# Patient Record
Sex: FEMALE | Race: WHITE
[De-identification: names, ages, dates, MRNs, and addresses within clinical notes are randomized per-mention and may not be internally consistent; named-entity substitution may affect disease eponyms.]

---

## 2017-06-07 ENCOUNTER — HOSPITAL ENCOUNTER (EMERGENCY)
Dept: HOSPITAL 18 - NAV ERS | Age: 52
Discharge: ANOTHER HOSPITAL | End: 2017-06-07
Payer: COMMERCIAL

## 2017-06-07 DIAGNOSIS — I10: ICD-10-CM

## 2017-06-07 DIAGNOSIS — T37.0X5A: ICD-10-CM

## 2017-06-07 DIAGNOSIS — E78.2: ICD-10-CM

## 2017-06-07 DIAGNOSIS — F31.9: ICD-10-CM

## 2017-06-07 DIAGNOSIS — L29.9: Primary | ICD-10-CM

## 2017-06-07 DIAGNOSIS — F17.210: ICD-10-CM

## 2017-06-07 DIAGNOSIS — E11.9: ICD-10-CM

## 2017-06-07 DIAGNOSIS — I25.10: ICD-10-CM

## 2017-06-07 LAB
ALBUMIN SERPL BCG-MCNC: 3.7 G/DL (ref 3.5–5)
ALP SERPL-CCNC: 86 U/L (ref 40–150)
ALT SERPL W P-5'-P-CCNC: 46 U/L (ref 8–55)
ANION GAP SERPL CALC-SCNC: 17 MMOL/L (ref 10–20)
AST SERPL-CCNC: 40 U/L (ref 5–34)
BASOPHILS # BLD AUTO: 0.1 THOU/UL (ref 0–0.2)
BASOPHILS NFR BLD AUTO: 1.1 % (ref 0–1)
BILIRUB SERPL-MCNC: 0.4 MG/DL (ref 0.2–1.2)
BUN SERPL-MCNC: 10 MG/DL (ref 9.8–20.1)
CALCIUM SERPL-MCNC: 8.6 MG/DL (ref 7.8–10.44)
CHLORIDE SERPL-SCNC: 102 MMOL/L (ref 98–107)
CK MB SERPL-MCNC: 1.2 NG/ML (ref 0–6.6)
CO2 SERPL-SCNC: 21 MMOL/L (ref 22–29)
CREAT CL PREDICTED SERPL C-G-VRATE: 0 ML/MIN (ref 70–130)
EOSINOPHIL # BLD AUTO: 0.2 THOU/UL (ref 0–0.7)
EOSINOPHIL NFR BLD AUTO: 1.4 % (ref 0–10)
GLOBULIN SER CALC-MCNC: 2.8 G/DL (ref 2.4–3.5)
GLUCOSE SERPL-MCNC: 467 MG/DL (ref 70–105)
HGB BLD-MCNC: 14.4 G/DL (ref 12–16)
LYMPHOCYTES # BLD AUTO: 3.8 THOU/UL (ref 1.2–3.4)
LYMPHOCYTES NFR BLD AUTO: 32.9 % (ref 21–51)
MCH RBC QN AUTO: 29.3 PG (ref 27–31)
MCV RBC AUTO: 90.3 FL (ref 81–99)
MONOCYTES # BLD AUTO: 0.7 THOU/UL (ref 0.11–0.59)
MONOCYTES NFR BLD AUTO: 6 % (ref 0–10)
NEUTROPHILS # BLD AUTO: 6.8 THOU/UL (ref 1.4–6.5)
NEUTROPHILS NFR BLD AUTO: 58.6 % (ref 42–75)
PLATELET # BLD AUTO: 126 THOU/UL (ref 130–400)
POTASSIUM SERPL-SCNC: 3.4 MMOL/L (ref 3.5–5.1)
RBC # BLD AUTO: 4.92 MILL/UL (ref 4.2–5.4)
SODIUM SERPL-SCNC: 137 MMOL/L (ref 136–145)
TROPONIN I SERPL DL<=0.01 NG/ML-MCNC: (no result) NG/ML (ref ?–0.03)
WBC # BLD AUTO: 11.6 THOU/UL (ref 4.8–10.8)

## 2017-06-07 PROCEDURE — 82553 CREATINE MB FRACTION: CPT

## 2017-06-07 PROCEDURE — 84484 ASSAY OF TROPONIN QUANT: CPT

## 2017-06-07 PROCEDURE — 93005 ELECTROCARDIOGRAM TRACING: CPT

## 2017-06-07 PROCEDURE — 90471 IMMUNIZATION ADMIN: CPT

## 2017-06-07 PROCEDURE — 85025 COMPLETE CBC W/AUTO DIFF WBC: CPT

## 2017-06-07 PROCEDURE — 80053 COMPREHEN METABOLIC PANEL: CPT

## 2017-06-07 PROCEDURE — 96376 TX/PRO/DX INJ SAME DRUG ADON: CPT

## 2017-06-07 PROCEDURE — 96374 THER/PROPH/DIAG INJ IV PUSH: CPT

## 2017-06-07 PROCEDURE — 96372 THER/PROPH/DIAG INJ SC/IM: CPT

## 2017-06-07 PROCEDURE — 96375 TX/PRO/DX INJ NEW DRUG ADDON: CPT

## 2017-06-07 PROCEDURE — 96361 HYDRATE IV INFUSION ADD-ON: CPT

## 2017-06-07 PROCEDURE — 71010: CPT

## 2017-06-07 NOTE — RAD
PORTABLE CHEST:

6/7/17

 

HISTORY: 

Dyspnea.

 

COMPARISON:  

1/31/17 study.

 

The heart size is enlarged. Mediastinal structures are unremarkable. The lungs are clear of infiltra
jaison. There are postoperative changes of the cervical spine. 

 

IMPRESSION:  

Mild cardiomegaly. 

 

POS: [facility]

## 2017-06-09 ENCOUNTER — HOSPITAL ENCOUNTER (EMERGENCY)
Dept: HOSPITAL 18 - NAV ERS | Age: 52
LOS: 1 days | Discharge: HOME | End: 2017-06-10
Payer: SELF-PAY

## 2017-06-09 DIAGNOSIS — T78.40XA: Primary | ICD-10-CM

## 2017-06-09 DIAGNOSIS — E11.9: ICD-10-CM

## 2017-06-09 DIAGNOSIS — F17.210: ICD-10-CM

## 2017-06-09 DIAGNOSIS — F31.9: ICD-10-CM

## 2017-06-09 DIAGNOSIS — I10: ICD-10-CM

## 2017-06-09 DIAGNOSIS — E78.2: ICD-10-CM

## 2017-06-09 PROCEDURE — S0028 INJECTION, FAMOTIDINE, 20 MG: HCPCS

## 2017-06-09 PROCEDURE — 96375 TX/PRO/DX INJ NEW DRUG ADDON: CPT

## 2017-06-09 PROCEDURE — A4216 STERILE WATER/SALINE, 10 ML: HCPCS

## 2017-06-09 PROCEDURE — 96374 THER/PROPH/DIAG INJ IV PUSH: CPT

## 2017-07-25 ENCOUNTER — HOSPITAL ENCOUNTER (OUTPATIENT)
Dept: HOSPITAL 18 - NAV LAB | Age: 52
Discharge: HOME | End: 2017-07-25
Payer: SELF-PAY

## 2017-07-25 DIAGNOSIS — Z13.820: Primary | ICD-10-CM

## 2017-07-25 DIAGNOSIS — I25.10: ICD-10-CM

## 2017-07-25 DIAGNOSIS — E78.2: ICD-10-CM

## 2017-07-25 DIAGNOSIS — N18.9: ICD-10-CM

## 2017-07-25 DIAGNOSIS — I12.9: ICD-10-CM

## 2017-07-25 DIAGNOSIS — E11.22: ICD-10-CM

## 2017-07-25 LAB
25(OH)D3+25(OH)D2 SERPL-MCNC: 12 NG/ML (ref 30–?)
ALBUMIN SERPL BCG-MCNC: 4.2 G/DL (ref 3.5–5)
ALP SERPL-CCNC: 88 U/L (ref 40–150)
ALT SERPL W P-5'-P-CCNC: 53 U/L (ref 8–55)
ANION GAP SERPL CALC-SCNC: 17 MMOL/L (ref 10–20)
AST SERPL-CCNC: 53 U/L (ref 5–34)
BACTERIA UR QL AUTO: (no result) HPF
BASOPHILS # BLD AUTO: 0.1 THOU/UL (ref 0–0.2)
BASOPHILS NFR BLD AUTO: 1.1 % (ref 0–1)
BILIRUB SERPL-MCNC: 0.6 MG/DL (ref 0.2–1.2)
BUN SERPL-MCNC: 16 MG/DL (ref 9.8–20.1)
CALCIUM SERPL-MCNC: 9.2 MG/DL (ref 7.8–10.44)
CHD RISK SERPL-RTO: 9.4 (ref ?–4.5)
CHLORIDE SERPL-SCNC: 100 MMOL/L (ref 98–107)
CHOLEST SERPL-MCNC: 187 MG/DL
CO2 SERPL-SCNC: 26 MMOL/L (ref 22–29)
CREAT CL PREDICTED SERPL C-G-VRATE: 0 ML/MIN (ref 70–130)
EOSINOPHIL # BLD AUTO: 0.2 THOU/UL (ref 0–0.7)
EOSINOPHIL NFR BLD AUTO: 1.4 % (ref 0–10)
GLOBULIN SER CALC-MCNC: 2.5 G/DL (ref 2.4–3.5)
GLUCOSE SERPL-MCNC: 156 MG/DL (ref 70–105)
HDLC SERPL-MCNC: 20 MG/DL
HGB BLD-MCNC: 14.4 G/DL (ref 12–16)
LDLC SERPL CALC-MCNC: (no result) MG/DL
LYMPHOCYTES # BLD AUTO: 3.8 THOU/UL (ref 1.2–3.4)
LYMPHOCYTES NFR BLD AUTO: 32.9 % (ref 21–51)
MCH RBC QN AUTO: 28.5 PG (ref 27–31)
MCV RBC AUTO: 89 FL (ref 81–99)
MONOCYTES # BLD AUTO: 0.7 THOU/UL (ref 0.11–0.59)
MONOCYTES NFR BLD AUTO: 6 % (ref 0–10)
NEUTROPHILS # BLD AUTO: 6.8 THOU/UL (ref 1.4–6.5)
NEUTROPHILS NFR BLD AUTO: 58.6 % (ref 42–75)
PLATELET # BLD AUTO: 167 THOU/UL (ref 130–400)
POTASSIUM SERPL-SCNC: 3.7 MMOL/L (ref 3.5–5.1)
RBC # BLD AUTO: 5.04 MILL/UL (ref 4.2–5.4)
RBC UR QL AUTO: (no result) HPF (ref 0–3)
SODIUM SERPL-SCNC: 139 MMOL/L (ref 136–145)
SP GR UR STRIP: 1.01 (ref 1–1.03)
TRIGL SERPL-MCNC: 434 MG/DL (ref ?–150)
TSH SERPL DL<=0.005 MIU/L-ACNC: 0.95 UIU/ML (ref 0.35–4.94)
WBC # BLD AUTO: 11.2 THOU/UL (ref 4.8–10.8)

## 2017-07-25 PROCEDURE — 80053 COMPREHEN METABOLIC PANEL: CPT

## 2017-07-25 PROCEDURE — 84443 ASSAY THYROID STIM HORMONE: CPT

## 2017-07-25 PROCEDURE — 81001 URINALYSIS AUTO W/SCOPE: CPT

## 2017-07-25 PROCEDURE — 83036 HEMOGLOBIN GLYCOSYLATED A1C: CPT

## 2017-07-25 PROCEDURE — 82306 VITAMIN D 25 HYDROXY: CPT

## 2017-07-25 PROCEDURE — 80061 LIPID PANEL: CPT

## 2017-07-25 PROCEDURE — 85025 COMPLETE CBC W/AUTO DIFF WBC: CPT

## 2017-08-05 ENCOUNTER — HOSPITAL ENCOUNTER (EMERGENCY)
Dept: HOSPITAL 18 - NAV ERS | Age: 52
Discharge: HOME | End: 2017-08-05
Payer: SELF-PAY

## 2017-08-05 DIAGNOSIS — E11.65: Primary | ICD-10-CM

## 2017-08-05 DIAGNOSIS — I25.10: ICD-10-CM

## 2017-08-05 DIAGNOSIS — I10: ICD-10-CM

## 2017-08-05 DIAGNOSIS — F31.9: ICD-10-CM

## 2017-08-05 DIAGNOSIS — E66.9: ICD-10-CM

## 2017-08-05 DIAGNOSIS — E78.5: ICD-10-CM

## 2017-08-05 DIAGNOSIS — F17.210: ICD-10-CM

## 2017-08-05 DIAGNOSIS — Z79.899: ICD-10-CM

## 2017-08-05 LAB
ALBUMIN SERPL BCG-MCNC: 3.8 G/DL (ref 3.5–5)
ALP SERPL-CCNC: 85 U/L (ref 40–150)
ALT SERPL W P-5'-P-CCNC: 53 U/L (ref 8–55)
ANION GAP SERPL CALC-SCNC: 14 MMOL/L (ref 10–20)
AST SERPL-CCNC: 38 U/L (ref 5–34)
BASOPHILS # BLD AUTO: 0.1 THOU/UL (ref 0–0.2)
BASOPHILS NFR BLD AUTO: 1 % (ref 0–1)
BILIRUB SERPL-MCNC: 0.4 MG/DL (ref 0.2–1.2)
BUN SERPL-MCNC: 15 MG/DL (ref 9.8–20.1)
CALCIUM SERPL-MCNC: 8.6 MG/DL (ref 7.8–10.44)
CHLORIDE SERPL-SCNC: 103 MMOL/L (ref 98–107)
CO2 SERPL-SCNC: 21 MMOL/L (ref 22–29)
CREAT CL PREDICTED SERPL C-G-VRATE: 0 ML/MIN (ref 70–130)
EOSINOPHIL # BLD AUTO: 0.2 THOU/UL (ref 0–0.7)
EOSINOPHIL NFR BLD AUTO: 1.6 % (ref 0–10)
GLOBULIN SER CALC-MCNC: 2.4 G/DL (ref 2.4–3.5)
GLUCOSE SERPL-MCNC: 480 MG/DL (ref 70–105)
GLUCOSE UR STRIP-MCNC: 500 MG/DL
HGB BLD-MCNC: 13.9 G/DL (ref 12–16)
LIPASE SERPL-CCNC: 81 U/L (ref 8–78)
LYMPHOCYTES # BLD AUTO: 3.1 THOU/UL (ref 1.2–3.4)
LYMPHOCYTES NFR BLD AUTO: 31.1 % (ref 21–51)
MCH RBC QN AUTO: 29.8 PG (ref 27–31)
MCV RBC AUTO: 88 FL (ref 81–99)
MONOCYTES # BLD AUTO: 0.6 THOU/UL (ref 0.11–0.59)
MONOCYTES NFR BLD AUTO: 5.6 % (ref 0–10)
NEUTROPHILS # BLD AUTO: 6.1 THOU/UL (ref 1.4–6.5)
NEUTROPHILS NFR BLD AUTO: 60.8 % (ref 42–75)
PLATELET # BLD AUTO: 132 THOU/UL (ref 130–400)
POTASSIUM SERPL-SCNC: 4 MMOL/L (ref 3.5–5.1)
RBC # BLD AUTO: 4.65 MILL/UL (ref 4.2–5.4)
SODIUM SERPL-SCNC: 134 MMOL/L (ref 136–145)
SP GR UR STRIP: 1.02 (ref 1–1.03)
WBC # BLD AUTO: 10 THOU/UL (ref 4.8–10.8)

## 2017-08-05 PROCEDURE — 82010 KETONE BODYS QUAN: CPT

## 2017-08-05 PROCEDURE — 36416 COLLJ CAPILLARY BLOOD SPEC: CPT

## 2017-08-05 PROCEDURE — 81003 URINALYSIS AUTO W/O SCOPE: CPT

## 2017-08-05 PROCEDURE — 71010: CPT

## 2017-08-05 PROCEDURE — 93005 ELECTROCARDIOGRAM TRACING: CPT

## 2017-08-05 PROCEDURE — 80053 COMPREHEN METABOLIC PANEL: CPT

## 2017-08-05 PROCEDURE — 96360 HYDRATION IV INFUSION INIT: CPT

## 2017-08-05 PROCEDURE — 85025 COMPLETE CBC W/AUTO DIFF WBC: CPT

## 2017-08-05 PROCEDURE — 83690 ASSAY OF LIPASE: CPT

## 2017-08-05 NOTE — RAD
PORTABLE FRONTAL CHEST RADIOGRAPH

8/5/17

 

COMPARISON:  

6/7/17

 

HISTORY: 

Dizziness. 

 

Abnormal blood sugar levels. 

 

FINDINGS:  

No pneumothorax or pleural fluid. No focal consolidation or alveolar edema. Heart and mediastinal co
ntours are stable. Cervical spine hardware is present. 

 

IMPRESSION:  

No acute findings. 

 

POS: [facility]

## 2017-08-19 ENCOUNTER — HOSPITAL ENCOUNTER (EMERGENCY)
Dept: HOSPITAL 18 - NAV ERS | Age: 52
Discharge: HOME | End: 2017-08-19
Payer: COMMERCIAL

## 2017-08-19 DIAGNOSIS — N20.0: Primary | ICD-10-CM

## 2017-08-19 DIAGNOSIS — Z79.4: ICD-10-CM

## 2017-08-19 DIAGNOSIS — K86.1: ICD-10-CM

## 2017-08-19 DIAGNOSIS — R91.1: ICD-10-CM

## 2017-08-19 DIAGNOSIS — F17.210: ICD-10-CM

## 2017-08-19 DIAGNOSIS — E66.9: ICD-10-CM

## 2017-08-19 DIAGNOSIS — Z79.899: ICD-10-CM

## 2017-08-19 DIAGNOSIS — F31.9: ICD-10-CM

## 2017-08-19 DIAGNOSIS — E10.9: ICD-10-CM

## 2017-08-19 DIAGNOSIS — E78.5: ICD-10-CM

## 2017-08-19 DIAGNOSIS — I25.10: ICD-10-CM

## 2017-08-19 DIAGNOSIS — I10: ICD-10-CM

## 2017-08-19 LAB
ALBUMIN SERPL BCG-MCNC: 3.5 G/DL (ref 3.5–5)
ALP SERPL-CCNC: 72 U/L (ref 40–150)
ALT SERPL W P-5'-P-CCNC: 44 U/L (ref 8–55)
ANION GAP SERPL CALC-SCNC: 10 MMOL/L (ref 10–20)
AST SERPL-CCNC: 35 U/L (ref 5–34)
BASOPHILS # BLD AUTO: 0.1 THOU/UL (ref 0–0.2)
BASOPHILS NFR BLD AUTO: 1.1 % (ref 0–1)
BILIRUB SERPL-MCNC: 0.3 MG/DL (ref 0.2–1.2)
BUN SERPL-MCNC: 10 MG/DL (ref 9.8–20.1)
CALCIUM SERPL-MCNC: 8.7 MG/DL (ref 7.8–10.44)
CHLORIDE SERPL-SCNC: 106 MMOL/L (ref 98–107)
CO2 SERPL-SCNC: 26 MMOL/L (ref 22–29)
CREAT CL PREDICTED SERPL C-G-VRATE: 0 ML/MIN (ref 70–130)
EOSINOPHIL # BLD AUTO: 0.2 THOU/UL (ref 0–0.7)
EOSINOPHIL NFR BLD AUTO: 1.8 % (ref 0–10)
GLOBULIN SER CALC-MCNC: 2.5 G/DL (ref 2.4–3.5)
GLUCOSE SERPL-MCNC: 251 MG/DL (ref 70–105)
GLUCOSE UR STRIP-MCNC: 500 MG/DL
HGB BLD-MCNC: 14.2 G/DL (ref 12–16)
LIPASE SERPL-CCNC: 111 U/L (ref 8–78)
LYMPHOCYTES # BLD AUTO: 2.6 THOU/UL (ref 1.2–3.4)
LYMPHOCYTES NFR BLD AUTO: 30.9 % (ref 21–51)
MCH RBC QN AUTO: 29.3 PG (ref 27–31)
MCV RBC AUTO: 88.5 FL (ref 81–99)
MONOCYTES # BLD AUTO: 0.4 THOU/UL (ref 0.11–0.59)
MONOCYTES NFR BLD AUTO: 4.9 % (ref 0–10)
NEUTROPHILS # BLD AUTO: 5.1 THOU/UL (ref 1.4–6.5)
NEUTROPHILS NFR BLD AUTO: 61.2 % (ref 42–75)
PLATELET # BLD AUTO: 144 THOU/UL (ref 130–400)
POTASSIUM SERPL-SCNC: 4 MMOL/L (ref 3.5–5.1)
RBC # BLD AUTO: 4.85 MILL/UL (ref 4.2–5.4)
RBC UR QL AUTO: (no result) HPF (ref 0–3)
SODIUM SERPL-SCNC: 138 MMOL/L (ref 136–145)
SP GR UR STRIP: 1.03 (ref 1–1.04)
WBC # BLD AUTO: 8.3 THOU/UL (ref 4.8–10.8)
WBC UR QL AUTO: (no result) HPF (ref 0–3)

## 2017-08-19 PROCEDURE — 83690 ASSAY OF LIPASE: CPT

## 2017-08-19 PROCEDURE — 81015 MICROSCOPIC EXAM OF URINE: CPT

## 2017-08-19 PROCEDURE — 80053 COMPREHEN METABOLIC PANEL: CPT

## 2017-08-19 PROCEDURE — 74176 CT ABD & PELVIS W/O CONTRAST: CPT

## 2017-08-19 PROCEDURE — 81003 URINALYSIS AUTO W/O SCOPE: CPT

## 2017-08-19 PROCEDURE — 96361 HYDRATE IV INFUSION ADD-ON: CPT

## 2017-08-19 PROCEDURE — 96374 THER/PROPH/DIAG INJ IV PUSH: CPT

## 2017-08-19 PROCEDURE — 87086 URINE CULTURE/COLONY COUNT: CPT

## 2017-08-19 PROCEDURE — 85025 COMPLETE CBC W/AUTO DIFF WBC: CPT

## 2017-08-19 NOTE — CT
EXAM:

ABDOMEN CT WITHOUT CONTRAST

PELVIC CT WITHOUT CONTRAST

8/19/17

 

HISTORY: 

Hematuria. Lower abdominal pain.

 

COMPARISON:  

None.

 

TECHNIQUE:  

Abdomen and pelvic CT are performed without IV or oral contrast utilizing a renal stone protocol. Co
sherri reformatted images are submitted for interpretation. 

 

FINDINGS:  

 

ABDOMEN CT:

4 mm nodule in the left lower lobe. Limited evaluation of the solid organs due to lack of IV contras
t. There is evidence of hepatic steatosis with small areas of fatty sparring. Grossly, the solid org
ans are unremarkable. 

 

Gallbladder is surgically absent. 

 

No gastrohepatic, retrocrural or periportal lymphadenopathy. 

 

Limited evaluation of the alimentary canal due to lack of oral contrast. No evidence of bowel obstru
ction. Ileocecal junction is normal. Appendix is not appreciated. No inflammation of the cecal apex.
 Scattered fecal material in a nondistended, nondilated colon. There are diverticulum in the sigmoid
 colon. There is subtle fat stranding in the mid sigmoid colon. Correlate for possible diverticuliti
s. No evidence of abscess or bowel perforation. 

 

Bilaterally, no hydronephrosis or perinephric fat stranding. There is a punctate nonobstructing calc
ification in the right renal pelvis. Bilateral ureters have a normal caliber. No hydroureter, periur
eteral fat stranding or ureterolithiasis. 

 

PELVIC CT:

The uterus is surgically absent. No pelvic mass, lymphadenopathy, free air or free fluid. 

 

Questionable subtle hyperdensity in the left aspect of the bladder which may represent a small recen
tly passed calculus. This subtle hyperdensity measures approximately 2 mm. 

 

IMPRESSION:  

1.      Questionable recently passed calculus. There is no evidence of obstructive uropathy.

2.      Nonobstructing punctate calcification right renal pelvis. 

3.      Diverticulosis with questionable diverticulitis involving the sigmoid colon. Minimal pericol
onic fat stranding is noted. Correlate clinically. 

 

POS: [facility]

## 2017-09-01 ENCOUNTER — HOSPITAL ENCOUNTER (EMERGENCY)
Dept: HOSPITAL 18 - NAV ERS | Age: 52
Discharge: HOME | End: 2017-09-01
Payer: COMMERCIAL

## 2017-09-01 DIAGNOSIS — Z79.899: ICD-10-CM

## 2017-09-01 DIAGNOSIS — E11.9: ICD-10-CM

## 2017-09-01 DIAGNOSIS — F31.9: ICD-10-CM

## 2017-09-01 DIAGNOSIS — R10.13: Primary | ICD-10-CM

## 2017-09-01 DIAGNOSIS — R11.2: ICD-10-CM

## 2017-09-01 DIAGNOSIS — I10: ICD-10-CM

## 2017-09-01 DIAGNOSIS — F17.210: ICD-10-CM

## 2017-09-01 DIAGNOSIS — Z79.4: ICD-10-CM

## 2017-09-01 DIAGNOSIS — I25.10: ICD-10-CM

## 2017-09-01 DIAGNOSIS — E66.9: ICD-10-CM

## 2017-09-01 DIAGNOSIS — E78.5: ICD-10-CM

## 2017-09-01 PROCEDURE — 99283 EMERGENCY DEPT VISIT LOW MDM: CPT

## 2017-09-08 ENCOUNTER — HOSPITAL ENCOUNTER (OUTPATIENT)
Dept: HOSPITAL 18 - NAV LAB | Age: 52
Discharge: HOME | End: 2017-09-08
Payer: COMMERCIAL

## 2017-09-08 ENCOUNTER — HOSPITAL ENCOUNTER (EMERGENCY)
Dept: HOSPITAL 18 - NAV ERS | Age: 52
LOS: 1 days | Discharge: ANOTHER HOSPITAL | End: 2017-09-09
Payer: COMMERCIAL

## 2017-09-08 DIAGNOSIS — Z87.891: ICD-10-CM

## 2017-09-08 DIAGNOSIS — F17.210: ICD-10-CM

## 2017-09-08 DIAGNOSIS — Z79.899: ICD-10-CM

## 2017-09-08 DIAGNOSIS — Z79.4: ICD-10-CM

## 2017-09-08 DIAGNOSIS — F31.9: ICD-10-CM

## 2017-09-08 DIAGNOSIS — R53.83: ICD-10-CM

## 2017-09-08 DIAGNOSIS — R16.1: ICD-10-CM

## 2017-09-08 DIAGNOSIS — E11.9: ICD-10-CM

## 2017-09-08 DIAGNOSIS — E66.9: ICD-10-CM

## 2017-09-08 DIAGNOSIS — R10.12: Primary | ICD-10-CM

## 2017-09-08 DIAGNOSIS — E78.1: ICD-10-CM

## 2017-09-08 DIAGNOSIS — K86.1: Primary | ICD-10-CM

## 2017-09-08 DIAGNOSIS — I10: ICD-10-CM

## 2017-09-08 LAB
ALBUMIN SERPL BCG-MCNC: 3.8 G/DL (ref 3.5–5)
ALP SERPL-CCNC: 72 U/L (ref 40–150)
ALT SERPL W P-5'-P-CCNC: 38 U/L (ref 8–55)
AMYLASE SERPL-CCNC: 67 U/L (ref 25–125)
ANION GAP SERPL CALC-SCNC: 11 MMOL/L (ref 10–20)
AST SERPL-CCNC: 36 U/L (ref 5–34)
BASOPHILS # BLD AUTO: 0.1 THOU/UL (ref 0–0.2)
BASOPHILS NFR BLD AUTO: 0.9 % (ref 0–1)
BILIRUB SERPL-MCNC: 0.3 MG/DL (ref 0.2–1.2)
BUN SERPL-MCNC: 12 MG/DL (ref 9.8–20.1)
CALCIUM SERPL-MCNC: 9.2 MG/DL (ref 7.8–10.44)
CHLORIDE SERPL-SCNC: 106 MMOL/L (ref 98–107)
CO2 SERPL-SCNC: 26 MMOL/L (ref 22–29)
CREAT CL PREDICTED SERPL C-G-VRATE: 0 ML/MIN (ref 70–130)
EOSINOPHIL # BLD AUTO: 0.2 THOU/UL (ref 0–0.7)
EOSINOPHIL NFR BLD AUTO: 1.7 % (ref 0–10)
GLOBULIN SER CALC-MCNC: 2.8 G/DL (ref 2.4–3.5)
GLUCOSE SERPL-MCNC: 223 MG/DL (ref 70–105)
GLUCOSE UR STRIP-MCNC: 100 MG/DL
HGB BLD-MCNC: 13.6 G/DL (ref 12–16)
LIPASE SERPL-CCNC: 105 U/L (ref 8–78)
LYMPHOCYTES # BLD AUTO: 2.8 THOU/UL (ref 1.2–3.4)
LYMPHOCYTES NFR BLD AUTO: 31.7 % (ref 21–51)
Lab: 39 U/L (ref 9–36)
MCH RBC QN AUTO: 29.6 PG (ref 27–31)
MCV RBC AUTO: 90.4 FL (ref 81–99)
MONOCYTES # BLD AUTO: 0.5 THOU/UL (ref 0.11–0.59)
MONOCYTES NFR BLD AUTO: 5.7 % (ref 0–10)
NEUTROPHILS # BLD AUTO: 5.3 THOU/UL (ref 1.4–6.5)
NEUTROPHILS NFR BLD AUTO: 60 % (ref 42–75)
PLATELET # BLD AUTO: 159 THOU/UL (ref 130–400)
POTASSIUM SERPL-SCNC: 4.2 MMOL/L (ref 3.5–5.1)
RBC # BLD AUTO: 4.61 MILL/UL (ref 4.2–5.4)
SODIUM SERPL-SCNC: 139 MMOL/L (ref 136–145)
SP GR UR STRIP: 1.01 (ref 1–1.03)
WBC # BLD AUTO: 8.9 THOU/UL (ref 4.8–10.8)

## 2017-09-08 PROCEDURE — 82607 VITAMIN B-12: CPT

## 2017-09-08 PROCEDURE — 83690 ASSAY OF LIPASE: CPT

## 2017-09-08 PROCEDURE — 85025 COMPLETE CBC W/AUTO DIFF WBC: CPT

## 2017-09-08 PROCEDURE — 82150 ASSAY OF AMYLASE: CPT

## 2017-09-08 PROCEDURE — 96374 THER/PROPH/DIAG INJ IV PUSH: CPT

## 2017-09-08 PROCEDURE — 96361 HYDRATE IV INFUSION ADD-ON: CPT

## 2017-09-08 PROCEDURE — 82977 ASSAY OF GGT: CPT

## 2017-09-08 PROCEDURE — 80053 COMPREHEN METABOLIC PANEL: CPT

## 2017-09-08 PROCEDURE — 81003 URINALYSIS AUTO W/O SCOPE: CPT

## 2017-09-08 PROCEDURE — 96375 TX/PRO/DX INJ NEW DRUG ADDON: CPT

## 2017-09-08 PROCEDURE — 96376 TX/PRO/DX INJ SAME DRUG ADON: CPT

## 2017-09-08 PROCEDURE — 74177 CT ABD & PELVIS W/CONTRAST: CPT

## 2017-09-09 NOTE — CT
ABDOMEN AND PELVIC CT SCAN WITH IV CONTRAST:

9/8/17

 

HISTORY: 

52-year-old female with history of chronic pancreatitis, left upper quadrant pain.

 

COMPARISON:  

8/19/17.

 

FINDINGS:  

The visualized lung zones are unremarkable. Fatty changes in the liver. Postop cholecystectomy. Ther
e is some minimal pancreatic ductal dilatation of the proximal and mid pancreatic duct, otherwise th
e pancreas appears unremarkable. Spleen is borderline enlarged. Adrenal glands are unremarkable. No 
evidence for renal calculus or acute  obstruction. Minimal sigmoid colon diverticulosis without di
verticulitis. No bowel obstruction, abscess, adenopathy or abnormal fluid collection. 

 

IMPRESSION:  

Fatty changes of the liver. Borderline splenomegaly. No renal calculus or acute  obstruction. No C
T evidence for acute appendicitis. Diverticulosis without acute diverticulitis.

 

POS: [facility]

## 2017-10-13 ENCOUNTER — HOSPITAL ENCOUNTER (INPATIENT)
Dept: HOSPITAL 92 - ERS | Age: 52
LOS: 2 days | Discharge: HOME | DRG: 439 | End: 2017-10-15
Attending: FAMILY MEDICINE | Admitting: FAMILY MEDICINE
Payer: COMMERCIAL

## 2017-10-13 VITALS — BODY MASS INDEX: 43.4 KG/M2

## 2017-10-13 DIAGNOSIS — K44.9: ICD-10-CM

## 2017-10-13 DIAGNOSIS — Z85.3: ICD-10-CM

## 2017-10-13 DIAGNOSIS — Z91.048: ICD-10-CM

## 2017-10-13 DIAGNOSIS — Z79.4: ICD-10-CM

## 2017-10-13 DIAGNOSIS — Z88.8: ICD-10-CM

## 2017-10-13 DIAGNOSIS — F17.210: ICD-10-CM

## 2017-10-13 DIAGNOSIS — E11.22: ICD-10-CM

## 2017-10-13 DIAGNOSIS — Z88.5: ICD-10-CM

## 2017-10-13 DIAGNOSIS — Z88.2: ICD-10-CM

## 2017-10-13 DIAGNOSIS — K86.1: Primary | ICD-10-CM

## 2017-10-13 DIAGNOSIS — Z85.820: ICD-10-CM

## 2017-10-13 DIAGNOSIS — F31.9: ICD-10-CM

## 2017-10-13 DIAGNOSIS — K29.70: ICD-10-CM

## 2017-10-13 DIAGNOSIS — I12.9: ICD-10-CM

## 2017-10-13 DIAGNOSIS — I25.10: ICD-10-CM

## 2017-10-13 DIAGNOSIS — Z88.1: ICD-10-CM

## 2017-10-13 DIAGNOSIS — E78.5: ICD-10-CM

## 2017-10-13 DIAGNOSIS — E66.9: ICD-10-CM

## 2017-10-13 DIAGNOSIS — N18.3: ICD-10-CM

## 2017-10-13 LAB
ALP SERPL-CCNC: 66 U/L (ref 40–150)
ALT SERPL W P-5'-P-CCNC: 34 U/L (ref 8–55)
ANION GAP SERPL CALC-SCNC: 9 MMOL/L (ref 10–20)
AST SERPL-CCNC: 29 U/L (ref 5–34)
BASOPHILS # BLD AUTO: 0.1 THOU/UL (ref 0–0.2)
BASOPHILS NFR BLD AUTO: 0.8 % (ref 0–1)
BILIRUB SERPL-MCNC: 0.4 MG/DL (ref 0.2–1.2)
BUN SERPL-MCNC: 15 MG/DL (ref 9.8–20.1)
CALCIUM SERPL-MCNC: 9.6 MG/DL (ref 7.8–10.44)
CHLORIDE SERPL-SCNC: 103 MMOL/L (ref 98–107)
CO2 SERPL-SCNC: 30 MMOL/L (ref 22–29)
CREAT CL PREDICTED SERPL C-G-VRATE: 0 ML/MIN (ref 70–130)
EOSINOPHIL # BLD AUTO: 0.2 THOU/UL (ref 0–0.7)
EOSINOPHIL NFR BLD AUTO: 1.6 % (ref 0–10)
GLOBULIN SER CALC-MCNC: 3.1 G/DL (ref 2.4–3.5)
HCT VFR BLD CALC: 44.5 % (ref 36–47)
LIPASE SERPL-CCNC: 98 U/L (ref 8–78)
LYMPHOCYTES # BLD: 3.8 THOU/UL (ref 1.2–3.4)
LYMPHOCYTES NFR BLD AUTO: 35.7 % (ref 21–51)
MONOCYTES # BLD AUTO: 0.6 THOU/UL (ref 0.11–0.59)
MONOCYTES NFR BLD AUTO: 5.9 % (ref 0–10)
NEUTROPHILS # BLD AUTO: 5.9 THOU/UL (ref 1.4–6.5)
RBC # BLD AUTO: 4.68 MILL/UL (ref 4.2–5.4)
WBC # BLD AUTO: 10.6 THOU/UL (ref 4.8–10.8)

## 2017-10-13 PROCEDURE — 84484 ASSAY OF TROPONIN QUANT: CPT

## 2017-10-13 PROCEDURE — 80048 BASIC METABOLIC PNL TOTAL CA: CPT

## 2017-10-13 PROCEDURE — 83690 ASSAY OF LIPASE: CPT

## 2017-10-13 PROCEDURE — 93010 ELECTROCARDIOGRAM REPORT: CPT

## 2017-10-13 PROCEDURE — 96374 THER/PROPH/DIAG INJ IV PUSH: CPT

## 2017-10-13 PROCEDURE — 80053 COMPREHEN METABOLIC PANEL: CPT

## 2017-10-13 PROCEDURE — 85025 COMPLETE CBC W/AUTO DIFF WBC: CPT

## 2017-10-13 PROCEDURE — 96375 TX/PRO/DX INJ NEW DRUG ADDON: CPT

## 2017-10-13 PROCEDURE — A4216 STERILE WATER/SALINE, 10 ML: HCPCS

## 2017-10-13 PROCEDURE — 74177 CT ABD & PELVIS W/CONTRAST: CPT

## 2017-10-13 PROCEDURE — 36415 COLL VENOUS BLD VENIPUNCTURE: CPT

## 2017-10-13 PROCEDURE — 96361 HYDRATE IV INFUSION ADD-ON: CPT

## 2017-10-13 PROCEDURE — 96376 TX/PRO/DX INJ SAME DRUG ADON: CPT

## 2017-10-13 PROCEDURE — 36416 COLLJ CAPILLARY BLOOD SPEC: CPT

## 2017-10-13 PROCEDURE — 88312 SPECIAL STAINS GROUP 1: CPT

## 2017-10-13 PROCEDURE — 88305 TISSUE EXAM BY PATHOLOGIST: CPT

## 2017-10-13 PROCEDURE — 82553 CREATINE MB FRACTION: CPT

## 2017-10-13 PROCEDURE — 93005 ELECTROCARDIOGRAM TRACING: CPT

## 2017-10-13 NOTE — CT
CT ABDOMEN WITH CONTRAST

CT PELVIS WITH CONTRAST:

 

DATE:

10/13/17

 

TIME:

9:32 p.m.

 

HISTORY:

52-year-old female with acute pancreatitis and epigastric pain. Nausea and emesis. 

 

COMPARISON:

CT of 9/8/17.

 

TECHNIQUE:

IV injection of iodinated contrast media: Isovue

Oral contrast media: Not administered. 

 

FINDINGS:

Diffusely low hepatic attenuation represents fatty liver. Liver is enlarged. Spleen is 14 x 7.5 x 11
.5 cm. Extensive calcified and noncalcified plaque with irregularity, but without aneurysm, involvin
g abdominal aorta. Appendix not visualized. Clips in the gallbladder fossa. Again demonstrated is th
e dilation of the pancreatic duct, at the head and body of the pancreas, up to 7 mm in caliber. At t
he body of the pancreas, there is an approximately 1.5 x 1.5 x 1.5 cm low attenuation lesion, with l
obular margins, with fluid attenuation (axial image 26 of 90, series 2; coronal image 66 of 179, ser
ies 601). The appearance is unchanged compared to 9/8/17. No peripancreatic fat stranding, and no ex
trapancreatic pseudocyst.  No hydronephrosis bilaterally. Normal adrenals. Nonspecific diffuse mild 
mural thickening of the urinary bladder, similar to previous CT. No free fluid or free air within th
e abdominal cavity or pelvic cavity. Multiple diverticula in sigmoid colon without signs of acute di
verticulitis. No small bowel dilation. No major interval change. No fat stranding around the pancrea
s. Nonspecific mildly enlarged delores hepatis lymph nodes.

 

IMPRESSION:

1.      A small hypodense lesion in the body of the pancreas. This is nonspecific, but one possibili
ty is a cystic pancreatic neoplasm. 

2.      Pancreatic ductal dilation.

3.      Hepatic steatosis. 

4.      Hepatomegaly.

5.      Borderline splenomegaly.

6.      Atherosclerotic disease of the abdominal aorta. 

7.      Status post cholecystectomy and perhaps status post appendectomy. 

8.      No significant interval change compared to 9/18/17.

 

 

MIRYAM BRADLEY

 

POS: [facility]

## 2017-10-14 LAB
ANION GAP SERPL CALC-SCNC: 12 MMOL/L (ref 10–20)
BASOPHILS # BLD AUTO: 0.1 THOU/UL (ref 0–0.2)
BASOPHILS NFR BLD AUTO: 0.5 % (ref 0–1)
BUN SERPL-MCNC: 15 MG/DL (ref 9.8–20.1)
CALCIUM SERPL-MCNC: 9.2 MG/DL (ref 7.8–10.44)
CHLORIDE SERPL-SCNC: 106 MMOL/L (ref 98–107)
CO2 SERPL-SCNC: 24 MMOL/L (ref 22–29)
CREAT CL PREDICTED SERPL C-G-VRATE: 120 ML/MIN (ref 70–130)
EOSINOPHIL # BLD AUTO: 0.2 THOU/UL (ref 0–0.7)
EOSINOPHIL NFR BLD AUTO: 2.2 % (ref 0–10)
HCT VFR BLD CALC: 44.7 % (ref 36–47)
LYMPHOCYTES # BLD: 4.6 THOU/UL (ref 1.2–3.4)
LYMPHOCYTES NFR BLD AUTO: 43.7 % (ref 21–51)
MONOCYTES # BLD AUTO: 0.6 THOU/UL (ref 0.11–0.59)
MONOCYTES NFR BLD AUTO: 5.3 % (ref 0–10)
NEUTROPHILS # BLD AUTO: 5 THOU/UL (ref 1.4–6.5)
RBC # BLD AUTO: 4.7 MILL/UL (ref 4.2–5.4)
TROPONIN I SERPL DL<=0.01 NG/ML-MCNC: (no result) NG/ML (ref ?–0.03)
WBC # BLD AUTO: 10.4 THOU/UL (ref 4.8–10.8)

## 2017-10-14 RX ADMIN — Medication PRN ML: at 17:08

## 2017-10-14 NOTE — HP-2
CODE STATUS:  FULL.

 

PRIMARY CARE PHYSICIAN:  Dr. Khan.

 

ATTENDING:  Dr. Hurtado.

 

PGY-1:  Dr. León.

 

CHIEF COMPLAINT:  Nausea, vomiting, abdominal pain.

 

HISTORY OF PRESENT ILLNESS:  This is a 52-year-old female who presents with nausea, vomiting, abdomi
nal pain for 3 months.  She states that she has previously admitted for the same type of symptoms.  
She states she had a history of gallstones and chronic pancreatitis in the past as well.  She also d
escribes the pain as a 6/10 over epigastrium, worse on the left side, but does not radiate anywhere.
  She states she is nauseous all the time and has vomited multiple times as well.  She denies any ja
undice episodes.  She has no other complaints at this time.

 

PAST MEDICAL HISTORY:  Significant for breast cancer, melanoma, coronary artery disease, diabetes me
llitus, hyperlipidemia, hypertension, obesity, chronic pancreatitis, bipolar and depression.

 

PAST SURGICAL HISTORY:  Significant for hysterectomy, oophorectomy, bilateral lumpectomy and left kn
ee surgery.

 

ALLERGIES:  Include ADHESIVES, BACTRIM, CODEINE, HYDROCODONE, NSAIDs, SULFA DRUGS, and TRAMADOL.

 

MEDICATIONS:  Include gabapentin 900 mg b.i.d., furosemide 40 mg, glargine 80 units b.i.d., lisinopr
il 5 mg, isosorbide mononitrate 60 mg t.i.d., paroxetine 20 mg, Risperdal 0.5 mg, fenofibrate 160 mg
, clopidogrel 75 mg.  She was also given Zofran and meperidine, but she was unaware of the dosages o
f those.

 

FAMILY HISTORY:  She had extensive cancer history in her maternal side including lung cancer in her 
mother and colon cancer in her uncle.

 

SOCIAL HISTORY:  She has a 30-pack-year smoking history and still currently smoking 1 pack per day. 
 She denies alcohol or drug use.

 

REVIEW OF SYSTEMS:  General:  She denies fevers or chills.  She does admit to a 20 pound weight loss
 over the last 3 months.  Denies night sweats, or fatigue.  Eyes:  She denies vision changes or eye 
pain.  ENT:  She admits to nasal congestion from her seasonal allergies.  She denies rhinorrhea, or 
sore throat.  Respiratory:  She denies cough, congestion, shortness of breath, exercise intolerance.
  Cardiovascular:  She denies chest pain, palpitations, edema.

Gastrointestinal:  She admits to nausea and vomiting.  She denies diarrhea or constipation.  She plascencia
s admit to abdominal pain.  Genitourinary:  Denies any incontinence or dysuria.  Skin:  She denies r
ashes, lesions, jaundice, itching.  Musculoskeletal:  She admits to pain, tenderness, stiffness, and
 swelling to her knees.  Neurologic:  She denies any weakness.  She does note numbness in her hands 
from a previous back injury.  Psychiatric:  She denies any anxiety or depression.

 

PHYSICAL EXAMINATION:

VITAL SIGNS:  Blood pressure was 125/71, pulse is 52, respiration 16, temperature max 98.2, pulse ox
 99% on room air.  Current weight is 111 kilograms.

GENERAL:  She is alert and oriented x4.  She is obese, appropriately interactive.

EYES:  PERRLA.  Conjunctivae within normal limits.

ENT:  Nasal mucosa and oropharynx within normal limits.

NECK:  Supple, without lymphadenopathy, thyromegaly, or bruit.

CARDIOVASCULAR:  Regular rate and rhythm.  No murmurs, no gallops.  Radial pulses and pedal pulses a
re equal bilaterally.

RESPIRATORY:  Normal effort, no retractions.

LUNGS:  Clear to auscultation bilaterally.

SKIN:  Warm and dry.

ABDOMEN:  Soft.  She was tender to palpation especially over epigastrium, worse on her left side.  B
owel sounds are present x4 and her liver was palpable.

EXTREMITIES:  She did not have any clubbing, cyanosis or edema.

MUSCULOSKELETAL:  Structure, tone, muscle strength and range of motion were within normal limits.

NEUROLOGIC:  No focal neurologic deficits.  Cranial nerves II through XII are grossly intact.  GCS w
as 15.

PSYCHIATRIC:  She was appropriate.

 

LABORATORY DATA AND IMAGING:  White blood cell count 10.6, platelet count 168, hemoglobin 14.4, hema
tocrit 44.5, MCV 95, 55.9% neutrophils.  Sodium 138, potassium 3.9, chloride 103, bicarbonate 30, BU
N 15, creatinine 1.04, glucose was 111, calcium 9.6, total protein 7.0, albumin 3.9, total bilirubin
 0.4, AST 29, ALT 34, alkaline phosphatase is 66.  Lipase was 98.  CT of her abdomen that showed a s
mall hypodense lesion in the body of her pancreas, pancreatic ductal dilation, hepatic steatosis, he
patomegaly and splenomegaly.

 

ASSESSMENT AND PLAN:  This is a 52-year-old female with a past medical history of coronary artery di
sease, diabetes mellitus, hypertension, hyperlipidemia, obesity, chronic pancreatitis, bipolar depre
ssion, breast cancer and melanoma, presents with:

1.  Abdominal pain secondary to a pancreatic lesion versus chronic pancreatitis.  We are going to gi
ve her pain control with morphine.  We are going to get a consult for biopsy, the best way to biopsy
 this lesion.  We are going to trend cardiac enzymes and get an EKG just to rule out any atypical pr
esentations of cardiac etiology.

2.  Nausea and vomiting.  We will give Zofran and Phenergan.

3.  Weight loss.  We are going to monitor her diet and monitor that going forward.

4.  Coronary artery disease.  Continue her home medications.

5.  Diabetes.  We will continue her home medication and give her a sliding scale.

6.  Hyperlipidemia.  Continue her home medications.

7.  Hypertension.  Continue her home medications.

8.  Bipolar depression.  Continue her home medications.

9.  Tobacco abuse.  We did advise her to quit, she did not want to use the nicotine patch.

10.  Hepatomegaly was found.  We will monitor that going forward.

 

Disposition and length of hospital stay will be inpatient and greater than 2 midnights.

 

Symptomatic medications will be provided.

 

History and physical exam as well as management will be discussed with Dr. Hurtado.

## 2017-10-15 VITALS — DIASTOLIC BLOOD PRESSURE: 69 MMHG | SYSTOLIC BLOOD PRESSURE: 139 MMHG

## 2017-10-15 VITALS — TEMPERATURE: 98.8 F

## 2017-10-15 LAB
ANION GAP SERPL CALC-SCNC: 9 MMOL/L (ref 10–20)
BASOPHILS # BLD AUTO: 0 THOU/UL (ref 0–0.2)
BASOPHILS NFR BLD AUTO: 0.4 % (ref 0–1)
BUN SERPL-MCNC: 18 MG/DL (ref 9.8–20.1)
CALCIUM SERPL-MCNC: 9.1 MG/DL (ref 7.8–10.44)
CHLORIDE SERPL-SCNC: 107 MMOL/L (ref 98–107)
CO2 SERPL-SCNC: 30 MMOL/L (ref 22–29)
CREAT CL PREDICTED SERPL C-G-VRATE: 118 ML/MIN (ref 70–130)
EOSINOPHIL # BLD AUTO: 0.1 THOU/UL (ref 0–0.7)
EOSINOPHIL NFR BLD AUTO: 1 % (ref 0–10)
HCT VFR BLD CALC: 42.7 % (ref 36–47)
LYMPHOCYTES # BLD: 2.7 THOU/UL (ref 1.2–3.4)
LYMPHOCYTES NFR BLD AUTO: 25.3 % (ref 21–51)
MONOCYTES # BLD AUTO: 0.6 THOU/UL (ref 0.11–0.59)
MONOCYTES NFR BLD AUTO: 6 % (ref 0–10)
NEUTROPHILS # BLD AUTO: 7.2 THOU/UL (ref 1.4–6.5)
RBC # BLD AUTO: 4.49 MILL/UL (ref 4.2–5.4)
WBC # BLD AUTO: 10.7 THOU/UL (ref 4.8–10.8)

## 2017-10-15 PROCEDURE — 0DB68ZX EXCISION OF STOMACH, VIA NATURAL OR ARTIFICIAL OPENING ENDOSCOPIC, DIAGNOSTIC: ICD-10-PCS | Performed by: INTERNAL MEDICINE

## 2017-10-15 RX ADMIN — Medication PRN ML: at 06:19

## 2017-10-15 NOTE — PRG
DATE OF SERVICE:  10/15/2017

 

Ms. Nguyen just returned from her endoscopy.  She is in no distress.  The official report has not
 yet returned, but she states that there were findings of gastritis.  It has been recommended that s
xavi have an endoscopic ultrasound, which was not performed at [facility].  She will through her PCP lora Angela for this test needed and evaluation of her pancreatic mass.  In any event, merle miller is ready for discharge from our institution and we will follow up with her PCP.

## 2017-10-15 NOTE — PDOC.FM
- Subjective


Subjective: 





Pt doing fine this morning. Pain being well controlled. Denies any other 

concerns or complaints at this time. 





- Objective


MAR Reviewed: Yes


Vital Signs & Weight: 


 Vital Signs (12 hours)











  Temp Pulse Resp BP Pulse Ox


 


 10/14/17 19:53  98.2 F  66  18   93 L


 


 10/14/17 19:51  98.2 F  66  18  103/57 L  93 L








 Weight











Admit Weight                   111.13 kg


 


Weight                         111.13 kg














Result Diagrams: 


 10/15/17 03:24





 10/15/17 03:24





Phys Exam





- Physical Examination


HEENT: moist MMs, oral pharynx no lesions


Neck: no nodes


Respiratory: no wheezing, no rales, no rhonchi, clear to auscultation bilateral


Cardiovascular: RRR, no significant murmur, no rub


Gastrointestinal: soft, non-tender, no distention


Musculoskeletal: no edema


Neurological: non-focal, normal sensation


Lymphatic: no nodes


Psychiatric: normal affect, A&O x 3





Dx/Plan


(1) Chronic pancreatitis


Code(s): K86.1 - OTHER CHRONIC PANCREATITIS   Status: Acute   





(2) Bipolar disorder


Code(s): F31.9 - BIPOLAR DISORDER, UNSPECIFIED   Status: Chronic   


Qualifiers: 


   Active/Remission status: remission status unspecified   Qualified Code(s): 

F31.9 - Bipolar disorder, unspecified   





(3) CAD (coronary artery disease)


Code(s): I25.10 - ATHSCL HEART DISEASE OF NATIVE CORONARY ARTERY W/O ANG PCTRS 

  Status: Chronic   


Qualifiers: 


   Coronary Disease-Associated Artery/Lesion type: native artery   Native vs. 

transplanted heart: native heart   Associated angina: angina presence 

unspecified   Qualified Code(s): I25.10 - Atherosclerotic heart disease of 

native coronary artery without angina pectoris   





(4) Diabetes mellitus type 2 in obese


Code(s): E11.9 - TYPE 2 DIABETES MELLITUS WITHOUT COMPLICATIONS; E66.9 - OBESITY

, UNSPECIFIED   Status: Chronic   





(5) Hyperlipidemia


Code(s): E78.5 - HYPERLIPIDEMIA, UNSPECIFIED   Status: Chronic   


Qualifiers: 


   Hyperlipidemia type: unspecified   Qualified Code(s): E78.5 - Hyperlipidemia

, unspecified   





(6) Hypertension


Code(s): I10 - ESSENTIAL (PRIMARY) HYPERTENSION   Status: Chronic   


Qualifiers: 


   Hypertension type: essential hypertension   Qualified Code(s): I10 - 

Essential (primary) hypertension   





(7) Tobacco abuse


Code(s): Z72.0 - TOBACCO USE   Status: Chronic   





(8) Weight loss


Status: Acute   





(9) Nausea & vomiting


Code(s): R11.2 - NAUSEA WITH VOMITING, UNSPECIFIED   Status: Acute   





- Plan


Plan: 





Chronic Pancreatitis w/ pancreatic lesion


-GI consulted Dr. Figueroa, await recs


-pt states plan for today is EGD


-Continue current pain management





Nausea and Vomitting


-Zofran and phenegran, continue to monitor





Weight Loss


-Diabetic Diet, continue to monitor





CAD


-continue home meds





Diabetes


-Mild SSI, continue home meds


-Accuchecks AC/HS





Hyperlipidemia


-Continue home meds





HTN


-continue home meds





Bipolar Depression


-Continue home meds





Tobacco Abuse


-Counselled on cessation

## 2017-10-15 NOTE — CON
DATE OF CONSULTATION:  10/14/2017

 

REASON FOR CONSULTATION:  Chronic pancreatitis.

 

HISTORY OF PRESENT ILLNESS:  Ms. Nguyen is a 52-year-old female who was admitted to the hospital 
on 09/10/2017 when she had represented to the emergency room with some left upper abdominal pain.  S
he had been here with that recently as an admission from 09/01 to 09/09.  At that time, she was foun
d to have fatty liver and borderline splenomegaly, mildly elevated lipase.  A CAT scan, at that time
, showed a prominent proximal common bile duct.  She was treated with narcotics through hospital [facility], ultimately discharged to home on a diabetic, heart-healthy diet, gabapentin, furosemide, insulin,
 lisinopril, isosorbide, paroxetine, Risperdal, fenofibrate, and Plavix.  She returns now with lew sanchez the same complaints and has pain in the left upper quadrant and across the mid abdomen, band-
like fashion with chronic and achy.  She reports that this is similar to her chronic pancreatitis.  
She reports that her chronic pancreatitis is from an episode of severe pancreatitis from gallstones 
at which time her bile duct was damaged.  She has nausea quite a bit.  She denies any fever or chill
s.  She reports some weight loss and loose stools at times.

 

PAST MEDICAL HISTORY:  Breast cancer, _____, coronary artery disease, diabetes, hyperlipidemia, hype
rtension, obesity, chronic pancreatitis, bipolar depression.

 

PAST SURGICAL HISTORY:  Includes hysterectomy, nephrectomy, bilateral lumpectomy, and left knee surg
fely as well as the appendectomy.

 

ALLERGIES:  ADHESIVES, BACTRIM, CODEINE, HYDROCODONE, NSAIDs, SULFA DRUGS, and TRAMADOL.

 

HOME MEDICATIONS:  Gabapentin, furosemide, lisinopril, isosorbide mononitrate, paroxetine, fenofibra
te, Risperdal, Plavix.

 

SOCIAL HISTORY:  She smokes a pack per day.  She denies alcohol use.

 

PRESENT MEDICATIONS:  Include Lipitor, Zyrtec, Pepcid, TriCor, fenofibrate, Lasix 40, gabapentin, gl
ucagon, insulin, Humalog, magnesium oxide, Lopressor, morphine, Protonix, Paxil, Phenergan, Risperda
l.

 

PHYSICAL EXAMINATION:

GENERAL:  She is resting on the bed.  She is actually sleeping when I came in, had an empty food tra
y by her bed.

VITAL SIGNS:  Temperature is 98, pulse 57, blood pressure 166/75.  She is overweight.

LUNGS:  Clear.

HEART:  Regular rate and rhythm without clicks or murmurs.

ABDOMEN:  Soft, mildly tender with no rebound or guarding in the upper abdomen.  There is no palpabl
e hepatosplenomegaly.

EXTREMITIES:  No clubbing, cyanosis, or edema.

 

LABORATORY AND IMAGING STUDIES:  White count 10.4, hemoglobin 14, platelet count 147.  Chemistry:  S
odium is 138, potassium 4.1, BUN and creatinine are 12 and 0.9, glucose 76, hemoglobin A1c was 8.1 o
n 09/09/2017, AST and ALT are 29 and 34 with alkaline phosphatase 66, bilirubin 0.4 on 10/13/2017.  
Her lipase is 98 and it is typically in the last year between 81 and 105.  CT scan of the abdomen an
d pelvis were performed again this hospitalization and show dilated distal common bile duct in the h
ead of the pancreas region, a small hypodense lesion in the head of the pancreas region and hepatome
mike and hepatic steatosis, borderline splenomegaly.  Other labs, her triglycerides were 294 on 09/0
9/2017.

 

ASSESSMENT:

1.  Chronic pancreatitis reportedly from complications of severe biliary pancreatitis about 20 years
 ago.

2.  Left lower quadrant upper abdominal pain likely related to her chronic pancreatitis.  Differenti
al diagnosis would include functional pain related to gastroparesis with elevated hemoglobin A1c.  T
here are no changes of acute pancreatitis on CAT scan.

3.  Diabetes, poorly controlled with hemoglobin A1c greater than 9.

4.  Cystic lesion in the head of the pancreas region, it is unclear.  This could be a pseudocyst, al
though it is new, a neoplasia needs to be ruled out and that will require endoscopic ultrasound that
 is not available at this facility.

 

RECOMMENDATIONS:

1.  I will make her n.p.o.  If her symptoms persist, we can consider an upper endoscopy this admissi
on, as I see nothing really overtly on her CAT scan that would cause pain.  It will be reasonable to
 start her on some pancreatic enzymes.  Her diabetes needs to be well controlled.  The patient will 
ultimately need to have an EUS of the pancreaa, but that can be either obtained by transferring to [facility] facility where that is available as an inpatient or having her follow up there as an outpatient. 
 Following up in the outpatient would be difficult as she states she has difficulty with insurance i
n outpatient settings requiring her to cover 100% in terms of not kicking in.

2.  At this time, I did not see any usefulness to ERCP.  She may benefit from ERCP and pancreatic du
ct stenting, but again, this is not something that is offered as an advance endoscopic procedure pan
creatic work here at this facility by our gastroenterologist and would need to be seen at the [facility] for that.

3.  We would also go ahead and place her on a PPI and some H2 blocker, would recommend smoking cessa
tion as this can irritate and exacerbate chronic pancreatitis.

## 2017-10-15 NOTE — OP
PREOPERATIVE DIAGNOSIS:  Abdominal pain, epigastric left upper quadrant.

 

POSTOPERATIVE DIAGNOSIS:  Mild antral gastritis, biopsied doubt cause of pain.

 

PROCEDURE:  Esophagogastroduodenoscopy with biopsy.

 

ANESTHESIA:  TIVA.

 

RECOMMENDATIONS:

1.  Change PPI to p.o., low fat diet, and pancreatic enzymes for pain.

2.  With regard to pancreatic mass and chronic pancreatitis, the patient needs referral to an outsid
e facility where EUS is available.  It is not available here.  With her insurance difficulties, I pr
obably not going to be able to get her referred in the outpatient setting and it might be best for [facility] to be transferred directly as an inpatient possibly to one of her sister's facilities at [facility] in [facility] where EUS is available, we will defer this to primary service.

 

PROCEDURE IN DETAIL:  After the patient was informed of the risks, benefits, possible complications 
of endoscopy including perforation, bleeding, reactions to medication and aspiration, informed conse
nt was obtained.  The patient brought to endoscopy suite where she was sedated in gradual fashion.  
Once she was comfortable, a bite block was placed in incisural orifice.  The endoscope was advanced 
through the esophagus, stomach, and second and third portion of duodenum and slowly removed.  There 
was good visualization of mucosa.  The esophagus was normal.  There is small hiatal hernia with no e
vidence of erosions or inflammation.  The stomach was normal except for some very mild nodular gastr
itis in the antrum and biopsies were obtained and submitted to Pathology.  Retroflexed views in the 
stomach were normal.  The duodenum was normal to the third portion.  The scope was removed.  The pat
ient tolerated the procedure well with no complications.

## 2017-10-18 NOTE — DIS-2
DATE OF ADMISSION:  10/14/2017

 

DATE OF DISCHARGE:  10/15/2017

 

ADMISSION ATTENDING:  Dr. Hurtado.

 

DISCHARGE ATTENDING:  Dr. Hurtado.

 

RESIDENT:  Narayan Herndon M.D.

 

CONSULTATIONS:  GI, Dr. Figueroa.

 

PROCEDURES DONE:  EGD was done on 10/15/2017.  Pelvis CT showed:

1.  A small hypodense lesion in the body of pancreas.  This is nonspecific, but one possibility is a
 cystic pancreatic neoplasm.

2.  Pancreatic ductal dilatation.

3.  Hepatic steatosis.

4.  Hepatomegaly.

4.  Borderline splenomegaly.

5.  Arthrosclerotic disease of the abdominal aorta.

6.  Status post cholecystectomy.

7.  Status post appendectomy.

8.  No significant interval change compared to 09/18/2017.

 

DISCHARGE MEDICATIONS:  New ones were famotidine 20 mg, Ondansetron 4 mg and pancrelipase DR 12,000 
four caps p.o. t.i.d. and then she was sent home on other home medications, clopidogrel 75 mg, cetir
izine 10 mg, fenofibrate 160 mg, furosemide 40 mg, gabapentin 300 mg, insulin 80 units subcu daily, 
isosorbide mononitrate 20 mg, lisinopril 5 mg, lovastatin 40 mg, magnesium 250 mg tablet, metoprolol
 tartrate 25 mg, omega-3-Acid Ethyl Esters 1 gram, omeprazole 20 mg, and paroxetine 20 mg, potassium
 99 mg p.o., Risperdal 0.5 mg.

 

DISCONTINUED MEDICATIONS:  There were no discontinued medications at this visit.

 

DIAGNOSES:

1.  Chronic pancreatitis.

2.  Gastritis.

3.  Bipolar disorder.

4.  Coronary artery disease.

5.  Diabetes mellitus type 2.

6.  Hypertension.

7.  Hyperlipidemia.

8.  Tobacco abuse.

9.  Weight loss.

10.  Nausea and vomiting.

 

HISTORY OF PRESENT ILLNESS AND BRIEF HOSPITAL COURSE:  This is a 52-year-old female who presented wi
th nausea, vomiting, abdominal pain for 3 months.  She was admitted for the symptoms previously abou
t a month ago.  She is reported to have chronic pancreatitis, reports the pain in the epigastrium ar
ea and worse on the left side.  Denies any jaundice episodes, just having severe nausea and vomiting
 and reported having some weight loss.  At this time, on the CT abdomen, a pancreatic lesion was adriane
pected neoplasm which was new from the previous admission, so we decided to consult GI for possible 
exploration.  Dr. Figueroa at this time recommended getting EGD, he also recommended that she would ne
ed a special procedure to visualize the pancreas in which they do not do at [facility] and 
so Dr. Figueroa recommended that patient be referred to a different hospital where they can workup the
 lesion on the chronic pancreatitis further.  She needs an EUS and needs to go somewhere that is willem
ilable and she was found on the EGD to have some mild nodular gastritis in which a biopsy was taken 
and Dr. Figueroa recommended to change PPI and added pancreatic enzymes for pain.  At this time since 
she would need further workup outpatient, we decided to discharge her home as her pain was being wel
l controlled and I did switch her medication and told her she needed to follow up with her primary c
are doctor and get set up for the EUS as recommended by GI.

 

DISPOSITION:  Patient is stable.  She will be discharged to home.

 

ACTIVITY:  Her activity as tolerated.

 

DIET:  Her diet is a diabetic diet and heart healthy diet.

 

FOLLOWUP:  Recommend she follow up with Dr. Khan within a week to get set up for outpatient EUS.

## 2017-11-12 ENCOUNTER — HOSPITAL ENCOUNTER (EMERGENCY)
Dept: HOSPITAL 18 - NAV ERS | Age: 52
Discharge: HOME | End: 2017-11-12
Payer: COMMERCIAL

## 2017-11-12 DIAGNOSIS — E78.5: ICD-10-CM

## 2017-11-12 DIAGNOSIS — I10: ICD-10-CM

## 2017-11-12 DIAGNOSIS — E78.1: ICD-10-CM

## 2017-11-12 DIAGNOSIS — E10.9: ICD-10-CM

## 2017-11-12 DIAGNOSIS — F31.9: ICD-10-CM

## 2017-11-12 DIAGNOSIS — I25.10: ICD-10-CM

## 2017-11-12 DIAGNOSIS — F17.210: ICD-10-CM

## 2017-11-12 DIAGNOSIS — K86.1: Primary | ICD-10-CM

## 2017-11-12 DIAGNOSIS — Z79.899: ICD-10-CM

## 2017-11-12 LAB
ALBUMIN SERPL BCG-MCNC: 4.1 G/DL (ref 3.5–5)
ALP SERPL-CCNC: 72 U/L (ref 40–150)
ALT SERPL W P-5'-P-CCNC: 33 U/L (ref 8–55)
ANION GAP SERPL CALC-SCNC: 15 MMOL/L (ref 10–20)
AST SERPL-CCNC: 33 U/L (ref 5–34)
BASOPHILS # BLD AUTO: 0.2 THOU/UL (ref 0–0.2)
BASOPHILS NFR BLD AUTO: 1.5 % (ref 0–1)
BILIRUB SERPL-MCNC: 0.5 MG/DL (ref 0.2–1.2)
BUN SERPL-MCNC: 15 MG/DL (ref 9.8–20.1)
CALCIUM SERPL-MCNC: 8.9 MG/DL (ref 7.8–10.44)
CHLORIDE SERPL-SCNC: 107 MMOL/L (ref 98–107)
CK MB SERPL-MCNC: 1.2 NG/ML (ref 0–6.6)
CO2 SERPL-SCNC: 21 MMOL/L (ref 22–29)
CREAT CL PREDICTED SERPL C-G-VRATE: 0 ML/MIN (ref 70–130)
EOSINOPHIL # BLD AUTO: 0.2 THOU/UL (ref 0–0.7)
EOSINOPHIL NFR BLD AUTO: 1.9 % (ref 0–10)
GLOBULIN SER CALC-MCNC: 2.7 G/DL (ref 2.4–3.5)
GLUCOSE SERPL-MCNC: 102 MG/DL (ref 70–105)
HGB BLD-MCNC: 14.9 G/DL (ref 12–16)
LIPASE SERPL-CCNC: 77 U/L (ref 8–78)
LYMPHOCYTES # BLD AUTO: 3.5 THOU/UL (ref 1.2–3.4)
LYMPHOCYTES NFR BLD AUTO: 31.6 % (ref 21–51)
MCH RBC QN AUTO: 29.6 PG (ref 27–31)
MCV RBC AUTO: 94.5 FL (ref 81–99)
MONOCYTES # BLD AUTO: 0.6 THOU/UL (ref 0.11–0.59)
MONOCYTES NFR BLD AUTO: 5.6 % (ref 0–10)
NEUTROPHILS # BLD AUTO: 6.6 THOU/UL (ref 1.4–6.5)
NEUTROPHILS NFR BLD AUTO: 59.4 % (ref 42–75)
PLATELET # BLD AUTO: 164 THOU/UL (ref 130–400)
POTASSIUM SERPL-SCNC: 4.2 MMOL/L (ref 3.5–5.1)
RBC # BLD AUTO: 5.05 MILL/UL (ref 4.2–5.4)
SODIUM SERPL-SCNC: 139 MMOL/L (ref 136–145)
TROPONIN I SERPL DL<=0.01 NG/ML-MCNC: (no result) NG/ML (ref ?–0.03)
WBC # BLD AUTO: 11.1 THOU/UL (ref 4.8–10.8)

## 2017-11-12 PROCEDURE — 82553 CREATINE MB FRACTION: CPT

## 2017-11-12 PROCEDURE — 84484 ASSAY OF TROPONIN QUANT: CPT

## 2017-11-12 PROCEDURE — 93005 ELECTROCARDIOGRAM TRACING: CPT

## 2017-11-12 PROCEDURE — 96374 THER/PROPH/DIAG INJ IV PUSH: CPT

## 2017-11-12 PROCEDURE — 96375 TX/PRO/DX INJ NEW DRUG ADDON: CPT

## 2017-11-12 PROCEDURE — 80053 COMPREHEN METABOLIC PANEL: CPT

## 2017-11-12 PROCEDURE — 83690 ASSAY OF LIPASE: CPT

## 2017-11-12 PROCEDURE — 85025 COMPLETE CBC W/AUTO DIFF WBC: CPT

## 2017-11-12 PROCEDURE — 96361 HYDRATE IV INFUSION ADD-ON: CPT

## 2018-02-02 ENCOUNTER — HOSPITAL ENCOUNTER (EMERGENCY)
Dept: HOSPITAL 18 - NAV ERS | Age: 53
Discharge: HOME | End: 2018-02-02
Payer: COMMERCIAL

## 2018-02-02 DIAGNOSIS — E66.9: ICD-10-CM

## 2018-02-02 DIAGNOSIS — F17.210: ICD-10-CM

## 2018-02-02 DIAGNOSIS — E78.5: ICD-10-CM

## 2018-02-02 DIAGNOSIS — E11.9: ICD-10-CM

## 2018-02-02 DIAGNOSIS — N28.9: ICD-10-CM

## 2018-02-02 DIAGNOSIS — Z79.4: ICD-10-CM

## 2018-02-02 DIAGNOSIS — I10: ICD-10-CM

## 2018-02-02 DIAGNOSIS — R10.11: Primary | ICD-10-CM

## 2018-02-02 DIAGNOSIS — Z79.899: ICD-10-CM

## 2018-02-02 DIAGNOSIS — I25.10: ICD-10-CM

## 2018-02-02 DIAGNOSIS — F31.9: ICD-10-CM

## 2018-02-02 LAB
ALBUMIN SERPL BCG-MCNC: 3.9 G/DL (ref 3.5–5)
ALP SERPL-CCNC: 58 U/L (ref 40–150)
ALT SERPL W P-5'-P-CCNC: 30 U/L (ref 8–55)
ANION GAP SERPL CALC-SCNC: 17 MMOL/L (ref 10–20)
AST SERPL-CCNC: 22 U/L (ref 5–34)
BASOPHILS # BLD AUTO: 0.1 THOU/UL (ref 0–0.2)
BASOPHILS NFR BLD AUTO: 1.5 % (ref 0–1)
BILIRUB SERPL-MCNC: 0.3 MG/DL (ref 0.2–1.2)
BUN SERPL-MCNC: 15 MG/DL (ref 9.8–20.1)
CALCIUM SERPL-MCNC: 9.6 MG/DL (ref 7.8–10.44)
CHLORIDE SERPL-SCNC: 103 MMOL/L (ref 98–107)
CO2 SERPL-SCNC: 27 MMOL/L (ref 22–29)
CREAT CL PREDICTED SERPL C-G-VRATE: 0 ML/MIN (ref 70–130)
EOSINOPHIL # BLD AUTO: 0.2 THOU/UL (ref 0–0.7)
EOSINOPHIL NFR BLD AUTO: 2.3 % (ref 0–10)
GLOBULIN SER CALC-MCNC: 2.9 G/DL (ref 2.4–3.5)
GLUCOSE SERPL-MCNC: 124 MG/DL (ref 70–105)
HGB BLD-MCNC: 12.4 G/DL (ref 12–16)
LIPASE SERPL-CCNC: 78 U/L (ref 8–78)
LYMPHOCYTES # BLD AUTO: 3 THOU/UL (ref 1.2–3.4)
LYMPHOCYTES NFR BLD AUTO: 32.3 % (ref 21–51)
MCH RBC QN AUTO: 28 PG (ref 27–31)
MCV RBC AUTO: 88.2 FL (ref 81–99)
MONOCYTES # BLD AUTO: 0.5 THOU/UL (ref 0.11–0.59)
MONOCYTES NFR BLD AUTO: 5.7 % (ref 0–10)
NEUTROPHILS # BLD AUTO: 5.4 THOU/UL (ref 1.4–6.5)
NEUTROPHILS NFR BLD AUTO: 58.2 % (ref 42–75)
PLATELET # BLD AUTO: 190 THOU/UL (ref 130–400)
POTASSIUM SERPL-SCNC: 3.9 MMOL/L (ref 3.5–5.1)
RBC # BLD AUTO: 4.43 MILL/UL (ref 4.2–5.4)
SODIUM SERPL-SCNC: 143 MMOL/L (ref 136–145)
SP GR UR STRIP: 1.01 (ref 1–1.03)
WBC # BLD AUTO: 9.2 THOU/UL (ref 4.8–10.8)

## 2018-02-02 PROCEDURE — 80053 COMPREHEN METABOLIC PANEL: CPT

## 2018-02-02 PROCEDURE — 85025 COMPLETE CBC W/AUTO DIFF WBC: CPT

## 2018-02-02 PROCEDURE — 96374 THER/PROPH/DIAG INJ IV PUSH: CPT

## 2018-02-02 PROCEDURE — 81003 URINALYSIS AUTO W/O SCOPE: CPT

## 2018-02-02 PROCEDURE — 83690 ASSAY OF LIPASE: CPT

## 2018-02-06 ENCOUNTER — HOSPITAL ENCOUNTER (EMERGENCY)
Dept: HOSPITAL 18 - NAV ERS | Age: 53
Discharge: HOME | End: 2018-02-06
Payer: COMMERCIAL

## 2018-02-06 DIAGNOSIS — E78.5: ICD-10-CM

## 2018-02-06 DIAGNOSIS — F31.9: ICD-10-CM

## 2018-02-06 DIAGNOSIS — E66.9: ICD-10-CM

## 2018-02-06 DIAGNOSIS — F17.210: ICD-10-CM

## 2018-02-06 DIAGNOSIS — I25.10: ICD-10-CM

## 2018-02-06 DIAGNOSIS — N28.9: ICD-10-CM

## 2018-02-06 DIAGNOSIS — Z79.899: ICD-10-CM

## 2018-02-06 DIAGNOSIS — Z79.4: ICD-10-CM

## 2018-02-06 DIAGNOSIS — K86.1: Primary | ICD-10-CM

## 2018-02-06 DIAGNOSIS — E11.9: ICD-10-CM

## 2018-02-06 DIAGNOSIS — I10: ICD-10-CM

## 2018-02-06 LAB
ALBUMIN SERPL BCG-MCNC: 4 G/DL (ref 3.5–5)
ALP SERPL-CCNC: 52 U/L (ref 40–150)
ALT SERPL W P-5'-P-CCNC: 29 U/L (ref 8–55)
ANION GAP SERPL CALC-SCNC: 16 MMOL/L (ref 10–20)
AST SERPL-CCNC: 24 U/L (ref 5–34)
BASOPHILS # BLD AUTO: 0.2 THOU/UL (ref 0–0.2)
BASOPHILS NFR BLD AUTO: 1.6 % (ref 0–1)
BILIRUB SERPL-MCNC: 0.5 MG/DL (ref 0.2–1.2)
BUN SERPL-MCNC: 22 MG/DL (ref 9.8–20.1)
CALCIUM SERPL-MCNC: 9.7 MG/DL (ref 7.8–10.44)
CHLORIDE SERPL-SCNC: 101 MMOL/L (ref 98–107)
CO2 SERPL-SCNC: 28 MMOL/L (ref 22–29)
CREAT CL PREDICTED SERPL C-G-VRATE: 0 ML/MIN (ref 70–130)
EOSINOPHIL # BLD AUTO: 0.1 THOU/UL (ref 0–0.7)
EOSINOPHIL NFR BLD AUTO: 0.8 % (ref 0–10)
GLOBULIN SER CALC-MCNC: 2.9 G/DL (ref 2.4–3.5)
GLUCOSE SERPL-MCNC: 85 MG/DL (ref 70–105)
HGB BLD-MCNC: 12.2 G/DL (ref 12–16)
LYMPHOCYTES # BLD AUTO: 3.4 THOU/UL (ref 1.2–3.4)
LYMPHOCYTES NFR BLD AUTO: 32.4 % (ref 21–51)
MCH RBC QN AUTO: 28.9 PG (ref 27–31)
MCV RBC AUTO: 87.7 FL (ref 81–99)
MONOCYTES # BLD AUTO: 0.7 THOU/UL (ref 0.11–0.59)
MONOCYTES NFR BLD AUTO: 7.1 % (ref 0–10)
NEUTROPHILS # BLD AUTO: 6.1 THOU/UL (ref 1.4–6.5)
NEUTROPHILS NFR BLD AUTO: 58.2 % (ref 42–75)
PLATELET # BLD AUTO: 171 THOU/UL (ref 130–400)
POTASSIUM SERPL-SCNC: 3.6 MMOL/L (ref 3.5–5.1)
RBC # BLD AUTO: 4.22 MILL/UL (ref 4.2–5.4)
SODIUM SERPL-SCNC: 141 MMOL/L (ref 136–145)
WBC # BLD AUTO: 10.4 THOU/UL (ref 4.8–10.8)

## 2018-02-06 PROCEDURE — 83690 ASSAY OF LIPASE: CPT

## 2018-02-06 PROCEDURE — 96375 TX/PRO/DX INJ NEW DRUG ADDON: CPT

## 2018-02-06 PROCEDURE — 85025 COMPLETE CBC W/AUTO DIFF WBC: CPT

## 2018-02-06 PROCEDURE — 96361 HYDRATE IV INFUSION ADD-ON: CPT

## 2018-02-06 PROCEDURE — 80053 COMPREHEN METABOLIC PANEL: CPT

## 2018-02-06 PROCEDURE — 83605 ASSAY OF LACTIC ACID: CPT

## 2018-02-06 PROCEDURE — 96374 THER/PROPH/DIAG INJ IV PUSH: CPT

## 2018-02-10 ENCOUNTER — HOSPITAL ENCOUNTER (EMERGENCY)
Dept: HOSPITAL 18 - NAV ERS | Age: 53
Discharge: HOME | End: 2018-02-10
Payer: COMMERCIAL

## 2018-02-10 DIAGNOSIS — F17.210: ICD-10-CM

## 2018-02-10 DIAGNOSIS — R10.13: ICD-10-CM

## 2018-02-10 DIAGNOSIS — I10: ICD-10-CM

## 2018-02-10 DIAGNOSIS — E78.5: ICD-10-CM

## 2018-02-10 DIAGNOSIS — Z79.899: ICD-10-CM

## 2018-02-10 DIAGNOSIS — F31.9: ICD-10-CM

## 2018-02-10 DIAGNOSIS — I25.10: ICD-10-CM

## 2018-02-10 DIAGNOSIS — E78.2: ICD-10-CM

## 2018-02-10 DIAGNOSIS — E11.9: ICD-10-CM

## 2018-02-10 DIAGNOSIS — E66.9: ICD-10-CM

## 2018-02-10 DIAGNOSIS — R10.12: Primary | ICD-10-CM

## 2018-02-10 PROCEDURE — 96375 TX/PRO/DX INJ NEW DRUG ADDON: CPT

## 2018-02-10 PROCEDURE — 96374 THER/PROPH/DIAG INJ IV PUSH: CPT

## 2018-02-24 ENCOUNTER — HOSPITAL ENCOUNTER (EMERGENCY)
Dept: HOSPITAL 18 - NAV ERS | Age: 53
Discharge: HOME | End: 2018-02-24
Payer: COMMERCIAL

## 2018-02-24 DIAGNOSIS — E66.9: ICD-10-CM

## 2018-02-24 DIAGNOSIS — E78.5: ICD-10-CM

## 2018-02-24 DIAGNOSIS — F17.210: ICD-10-CM

## 2018-02-24 DIAGNOSIS — Z79.899: ICD-10-CM

## 2018-02-24 DIAGNOSIS — E10.9: ICD-10-CM

## 2018-02-24 DIAGNOSIS — R10.11: Primary | ICD-10-CM

## 2018-02-24 DIAGNOSIS — I10: ICD-10-CM

## 2018-02-24 LAB
ALBUMIN SERPL BCG-MCNC: 3.8 G/DL (ref 3.5–5)
ALP SERPL-CCNC: 57 U/L (ref 40–150)
ALT SERPL W P-5'-P-CCNC: 30 U/L (ref 8–55)
ANION GAP SERPL CALC-SCNC: 16 MMOL/L (ref 10–20)
APTT PPP: 28.7 SEC (ref 22.9–36.1)
AST SERPL-CCNC: 32 U/L (ref 5–34)
BASOPHILS # BLD AUTO: 0.1 THOU/UL (ref 0–0.2)
BASOPHILS NFR BLD AUTO: 1.4 % (ref 0–1)
BILIRUB SERPL-MCNC: 0.5 MG/DL (ref 0.2–1.2)
BUN SERPL-MCNC: 12 MG/DL (ref 9.8–20.1)
CALCIUM SERPL-MCNC: 9.3 MG/DL (ref 7.8–10.44)
CHLORIDE SERPL-SCNC: 105 MMOL/L (ref 98–107)
CO2 SERPL-SCNC: 24 MMOL/L (ref 22–29)
CREAT CL PREDICTED SERPL C-G-VRATE: 0 ML/MIN (ref 70–130)
EOSINOPHIL # BLD AUTO: 0.2 THOU/UL (ref 0–0.7)
EOSINOPHIL NFR BLD AUTO: 2.4 % (ref 0–10)
GLOBULIN SER CALC-MCNC: 2.6 G/DL (ref 2.4–3.5)
GLUCOSE SERPL-MCNC: 117 MG/DL (ref 70–105)
HGB BLD-MCNC: 12.4 G/DL (ref 12–16)
INR PPP: 1.1
LIPASE SERPL-CCNC: 48 U/L (ref 8–78)
LYMPHOCYTES # BLD AUTO: 4 THOU/UL (ref 1.2–3.4)
LYMPHOCYTES NFR BLD AUTO: 40.3 % (ref 21–51)
MCH RBC QN AUTO: 29.4 PG (ref 27–31)
MCV RBC AUTO: 89.3 FL (ref 81–99)
MONOCYTES # BLD AUTO: 0.3 THOU/UL (ref 0.11–0.59)
MONOCYTES NFR BLD AUTO: 2.8 % (ref 0–10)
NEUTROPHILS # BLD AUTO: 5.2 THOU/UL (ref 1.4–6.5)
NEUTROPHILS NFR BLD AUTO: 53.1 % (ref 42–75)
PLATELET # BLD AUTO: 163 THOU/UL (ref 130–400)
POTASSIUM SERPL-SCNC: 3.9 MMOL/L (ref 3.5–5.1)
PROTHROMBIN TIME: 13.8 SEC (ref 12–14.7)
RBC # BLD AUTO: 4.21 MILL/UL (ref 4.2–5.4)
SODIUM SERPL-SCNC: 141 MMOL/L (ref 136–145)
WBC # BLD AUTO: 9.8 THOU/UL (ref 4.8–10.8)

## 2018-02-24 PROCEDURE — 96374 THER/PROPH/DIAG INJ IV PUSH: CPT

## 2018-02-24 PROCEDURE — 83690 ASSAY OF LIPASE: CPT

## 2018-02-24 PROCEDURE — 96375 TX/PRO/DX INJ NEW DRUG ADDON: CPT

## 2018-02-24 PROCEDURE — 80053 COMPREHEN METABOLIC PANEL: CPT

## 2018-02-24 PROCEDURE — 85730 THROMBOPLASTIN TIME PARTIAL: CPT

## 2018-02-24 PROCEDURE — 85610 PROTHROMBIN TIME: CPT

## 2018-02-24 PROCEDURE — 36415 COLL VENOUS BLD VENIPUNCTURE: CPT

## 2018-02-24 PROCEDURE — 85025 COMPLETE CBC W/AUTO DIFF WBC: CPT

## 2018-03-06 ENCOUNTER — HOSPITAL ENCOUNTER (OUTPATIENT)
Dept: HOSPITAL 92 - ERS | Age: 53
Setting detail: OBSERVATION
LOS: 3 days | Discharge: HOME | End: 2018-03-09
Attending: FAMILY MEDICINE | Admitting: FAMILY MEDICINE
Payer: COMMERCIAL

## 2018-03-06 ENCOUNTER — HOSPITAL ENCOUNTER (EMERGENCY)
Dept: HOSPITAL 18 - NAV ERS | Age: 53
Discharge: ANOTHER HOSPITAL | End: 2018-03-06
Payer: COMMERCIAL

## 2018-03-06 VITALS — BODY MASS INDEX: 42.3 KG/M2

## 2018-03-06 DIAGNOSIS — Z88.1: ICD-10-CM

## 2018-03-06 DIAGNOSIS — E78.5: ICD-10-CM

## 2018-03-06 DIAGNOSIS — Z88.8: ICD-10-CM

## 2018-03-06 DIAGNOSIS — Z88.2: ICD-10-CM

## 2018-03-06 DIAGNOSIS — E11.9: ICD-10-CM

## 2018-03-06 DIAGNOSIS — I10: ICD-10-CM

## 2018-03-06 DIAGNOSIS — F17.210: ICD-10-CM

## 2018-03-06 DIAGNOSIS — I25.2: ICD-10-CM

## 2018-03-06 DIAGNOSIS — E78.1: ICD-10-CM

## 2018-03-06 DIAGNOSIS — Z79.899: ICD-10-CM

## 2018-03-06 DIAGNOSIS — R07.9: Primary | ICD-10-CM

## 2018-03-06 DIAGNOSIS — K86.1: ICD-10-CM

## 2018-03-06 DIAGNOSIS — E66.9: ICD-10-CM

## 2018-03-06 DIAGNOSIS — Z88.5: ICD-10-CM

## 2018-03-06 DIAGNOSIS — I25.10: ICD-10-CM

## 2018-03-06 DIAGNOSIS — I25.119: Primary | ICD-10-CM

## 2018-03-06 DIAGNOSIS — Z95.5: ICD-10-CM

## 2018-03-06 DIAGNOSIS — Z88.0: ICD-10-CM

## 2018-03-06 DIAGNOSIS — Z91.048: ICD-10-CM

## 2018-03-06 LAB
ALBUMIN SERPL BCG-MCNC: 3.8 G/DL (ref 3.5–5)
ALP SERPL-CCNC: 59 U/L (ref 40–150)
ALT SERPL W P-5'-P-CCNC: 27 U/L (ref 8–55)
ANION GAP SERPL CALC-SCNC: 14 MMOL/L (ref 10–20)
AST SERPL-CCNC: 23 U/L (ref 5–34)
BASOPHILS # BLD AUTO: 0.1 THOU/UL (ref 0–0.2)
BASOPHILS NFR BLD AUTO: 1 % (ref 0–1)
BILIRUB SERPL-MCNC: 0.4 MG/DL (ref 0.2–1.2)
BUN SERPL-MCNC: 15 MG/DL (ref 9.8–20.1)
CALCIUM SERPL-MCNC: 9.3 MG/DL (ref 7.8–10.44)
CHLORIDE SERPL-SCNC: 106 MMOL/L (ref 98–107)
CK MB SERPL-MCNC: 0.8 NG/ML (ref 0–6.6)
CK SERPL-CCNC: 52 U/L (ref 29–168)
CO2 SERPL-SCNC: 24 MMOL/L (ref 22–29)
CREAT CL PREDICTED SERPL C-G-VRATE: 0 ML/MIN (ref 70–130)
EOSINOPHIL # BLD AUTO: 0.2 THOU/UL (ref 0–0.7)
EOSINOPHIL NFR BLD AUTO: 2 % (ref 0–10)
GLOBULIN SER CALC-MCNC: 2.8 G/DL (ref 2.4–3.5)
GLUCOSE SERPL-MCNC: 151 MG/DL (ref 70–105)
HGB BLD-MCNC: 12.7 G/DL (ref 12–16)
LIPASE SERPL-CCNC: 60 U/L (ref 8–78)
LYMPHOCYTES # BLD AUTO: 3.3 THOU/UL (ref 1.2–3.4)
LYMPHOCYTES NFR BLD AUTO: 33.6 % (ref 21–51)
MCH RBC QN AUTO: 29.8 PG (ref 27–31)
MCV RBC AUTO: 89.3 FL (ref 81–99)
MONOCYTES # BLD AUTO: 0.5 THOU/UL (ref 0.11–0.59)
MONOCYTES NFR BLD AUTO: 5.3 % (ref 0–10)
NEUTROPHILS # BLD AUTO: 5.6 THOU/UL (ref 1.4–6.5)
NEUTROPHILS NFR BLD AUTO: 58.1 % (ref 42–75)
PLATELET # BLD AUTO: 162 THOU/UL (ref 130–400)
POTASSIUM SERPL-SCNC: 3.9 MMOL/L (ref 3.5–5.1)
RBC # BLD AUTO: 4.26 MILL/UL (ref 4.2–5.4)
SODIUM SERPL-SCNC: 140 MMOL/L (ref 136–145)
TROPONIN I SERPL DL<=0.01 NG/ML-MCNC: (no result) NG/ML (ref ?–0.03)
WBC # BLD AUTO: 9.7 THOU/UL (ref 4.8–10.8)

## 2018-03-06 PROCEDURE — 85025 COMPLETE CBC W/AUTO DIFF WBC: CPT

## 2018-03-06 PROCEDURE — 96372 THER/PROPH/DIAG INJ SC/IM: CPT

## 2018-03-06 PROCEDURE — 93017 CV STRESS TEST TRACING ONLY: CPT

## 2018-03-06 PROCEDURE — 83880 ASSAY OF NATRIURETIC PEPTIDE: CPT

## 2018-03-06 PROCEDURE — A9500 TC99M SESTAMIBI: HCPCS

## 2018-03-06 PROCEDURE — 36416 COLLJ CAPILLARY BLOOD SPEC: CPT

## 2018-03-06 PROCEDURE — 93798 PHYS/QHP OP CAR RHAB W/ECG: CPT

## 2018-03-06 PROCEDURE — 78452 HT MUSCLE IMAGE SPECT MULT: CPT

## 2018-03-06 PROCEDURE — 93010 ELECTROCARDIOGRAM REPORT: CPT

## 2018-03-06 PROCEDURE — 96375 TX/PRO/DX INJ NEW DRUG ADDON: CPT

## 2018-03-06 PROCEDURE — 71275 CT ANGIOGRAPHY CHEST: CPT

## 2018-03-06 PROCEDURE — 80061 LIPID PANEL: CPT

## 2018-03-06 PROCEDURE — 4A023N7 MEASUREMENT OF CARDIAC SAMPLING AND PRESSURE, LEFT HEART, PERCUTANEOUS APPROACH: ICD-10-PCS | Performed by: INTERNAL MEDICINE

## 2018-03-06 PROCEDURE — 99152 MOD SED SAME PHYS/QHP 5/>YRS: CPT

## 2018-03-06 PROCEDURE — C1769 GUIDE WIRE: HCPCS

## 2018-03-06 PROCEDURE — 99153 MOD SED SAME PHYS/QHP EA: CPT

## 2018-03-06 PROCEDURE — 82553 CREATINE MB FRACTION: CPT

## 2018-03-06 PROCEDURE — 96376 TX/PRO/DX INJ SAME DRUG ADON: CPT

## 2018-03-06 PROCEDURE — G0378 HOSPITAL OBSERVATION PER HR: HCPCS

## 2018-03-06 PROCEDURE — 93005 ELECTROCARDIOGRAM TRACING: CPT

## 2018-03-06 PROCEDURE — 96374 THER/PROPH/DIAG INJ IV PUSH: CPT

## 2018-03-06 PROCEDURE — 80053 COMPREHEN METABOLIC PANEL: CPT

## 2018-03-06 PROCEDURE — 71045 X-RAY EXAM CHEST 1 VIEW: CPT

## 2018-03-06 PROCEDURE — 85379 FIBRIN DEGRADATION QUANT: CPT

## 2018-03-06 PROCEDURE — 76942 ECHO GUIDE FOR BIOPSY: CPT

## 2018-03-06 PROCEDURE — 84484 ASSAY OF TROPONIN QUANT: CPT

## 2018-03-06 PROCEDURE — 83690 ASSAY OF LIPASE: CPT

## 2018-03-06 PROCEDURE — 96361 HYDRATE IV INFUSION ADD-ON: CPT

## 2018-03-06 PROCEDURE — 36415 COLL VENOUS BLD VENIPUNCTURE: CPT

## 2018-03-06 PROCEDURE — 80048 BASIC METABOLIC PNL TOTAL CA: CPT

## 2018-03-06 PROCEDURE — A4216 STERILE WATER/SALINE, 10 ML: HCPCS

## 2018-03-06 PROCEDURE — 93458 L HRT ARTERY/VENTRICLE ANGIO: CPT

## 2018-03-06 NOTE — RAD
CHEST ONE VIEW:

 

History: Chest pain. 

 

Comparison: 8-5-17

 

FINDINGS: 

Portable upright chest demonstrates a normal cardiac silhouette. The pulmonary vessels and hilum are 
normal. Costophrenic angles are clear. No masses or consolidation. No pneumothorax or osseous abnorma
lities. Cervical fusion hardware is noted. 

 

IMPRESSION: 

No acute cardiopulmonary process. 

 

POS: [facility]

## 2018-03-06 NOTE — CT
CT PULMONARY ANGIOGRAM WITH IV CONTRAST AND 3D MIP RECONSTRUCTIONS:

 

03/06/2018

 

PROVIDED CLINICAL HISTORY:

Chest pain.

 

FINDINGS:

There is no evidence for central or segmental pulmonary embolus.  The heart, pericardium, and great v
essels demonstrate an unremarkable CT appearance, with the exception of coronary calcium.  No evidenc
e for thoracic lymph node enlargement.  The airway appears patent and of normal caliber.  The lungs a
re free of significant opacity.  No pleural fluid or pneumothorax apparent.  The visualized portions 
of the upper abdomen appear unremarkable.  The osseous structures demonstrate no concerning osteoblas
tic or osteolytic lesions.

 

IMPRESSION:

No evidence for central or segmental pulmonary embolus.

 

POS: OFF

## 2018-03-06 NOTE — PDOC.FPRHP
- History of Present Illness


Chief Complaint: Chest pain


History of Present Illness: 





51 yo F w/ PMH including DM2, MI x 2, last stent placement 10 yr ago, chronic 

pancreatitis, HLD, and HTN comes into the ED for evaluation of chest pain. She 

states that the chest pain started about 9am this morning while she was at 

work. She describes it as a crushing left-sided chest pain which radiated to 

the L neck and L arm. The pain was worse with walking. It was improved with 

resting and nitroglycerin. Pain is currently 3/10. She states she was having 

shortness of breath but denies diaphoresis. She had a cath with Dr. Bishop 2/1/ 17, showed 50% occulsion of LAD, single vessel disease.


ED Course: 





After elevated D-dimer patient had CTA in outside ED which showed no PE





- Allergies/Adverse Reactions


 Allergies











Allergy/AdvReac Type Severity Reaction Status Date / Time


 


adhesive Allergy   Verified 01/31/17 20:39


 


codeine Allergy   Verified 01/31/17 20:39


 


hydrocodone Allergy   Verified 01/31/17 20:39


 


levofloxacin [From Levaquin] Allergy   Verified 03/06/18 16:08


 


NSAIDS (Non-Steroidal Allergy   Verified 01/31/17 20:39





Anti-Inflamma     


 


Sulfa (Sulfonamide Allergy   Verified 06/08/17 02:28





Antibiotics)     


 


trimethoprim [From Bactrim] Allergy   Verified 03/06/18 16:08


 


tramadol AdvReac Mild ITCHING Verified 09/09/17 08:03














- Home Medications


 











 Medication  Instructions  Recorded  Confirmed  Type


 


Cetirizine HCl [Zyrtec] 10 mg PO HS 01/31/17 10/14/17 History


 


Clopidogrel Bisulfate [Clopidogrel] 75 mg PO DAILY 01/31/17 10/14/17 History


 


Fenofibrate [Tricor] 160 mg PO DAILY 01/31/17 10/14/17 History


 


Furosemide 40 mg PO DAILY 01/31/17 10/14/17 History


 


Gabapentin 900 mg PO BID 01/31/17 10/14/17 History


 


Insulin Glargine,Hum.Rec.Anlog 80 units SC DAILY 01/31/17 10/14/17 History





[Tousimba Solostar]    


 


Isosorbide Mononitrate 60 mg PO TID 01/31/17 10/14/17 History


 


Lisinopril 5 mg PO DAILY 01/31/17 10/14/17 History


 


Magnesium 250 mg PO HS 01/31/17 10/14/17 History


 


Omega-3 Acid Ethyl Esters [Lovaza] 2 gm PO BID 01/31/17 10/14/17 History


 


Omeprazole 20 mg PO BID 01/31/17 10/14/17 History


 


PARoxetine HCl [Paroxetine HCl] 20 mg PO HS 01/31/17 10/14/17 History


 


Potassium 99 mg PO HS 01/31/17 10/14/17 History


 


risperiDONE [Risperidone] 0.5 mg PO HS 01/31/17 10/14/17 History


 


Lovastatin 40 mg PO QPM-WM #30 tablet 06/08/17 10/14/17 Rx


 


Metoprolol Tartrate [Lopressor] 12.5 mg PO BID 09/09/17 10/14/17 History


 


Famotidine 20 mg PO BID #60 tablet 10/15/17  Rx


 


Ondansetron HCl [Zofran] 4 mg PO Q4HR PRN #30 tab 10/15/17  Rx


 


[place]lip[place] [address] [Creon DR 4 cap PO TID-WM #30 cap 10/15/17  Rx





12,000 Units]    














- History


PMHx: MIx2, chronic pancreatitis, HTN, HLD, CKD,


 


PSHx: Hysterectomy w/ bilateral oopherectomy, L knee surgery





FHx: Mother with Cardiomyopathy and pulm HTN


 


Social:


 2ppd for 40 years, no alcohol, no drugs, works at [employer] as [employer]








- Review of Systems


General: reports: fatigue.  denies: fever/chills


Eyes: reports: vision changes (when her blood sugar is abnormal).  denies: eye 

pain


ENT: denies: nasal congestion, rhinorrhea


Respiratory: reports: cough, shortness of breath (2/2 to allergies per patient)


Cardiovascular: reports: chest pain.  denies: palpitation, edema


Gastrointestinal: reports: nausea, vomiting, abdominal pain (chronic).  denies: 

diarrhea, constipation


Genitourinary: denies: dysuria, polyuria


Skin: denies: rashes, lesions


Musculoskeletal: reports: arthritis/arthralgias.  denies: pain, tenderness


Neurological: denies: numbness, weakness





- Vital signs


BP: [118/75]  HR: [63] RR: [18] Tmax: [98.2] Pox: [96]% on [ra]  Wt: [106]   








- Physical Exam


Constitutional: NAD, awake, alert and oriented


HEENT: normocephalic and atraumatic, EOMI, other (appearence of smoking)


Neck: supple, FROM


Heart: RRR, normal S1/S2, no murmurs/rubs/gallops, pulses present, no edema


Lungs: CTAB, good air movement


Abdomen: soft, non-tender, bowel sounds present


Musculoskeletal: normal structure, normal tone


Neurological: no focal deficit, CN II-XII intact


Skin: no rash/lesions, capillary refill <2 seconds


Heme/Lymphatic: no unusual bruising or bleeding


Psychiatric: normal mood and affect





FMR H&P: Results





- EKG Interpretation


EKG: 





normal rate, sinus rhythm, no axis deviation, QRS WNL, no ST elevation/

depression





FMR H&P: A/P





- Problem List


(1) Chest pain, rule out acute myocardial infarction


Current Visit: No   Status: Acute   Code(s): R07.9 - CHEST PAIN, UNSPECIFIED   





(2) Chronic pancreatitis


Current Visit: No   Status: Acute   Code(s): K86.1 - OTHER CHRONIC PANCREATITIS

   





(3) Hyperlipidemia


Current Visit: No   Status: Chronic   Code(s): E78.5 - HYPERLIPIDEMIA, 

UNSPECIFIED   


Qualifiers: 


   Hyperlipidemia type: unspecified   Qualified Code(s): E78.5 - Hyperlipidemia

, unspecified   





(4) Hypertension


Current Visit: No   Status: Chronic   Code(s): I10 - ESSENTIAL (PRIMARY) 

HYPERTENSION   


Qualifiers: 


   Hypertension type: essential hypertension   Qualified Code(s): I10 - 

Essential (primary) hypertension   





(5) Tobacco abuse


Current Visit: No   Status: Chronic   Code(s): Z72.0 - TOBACCO USE   





- Plan





# Chest pain r/o


- Cath in 2/1/17 shows 50% occlusion LAD, 2 vessels with 35% occlusion


- typical chest pain, HEART score 5


- CTA negative


- trend troponins


- ASA, plavix, statin


- Cardiology consulted





# HTN 


- home meds





# HLD


- statin





# DM2


- Home toujeo 80U q AM





# Chronic Pancreatitis


- gabapentin 900 mg BID


- Lipase is 60





Dispo: home tomorrow pending stress test results





FMR H&P: Upper Level





- Pertinent history





51 yo CF w/ hx CAD s/p stent placement, HTN, HLD, DM2, and chronic pancreatitis 

presenting with chest pain that began acutely today at work. Pt describes 

exertional substernal sharp crushing pain that radiates to left shoulder and arm

, that is improved by rest and nitroglycerin. She was walking at work when pain 

occurred. No episodes of CP over last few weeks besides today.





- Pertinent findings





Gen: obese, NAD


CV: no reproducible TTP, RRR, good S1, S2 but mildly distant


Resp: CTAB, no audible wheezing


Abd: mild TTP RUQ and epigastrium, negative blake's, no rigidity, no rebound


Ext: negative Homans, no edema


Psych: A&Ox3, normal affect





EKG showed NSR rate of 72. mild ST depression in V5-V6





- Plan


Date/Time: 03/06/18 1448








I, Richard Warren MD, have evaluated this patient and agree with findings/plan 

as outlined by intern resident. Pertinent changes/additions are listed here.





1. Typical CP likely secondary to CAD


- pt has a hx CAD s/p stent placement with most recent cath in 10/2017 

revealing 40-50% blockage of LAD.


- pt describes this episode of her CP similar to previous cardiac related events


- pt has a HEART score of 5 with Nguyen rule placing her at pretest 

possibility of 73% for cardiac event


- trops neg x2, continue to trend. no significant EKG changes.


- with typical CP, will consult cardiology, recs greatly appreciated


- nitro and morphine PRN CP


- pt already on plavix, continue


- will obtain list of home medications from PCP office





2. Chronic Pancreatitis


- normal lipase and does not seem to be abdominal pain or acute pancreatitis


- pt had EUS at [facility] and has follow up already scheduled





3. HTN


- home meds and monitor





4. DM2


- home meds, accuchecks and SSI





5. HLD


- continue home statin





6. PPx


- lovenox and protonix





Disposition: Admit to observation and proceed with cardiology consult, recs 

greatly appreciated for possible catheterization.





Attending Addendum





- Attending Addendum


Date/Time: 03/06/18 5050





I personally evaluated the patient and discussed the management with Dr. Hernandez 

on 3/6/18.


I agree with the History, Examination, Assessment and Plan documented above 

with any addition or exceptions noted below.


Patient with 3/3 Nguyen score, typical CP, with hx of CAD.  Pt c/o 5/10 chest 

pain right now, will give morphine and nitro.  Cardiac enzymes neg, EKG 

unremarkable.  Cardiology consulted to consider cath.

## 2018-03-07 LAB
ANION GAP SERPL CALC-SCNC: 12 MMOL/L (ref 10–20)
BASOPHILS # BLD AUTO: 0.1 THOU/UL (ref 0–0.2)
BASOPHILS NFR BLD AUTO: 0.9 % (ref 0–1)
BUN SERPL-MCNC: 15 MG/DL (ref 9.8–20.1)
CALCIUM SERPL-MCNC: 9.7 MG/DL (ref 7.8–10.44)
CHD RISK SERPL-RTO: 11.6 (ref ?–4.5)
CHLORIDE SERPL-SCNC: 107 MMOL/L (ref 98–107)
CHOLEST SERPL-MCNC: 197 MG/DL
CK MB SERPL-MCNC: 0.5 NG/ML (ref 0–6.6)
CO2 SERPL-SCNC: 26 MMOL/L (ref 22–29)
CREAT CL PREDICTED SERPL C-G-VRATE: 120 ML/MIN (ref 70–130)
EOSINOPHIL # BLD AUTO: 0.2 THOU/UL (ref 0–0.7)
EOSINOPHIL NFR BLD AUTO: 2.1 % (ref 0–10)
GLUCOSE SERPL-MCNC: 103 MG/DL (ref 70–105)
HDLC SERPL-MCNC: 17 MG/DL
HGB BLD-MCNC: 14 G/DL (ref 12–16)
LDLC SERPL CALC-MCNC: (no result) MG/DL
LYMPHOCYTES # BLD: 3.1 THOU/UL (ref 1.2–3.4)
LYMPHOCYTES NFR BLD AUTO: 42.4 % (ref 21–51)
MCH RBC QN AUTO: 31 PG (ref 27–31)
MCV RBC AUTO: 93.6 FL (ref 81–99)
MONOCYTES # BLD AUTO: 0.5 THOU/UL (ref 0.11–0.59)
MONOCYTES NFR BLD AUTO: 6.6 % (ref 0–10)
NEUTROPHILS # BLD AUTO: 3.5 THOU/UL (ref 1.4–6.5)
NEUTROPHILS NFR BLD AUTO: 47.9 % (ref 42–75)
PLATELET # BLD AUTO: 167 THOU/UL (ref 130–400)
POTASSIUM SERPL-SCNC: 4 MMOL/L (ref 3.5–5.1)
RBC # BLD AUTO: 4.52 MILL/UL (ref 4.2–5.4)
SODIUM SERPL-SCNC: 141 MMOL/L (ref 136–145)
TRIGL SERPL-MCNC: 509 MG/DL (ref ?–150)
TROPONIN I SERPL DL<=0.01 NG/ML-MCNC: (no result) NG/ML (ref ?–0.03)
TROPONIN I SERPL DL<=0.01 NG/ML-MCNC: 0.01 NG/ML (ref ?–0.03)
WBC # BLD AUTO: 7.2 THOU/UL (ref 4.8–10.8)

## 2018-03-07 RX ADMIN — NITROGLYCERIN PRN MG: 0.4 TABLET SUBLINGUAL at 22:31

## 2018-03-07 RX ADMIN — ASPIRIN SCH: 81 TABLET ORAL at 10:57

## 2018-03-07 RX ADMIN — Medication SCH: at 23:36

## 2018-03-07 RX ADMIN — NITROGLYCERIN PRN MG: 0.4 TABLET SUBLINGUAL at 22:19

## 2018-03-07 NOTE — CON
DATE OF CONSULTATION:  03/06/2018

 

HISTORY OF PRESENT ILLNESS:  The patient is a 52-year-old woman who presents 
with recurrent chest discomfort.  The patient has a long history of coronary 
artery disease.  She states many years ago, she had placement of a coronary 
stent.  The patient was seen initially in 2017 with chest pain.  She underwent 
a cardiac catheterization and found to have a 50% lesion in the LAD.  The 
patient was placed on medical therapy.  The patient states she has been 
compliant with her medication, but was continued smoking.  The patient was 
doing well until the day of admission, she developed midsternal chest and that 
radiated down into her left arm.  This discomfort was relieved by 1 
nitroglycerin spray, the discomfort recurred a few minutes later and she came 
to the emergency room for further evaluation.  The patient denies having any 
present chest discomfort.

 

PAST MEDICAL HISTORY: 

1.  Coronary artery disease.

2.  Hypertension.

3.  Hypercholesterolemia.

4.  Chronic obstructive pulmonary disease.

5.  Asthma.

6.  Chronic pancreatitis.

7.  History of pancreatic mass.

 

PAST SURGICAL HISTORY:  Breast surgery, neck surgery,and  knee surgery.

 

MEDICATIONS ON ADMISSION:   Paxil 20 daily, lovastatin 40 at bedtime, Imdur 60 
t.i.d., risperidone 0.5 mg at bedtime, potassium 99 mg daily, Creon 4 capsules 
t.i.d., magnesium 250 at bedtime, gabapentin 900 b.i.d., Lasix 40 daily, TriCor 
160 at bedtime, Plavix 75 daily, lisinopril 5 daily, Prilosec 20 b.i.d., folic 
acid 1 mg daily, and Zyrtec 10 mg at bedtime.

 

SOCIAL HISTORY:  The patient smokes 2 packs per day.

 

FAMILY HISTORY:  Positive family history of coronary artery disease.

 

ALLERGIES:  ASPIRIN, ADHESIVE TAPE, CODEINE, HYDROCODONE, LEVOFLOXACIN, 
NONSTEROIDAL MEDICATIONS, SULFA DRUGS and TRAMADOL.

 

REVIEW OF SYSTEMS:  Ten point system is notable for chronic abdominal 
discomfort.  Ten-point system otherwise unremarkable.

 

PHYSICAL EXAMINATION:

GENERAL:  Obese woman in no acute distress.

VITAL SIGNS:  Blood pressure 117/58.

NECK:  Showed no jugular venous distension.

LUNGS:  Clear to auscultation.

HEART:  Regular rate and rhythm, normal S1, S2, 1/6 systolic murmur.

ABDOMEN:  Distended.

EXTREMITIES:  Showed trace edema.

SKIN:  Warm, dry.

NEUROLOGIC:  Nonfocal.

VASCULAR:  Radial pulses are 2+.

 

LABORATORY RESULTS:  Revealed her to have white blood count 9.7, hemoglobin 12.7
, hematocrit 38.0, platelets 162.  Sodium is 140, potassium 3.9, chloride 106, 
bicarbonate 24, BUN 15, creatinine is 0.92, troponin less than 0.01.  BNP is 
less than 10.  Lipase was 60.  The patient's EKG revealed normal sinus rhythm, 
normal ECG.

 

IMPRESSION:

1.  Chest pain suggestive of angina.

2.  History of coronary artery disease.

3.  Hypertension.

4.  Dyslipidemia.

5.  Diabetes mellitus.

6.  Chronic pancreatitis.

7.  Pancreatic mass.

8.  Tobacco abuse.

 

PLAN:  This patient presents with recurrent chest pain suggestive of angina.  
She also has a history of a pancreatic mass which is undergoing evaluation.  We 
will ask GI to reevaluate.  We will change the patient's Imdur to 120 mg tablet 
once daily.  With her aspirin allergy, we would try to treat the patient 
initially medically since it would be risky to place a stent.  We will proceed 
with stress testing to make sure there is no evidence of significant ischemia.  
We will follow this patient with you through her hospitalization.

 

LEVI

## 2018-03-07 NOTE — PDOC.FM
Addendum entered and electronically signed by Johny Hernandez MD  03/07/18 12:28: 





Patient's stress test showed questionable area, she will require resting stress 

test tomorrow





Original Note:








- Subjective


Subjective: 





This morning patient says she is feeling well overall. She states she is having 

3-4/10 chest pain similar to what she had yesterday. She states she was able to 

sleep well overnight. 





- Objective


Vital Signs & Weight: 


 Vital Signs (12 hours)











  Temp Pulse Resp BP BP Pulse Ox


 


 03/07/18 09:00  97.9 F  55 L  20   


 


 03/07/18 07:18  97.9 F  55 L  20   146/61 H  94 L


 


 03/07/18 04:10  98.1 F  62  20  132/60   97


 


 03/06/18 23:40   60  20  136/60   96








 Weight











Weight                         105.097 kg














I&O: 


 











 03/06/18 03/07/18 03/08/18





 06:59 06:59 06:59


 


Intake Total  840 


 


Balance  840 











Result Diagrams: 


 03/07/18 04:30





 03/07/18 04:30





<Johny Hernandez - Last Filed: 03/07/18 09:14>





- Objective


Vital Signs & Weight: 


 Vital Signs (12 hours)











  Temp Pulse Resp BP Pulse Ox


 


 03/07/18 15:04  98.6 F  80  18  126/63  92 L


 


 03/07/18 10:55  98.7 F  76  17  144/70 H  96


 


 03/07/18 09:00  97.9 F  55 L  20  


 


 03/07/18 07:18  97.9 F  55 L  20  146/61 H  94 L








 Weight











Weight                         105.097 kg














I&O: 


 











 03/06/18 03/07/18 03/08/18





 06:59 06:59 06:59


 


Intake Total  840 


 


Balance  840 











Result Diagrams: 


 03/07/18 04:30





 03/07/18 04:30





<Joe Carrillo - Last Filed: 03/07/18 17:13>





Phys Exam





- Physical Examination


Constitutional: NAD


HEENT: PERRLA, moist MMs


Neck: no nodes, full ROM


Respiratory: no wheezing, clear to auscultation bilateral


Cardiovascular: RRR, no significant murmur


Gastrointestinal: soft, non-tender, no distention, positive bowel sounds


Musculoskeletal: no edema, pulses present


Neurological: non-focal, moves all 4 limbs


Psychiatric: normal affect, A&O x 3


Skin: no rash, cap refill <2 seconds





<Johny Hernandez - Last Filed: 03/07/18 09:14>





Dx/Plan


(1) Chest pain, rule out acute myocardial infarction


Code(s): R07.9 - CHEST PAIN, UNSPECIFIED   Status: Acute   





(2) Chronic pancreatitis


Code(s): K86.1 - OTHER CHRONIC PANCREATITIS   Status: Acute   





(3) Hyperlipidemia


Code(s): E78.5 - HYPERLIPIDEMIA, UNSPECIFIED   Status: Chronic   


  QualifierTitle:    Hyperlipidemia type: unspecified   Qualified Code(s): 

E78.5 - Hyperlipidemia, unspecified   





(4) Hypertension


Code(s): I10 - ESSENTIAL (PRIMARY) HYPERTENSION   Status: Chronic   


  QualifierTitle:    Hypertension type: essential hypertension   Qualified Code(

s): I10 - Essential (primary) hypertension   





(5) Tobacco abuse


Code(s): Z72.0 - TOBACCO USE   Status: Chronic   





- Plan


Plan: 





# Chest pain r/o


- Cath in 2/1/17 shows 50% occlusion LAD, 2 vessels with 35% occlusion


- typical chest pain, HEART score 5


- CTA negative


- trend troponins


- ASA, plavix, statin


- Cardiology consulted


   - Dr. Alfonso has seen and evaluated the patient, recommends stress test 

today


   - consider cath if stress test is positive, patient has history of ASA 

allergy which complicates cath


# HTN 


- home meds





# HLD


- statin





# DM2


- Home toujeo 80U q AM





# Chronic Pancreatitis


- gabapentin 900 mg BID


- Lipase 60 on admission





Dispo: will f/u on stress test today, possible d/c pending stress test results





<Johny Hernandez - Last Filed: 03/07/18 09:14>





Attending Addendum





- Attending Addendum


Date/Time: 03/07/18 4179





I personally evaluated the patient and discussed the management with Dr. Hernandez.


I agree with the History, Examination, Assessment and Plan documented above 

with any addition or exceptions noted below.








<Joe Carrillo - Last Filed: 03/07/18 17:13>

## 2018-03-07 NOTE — CON
DATE OF CONSULTATION:  03/07/2018

 

I was asked to see Ms. Nguyen in regard to a pancreatic mass that was found when she was in the [facility] back in October.  She is here with chest pain for rule out MI and is getting a stress test.  I
n talking with her, she did follow up with Dr. Mccoy after that admission and was referred to [facility] and had endoscopic ultrasound.  There, she is being followed by [facility] and GI, Dr Castillo Mccoy, at this time.  It seems that all has been addressed and is being evaluated in the outpat
ient setting.  It does not need to be reevaluated here.  If she needs further GI attention, she can f
eel free to consult Dr. Mccoy, her primary gastrologist.  As far as her chest pain, she has had a
 previous EGD on last admission with no findings of reflux.  There is no indication to repeat this at
 this time and I did not think her chest pain is GI in origin.  We will sign off.  If there are any q
uestions, please do not hesitate to contact me at [phone].

## 2018-03-07 NOTE — PDOC.EVN
Event Note





- Event Note


Event Note: 


 Notified by Johny, patient's nurse, that she is reporting some chest pain rated 

5/10. She had not yet been given Tylenol. Advised to give Tylenol. Considered 

nitro but patient is already on Imdur. Advised to give Tylenol and call if that 

didn't help. Johny verbalized understanding.  Will consider morphine if Tylenol 

does not help.

## 2018-03-07 NOTE — PDOC.EVN
Event Note





- Event Note


Event Note: 


 Nursing reports patient is having chest pain again.  Will check EKG and 

cardiac enzymes. Morphine ordered.

## 2018-03-07 NOTE — PRG
CARDIOLOGY FOLLOWUP NOTE

 

DATE OF SERVICE:  03/07/2018

 

SUBJECTIVE:  Ms. Carmen Nguyen continued to have chest pain off and on in the middle of her chest an
d into the left arm.  She said she has also noted that it gets worse when she exerts herself.

 

The patient had a stress portion of her nuclear medicine imaging done today, it was abnormal, but the
 final report is not in.

 

The initial plan was to do the resting tomorrow, but she is still having chest pain.

 

OBJECTIVE:

VITAL SIGNS:  Her blood pressure is 126/63 and pulse 80, it is regular.

LUNGS:  Clear.

CARDIAC:  Normal S1 and normal S2.

ABDOMEN:  Soft and nontender.

EXTREMITIES:  There is no edema.

 

ASSESSMENT:

1.  Recurrent chest pain, typical of angina.

2.  Coronary artery disease.

3.  Obesity.

4.  Aspirin intolerance/allergies anaphylactic reaction, she says.

5.  Continued smoking.  She said she has gone from 2 packs of cigarettes per day, now down to one nelida
f pack of cigarettes per day.

6.  Mixed hyperlipidemia, not well controlled.

 

PLAN:

1.  In view of the continued chest pain, we will proceed the catheterization tomorrow.  I discussed t
he risks of stroke, heart attack, iodine allergy, interfering with the blood supply to the leg or kid
morteza, stent thrombosis, stent restenosis.  She understands and wishes to proceed.  She understands the
re may be some increased risk of stent thrombosis without aspirin, but she is absolutely allergic to 
ASPIRIN and does not wish to even consider aspirin desensitization.

2.  We will increase the statin therapy and consider adding _____ at the time of discharge.  She need
s a high dose statin along with _____.

3.  Again I have counseled tobacco cessation.

## 2018-03-08 RX ADMIN — Medication SCH ML: at 20:05

## 2018-03-08 RX ADMIN — Medication SCH: at 08:23

## 2018-03-08 RX ADMIN — ASPIRIN SCH: 81 TABLET ORAL at 06:18

## 2018-03-08 NOTE — EKG
Test Reason : 

Blood Pressure : ***/*** mmHG

Vent. Rate : 076 BPM     Atrial Rate : 076 BPM

   P-R Int : 138 ms          QRS Dur : 088 ms

    QT Int : 376 ms       P-R-T Axes : 042 017 056 degrees

   QTc Int : 423 ms

 

Normal sinus rhythm

Normal ECG

When compared with ECG of 07-MAR-2018 16:52, (Unconfirmed)

No significant change was found

Confirmed by DR. VIJAYA THOMAS ([id number]) on 3/8/2018 5:44:18 PM

 

Referred By:  OSKAR           Confirmed By:DR. VIJAYA THOMAS

## 2018-03-08 NOTE — EKG
Test Reason : 

Blood Pressure : ***/*** mmHG

Vent. Rate : 074 BPM     Atrial Rate : 074 BPM

   P-R Int : 142 ms          QRS Dur : 086 ms

    QT Int : 400 ms       P-R-T Axes : 034 020 059 degrees

   QTc Int : 444 ms

 

Normal sinus rhythm

Normal ECG

When compared with ECG of 06-MAR-2018 14:22, (Unconfirmed)

No significant change was found

Confirmed by DR. VIJAYA THOMAS ([id number]) on 3/8/2018 5:43:49 PM

 

Referred By:  CHLOE           Confirmed By:DR. VIJAYA THOMAS

## 2018-03-08 NOTE — NM
STRESS ONLY MYOCARDIAL PERFUSION SCAN:

 

INDICATION:

Chest pain. 

 

TECHNIQUE:   

The patient was given 27 mCi of technetium sestamibi. Patient was stress according to Lexiscan protoc
ol. Dr. Bishop took patient to the cath lab prior to acquisition of rest imaging. Therefore, this is 
a stress only exam. 

 

FINDINGS:

There is decreased activity in the apex on the stress only images. 

Wall motion appears normal. 

Ejection fraction recorded at 73%. 

 

IMPRESSION: 

Decreased activity in the apex on stress only exam. This is persistent on attenuation correction. 

 

 

POS: [facility]

## 2018-03-08 NOTE — PDOC.FM
- Subjective


Subjective: 





This morning the patient denies chest pain at rest. She states that when 

walking she gets the squeezing chest pain up to 4/10 on the left side of her 

chest about the same as yesterday. She is having mild 4/10 abdominal pain which 

is charecteristic of her chronic pancreatitis. She states she did not sleep 

well overnight as she is slightly anxious about the cath procedure. Answered 

all of the patient's question and offered reassurance.





- Objective


Vital Signs & Weight: 


 Vital Signs (12 hours)











  Temp Pulse Resp BP BP Pulse Ox


 


 03/08/18 06:58  97.7 F  64  16   99/51 L  94 L


 


 03/08/18 05:07  97.5 F L  68  18  139/65   95


 


 03/07/18 22:29   70   134/63   99


 


 03/07/18 22:19   70   124/64  


 


 03/07/18 21:49   82   134/79  


 


 03/07/18 20:27  97.0 F L  70  20   134/79  99








 Weight











Weight                         107.229 kg














I&O: 


 











 03/07/18 03/08/18 03/09/18





 06:59 06:59 06:59


 


Intake Total 840 562 


 


Balance 840 562 











Result Diagrams: 


 03/07/18 04:30





 03/07/18 04:30





<Johny Hernandez - Last Filed: 03/08/18 08:20>





- Objective


Vital Signs & Weight: 


 Vital Signs (12 hours)











  Temp Pulse Resp BP BP Pulse Ox


 


 03/08/18 10:30  97.6 F  52 L  16   116/56 L  97


 


 03/08/18 09:57  97.5 F L  48 L  16   94/51 L  93 L


 


 03/08/18 08:00  97.7 F  64  16   


 


 03/08/18 06:58  97.7 F  64  16   99/51 L  94 L


 


 03/08/18 05:07  97.5 F L  68  18  139/65   95








 Weight











Weight                         107.229 kg














I&O: 


 











 03/07/18 03/08/18 03/09/18





 06:59 06:59 06:59


 


Intake Total 840 562 


 


Balance 840 562 











Result Diagrams: 


 03/07/18 04:30





 03/07/18 04:30





<Joe Carrillo - Last Filed: 03/08/18 13:43>





Phys Exam





- Physical Examination


Constitutional: NAD


HEENT: PERRLA, moist MMs


Neck: no nodes, full ROM


Respiratory: no wheezing, clear to auscultation bilateral


chronic smoker


Cardiovascular: RRR, no significant murmur


Gastrointestinal: soft, non-tender, no distention, positive bowel sounds


Musculoskeletal: no edema, pulses present


Neurological: non-focal, moves all 4 limbs


Lymphatic: no nodes


Psychiatric: normal affect, A&O x 3


Skin: no rash, cap refill <2 seconds





<Johny Hernandez - Last Filed: 03/08/18 08:20>





Dx/Plan


(1) Chest pain, rule out acute myocardial infarction


Code(s): R07.9 - CHEST PAIN, UNSPECIFIED   Status: Acute   





(2) Chronic pancreatitis


Code(s): K86.1 - OTHER CHRONIC PANCREATITIS   Status: Acute   





(3) Hyperlipidemia


Code(s): E78.5 - HYPERLIPIDEMIA, UNSPECIFIED   Status: Chronic   


  QualifierTitle:    Hyperlipidemia type: unspecified   Qualified Code(s): 

E78.5 - Hyperlipidemia, unspecified   





(4) Hypertension


Code(s): I10 - ESSENTIAL (PRIMARY) HYPERTENSION   Status: Chronic   


  QualifierTitle:    Hypertension type: essential hypertension   Qualified Code(

s): I10 - Essential (primary) hypertension   





(5) Tobacco abuse


Code(s): Z72.0 - TOBACCO USE   Status: Chronic   





- Plan


Plan: 





# Chest pain r/o


- Cath in 2/1/17 shows 50% occlusion LAD, 2 vessels with 35% occlusion


- typical chest pain, HEART score 5


- CTA negative


- trend troponins, neg -> neg -> .012


- ASA, plavix, statin (dose increased to high dose)


- Cardiology consulted, recs appreciated


   - plan for Cath this AM





# HTN 


- home meds





# HLD


- statin (increased to high dose)





# DM2


- Home toujeo 80U q AM





# Chronic Pancreatitis


- gabapentin 900 mg BID


- Lipase 60 on admission


- Patient did receive morphine PRN yesterday PM





Dispo: pending cath results





<Johny Hernandez - Last Filed: 03/08/18 08:20>





Attending Addendum





- Attending Addendum


Date/Time: 03/08/18 1343





I personally evaluated the patient and discussed the management with Dr. Hernandez.


I agree with the History, Examination, Assessment and Plan documented above 

with any addition or exceptions noted below.








<Joe Carrillo - Last Filed: 03/08/18 13:43>

## 2018-03-09 VITALS — DIASTOLIC BLOOD PRESSURE: 58 MMHG | SYSTOLIC BLOOD PRESSURE: 129 MMHG

## 2018-03-09 VITALS — TEMPERATURE: 97.9 F

## 2018-03-09 RX ADMIN — Medication SCH ML: at 08:52

## 2018-03-09 RX ADMIN — ASPIRIN SCH: 81 TABLET ORAL at 08:51

## 2018-03-09 NOTE — PDOC.FM
Addendum entered and electronically signed by Johny Hernandez MD  03/09/18 08:54: 





Patient will return next week for catheterization and stent placement w/ Dr. Bishop





Original Note:








- Subjective


Subjective: 





This morning Mrs. Nguyen denies chest pain overnight. She denies nausea or 

vomiting. She has mild abdominal pain from her chronic pancreatitis. this is a 

dull ache in the RUQ. She states she is feeling better and had many of her 

questions answered by Dr. Bishop. She had questions regarding home regimen and 

cath resutls which were addressed. Completed a smoking cessation plan with the 

patient. 





- Objective


Vital Signs & Weight: 


 Vital Signs (12 hours)











  Temp Pulse Resp BP Pulse Ox


 


 03/09/18 07:48  97.9 F  53 L  18  


 


 03/09/18 02:03  97.9 F  53 L  18  131/63  97


 


 03/08/18 20:04   52 L   131/61 








 Weight











Weight                         107.229 kg














I&O: 


 











 03/08/18 03/09/18 03/10/18





 06:59 06:59 06:59


 


Intake Total 562 1921 


 


Balance 562 1921 











Result Diagrams: 


 03/07/18 04:30





 03/07/18 04:30





<Johny Hernandez - Last Filed: 03/09/18 08:03>





- Objective


Vital Signs & Weight: 


 Vital Signs (12 hours)











  Temp Pulse Resp BP BP Pulse Ox


 


 03/09/18 07:48  97.9 F  53 L  18   


 


 03/09/18 07:22  98.2 F  62  20   129/58 L  98


 


 03/09/18 02:03  97.9 F  53 L  18  131/63   97








 Weight











Weight                         107.229 kg














I&O: 


 











 03/08/18 03/09/18 03/10/18





 06:59 06:59 06:59


 


Intake Total 562 1921 


 


Balance 562 1921 











Result Diagrams: 


 03/07/18 04:30





 03/07/18 04:30





<Joe Carrillo - Last Filed: 03/09/18 11:16>





Phys Exam





- Physical Examination


Constitutional: NAD


HEENT: PERRLA, moist MMs


Neck: no nodes, full ROM


Respiratory: no wheezing, clear to auscultation bilateral


Cardiovascular: RRR, no significant murmur


Gastrointestinal: soft, non-tender, no distention, positive bowel sounds


Musculoskeletal: no edema, pulses present


Neurological: non-focal, normal sensation, moves all 4 limbs


Lymphatic: no nodes


Psychiatric: normal affect, A&O x 3


Skin: no rash, cap refill <2 seconds





<Johny Hernandez - Last Filed: 03/09/18 08:03>





Dx/Plan


(1) Chest pain, rule out acute myocardial infarction


Code(s): R07.9 - CHEST PAIN, UNSPECIFIED   Status: Acute   





(2) Chronic pancreatitis


Code(s): K86.1 - OTHER CHRONIC PANCREATITIS   Status: Acute   





(3) Hyperlipidemia


Code(s): E78.5 - HYPERLIPIDEMIA, UNSPECIFIED   Status: Chronic   


  QualifierTitle:    Hyperlipidemia type: unspecified   Qualified Code(s): 

E78.5 - Hyperlipidemia, unspecified   





(4) Hypertension


Code(s): I10 - ESSENTIAL (PRIMARY) HYPERTENSION   Status: Chronic   


  QualifierTitle:    Hypertension type: essential hypertension   Qualified Code(

s): I10 - Essential (primary) hypertension   





(5) Tobacco abuse


Code(s): Z72.0 - TOBACCO USE   Status: Chronic   





- Plan


Plan: 





# Chest pain r/o


- Cath in 2/1/17 shows 50% occlusion LAD, 2 vessels with 35% occlusion


- Cath 3/8/17 shows 60% LAD occlusion, 50% RCA, both larger than last year


   - difficult place to stent because of diagonals, 


- typical chest pain, HEART score 5


- CTA negative


- trend troponins, neg -> neg -> .012


- Cardiology recs maximizing medical regimen and smoking cessation because of 

risky stent placement


- start ranexa, brillinta, vascepa


- Cardiology consulted, recs appreciated


   - plan for Cath this AM





# HTN 


- home meds





# HLD


- statin





# DM2


- Home toujeo 80U q AM





# Chronic Pancreatitis


- gabapentin 900 mg BID


- Lipase 60 on admission





Dispo: d/c this AM





<Johny Hernandez - Last Filed: 03/09/18 08:03>





Attending Addendum





- Attending Addendum


Date/Time: 03/09/18 1115





I personally evaluated the patient and discussed the management with Dr. Hernandez.


I agree with the History, Examination, Assessment and Plan documented above 

with any addition or exceptions noted below.








<Joe Carrillo - Last Filed: 03/09/18 11:16>

## 2018-03-09 NOTE — DIS-2
DATE OF ADMISSION:  03/06/2018

 

DATE OF DISCHARGE:  03/09/2018

 

RESIDENT:  Johny Hernandez M.D.

 

ADMITTING ATTENDING:  Joe Carrillo M.D.

 

DISCHARGE ATTENDING:  Joe Carrillo M.D.

 

CONSULTATIONS:  Cardiology.

 

PROCEDURES:  Stress test, catheterization.

 

PRIMARY DIAGNOSIS:  Atypical angina.

 

SECONDARY DIAGNOSES:  Hypertension, hyperlipidemia, diabetes type 2, chronic pancreatitis.

 

DISCONTINUED MEDICATIONS:  Aspirin, Plavix, fenofibrate.

 

DISCHARGE MEDICATIONS:  Atorvastatin 40 mg nightly, Vascepa 1 gram p.o. b.i.d., Imdur 120 mg daily, Z
ofran 4 mg q.6 h. p.r.n., Ranexa 500 mg p.o. b.i.d., ticagrelor 90 mg p.o. b.i.d., folic acid 1 mg da
cody, Lasix 40 mg daily, gabapentin 900 mg p.o. b.i.d., lisinopril 5 mg, and risperidone 0.5 mg nightl
y.

 

HISTORY OF PRESENT ILLNESS AND HOSPITAL COURSE:  This 53-year-old female with past medical history in
cluding diabetes type 2, MI x2, last stent placement 10 years ago, chronic pancreatitis, hyperlipidem
ia, and hypertension, comes in the ED for evaluation of chest pain.  She stated the chest pain starte
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  She described as a crushing lef
t-sided chest pain, which radiated to the left neck and left arm and was worsened by walking.  It imp
roved with resting and nitroglycerin.  In the ED, her pain was currently 3/10.  She said she was havi
ng shortness of breath, but denied diaphoresis.  She had a catheterization with Dr. Bishop on 02/01/2
017 which showed a 50% occlusion of left anterior descending.

 

During the course of this hospitalization, the patient had a resting stress test which showed possibl
e akinesis at the apex.  She had a repeat catheterization with Dr. Bishop which showed 60% LAD occlus
ion, 50% RCA occlusion, both increased in size from last year.  Stent was not placed at the time seco
ndary to location of the occlusions in relation to diagonal vessels.  The patient was sent home with 
maximal medical therapy which included starting on Ranexa, Brilinta, and Vascepa.  She was also sent 
home on a high dose of atorvastatin.  She is going to follow up on Monday for repeat catheterization 
with stent placement with Dr. Bishop.

 

DISPOSITION:  Stable.

 

DISCHARGE INSTRUCTIONS:

1.  Location:  Home.

2.  Diet:  Regular.

3.  Activity:  As tolerated.

4.  Follow up Dr. Khan within 1 week.  Dr. Bishop on Monday for catheterization.  Tobacco cessatio
n plan was completed with the patient.  Please follow up on encouraging her to quit smoking as this i
s likely a large contributor to her heart disease.

## 2018-03-10 NOTE — EKG
Test Reason : 

Blood Pressure : ***/*** mmHG

Vent. Rate : 060 BPM     Atrial Rate : 060 BPM

   P-R Int : 140 ms          QRS Dur : 090 ms

    QT Int : 438 ms       P-R-T Axes : 051 050 062 degrees

   QTc Int : 438 ms

 

Normal sinus rhythm

Normal ECG

 

Confirmed by RADHA HAYWOOD ([id number]),  CARMELLA KENYON ([id number]) on 3/10/2018 3:02:39 PM

 

Referred By:             Confirmed By:RADHA HAYWOOD

## 2018-03-12 ENCOUNTER — HOSPITAL ENCOUNTER (OUTPATIENT)
Dept: HOSPITAL 92 - CCL | Age: 53
Setting detail: OBSERVATION
LOS: 1 days | Discharge: HOME | End: 2018-03-13
Attending: INTERNAL MEDICINE | Admitting: INTERNAL MEDICINE
Payer: COMMERCIAL

## 2018-03-12 VITALS — BODY MASS INDEX: 42.4 KG/M2

## 2018-03-12 DIAGNOSIS — E78.2: ICD-10-CM

## 2018-03-12 DIAGNOSIS — Z79.4: ICD-10-CM

## 2018-03-12 DIAGNOSIS — Z79.02: ICD-10-CM

## 2018-03-12 DIAGNOSIS — E11.9: ICD-10-CM

## 2018-03-12 DIAGNOSIS — Z88.6: ICD-10-CM

## 2018-03-12 DIAGNOSIS — Z88.5: ICD-10-CM

## 2018-03-12 DIAGNOSIS — Z88.2: ICD-10-CM

## 2018-03-12 DIAGNOSIS — Z79.899: ICD-10-CM

## 2018-03-12 DIAGNOSIS — Z88.8: ICD-10-CM

## 2018-03-12 DIAGNOSIS — F17.210: ICD-10-CM

## 2018-03-12 DIAGNOSIS — Z91.048: ICD-10-CM

## 2018-03-12 DIAGNOSIS — K86.81: ICD-10-CM

## 2018-03-12 DIAGNOSIS — Z91.018: ICD-10-CM

## 2018-03-12 DIAGNOSIS — Z88.1: ICD-10-CM

## 2018-03-12 DIAGNOSIS — I25.119: Primary | ICD-10-CM

## 2018-03-12 LAB
ANION GAP SERPL CALC-SCNC: 12 MMOL/L (ref 10–20)
BUN SERPL-MCNC: 20 MG/DL (ref 9.8–20.1)
CALCIUM SERPL-MCNC: 9.8 MG/DL (ref 7.8–10.44)
CHLORIDE SERPL-SCNC: 103 MMOL/L (ref 98–107)
CO2 SERPL-SCNC: 28 MMOL/L (ref 22–29)
CREAT CL PREDICTED SERPL C-G-VRATE: 73 ML/MIN (ref 70–130)
GLUCOSE SERPL-MCNC: 139 MG/DL (ref 70–105)
POTASSIUM SERPL-SCNC: 4 MMOL/L (ref 3.5–5.1)
SODIUM SERPL-SCNC: 139 MMOL/L (ref 136–145)

## 2018-03-12 PROCEDURE — 99152 MOD SED SAME PHYS/QHP 5/>YRS: CPT

## 2018-03-12 PROCEDURE — 80048 BASIC METABOLIC PNL TOTAL CA: CPT

## 2018-03-12 PROCEDURE — 93454 CORONARY ARTERY ANGIO S&I: CPT

## 2018-03-12 PROCEDURE — 36416 COLLJ CAPILLARY BLOOD SPEC: CPT

## 2018-03-12 PROCEDURE — 80053 COMPREHEN METABOLIC PANEL: CPT

## 2018-03-12 PROCEDURE — 92928 PRQ TCAT PLMT NTRAC ST 1 LES: CPT

## 2018-03-12 PROCEDURE — G0378 HOSPITAL OBSERVATION PER HR: HCPCS

## 2018-03-12 PROCEDURE — 96376 TX/PRO/DX INJ SAME DRUG ADON: CPT

## 2018-03-12 PROCEDURE — 85347 COAGULATION TIME ACTIVATED: CPT

## 2018-03-12 PROCEDURE — 85025 COMPLETE CBC W/AUTO DIFF WBC: CPT

## 2018-03-12 PROCEDURE — 96361 HYDRATE IV INFUSION ADD-ON: CPT

## 2018-03-12 PROCEDURE — C1887 CATHETER, GUIDING: HCPCS

## 2018-03-12 PROCEDURE — 93005 ELECTROCARDIOGRAM TRACING: CPT

## 2018-03-12 PROCEDURE — C1769 GUIDE WIRE: HCPCS

## 2018-03-12 PROCEDURE — 36415 COLL VENOUS BLD VENIPUNCTURE: CPT

## 2018-03-12 PROCEDURE — 99153 MOD SED SAME PHYS/QHP EA: CPT

## 2018-03-12 PROCEDURE — C1874 STENT, COATED/COV W/DEL SYS: HCPCS

## 2018-03-12 PROCEDURE — 96374 THER/PROPH/DIAG INJ IV PUSH: CPT

## 2018-03-12 PROCEDURE — C9600 PERC DRUG-EL COR STENT SING: HCPCS

## 2018-03-12 PROCEDURE — 93798 PHYS/QHP OP CAR RHAB W/ECG: CPT

## 2018-03-12 PROCEDURE — 93010 ELECTROCARDIOGRAM REPORT: CPT

## 2018-03-12 PROCEDURE — 76942 ECHO GUIDE FOR BIOPSY: CPT

## 2018-03-12 RX ADMIN — TICAGRELOR SCH MG: 90 TABLET ORAL at 20:23

## 2018-03-12 RX ADMIN — PANCRELIPASE SCH: 60000; 12000; 38000 CAPSULE, DELAYED RELEASE PELLETS ORAL at 17:24

## 2018-03-12 NOTE — HP
HISTORY OF PRESENT ILLNESS:   Ms. Nguyen is a very pleasant 53-year-old woman with diabetes, smoki
ng, coronary artery disease and continued chest pain and abnormal stress test.

 

Ms. Nguyen has a previous history of coronary disease.  She was admitted to the hospital last week
 with resting and anginal chest pain.  The patient continued to have chest pain at rest.  The patient
's stress portion of her stress test had been done, but not the resting portion.  She continued to ha
ve chest pain at rest.  Therefore, she was taken to Cardiac Catheterization Lab.  She was found to ha
ve what appeared to be borderline lesion in the LAD.  The resting portion of the stress test was then
 completed and it revealed that she actually did have ischemia at the apex.  In view of the continued
 chest pain and abnormal stress test she was brought back in for heart catheterization with stent imp
lantation.

 

MEDICATIONS:  The patient medicine included Brilinta.  She was just started on that.  She is on a sta
tin.  She was on Triglide, but was taken off that and put on Lovaza and Lipitor.

 

ALLERGIES:  Allergy to ASPIRIN, anaphylactic reaction.

 

SOCIAL HISTORY:  Continued smoking.  She has cut down from 2 packs of cigarettes per day, down to nelida
f pack a day.  She understands it is critical to quit smoking.

 

REVIEW OF SYSTEMS:

CONSTITUTIONAL:  No significant weight gain or loss.

VISION:  No changes.

HEARING:  No changes.

PULMONARY:  No cough or wheezing.

GASTROINTESTINAL:  No nausea, vomiting, diarrhea.

 

PHYSICAL EXAMINATION:

GENERAL:  A pleasant overweight 53-year-old female.

VITAL SIGNS:  Blood pressure 110/70, pulse 70.

HEENT:  Eyes; sclerae are nonicteric.  Mouth; mucous branch moist.

NECK:  Supple, no lymphadenopathy.

LUNGS:  Clear, no wheezing, rales or rhonchi.

CARDIAC:  Normal S1, normal S2, no murmur, rub or gallop.

ABDOMEN:  Soft, nontender.

EXTREMITIES:  No clubbing or cyanosis.  There is no edema.

 

ASSESSMENT:

1.  Coronary artery disease with continued chest pain.

2.  Continued smoking.

3.  Mixed hyperlipidemia.

 

PLAN:  In view of the abnormal stress test, I have decided to bring her back in for percutaneous inte
rvention.  She understands if she continues smoking she will continue to have problems.  I also suspe
ct she may have microvascular disease therefore we may consider a trial of Ranexa.  No other interven
tion will be indicated as she did have a successful stent implantation this morning.  Unfortunately, 
I think she may continue to have some chest pain, but in view of the abnormal stress test which corre
sponded with the LAD region, stress testing as mentioned was done.  Percutaneous intervention was ind
icated in view of the continued chest pain.

## 2018-03-12 NOTE — STRESS
Acquisition Time: 2018-03-07  09:21:33

Total Exercise Time: 00:01:00

Test Indications: CHEST PAIN

Medications: 

 

 

 

 

 

 

 

 

 

Protocol:  LEXISCAN

Max HR: 104 BPM  63% of  Pred: 165 BPM

Max BP: 136/058 mmHG

Max Work Load: 1.0 METS

 

RESTING ECG: NORMAL SINUS RYTHM AT 66 BPM 

SYMPTONS: DYSPNEA AND HEADACHE 

NORMAL BP RESPONSE 

ECTOPY: NONE 

ECG STRESS: NO SIGNIFICANT CHANGES 

INTERPRETATION: NEGATIVE ECG/AWAIT NUCEAR IMAGES FOR DEFINITIVE DIAGNOSIS 

 

Confirmed by DR. VIJAYA THOMAS ([id number]),  ERWIN TIDWELL ([id number]) on 3/12/2018 2:04:54 PM

 

Referred By: MD CALDERON MCCARTHY           Confirmed By:DR. VIJAYA THOMAS

## 2018-03-13 VITALS — TEMPERATURE: 98 F

## 2018-03-13 VITALS — DIASTOLIC BLOOD PRESSURE: 67 MMHG | SYSTOLIC BLOOD PRESSURE: 103 MMHG

## 2018-03-13 LAB
ALBUMIN SERPL BCG-MCNC: 3.6 G/DL (ref 3.5–5)
ALP SERPL-CCNC: 52 U/L (ref 40–150)
ALT SERPL W P-5'-P-CCNC: 21 U/L (ref 8–55)
ANION GAP SERPL CALC-SCNC: 11 MMOL/L (ref 10–20)
AST SERPL-CCNC: 17 U/L (ref 5–34)
BASOPHILS # BLD AUTO: 0 THOU/UL (ref 0–0.2)
BASOPHILS NFR BLD AUTO: 0.6 % (ref 0–1)
BILIRUB SERPL-MCNC: 0.5 MG/DL (ref 0.2–1.2)
BUN SERPL-MCNC: 17 MG/DL (ref 9.8–20.1)
CALCIUM SERPL-MCNC: 9 MG/DL (ref 7.8–10.44)
CHLORIDE SERPL-SCNC: 108 MMOL/L (ref 98–107)
CO2 SERPL-SCNC: 23 MMOL/L (ref 22–29)
CREAT CL PREDICTED SERPL C-G-VRATE: 115 ML/MIN (ref 70–130)
EOSINOPHIL # BLD AUTO: 0.2 THOU/UL (ref 0–0.7)
EOSINOPHIL NFR BLD AUTO: 2 % (ref 0–10)
GLOBULIN SER CALC-MCNC: 2.2 G/DL (ref 2.4–3.5)
GLUCOSE SERPL-MCNC: 112 MG/DL (ref 70–105)
HGB BLD-MCNC: 13.1 G/DL (ref 12–16)
LYMPHOCYTES # BLD: 2.1 THOU/UL (ref 1.2–3.4)
LYMPHOCYTES NFR BLD AUTO: 27.9 % (ref 21–51)
MCH RBC QN AUTO: 30.9 PG (ref 27–31)
MCV RBC AUTO: 93.2 FL (ref 81–99)
MONOCYTES # BLD AUTO: 0.5 THOU/UL (ref 0.11–0.59)
MONOCYTES NFR BLD AUTO: 6.9 % (ref 0–10)
NEUTROPHILS # BLD AUTO: 4.7 THOU/UL (ref 1.4–6.5)
NEUTROPHILS NFR BLD AUTO: 62.6 % (ref 42–75)
PLATELET # BLD AUTO: 126 THOU/UL (ref 130–400)
POTASSIUM SERPL-SCNC: 4 MMOL/L (ref 3.5–5.1)
RBC # BLD AUTO: 4.25 MILL/UL (ref 4.2–5.4)
SODIUM SERPL-SCNC: 138 MMOL/L (ref 136–145)
WBC # BLD AUTO: 7.5 THOU/UL (ref 4.8–10.8)

## 2018-03-13 PROCEDURE — 02713ZZ DILATION OF CORONARY ARTERY, TWO ARTERIES, PERCUTANEOUS APPROACH: ICD-10-PCS | Performed by: INTERNAL MEDICINE

## 2018-03-13 PROCEDURE — 027034Z DILATION OF CORONARY ARTERY, ONE ARTERY WITH DRUG-ELUTING INTRALUMINAL DEVICE, PERCUTANEOUS APPROACH: ICD-10-PCS | Performed by: INTERNAL MEDICINE

## 2018-03-13 RX ADMIN — TICAGRELOR SCH MG: 90 TABLET ORAL at 09:14

## 2018-03-13 RX ADMIN — PANCRELIPASE SCH CAP: 60000; 12000; 38000 CAPSULE, DELAYED RELEASE PELLETS ORAL at 14:26

## 2018-03-13 RX ADMIN — PANCRELIPASE SCH CAP: 60000; 12000; 38000 CAPSULE, DELAYED RELEASE PELLETS ORAL at 09:13

## 2018-03-13 RX ADMIN — PANCRELIPASE SCH: 60000; 12000; 38000 CAPSULE, DELAYED RELEASE PELLETS ORAL at 18:04

## 2018-03-13 RX ADMIN — PANCRELIPASE SCH: 60000; 12000; 38000 CAPSULE, DELAYED RELEASE PELLETS ORAL at 14:12

## 2018-03-13 NOTE — DIS
FINAL DIAGNOSES:

1.  Coronary artery disease.

2.  Diastolic heart failure.

3.  History of recurrent pancreatitis.

4.  ASPIRIN allergy.

 

MEDICATIONS AT THE TIME OF DISCHARGE:

1.  Brilinta 90 mg twice a day.

2.  Ranexa.

3.  Lisinopril 5 mg a day.

4.  Isosorbide 120 mg a day.

5.  Atorvastatin 40 mg a day.

6.  Torsemide 20 mg 2 tablets in the morning, 1 in the afternoon.

7.  Potassium 10 mEq a day.

 

HOSPITAL COURSE:  Please see admission note for full details.  Briefly, the patient was brought to [facility] for stent implantation in view of a positive stress test.  The stent was successfully plac
ed yesterday with 3.5 x 20 mm drug-coated stent.  The stent was postdilated to 3.5 mm balloon high pr
essure and then 4 mm balloon all, but the distal few millimeters of the stent were post-dilated to 
at size.  The patient had renal insufficiency and therefore received fluid yesterday, but developed s
ome congestive heart failure symptoms late last night.  She received intravenous diuretics.  On two o
ccasions, put out about 4 liters of urine today.  The renal function did improve with the fluid with 
a creatinine went from 1.45 to 0.94.  The patient is feeling fine today and wishes to be released [facility].  I discussed this spending the night here tonight, but said she wishes to go home and take her med
icines at home.  The nursing staff did indicate that the patient's bedside, presumably smoking a few 
times during the day today.  She is up walking the halls, feeling fine.  She said she can lay flat, b
ut when she lays totally flat, she starts to feel uncomfortable after a few minutes, but up at about 
15 degrees, she is fine.  Last night, she was extremely short of breath.

 

The patient is released home at this point.  Prognosis guarded.

## 2018-04-09 ENCOUNTER — HOSPITAL ENCOUNTER (EMERGENCY)
Dept: HOSPITAL 18 - NAV ERS | Age: 53
Discharge: HOME | End: 2018-04-09
Payer: COMMERCIAL

## 2018-04-09 DIAGNOSIS — E78.5: ICD-10-CM

## 2018-04-09 DIAGNOSIS — I25.10: ICD-10-CM

## 2018-04-09 DIAGNOSIS — F31.9: ICD-10-CM

## 2018-04-09 DIAGNOSIS — I10: ICD-10-CM

## 2018-04-09 DIAGNOSIS — F17.210: ICD-10-CM

## 2018-04-09 DIAGNOSIS — R07.2: Primary | ICD-10-CM

## 2018-04-09 DIAGNOSIS — E66.9: ICD-10-CM

## 2018-04-09 DIAGNOSIS — Z79.899: ICD-10-CM

## 2018-04-09 DIAGNOSIS — E11.9: ICD-10-CM

## 2018-04-09 DIAGNOSIS — K86.1: ICD-10-CM

## 2018-04-09 DIAGNOSIS — Z79.4: ICD-10-CM

## 2018-04-09 LAB
ALBUMIN SERPL BCG-MCNC: 4.2 G/DL (ref 3.5–5)
ALP SERPL-CCNC: 73 U/L (ref 40–150)
ALT SERPL W P-5'-P-CCNC: 30 U/L (ref 8–55)
ANION GAP SERPL CALC-SCNC: 18 MMOL/L (ref 10–20)
APTT PPP: 26.2 SEC (ref 22.9–36.1)
AST SERPL-CCNC: 19 U/L (ref 5–34)
BASOPHILS # BLD AUTO: 0.1 THOU/UL (ref 0–0.2)
BASOPHILS NFR BLD AUTO: 1.1 % (ref 0–1)
BILIRUB SERPL-MCNC: 0.4 MG/DL (ref 0.2–1.2)
BUN SERPL-MCNC: 19 MG/DL (ref 9.8–20.1)
CALCIUM SERPL-MCNC: 10.1 MG/DL (ref 7.8–10.44)
CHLORIDE SERPL-SCNC: 97 MMOL/L (ref 98–107)
CK MB SERPL-MCNC: 0.9 NG/ML (ref 0–6.6)
CO2 SERPL-SCNC: 25 MMOL/L (ref 22–29)
CREAT CL PREDICTED SERPL C-G-VRATE: 0 ML/MIN (ref 70–130)
EOSINOPHIL # BLD AUTO: 0.2 THOU/UL (ref 0–0.7)
EOSINOPHIL NFR BLD AUTO: 1.7 % (ref 0–10)
GLOBULIN SER CALC-MCNC: 3 G/DL (ref 2.4–3.5)
GLUCOSE SERPL-MCNC: 300 MG/DL (ref 70–105)
GLUCOSE UR STRIP-MCNC: 100 MG/DL
HGB BLD-MCNC: 14.2 G/DL (ref 12–16)
INR PPP: 0.9
LYMPHOCYTES # BLD AUTO: 3.6 THOU/UL (ref 1.2–3.4)
LYMPHOCYTES NFR BLD AUTO: 30.1 % (ref 21–51)
MCH RBC QN AUTO: 30.3 PG (ref 27–31)
MCV RBC AUTO: 87.3 FL (ref 81–99)
MONOCYTES # BLD AUTO: 0.7 THOU/UL (ref 0.11–0.59)
MONOCYTES NFR BLD AUTO: 5.9 % (ref 0–10)
NEUTROPHILS # BLD AUTO: 7.3 THOU/UL (ref 1.4–6.5)
NEUTROPHILS NFR BLD AUTO: 61.2 % (ref 42–75)
PLATELET # BLD AUTO: 159 THOU/UL (ref 130–400)
POTASSIUM SERPL-SCNC: 3.9 MMOL/L (ref 3.5–5.1)
PROTHROMBIN TIME: 12.7 SEC (ref 12–14.7)
RBC # BLD AUTO: 4.68 MILL/UL (ref 4.2–5.4)
SODIUM SERPL-SCNC: 136 MMOL/L (ref 136–145)
SP GR UR STRIP: 1.01 (ref 1–1.03)
TROPONIN I SERPL DL<=0.01 NG/ML-MCNC: (no result) NG/ML (ref ?–0.03)
WBC # BLD AUTO: 11.9 THOU/UL (ref 4.8–10.8)

## 2018-04-09 PROCEDURE — 71045 X-RAY EXAM CHEST 1 VIEW: CPT

## 2018-04-09 PROCEDURE — 93005 ELECTROCARDIOGRAM TRACING: CPT

## 2018-04-09 PROCEDURE — 85025 COMPLETE CBC W/AUTO DIFF WBC: CPT

## 2018-04-09 PROCEDURE — 81003 URINALYSIS AUTO W/O SCOPE: CPT

## 2018-04-09 PROCEDURE — 82553 CREATINE MB FRACTION: CPT

## 2018-04-09 PROCEDURE — 99407 BEHAV CHNG SMOKING > 10 MIN: CPT

## 2018-04-09 PROCEDURE — 85610 PROTHROMBIN TIME: CPT

## 2018-04-09 PROCEDURE — 96374 THER/PROPH/DIAG INJ IV PUSH: CPT

## 2018-04-09 PROCEDURE — 80053 COMPREHEN METABOLIC PANEL: CPT

## 2018-04-09 PROCEDURE — 84484 ASSAY OF TROPONIN QUANT: CPT

## 2018-04-09 PROCEDURE — 85730 THROMBOPLASTIN TIME PARTIAL: CPT

## 2018-04-09 PROCEDURE — 83880 ASSAY OF NATRIURETIC PEPTIDE: CPT

## 2018-04-09 NOTE — RAD
CHEST ONE VIEW:

 

HISTORY:

Chest pain.

 

COMPARISON: 

03/06/2018

 

FINDINGS:

Portable semiupright chest radiograph demonstrates a normal cardiac silhouette.  The pulmonary vessel
s and hilum are normal.  No consolidation or mass.  No pneumothorax or osseous abnormalities.

 

IMPRESSION:

No acute cardiopulmonary process.

 

POS: [facility]

## 2018-04-30 ENCOUNTER — HOSPITAL ENCOUNTER (OUTPATIENT)
Dept: HOSPITAL 92 - ERS | Age: 53
Setting detail: OBSERVATION
LOS: 2 days | Discharge: HOME | End: 2018-05-02
Attending: FAMILY MEDICINE | Admitting: FAMILY MEDICINE
Payer: COMMERCIAL

## 2018-04-30 VITALS — BODY MASS INDEX: 40.1 KG/M2

## 2018-04-30 DIAGNOSIS — Z88.1: ICD-10-CM

## 2018-04-30 DIAGNOSIS — Z91.048: ICD-10-CM

## 2018-04-30 DIAGNOSIS — R55: Primary | ICD-10-CM

## 2018-04-30 DIAGNOSIS — F31.9: ICD-10-CM

## 2018-04-30 DIAGNOSIS — N18.3: ICD-10-CM

## 2018-04-30 DIAGNOSIS — F17.200: ICD-10-CM

## 2018-04-30 DIAGNOSIS — Z88.6: ICD-10-CM

## 2018-04-30 DIAGNOSIS — I13.0: ICD-10-CM

## 2018-04-30 DIAGNOSIS — K86.1: ICD-10-CM

## 2018-04-30 DIAGNOSIS — Z88.2: ICD-10-CM

## 2018-04-30 DIAGNOSIS — Z79.899: ICD-10-CM

## 2018-04-30 DIAGNOSIS — I50.9: ICD-10-CM

## 2018-04-30 DIAGNOSIS — E11.22: ICD-10-CM

## 2018-04-30 DIAGNOSIS — Z88.8: ICD-10-CM

## 2018-04-30 DIAGNOSIS — I25.10: ICD-10-CM

## 2018-04-30 LAB
ALBUMIN SERPL BCG-MCNC: 3.9 G/DL (ref 3.5–5)
ALP SERPL-CCNC: 66 U/L (ref 40–150)
ALT SERPL W P-5'-P-CCNC: 43 U/L (ref 8–55)
ANION GAP SERPL CALC-SCNC: 14 MMOL/L (ref 10–20)
AST SERPL-CCNC: 38 U/L (ref 5–34)
BASOPHILS # BLD AUTO: 0 THOU/UL (ref 0–0.2)
BASOPHILS NFR BLD AUTO: 0 % (ref 0–1)
BILIRUB SERPL-MCNC: 0.5 MG/DL (ref 0.2–1.2)
BUN SERPL-MCNC: 13 MG/DL (ref 9.8–20.1)
CALCIUM SERPL-MCNC: 9 MG/DL (ref 7.8–10.44)
CHLORIDE SERPL-SCNC: 106 MMOL/L (ref 98–107)
CO2 SERPL-SCNC: 22 MMOL/L (ref 22–29)
CREAT CL PREDICTED SERPL C-G-VRATE: 0 ML/MIN (ref 70–130)
EOSINOPHIL # BLD AUTO: 0.1 THOU/UL (ref 0–0.7)
EOSINOPHIL NFR BLD AUTO: 1.2 % (ref 0–10)
GLOBULIN SER CALC-MCNC: 2.7 G/DL (ref 2.4–3.5)
GLUCOSE SERPL-MCNC: 101 MG/DL (ref 70–105)
HGB BLD-MCNC: 12.9 G/DL (ref 12–16)
LYMPHOCYTES # BLD: 2 THOU/UL (ref 1.2–3.4)
LYMPHOCYTES NFR BLD AUTO: 22.3 % (ref 21–51)
MAGNESIUM SERPL-MCNC: 2.4 MG/DL (ref 1.6–2.6)
MCH RBC QN AUTO: 31.2 PG (ref 27–31)
MCV RBC AUTO: 90.6 FL (ref 81–99)
MONOCYTES # BLD AUTO: 0.6 THOU/UL (ref 0.11–0.59)
MONOCYTES NFR BLD AUTO: 6.6 % (ref 0–10)
NEUTROPHILS # BLD AUTO: 6.3 THOU/UL (ref 1.4–6.5)
NEUTROPHILS NFR BLD AUTO: 69.9 % (ref 42–75)
PLATELET # BLD AUTO: 148 THOU/UL (ref 130–400)
POTASSIUM SERPL-SCNC: 3.5 MMOL/L (ref 3.5–5.1)
RBC # BLD AUTO: 4.13 MILL/UL (ref 4.2–5.4)
SODIUM SERPL-SCNC: 138 MMOL/L (ref 136–145)
SP GR UR STRIP: 1.01 (ref 1–1.04)
TROPONIN I SERPL DL<=0.01 NG/ML-MCNC: (no result) NG/ML (ref ?–0.03)
TROPONIN I SERPL DL<=0.01 NG/ML-MCNC: (no result) NG/ML (ref ?–0.03)
WBC # BLD AUTO: 9 THOU/UL (ref 4.8–10.8)

## 2018-04-30 PROCEDURE — 93880 EXTRACRANIAL BILAT STUDY: CPT

## 2018-04-30 PROCEDURE — 83036 HEMOGLOBIN GLYCOSYLATED A1C: CPT

## 2018-04-30 PROCEDURE — 82746 ASSAY OF FOLIC ACID SERUM: CPT

## 2018-04-30 PROCEDURE — 70450 CT HEAD/BRAIN W/O DYE: CPT

## 2018-04-30 PROCEDURE — 36415 COLL VENOUS BLD VENIPUNCTURE: CPT

## 2018-04-30 PROCEDURE — 83735 ASSAY OF MAGNESIUM: CPT

## 2018-04-30 PROCEDURE — 71045 X-RAY EXAM CHEST 1 VIEW: CPT

## 2018-04-30 PROCEDURE — 84484 ASSAY OF TROPONIN QUANT: CPT

## 2018-04-30 PROCEDURE — 93306 TTE W/DOPPLER COMPLETE: CPT

## 2018-04-30 PROCEDURE — 70551 MRI BRAIN STEM W/O DYE: CPT

## 2018-04-30 PROCEDURE — 85379 FIBRIN DEGRADATION QUANT: CPT

## 2018-04-30 PROCEDURE — 96372 THER/PROPH/DIAG INJ SC/IM: CPT

## 2018-04-30 PROCEDURE — 93005 ELECTROCARDIOGRAM TRACING: CPT

## 2018-04-30 PROCEDURE — 81003 URINALYSIS AUTO W/O SCOPE: CPT

## 2018-04-30 PROCEDURE — 84100 ASSAY OF PHOSPHORUS: CPT

## 2018-04-30 PROCEDURE — 80053 COMPREHEN METABOLIC PANEL: CPT

## 2018-04-30 PROCEDURE — 82607 VITAMIN B-12: CPT

## 2018-04-30 PROCEDURE — 83880 ASSAY OF NATRIURETIC PEPTIDE: CPT

## 2018-04-30 PROCEDURE — 85025 COMPLETE CBC W/AUTO DIFF WBC: CPT

## 2018-04-30 PROCEDURE — 80061 LIPID PANEL: CPT

## 2018-04-30 PROCEDURE — 80048 BASIC METABOLIC PNL TOTAL CA: CPT

## 2018-04-30 PROCEDURE — 36416 COLLJ CAPILLARY BLOOD SPEC: CPT

## 2018-04-30 PROCEDURE — 84443 ASSAY THYROID STIM HORMONE: CPT

## 2018-04-30 PROCEDURE — G0378 HOSPITAL OBSERVATION PER HR: HCPCS

## 2018-04-30 NOTE — RAD
PORTABLE UPRIGHT FRONTAL CHEST RADIOGRAPH:

 

04/30/2018

 

HISTORY:

Unresponsive.  Syncope.

 

COMPARISON:

04/09/2018

 

FINDINGS:

Stable cervical spine hardware noted, incompletely imaged.  Heart and mediastinal contours are unchan
ged.  Lungs appear clear with no pneumothorax, pleural fluid, focal consolidation, or alveolar edema.


 

IMPRESSION:

No acute findings.

 

POS: [facility]

## 2018-04-30 NOTE — CT
CT HEAD WITHOUT CONTRAST:

 

04/30/2018

 

HISTORY:

Lightheaded.  Loss consciousness.  Syncope.  Headache.

 

COMPARISON:

None.

 

TECHNIQUE:

Serial axial CT imaging obtained at 5 mm intervals, from the vertex through the skull base, without c
ontrast.

 

FINDINGS:

The imaged paranasal sinuses and mastoid air cells are unremarkable.  No acute osseous abnormality.  
There is no intracranial hemorrhage, midline shift, mass effect, or ventricular enlargement.

 

IMPRESSION:

No acute findings.

 

POS: [facility]

## 2018-04-30 NOTE — PDOC.FPRHP
- History of Present Illness


Chief Complaint: Syncope


History of Present Illness: 





54 yo female presents for evaluation of syncope that occurred earlier today 

while she was at work at [employer]. She states that she felt weak and needed a 

break so she went outside to take a break. She then was sitting in a chair, 

felt warm and lightheaded and lost consciousness. She does not know exactly how 

long she was out, but denies any convulsions or loss of continence. She states 

she had some chest tightness and SOB that lasted for 1 minute following the 

episode. She denies any confusion after the episode. She also complains of a 

headache off and on for the last three months. Her headache is not constant, 

but comes and goes and today is one of the more severe days. She denies any 

nausea, vomiting, diarrhea, recent illness, fever or chills. She does note that 

she is a well controlled diabetic. She was told her glucose was in the 60s 

during this event and that is out of the ordinary low for her. No other 

complaints today. 


ED Course: 





No medication intervention





- Allergies/Adverse Reactions


 Allergies











Allergy/AdvReac Type Severity Reaction Status Date / Time


 


adhesive Allergy   Verified 18 12:29


 


aspirin Allergy   Verified 18 12:29


 


codeine Allergy   Verified 18 12:29


 


gluten Allergy   Verified 18 12:29


 


hydrocodone Allergy   Verified 18 12:29


 


levofloxacin [From Levaquin] Allergy   Verified 18 12:29


 


NSAIDS (Non-Steroidal Allergy   Verified 18 12:29





Anti-Inflamma     


 


Sulfa (Sulfonamide Allergy   Verified 18 12:29





Antibiotics)     


 


trimethoprim [From Bactrim] Allergy   Verified 18 12:29


 


tramadol AdvReac Mild ITCHING Verified 18 12:29














- Home Medications


 











 Medication  Instructions  Recorded  Confirmed  Type


 


Cetirizine HCl [Zyrtec] 10 mg PO HS 17 History


 


Gabapentin 900 mg PO BID 17 History


 


Insulin Glargine,Hum.Rec.Anlog 80 units SC DAILY 17 History





[Toujeo Solostar]    


 


Lisinopril 5 mg PO QPM 17 History


 


Magnesium 250 mg PO HS 17 History


 


PARoxetine HCl [Paroxetine HCl] 20 mg PO HS 17 History


 


risperiDONE [Risperidone] 0.5 mg PO HS 17 History


 


Pancrelipase  24684 [Creon DR 4 cap PO TID-WM #30 cap 10/15/17 04/30/18 Rx





12,000 Units]    


 


Ondansetron [Zofran ODT] 4 mg PO Q6H PRN 4 Days #16 tab 18 Rx


 


Atorvastatin Calcium [Lipitor] 40 mg PO HS 30 Days #30 tab 18 Rx


 


Ranolazine [Ranexa] 500 mg PO BID 30 Days #60 tab 18 Rx


 


Ticagrelor [Brilinta] 90 mg PO BID 30 Days #60 tab 18 Rx


 


Potassium Chloride [Klor-Con 10] 10 meq PO DAILY #30 tab 18 Rx


 


Torsemide 20 mg PO ASDIR #90 tablet 18 Rx


 


Ergocalciferol (Vitamin D2) 50,000 unit PO ASDIR 18 History





[Vitamin D2]    


 


Fenofibrate [Tricor] 160 mg PO HS 18 History


 


Isosorbide Mononitrate [Imdur] 90 mg PO DAILY 18 History


 


Metoprolol Tartrate [Lopressor] 12.5 mg PO DAILY 18 History


 


Omega-3 Acid Ethyl Esters [Lovaza] 2 gm PO BID 18 History


 


Promethazine [Phenergan] 25 mg PO Q6HR PRN 18 History


 


Ranitidine HCl 150 mg PO BID 18 History














- History


PMHx: MIx2, chronic pancreatitis, HTN, HLD, CKD, DM2, Bipolar 


 


PSHx: Hysterectomy w/ bilateral oopherectomy, L knee surgery





FHx: Mother with Cardiomyopathy and pulm HTN


 


Social:


 2ppd for 40 years, no alcohol, no drugs, works at [employer] as 





- Review of Systems


General: denies: fever/chills, weight/appetite/sleep changes


Eyes: denies: eye pain, vision changes


ENT: denies: nasal congestion, rhinorrhea


Respiratory: reports: shortness of breath.  denies: cough, congestion


Cardiovascular: reports: chest pain, palpitation.  denies: edema


Gastrointestinal: denies: nausea, vomiting, diarrhea, constipation


Genitourinary: denies: incontinence, dysuria


Skin: denies: rashes, lesions


Musculoskeletal: denies: pain, tenderness, stiffness


Neurological: reports: syncope


Psychological: reports: other (Bipolar)





- Vital signs


BP: [124/75]  HR: [60] RR: [18] Tmax: [97.9] Pox: [99]% on [RmAir]  Wt: [105 kg

]   








- Physical Exam


Constitutional: NAD, awake, alert and oriented


HEENT: normocephalic and atraumatic, PERRLA, grossly normal vision, grossly 

normal hearing, normal nasal mucosa


Neck: supple


Chest: no-tender to palpation


Heart: RRR, normal S1/S2, no murmurs/rubs/gallops


Lungs: CTAB, no respiratory distress, good air movement, no wheezing


Abdomen: soft, non-tender, bowel sounds present, no masses/distention


Musculoskeletal: normal structure, normal tone, ROM grossly normal


Neurological: no focal deficit, CN II-XII intact, DTRs 2+


-Neurological: 





Altered sensation on Right side. 


Skin: no rash/lesions, good turgor


Heme/Lymphatic: no unusual bruising or bleeding


Psychiatric: normal mood and affect, good judgment and insight, intact recent 

and remote memory





FMR H&P: Results





- Labs


Result Diagrams: 


 18 18:40





 18 18:40


Lab results: 


 











WBC  9.0 thou/uL (4.8-10.8)   18  18:40    


 


Hgb  12.9 g/dL (12.0-16.0)   18  18:40    


 


Hct  37.5 % (36.0-47.0)   18  18:40    


 


MCV  90.6 fl (81.0-99.0)   18  18:40    


 


Plt Count  148 thou/uL (130-400)   18  18:40    


 


Neutrophils %  69.9 % (42.0-75.0)   18  18:40    


 


Sodium  138 mmol/L (136-145)   18  18:40    


 


Potassium  3.5 mmol/L (3.5-5.1)   18  18:40    


 


Chloride  106 mmol/L ()   18  18:40    


 


Carbon Dioxide  22 mmol/L (22-29)   18  18:40    


 


BUN  13 mg/dL (9.8-20.1)   18  18:40    


 


Creatinine  1.16 mg/dL (0.6-1.1)  H  18  18:40    


 


Glucose  101 mg/dL ()   18  18:40    


 


Calcium  9.0 mg/dL (7.8-10.44)   18  18:40    


 


Total Bilirubin  0.5 mg/dL (0.2-1.2)   18  18:40    


 


AST  38 U/L (5-34)  H  18  18:40    


 


ALT  43 U/L (8-55)   18  18:40    


 


Alkaline Phosphatase  66 U/L ()   18  18:40    


 


B-Natriuretic Peptide  38.6 pg/mL (0-100)   18  18:36    


 


Serum Total Protein  6.6 g/dL (6.0-8.3)   18  18:40    


 


Albumin  3.9 g/dL (3.5-5.0)   18  18:40    














- EKG Interpretation


EK lead EKG shows normal sinus rhythm, Rate (beats per minute): 61, with no 

ectopics, Interpretation: normal EKG, Conduction normal, ST segments normal, T 

waves normal, Axis normal, Other findings include:, Clinical impression: Normal 

EKG.





FMR H&P: A/P





- Problem List


(1) Syncope


Current Visit: Yes   Status: Acute   Code(s): R55 - SYNCOPE AND COLLAPSE   





(2) Chronic pancreatitis


Current Visit: No   Status: Acute   Code(s): K86.1 - OTHER CHRONIC PANCREATITIS

   





(3) Bipolar disorder


Current Visit: No   Status: Chronic   Code(s): F31.9 - BIPOLAR DISORDER, 

UNSPECIFIED   


Qualifiers: 


   Active/Remission status: remission status unspecified   Qualified Code(s): 

F31.9 - Bipolar disorder, unspecified   





(4) CAD (coronary artery disease)


Current Visit: No   Status: Chronic   Code(s): I25.10 - ATHSCL HEART DISEASE OF 

NATIVE CORONARY ARTERY W/O ANG PCTRS   


Qualifiers: 


   Coronary Disease-Associated Artery/Lesion type: native artery   Native vs. 

transplanted heart: native heart   Associated angina: angina presence 

unspecified   Qualified Code(s): I25.10 - Atherosclerotic heart disease of 

native coronary artery without angina pectoris   





(5) Chronic kidney disease, stage 3


Current Visit: No   Status: Chronic   





(6) Diabetes mellitus type 2 in obese


Current Visit: No   Status: Chronic   Code(s): E11.9 - TYPE 2 DIABETES MELLITUS 

WITHOUT COMPLICATIONS; E66.9 - OBESITY, UNSPECIFIED   





(7) Hyperlipidemia


Current Visit: No   Status: Chronic   Code(s): E78.5 - HYPERLIPIDEMIA, 

UNSPECIFIED   


Qualifiers: 


   Hyperlipidemia type: unspecified   Qualified Code(s): E78.5 - Hyperlipidemia

, unspecified   





(8) Hypertension


Current Visit: No   Status: Chronic   Code(s): I10 - ESSENTIAL (PRIMARY) 

HYPERTENSION   


Qualifiers: 


   Hypertension type: essential hypertension   Qualified Code(s): I10 - 

Essential (primary) hypertension   





(9) Peripheral neuropathy


Current Visit: No   Status: Chronic   Code(s): G62.9 - POLYNEUROPATHY, 

UNSPECIFIED   


Qualifiers: 


   Peripheral neuropathy type: polyneuropathy, unspecified   Qualified Code(s): 

G62.9 - Polyneuropathy, unspecified   





(10) Tobacco abuse


Current Visit: No   Status: Chronic   Code(s): Z72.0 - TOBACCO USE   





- Plan





(1) Syncope


-  Rule out TIA


- ECHO, MRI, Carotid dopplers ordered


- Orthostatic VS


- Tele monitoring


- Patient is allergic to aspirin





(2) Chronic pancreatitis


- No acute symptoms


- Continue home meds





(3) Bipolar disorder


- Currently well controlled


- Continue home meds 





(4) CAD (coronary artery disease)


- last stent placed in 2018


- Continue home meds





(5) Chronic kidney disease, stage 3


- Monitor BMP


- Avoid nephrotoxic drugs


- IVF if needed





(6) Diabetes mellitus type 2 in obese


- Accuchecks


- SSI


- Continue home meds


- Check A1C 





(7) Hyperlipidemia


- Continue home meds


- Check FLP 





(8) Hypertension


- BP at goal


- Hold home meds for now for permissive HTN x24 hours





(9) Peripheral neuropathy


- Continue home meds





(10) Tobacco abuse


- Recommend cessation


- Patient not willing to quit at this time.








CODE STATUS: FULL CODE





Disposition: Stable, will admit to Observation Telemetry.   





FMR H&P: Upper Level





- Pertinent history


54 yo CF pmhx significant for CAD, HTN, HLD, and T2DM p/w syncopal episode. 

Occurred while she was sitting in a chair while at work at [employer]. States that 

she suddenly felt warm and lightheaded before losing consciousness. Unclear if 

episode was witnessed but one of her coworkers did call EMS. They believe she 

was unconscious for about 15 min. Patient awoke after sternal rub by 

paramedics. Denies any reported convulsions or incontinence. States that she 

had some chest chest tightness and dyspnea for about 1 minute after the episode 

but denies any confusion, memory loss, or post-ictal type symptoms. She also 

endorses a constant, diffuse headache over the past three months that will wax 

and wane in severity. Headache was severe after syncopal episode. She also 

reports that she is a well-controlled insulin dependent T2 diabetic. Upon EMS 

arrival, patient was noted to have a glucose in the 60's, which is abnormally 

low for her, and so was given an amp of D50 en route. States that she had been 

eating and drinking normally prior to the syncopal episode. She was also 

apparently hypertensive during transport and given nitro spray, with subsequent 

improvement of her pressures after arrival here. Denies recent illness or other 

associated symptoms. 





- Pertinent findings





PE:


Gen: AAOx3, NAD


HEENT: PERRLA, EOMI, no carotid bruits


CV: RRR, no m/g/r


Lungs: CTAB


Neuro: CN's II-XII intact; strength 4/5 in all extremities but suspect poor 

patient effort; sensation subjectively decreased in left upper and lower 

extremities; reflexes 2+; positive Romberg, negative drift; HTS and FTN negative

; gait normal








- Plan


Date/Time: 18








Evelio SOUZA MD, have evaluated this patient and agree with findings/plan 

as outlined by intern resident. Pertinent changes/additions are listed here.


54 yo F with:


1) Syncopal episode, etiology unclear. Hypoglycemia v. vasovagal v. cardiogenic 

v. less likely TIA. Place in obs O/N. CT head negative. MRI, cardiac echo, and 

carotid dopplers ordered for AM. Cont. statin and Brillinta. Neurology consult 

in AM. Orthostatics ordered. EKG NSR but keping on telemetry monitoring O/N to 

look for any occult cardiac arrhythmia. Consider EEG if no other cause 

apparent. 


2) Chronic HA: patient was supposed to have CT scan tmrw and see neurologist to 

w/u further; will ask neurology to evaluate tmrw. MRI ordered as above.


3) CKDIII: appears near baseline, fluid status euvolemic. Continue to avoid 

nephrotoxic agents if possible. 


4) HTN: permissive HTN x 24 hrs


5) HLD: cont statin


6) 2v CAD: s/p drug eluting stent placement in LAD in 2018; continue 

Brillinta only as patient has ASA allergy; no new EKG changes


7) T2DM: check A1c, accuchecks AC/HS, cont. HMs


8) dCHF: echo, cont home Lasix


9) Chronic pancreatitis: asymptomatic at present


10) Chronic weakness w/ L sided paresthesias: likely related to longstanding 

diabetic neuropathy as neuro-imaging so far WNL. MRI tmrw.


11) Tobacco abuse: cessation counseling; patient states that she is currently 

trying to quit with transdermals





Attending Addendum





- Attending Addendum


Date/Time: 18 [id number]





I personally evaluated the patient and discussed the management with Chasidy Santana 

and Mau. 


I agree with the History, Examination, Assessment and Plan documented above 

with any addition or exceptions noted below.

## 2018-05-01 LAB
ANION GAP SERPL CALC-SCNC: 12 MMOL/L (ref 10–20)
BUN SERPL-MCNC: 15 MG/DL (ref 9.8–20.1)
CALCIUM SERPL-MCNC: 9.3 MG/DL (ref 7.8–10.44)
CHD RISK SERPL-RTO: 6.9 (ref ?–4.5)
CHLORIDE SERPL-SCNC: 105 MMOL/L (ref 98–107)
CHOLEST SERPL-MCNC: 144 MG/DL
CO2 SERPL-SCNC: 24 MMOL/L (ref 22–29)
CREAT CL PREDICTED SERPL C-G-VRATE: 75 ML/MIN (ref 70–130)
GLUCOSE SERPL-MCNC: 240 MG/DL (ref 70–105)
HDLC SERPL-MCNC: 21 MG/DL
LDLC SERPL CALC-MCNC: 56 MG/DL
POTASSIUM SERPL-SCNC: 3.6 MMOL/L (ref 3.5–5.1)
SODIUM SERPL-SCNC: 137 MMOL/L (ref 136–145)
TRIGL SERPL-MCNC: 335 MG/DL (ref ?–150)
TROPONIN I SERPL DL<=0.01 NG/ML-MCNC: 0.01 NG/ML (ref ?–0.03)
VIT B12 SERPL-MCNC: 392 PG/ML (ref 211–911)

## 2018-05-01 RX ADMIN — INSULIN LISPRO PRN UNIT: 100 INJECTION, SOLUTION INTRAVENOUS; SUBCUTANEOUS at 13:35

## 2018-05-01 RX ADMIN — PANCRELIPASE SCH CAP: 60000; 12000; 38000 CAPSULE, DELAYED RELEASE PELLETS ORAL at 17:45

## 2018-05-01 RX ADMIN — PANCRELIPASE SCH CAP: 60000; 12000; 38000 CAPSULE, DELAYED RELEASE PELLETS ORAL at 09:23

## 2018-05-01 RX ADMIN — PANCRELIPASE SCH CAP: 60000; 12000; 38000 CAPSULE, DELAYED RELEASE PELLETS ORAL at 13:34

## 2018-05-01 RX ADMIN — INSULIN LISPRO PRN UNIT: 100 INJECTION, SOLUTION INTRAVENOUS; SUBCUTANEOUS at 17:44

## 2018-05-01 RX ADMIN — TICAGRELOR SCH MG: 90 TABLET ORAL at 21:01

## 2018-05-01 RX ADMIN — TICAGRELOR SCH MG: 90 TABLET ORAL at 09:26

## 2018-05-01 RX ADMIN — INSULIN LISPRO PRN UNIT: 100 INJECTION, SOLUTION INTRAVENOUS; SUBCUTANEOUS at 21:06

## 2018-05-01 RX ADMIN — INSULIN LISPRO PRN UNIT: 100 INJECTION, SOLUTION INTRAVENOUS; SUBCUTANEOUS at 06:32

## 2018-05-01 NOTE — MRI
BRAIN MRI WITHOUT IV CONTRAST:

 

Date:  05/01/18 

 

HISTORY:  

53-year-old female with lightheadedness, loss of consciousness, syncope, and headache. TIA. 

 

COMPARISON:  

04/30/18 head CT. 

 

FINDINGS:

No focal mass or midline shift. No intra or extra-axial hemorrhage. No evidence for an acute infarct.
 Normal expected flow-voids are present. 

 

IMPRESSION: 

Unremarkable brain MRI. No evidence for acute infarct or other significant acute process. 

 

 

POS: [facility]

## 2018-05-01 NOTE — ULT
BILATERAL CAROTID DUPLEX ULTRASOUND:

 

DATE:  05/01/18 

 

HISTORY:  

TIA. 

 

TECHNIQUE:  

Gray scale ultrasound with color flow and spectral Doppler imaging of the extracranial carotid artery
 systems performed bilaterally. 

 

FINDINGS:

 

There is plaque formation on either side. 

 

The peak systolic velocity in the right ICA measures 57 cm/second with an end-diastolic velocity of 1
6 cm/second and a systolic ratio of 0.83. 

 

The peak systolic velocity in the left ICA measures 64 cm/second with an end-diastolic velocity of 21
 cm/second and a systolic ratio of 0.84. 

 

Flow in both vertebral arteries remains antegrade. 

 

IMPRESSION: 

No evidence of hemodynamically significant stenosis.  

 

POS: [facility]

## 2018-05-01 NOTE — CON
DATE OF CONSULTATION:  05/01/2018

 

CONSULTING PHYSICIAN:  [facility].

 

IMPRESSION:  Prolonged syncopal episode of uncertain etiology.

 

PLAN:  I will be happy to monitor as an outpatient for further events.

 

HISTORY OF PRESENT ILLNESS:  Ms. Nguyen is a 53-year-old white female with a history of diabetes, 
hypertension, chronic kidney disease, bipolar disorder, and myocardial infarction.  She was outside [facility], sitting on a chair when she began feeling lightheaded.  She apparently lost consciousness and
 reportedly it may have been as long as 10 minutes.  EMS was called when she was found unresponsive. 
 She was left sitting in the chair during this interval of time.  Has no reported seizure activity.  
I checked her blood sugar and it was 84.  It is unclear as to what her vital signs showed at that poi
nt in time.  She was transported here for further evaluation.  Her MRI of the brain was normal.  Her 
carotid ultrasound was also clear.  Her vital signs have been stable and she has been afebrile since 
admission.  She has not had any similar events like this in the past.

 

PAST MEDICAL HISTORY:  As listed above.

 

ALLERGIES:  SULFA, CODEINE, LEVAQUIN, NONSTEROIDALS, and TRAMADOL.

 

MEDICATIONS:  Metoprolol, lisinopril, Plavix, Neurontin, isosorbide, lovastatin, Risperdal, Paxil, La
six and Toujeo.

 

FAMILY HISTORY:  Noncontributory.

 

SOCIAL HISTORY:  No illicit drug use.

 

REVIEW OF SYSTEMS:  No complaints of any focal weakness or numbness.

 

PHYSICAL EXAMINATION:

GENERAL:  She is an overweight, middle-aged woman, sitting up in bed, in no distress.

VITAL SIGNS:  Have been stable.  She is afebrile.

HEENT:  Unremarkable.

NECK:  Supple.

EXTREMITIES:  No cyanosis.

NEUROLOGIC:  She is alert and appropriate.  Her speech is fluent and clear.  Exam is nonfocal.

 

IMAGING:  EKG shows normal sinus rhythm.

 

SUMMARY:  A middle-aged woman with a prolonged interval of unconsciousness.  It is possibly could hav
e been a nonconvulsive seizure.  I will be happy to follow up with her if there are any recurrent pro
blems.

## 2018-05-01 NOTE — PDOC.FM
- Subjective


Subjective: 





Pt states that she feels like during the episode yesterday she was having 

palpitations. She doesn't think her blood sugar was so low to where she passed 

out. She reports a hx of bradycardia. Denies complaints or sx this am.





- Objective


MAR Reviewed: Yes


Vital Signs & Weight: 


 Vital Signs (12 hours)











  Temp Pulse Resp BP BP BP Pulse Ox


 


 05/01/18 04:52  97.9 F  70  20  141/67 H    97


 


 04/30/18 22:48  97.5 F L  59 L  28 H  98/51 L  108/56 L  108/55 L 


 


 04/30/18 22:10  97.5 F L  59 L  28 H  134/62    96








 Weight











Weight                         106.005 kg














I&O: 


 











 04/30/18 05/01/18 05/02/18





 06:59 06:59 06:59


 


Intake Total  600 


 


Balance  600 











Result Diagrams: 


 04/30/18 18:40





 05/01/18 05:09





<Lyn Reyes - Last Filed: 05/01/18 12:21>





- Objective


Vital Signs & Weight: 


 Vital Signs (12 hours)











  Temp Pulse Resp BP BP Pulse Ox


 


 05/01/18 15:42  98.2 F  55 L  16  133/62   93 L


 


 05/01/18 12:00  97.7 F  60  12  119/62   96


 


 05/01/18 08:15  97.6 F  52 L  14   160/68 H  97








 Weight











Weight                         106.005 kg














I&O: 


 











 04/30/18 05/01/18 05/02/18





 06:59 06:59 06:59


 


Intake Total  600 1400


 


Balance  600 1400











Result Diagrams: 


 04/30/18 18:40





 05/01/18 05:09





<Chiqui Owens - Last Filed: 05/01/18 19:00>





Phys Exam





- Physical Examination


Constitutional: NAD


Respiratory: no wheezing, no rales, no rhonchi, clear to auscultation bilateral


Cardiovascular: RRR, no significant murmur


Gastrointestinal: soft, non-tender, no distention


Musculoskeletal: no edema


Neurological: non-focal


generalized weakness; left sided decreased sensation to light touch


Psychiatric: normal affect, A&O x 3


Skin: no rash, cap refill <2 seconds





<Lyn Reyes - Last Filed: 05/01/18 12:21>





Dx/Plan


(1) CHF (congestive heart failure)


Code(s): I50.9 - HEART FAILURE, UNSPECIFIED   Status: Acute   





(2) Syncope


Code(s): R55 - SYNCOPE AND COLLAPSE   Status: Acute   





(3) Chronic pancreatitis


Code(s): K86.1 - OTHER CHRONIC PANCREATITIS   Status: Acute   





(4) CAD (coronary artery disease)


Code(s): I25.10 - ATHSCL HEART DISEASE OF NATIVE CORONARY ARTERY W/O ANG PCTRS 

  Status: Chronic   


  QualifierTitle:    Coronary Disease-Associated Artery/Lesion type: native 

artery   Native vs. transplanted heart: native heart   Associated angina: 

angina presence unspecified   Qualified Code(s): I25.10 - Atherosclerotic heart 

disease of native coronary artery without angina pectoris   





(5) Diabetes mellitus type 2 in obese


Code(s): E11.9 - TYPE 2 DIABETES MELLITUS WITHOUT COMPLICATIONS; E66.9 - OBESITY

, UNSPECIFIED   Status: Chronic   





(6) Hyperlipidemia


Code(s): E78.5 - HYPERLIPIDEMIA, UNSPECIFIED   Status: Chronic   


  QualifierTitle:    Hyperlipidemia type: unspecified   Qualified Code(s): 

E78.5 - Hyperlipidemia, unspecified   





(7) Hypertension


Code(s): I10 - ESSENTIAL (PRIMARY) HYPERTENSION   Status: Chronic   


  QualifierTitle:    Hypertension type: essential hypertension   Qualified Code(

s): I10 - Essential (primary) hypertension   





(8) Bipolar disorder


Code(s): F31.9 - BIPOLAR DISORDER, UNSPECIFIED   Status: Chronic   


  QualifierTitle:    Active/Remission status: remission status unspecified   

Qualified Code(s): F31.9 - Bipolar disorder, unspecified   





(9) Chronic kidney disease, stage 3


Status: Chronic   





(10) Tobacco abuse


Code(s): Z72.0 - TOBACCO USE   Status: Chronic   





- Plan


Plan: 





(1) Syncope


- Rule out CVA


- MRI and carotid dopplers show no sign of cva or severe stenosis


- Orthostatic VS wnl


- Echo pending


- Continue Tele monitoring as patient is persistently bradycardic


- Patient is allergic to aspirin


- She is on atorvastin 40mg daily





(2) CAD (coronary artery disease)


- last stent placed in March 2018


-Continue plavix and ticagrelor (brilinta), pt allergic to aspirin


-Continue Ranexa





(3) Diabetes mellitus type 2 in obese


- Accuchecks


- SSI


- Continue 80U levemir daily


- hba1c 8.1





(4) CHF


metoprolol, torsemide, lisinopril, imdur





(5) Hyperlipidemia


-Continue atorvastatin 40mg daily and Tricor (fenofibrate)





(6) Hypertension


- BP at goal


- Okay to restart Imdur, lisinopril, metoprolol





(7) Chronic kidney disease, stage 3


- Monitor BMP


- Avoid nephrotoxic drugs


- IVF if needed





(8) Chronic pancreatitis


- No acute symptoms


-Continue Creon





(9) Bipolar disorder


- Currently well controlled


- Continue Risperdal and  





(10) Tobacco abuse


- Recommend cessation


- Patient not willing to quit at this time.








CODE STATUS: FULL CODE





Disposition: Stable, will admit to Observation Telemetry.   





<Lyn Reyes - Last Filed: 05/01/18 12:21>





Attending Addendum





- Attending Addendum


Date/Time: 05/01/18 1900





I personally evaluated the patient and discussed the management with Dr. Reyes.


I agree with the History, Examination, Assessment and Plan documented above 

with any addition or exceptions noted below.


Pt admitted with syncope.  MRI and carotid dopplers negative.  Waiting on echo 

report.  She is feeling better.  Monitor heart rate, as her beta blocker may 

need to be decreased if bradycardia persists.





<Chiqui Owens - Last Filed: 05/01/18 19:00>

## 2018-05-02 VITALS — DIASTOLIC BLOOD PRESSURE: 55 MMHG | TEMPERATURE: 98 F | SYSTOLIC BLOOD PRESSURE: 109 MMHG

## 2018-05-02 RX ADMIN — PANCRELIPASE SCH CAP: 60000; 12000; 38000 CAPSULE, DELAYED RELEASE PELLETS ORAL at 17:42

## 2018-05-02 RX ADMIN — TICAGRELOR SCH MG: 90 TABLET ORAL at 08:11

## 2018-05-02 RX ADMIN — INSULIN LISPRO PRN UNIT: 100 INJECTION, SOLUTION INTRAVENOUS; SUBCUTANEOUS at 03:59

## 2018-05-02 RX ADMIN — PANCRELIPASE SCH CAP: 60000; 12000; 38000 CAPSULE, DELAYED RELEASE PELLETS ORAL at 08:10

## 2018-05-02 RX ADMIN — PANCRELIPASE SCH CAP: 60000; 12000; 38000 CAPSULE, DELAYED RELEASE PELLETS ORAL at 12:25

## 2018-05-02 NOTE — PDOC.FM
- Subjective


Subjective: 





No acute events overnight. No complaints this morning. During the event, she 

described acute sx of sweating, rapid heart beat, and nausea before she had her 

syncopal spell. She also describes a hx of sinoatrial dysfunction and states 

that she usually has a fast heart beat.








- Objective


MAR Reviewed: Yes


Vital Signs & Weight: 


 Vital Signs (12 hours)











  Temp Pulse Resp BP BP Pulse Ox


 


 05/02/18 03:45  98.1 F  59 L  16   136/58 L  96


 


 05/01/18 23:40  98.3 F  62  18   143/64 H  96


 


 05/01/18 19:49  97.8 F  57 L  17  139/63   95








 Weight











Weight                         106.367 kg














I&O: 


 











 04/30/18 05/01/18 05/02/18





 06:59 06:59 06:59


 


Intake Total  600 1950


 


Balance  600 1950











Result Diagrams: 


 04/30/18 18:40





 05/01/18 05:09





<Lyn Reyes - Last Filed: 05/02/18 08:37>





- Objective


Vital Signs & Weight: 


 Vital Signs (12 hours)











  Temp Pulse Resp BP BP Pulse Ox


 


 05/02/18 11:09  98.0 F  62  16  109/55 L   96


 


 05/02/18 08:10  98.2 F  71  20   


 


 05/02/18 07:19  98.2 F  71  20  131/58 L   96


 


 05/02/18 03:45  98.1 F  59 L  16   136/58 L  96








 Weight











Weight                         106.367 kg














I&O: 


 











 05/01/18 05/02/18 05/03/18





 06:59 06:59 06:59


 


Intake Total 600 1950 


 


Balance 600 1950 











Result Diagrams: 


 04/30/18 18:40





 05/01/18 05:09





<Chiqui Owens - Last Filed: 05/02/18 12:57>





Phys Exam





- Physical Examination


Constitutional: NAD


HEENT: PERRLA, moist MMs


Respiratory: no wheezing, no rales, clear to auscultation bilateral


Cardiovascular: RRR, no significant murmur


Gastrointestinal: soft, non-tender, no distention


Musculoskeletal: no edema


Neurological: non-focal


Psychiatric: normal affect, A&O x 3


Skin: no rash, normal turgor, cap refill <2 seconds





<Lyn Reyes - Last Filed: 05/02/18 08:37>





Dx/Plan


(1) CHF (congestive heart failure)


Code(s): I50.9 - HEART FAILURE, UNSPECIFIED   Status: Acute   





(2) Syncope


Code(s): R55 - SYNCOPE AND COLLAPSE   Status: Acute   





(3) Chronic pancreatitis


Code(s): K86.1 - OTHER CHRONIC PANCREATITIS   Status: Acute   





(4) CAD (coronary artery disease)


Code(s): I25.10 - ATHSCL HEART DISEASE OF NATIVE CORONARY ARTERY W/O ANG PCTRS 

  Status: Chronic   


  QualifierTitle:    Coronary Disease-Associated Artery/Lesion type: native 

artery   Native vs. transplanted heart: native heart   Associated angina: 

angina presence unspecified   Qualified Code(s): I25.10 - Atherosclerotic heart 

disease of native coronary artery without angina pectoris   





(5) Diabetes mellitus type 2 in obese


Code(s): E11.9 - TYPE 2 DIABETES MELLITUS WITHOUT COMPLICATIONS; E66.9 - OBESITY

, UNSPECIFIED   Status: Chronic   





(6) Hyperlipidemia


Code(s): E78.5 - HYPERLIPIDEMIA, UNSPECIFIED   Status: Chronic   


  QualifierTitle:    Hyperlipidemia type: unspecified   Qualified Code(s): 

E78.5 - Hyperlipidemia, unspecified   





(7) Hypertension


Code(s): I10 - ESSENTIAL (PRIMARY) HYPERTENSION   Status: Chronic   


  QualifierTitle:    Hypertension type: essential hypertension   Qualified Code(

s): I10 - Essential (primary) hypertension   





(8) Bipolar disorder


Code(s): F31.9 - BIPOLAR DISORDER, UNSPECIFIED   Status: Chronic   


  QualifierTitle:    Active/Remission status: remission status unspecified   

Qualified Code(s): F31.9 - Bipolar disorder, unspecified   





(9) Chronic kidney disease, stage 3


Status: Chronic   





(10) Tobacco abuse


Code(s): Z72.0 - TOBACCO USE   Status: Chronic   





- Plan


Plan: 





(1) Syncope


- No evidence of CVA


-HR 58-94 overnight, sinus rhythm


-Pt describes acute sx of sweating, rapid heart beat and nausea before she had 

her syncopal spell.


-I suspect vasovagal syncope vs cardiac etiology; she also describes a hx of 

sinoatrial dysfunction and states that she usually has a fast heart beat.


-Echo read pending.


- MRI and carotid dopplers show no sign of cva or severe stenosis


- Orthostatic VS wnl


- Echo pending


- Continue Tele monitoring as patient is persistently bradycardic


- Patient is allergic to aspirin


- She is on atorvastin 40mg daily


- Continue to hold metoprolol





(2) CAD (coronary artery disease)


-last stent placed in March 2018


-Continue plavix and ticagrelor (brilinta), pt allergic to aspirin


-Continue Ranexa





(3) Diabetes mellitus type 2 in obese


- Accuchecks


- SSI: pt required 14 additional units of sliding scale yesterday


- Increase levemir to 87U daily


- hba1c 8.1





(4) dCHF


-Based off of last discharge summary from pt's recent hospitalization in March.


-echo pending, no s/s of volume overload today.


-Continue torsemide, imdur


-lisinopril, metoprolol were held yesterday for permissive hypertension


-restart lisinopril today; pt still is bradycardic. Hold metoprolol for now.





(5) Hyperlipidemia


-Continue atorvastatin 40mg daily and Tricor (fenofibrate)





(6) Hypertension


- BP at goal


- Okay to restart Imdur, lisinopril, metoprolol





(7) Chronic kidney disease, stage 3


- Monitor BMP


- Avoid nephrotoxic drugs


- IVF if needed





(8) Chronic pancreatitis


- No acute symptoms


-Continue Creon





(9) Bipolar disorder


- Currently well controlled


- Continue Risperdal and  





(10) Tobacco abuse


- Recommend cessation


- Patient not willing to quit at this time.





CODE STATUS: FULL CODE





Disposition: Stable, dc pending echo read, likely this evening.





<Lyn Reyes - Last Filed: 05/01/18 12:21>








<Lyn Reyes - Last Filed: 05/02/18 08:37>





Attending Addendum





- Attending Addendum


Date/Time: 05/02/18 1257





I personally evaluated the patient and discussed the management with Dr. Reyes.


I agree with the History, Examination, Assessment and Plan documented above 

with any addition or exceptions noted below.


No more syncopal or presyncopal symptoms.  Echo reviewed, EF 50-55%.  Will d/c 

home to f/u with PCP.





<Chiqui Owens - Last Filed: 05/02/18 12:57>

## 2018-05-25 ENCOUNTER — HOSPITAL ENCOUNTER (EMERGENCY)
Dept: HOSPITAL 18 - NAV ERS | Age: 53
Discharge: HOME | End: 2018-05-25
Payer: COMMERCIAL

## 2018-05-25 DIAGNOSIS — F17.210: ICD-10-CM

## 2018-05-25 DIAGNOSIS — Z79.899: ICD-10-CM

## 2018-05-25 DIAGNOSIS — E66.9: ICD-10-CM

## 2018-05-25 DIAGNOSIS — N28.9: ICD-10-CM

## 2018-05-25 DIAGNOSIS — I25.2: ICD-10-CM

## 2018-05-25 DIAGNOSIS — E78.5: ICD-10-CM

## 2018-05-25 DIAGNOSIS — E11.65: ICD-10-CM

## 2018-05-25 DIAGNOSIS — K86.1: Primary | ICD-10-CM

## 2018-05-25 LAB
ALBUMIN SERPL BCG-MCNC: 4.1 G/DL (ref 3.5–5)
ALP SERPL-CCNC: 64 U/L (ref 40–150)
ALT SERPL W P-5'-P-CCNC: 42 U/L (ref 8–55)
ANION GAP SERPL CALC-SCNC: 15 MMOL/L (ref 10–20)
AST SERPL-CCNC: 30 U/L (ref 5–34)
BASOPHILS # BLD AUTO: 0.1 THOU/UL (ref 0–0.2)
BASOPHILS NFR BLD AUTO: 0.9 % (ref 0–1)
BILIRUB SERPL-MCNC: 0.5 MG/DL (ref 0.2–1.2)
BUN SERPL-MCNC: 15 MG/DL (ref 9.8–20.1)
CALCIUM SERPL-MCNC: 9.1 MG/DL (ref 7.8–10.44)
CHLORIDE SERPL-SCNC: 102 MMOL/L (ref 98–107)
CK MB SERPL-MCNC: 0.6 NG/ML (ref 0–6.6)
CO2 SERPL-SCNC: 25 MMOL/L (ref 22–29)
CREAT CL PREDICTED SERPL C-G-VRATE: 0 ML/MIN (ref 70–130)
EOSINOPHIL # BLD AUTO: 0.2 THOU/UL (ref 0–0.7)
EOSINOPHIL NFR BLD AUTO: 1.9 % (ref 0–10)
GLOBULIN SER CALC-MCNC: 2.5 G/DL (ref 2.4–3.5)
GLUCOSE SERPL-MCNC: 289 MG/DL (ref 70–105)
HGB BLD-MCNC: 12.5 G/DL (ref 12–16)
LIPASE SERPL-CCNC: 101 U/L (ref 8–78)
LYMPHOCYTES # BLD AUTO: 2.6 THOU/UL (ref 1.2–3.4)
LYMPHOCYTES NFR BLD AUTO: 30.7 % (ref 21–51)
MCH RBC QN AUTO: 28.6 PG (ref 27–31)
MCV RBC AUTO: 86.5 FL (ref 81–99)
MONOCYTES # BLD AUTO: 0.4 THOU/UL (ref 0.11–0.59)
MONOCYTES NFR BLD AUTO: 5.2 % (ref 0–10)
NEUTROPHILS # BLD AUTO: 5.2 THOU/UL (ref 1.4–6.5)
NEUTROPHILS NFR BLD AUTO: 61.3 % (ref 42–75)
PLATELET # BLD AUTO: 158 THOU/UL (ref 130–400)
POTASSIUM SERPL-SCNC: 3.4 MMOL/L (ref 3.5–5.1)
RBC # BLD AUTO: 4.37 MILL/UL (ref 4.2–5.4)
SODIUM SERPL-SCNC: 139 MMOL/L (ref 136–145)
TROPONIN I SERPL DL<=0.01 NG/ML-MCNC: (no result) NG/ML (ref ?–0.03)
WBC # BLD AUTO: 8.5 THOU/UL (ref 4.8–10.8)

## 2018-05-25 PROCEDURE — 83690 ASSAY OF LIPASE: CPT

## 2018-05-25 PROCEDURE — 82553 CREATINE MB FRACTION: CPT

## 2018-05-25 PROCEDURE — 85025 COMPLETE CBC W/AUTO DIFF WBC: CPT

## 2018-05-25 PROCEDURE — 80053 COMPREHEN METABOLIC PANEL: CPT

## 2018-05-25 PROCEDURE — 99406 BEHAV CHNG SMOKING 3-10 MIN: CPT

## 2018-05-25 PROCEDURE — 96375 TX/PRO/DX INJ NEW DRUG ADDON: CPT

## 2018-05-25 PROCEDURE — 96361 HYDRATE IV INFUSION ADD-ON: CPT

## 2018-05-25 PROCEDURE — 93005 ELECTROCARDIOGRAM TRACING: CPT

## 2018-05-25 PROCEDURE — 84484 ASSAY OF TROPONIN QUANT: CPT

## 2018-05-25 PROCEDURE — 96374 THER/PROPH/DIAG INJ IV PUSH: CPT

## 2018-06-15 ENCOUNTER — HOSPITAL ENCOUNTER (EMERGENCY)
Dept: HOSPITAL 18 - NAV ERS | Age: 53
Discharge: HOME | End: 2018-06-15
Payer: COMMERCIAL

## 2018-06-15 DIAGNOSIS — R10.12: ICD-10-CM

## 2018-06-15 DIAGNOSIS — E78.2: ICD-10-CM

## 2018-06-15 DIAGNOSIS — F31.9: ICD-10-CM

## 2018-06-15 DIAGNOSIS — R10.11: ICD-10-CM

## 2018-06-15 DIAGNOSIS — I25.2: ICD-10-CM

## 2018-06-15 DIAGNOSIS — E66.9: ICD-10-CM

## 2018-06-15 DIAGNOSIS — I25.10: ICD-10-CM

## 2018-06-15 DIAGNOSIS — F17.210: ICD-10-CM

## 2018-06-15 DIAGNOSIS — E10.9: ICD-10-CM

## 2018-06-15 DIAGNOSIS — I10: ICD-10-CM

## 2018-06-15 DIAGNOSIS — R10.13: Primary | ICD-10-CM

## 2018-06-15 LAB
ALBUMIN SERPL BCG-MCNC: 4.1 G/DL (ref 3.5–5)
ALP SERPL-CCNC: 64 U/L (ref 40–150)
ALT SERPL W P-5'-P-CCNC: 37 U/L (ref 8–55)
ANION GAP SERPL CALC-SCNC: 17 MMOL/L (ref 10–20)
AST SERPL-CCNC: 30 U/L (ref 5–34)
BASOPHILS # BLD AUTO: 0.1 THOU/UL (ref 0–0.2)
BASOPHILS NFR BLD AUTO: 1 % (ref 0–1)
BILIRUB SERPL-MCNC: 0.4 MG/DL (ref 0.2–1.2)
BUN SERPL-MCNC: 17 MG/DL (ref 9.8–20.1)
CALCIUM SERPL-MCNC: 9.4 MG/DL (ref 7.8–10.44)
CHLORIDE SERPL-SCNC: 104 MMOL/L (ref 98–107)
CO2 SERPL-SCNC: 22 MMOL/L (ref 22–29)
CREAT CL PREDICTED SERPL C-G-VRATE: 0 ML/MIN (ref 70–130)
EOSINOPHIL # BLD AUTO: 0.2 THOU/UL (ref 0–0.7)
EOSINOPHIL NFR BLD AUTO: 1.7 % (ref 0–10)
GLOBULIN SER CALC-MCNC: 2.7 G/DL (ref 2.4–3.5)
GLUCOSE SERPL-MCNC: 150 MG/DL (ref 70–105)
HGB BLD-MCNC: 12.4 G/DL (ref 12–16)
LIPASE SERPL-CCNC: 78 U/L (ref 8–78)
LYMPHOCYTES # BLD AUTO: 3.4 THOU/UL (ref 1.2–3.4)
LYMPHOCYTES NFR BLD AUTO: 35.8 % (ref 21–51)
MCH RBC QN AUTO: 28.4 PG (ref 27–31)
MCV RBC AUTO: 87.6 FL (ref 81–99)
MONOCYTES # BLD AUTO: 0.5 THOU/UL (ref 0.11–0.59)
MONOCYTES NFR BLD AUTO: 5.7 % (ref 0–10)
NEUTROPHILS # BLD AUTO: 5.3 THOU/UL (ref 1.4–6.5)
NEUTROPHILS NFR BLD AUTO: 55.8 % (ref 42–75)
PLATELET # BLD AUTO: 166 THOU/UL (ref 130–400)
POTASSIUM SERPL-SCNC: 3.7 MMOL/L (ref 3.5–5.1)
RBC # BLD AUTO: 4.37 MILL/UL (ref 4.2–5.4)
SODIUM SERPL-SCNC: 139 MMOL/L (ref 136–145)
WBC # BLD AUTO: 9.5 THOU/UL (ref 4.8–10.8)

## 2018-06-15 PROCEDURE — 96374 THER/PROPH/DIAG INJ IV PUSH: CPT

## 2018-06-15 PROCEDURE — 96375 TX/PRO/DX INJ NEW DRUG ADDON: CPT

## 2018-06-15 PROCEDURE — 83690 ASSAY OF LIPASE: CPT

## 2018-06-15 PROCEDURE — 80053 COMPREHEN METABOLIC PANEL: CPT

## 2018-06-15 PROCEDURE — 96361 HYDRATE IV INFUSION ADD-ON: CPT

## 2018-06-15 PROCEDURE — 85025 COMPLETE CBC W/AUTO DIFF WBC: CPT

## 2018-06-26 ENCOUNTER — HOSPITAL ENCOUNTER (OUTPATIENT)
Dept: HOSPITAL 92 - SDC | Age: 53
Discharge: HOME | End: 2018-06-26
Attending: INTERNAL MEDICINE
Payer: COMMERCIAL

## 2018-06-26 VITALS — BODY MASS INDEX: 40.8 KG/M2

## 2018-06-26 DIAGNOSIS — F32.9: ICD-10-CM

## 2018-06-26 DIAGNOSIS — Z88.2: ICD-10-CM

## 2018-06-26 DIAGNOSIS — Z88.8: ICD-10-CM

## 2018-06-26 DIAGNOSIS — Z79.899: ICD-10-CM

## 2018-06-26 DIAGNOSIS — D12.3: ICD-10-CM

## 2018-06-26 DIAGNOSIS — K63.5: ICD-10-CM

## 2018-06-26 DIAGNOSIS — E11.9: ICD-10-CM

## 2018-06-26 DIAGNOSIS — I25.10: ICD-10-CM

## 2018-06-26 DIAGNOSIS — F17.200: ICD-10-CM

## 2018-06-26 DIAGNOSIS — K57.30: ICD-10-CM

## 2018-06-26 DIAGNOSIS — Z88.5: ICD-10-CM

## 2018-06-26 DIAGNOSIS — Z88.6: ICD-10-CM

## 2018-06-26 DIAGNOSIS — I10: ICD-10-CM

## 2018-06-26 DIAGNOSIS — Z88.1: ICD-10-CM

## 2018-06-26 DIAGNOSIS — M19.90: ICD-10-CM

## 2018-06-26 DIAGNOSIS — Z12.11: Primary | ICD-10-CM

## 2018-06-26 PROCEDURE — 0DBL8ZX EXCISION OF TRANSVERSE COLON, VIA NATURAL OR ARTIFICIAL OPENING ENDOSCOPIC, DIAGNOSTIC: ICD-10-PCS | Performed by: INTERNAL MEDICINE

## 2018-06-26 PROCEDURE — 0DBN8ZX EXCISION OF SIGMOID COLON, VIA NATURAL OR ARTIFICIAL OPENING ENDOSCOPIC, DIAGNOSTIC: ICD-10-PCS | Performed by: INTERNAL MEDICINE

## 2018-06-26 PROCEDURE — 88305 TISSUE EXAM BY PATHOLOGIST: CPT

## 2018-06-26 NOTE — HP
SHORT STAY HISTORY AND PHYSICAL

 

DATE OF ADMISSION:  06/26/2018

 

HISTORY OF PRESENT ILLNESS:  This is a 53-year-old female with abdominal pain, 
abnormal CT scan of the abdomen.  The patient had recent abdominal pain off and 
on.  She had an abdominal CAT scan few months ago and was told to have possible 
pancreatic mass.  However, she  had a negative EUS  in the past.  The patient 
has been doing better at the present time.  The patient comes for colonoscopy 
for colon cancer screening.

 

ALLERGIES:  Multiple include ASPIRIN, BACTRIM, CODEINE, HYDROCODONE, LEVAQUIN, 
METFORMIN, NSAIDS, and OXYCODONE.

 

SOCIAL HISTORY:  The patient is a smoker.  Does not drink alcohol.

 

MEDICAL ILLNESSES:

1.  Allergic rhinitis:

2.  Osteoarthritis.

3.  Colon polyp.

4.  Breast cancer.

5.  Coronary artery disease, stable.

6.  Depression.

7.  Diabetes.

8.  Hypertension.

 

PHYSICAL EXAMINATION:

VITAL SIGNS:  Pulse is 70, blood pressure 130/80.

HEENT:  Conjunctivae clear.

CARDIOVASCULAR SYSTEM:  First and second heart sounds normal.

LUNGS:  Clear to auscultation.

ABDOMEN:  Soft to palpate.  No organomegaly.  No tenderness.  No masses.

 

ADMITTING DIAGNOSIS:  Colon polyp.  The patient comes for colonoscopy for colon 
cancer screening.

 

LEVI

## 2018-06-26 NOTE — OP
DATE OF PROCEDURE:  06/26/2018

 

SURGEON:  Mandeep Mccoy M.D. 

 

OPERATIVE PROCEDURE:  Colonoscopy with polypectomy.

 

PREOPERATIVE DIAGNOSIS:  A 53-year-old  female with history of colon polyp, undergoing [facility] colonoscopy.

 

POSTOPERATIVE DIAGNOSES:

1.  Sigmoid diverticular disease.

2.  Sessile polyps x2 transverse colon.

3.  Sessile polyps x2, lower sigmoid colon.

 

PROCEDURE NOTE:  The patient was placed on her left lateral position and was given sedation by Anesth
esia Department.  A rectal exam was done before the scope was advanced into the rectum.  No lesion fe
lt on rectal exam.  A Pentax video colonoscope was introduced into the rectum and advanced all the wa
y into the cecum.  Prep was reasonably good.  The patient had some fecal residue that was washed out.
  The appendical opening, the ileocecal valve, cecum, no pathology seen.  The mucosa appears normal t
hroughout the colon with normal vascular pattern.  The ascending colon, hepatic flexure, no lesions s
een.  The transverse colon showed 2 sessile polyps.  Both removed with snare cautery with good hemost
asis.  The splenic flexure, descending colon, no pathology seen.  The sigmoid colon showed scattered 
diverticula.  There were 2 sessile polyps seen over the lower sigmoid colon area.  Both were removed 
with snare cautery with good hemostasis.  Retroflexion of the scope in the rectum showed no pathology
.

 

DISCHARGE PLANNING:  This is a 53-year-old  female who came in for colonoscopy.  She underwe
nt colonoscopy with polypectomy.

 

DISCHARGE RECOMMENDATIONS:

1.  The patient was advised to call me if she develops abdominal pain, hematochezia.

2.  High-fiber diet.

3.  Repeat colonoscopy in 5 years.

## 2018-07-04 ENCOUNTER — HOSPITAL ENCOUNTER (EMERGENCY)
Dept: HOSPITAL 18 - NAV ERS | Age: 53
Discharge: HOME | End: 2018-07-04
Payer: COMMERCIAL

## 2018-07-04 DIAGNOSIS — E78.1: ICD-10-CM

## 2018-07-04 DIAGNOSIS — I10: ICD-10-CM

## 2018-07-04 DIAGNOSIS — Z79.899: ICD-10-CM

## 2018-07-04 DIAGNOSIS — E10.9: ICD-10-CM

## 2018-07-04 DIAGNOSIS — I25.2: ICD-10-CM

## 2018-07-04 DIAGNOSIS — E66.9: ICD-10-CM

## 2018-07-04 DIAGNOSIS — I25.10: ICD-10-CM

## 2018-07-04 DIAGNOSIS — F17.210: ICD-10-CM

## 2018-07-04 DIAGNOSIS — R10.10: Primary | ICD-10-CM

## 2018-07-04 PROCEDURE — 96374 THER/PROPH/DIAG INJ IV PUSH: CPT

## 2018-07-04 PROCEDURE — 96375 TX/PRO/DX INJ NEW DRUG ADDON: CPT

## 2018-07-25 ENCOUNTER — HOSPITAL ENCOUNTER (EMERGENCY)
Dept: HOSPITAL 18 - NAV ERS | Age: 53
LOS: 1 days | Discharge: ANOTHER HOSPITAL | End: 2018-07-26
Payer: COMMERCIAL

## 2018-07-25 DIAGNOSIS — R11.0: ICD-10-CM

## 2018-07-25 DIAGNOSIS — Z79.899: ICD-10-CM

## 2018-07-25 DIAGNOSIS — E11.65: Primary | ICD-10-CM

## 2018-07-25 DIAGNOSIS — I25.10: ICD-10-CM

## 2018-07-25 DIAGNOSIS — E66.9: ICD-10-CM

## 2018-07-25 DIAGNOSIS — I25.2: ICD-10-CM

## 2018-07-25 DIAGNOSIS — E78.5: ICD-10-CM

## 2018-07-25 DIAGNOSIS — Z79.891: ICD-10-CM

## 2018-07-25 DIAGNOSIS — R61: ICD-10-CM

## 2018-07-25 DIAGNOSIS — F17.210: ICD-10-CM

## 2018-07-25 PROCEDURE — 82553 CREATINE MB FRACTION: CPT

## 2018-07-25 PROCEDURE — 36416 COLLJ CAPILLARY BLOOD SPEC: CPT

## 2018-07-25 PROCEDURE — 36415 COLL VENOUS BLD VENIPUNCTURE: CPT

## 2018-07-25 PROCEDURE — 94760 N-INVAS EAR/PLS OXIMETRY 1: CPT

## 2018-07-25 PROCEDURE — 71046 X-RAY EXAM CHEST 2 VIEWS: CPT

## 2018-07-25 PROCEDURE — 85025 COMPLETE CBC W/AUTO DIFF WBC: CPT

## 2018-07-25 PROCEDURE — 80053 COMPREHEN METABOLIC PANEL: CPT

## 2018-07-25 PROCEDURE — 93005 ELECTROCARDIOGRAM TRACING: CPT

## 2018-07-25 PROCEDURE — 84484 ASSAY OF TROPONIN QUANT: CPT

## 2018-07-25 PROCEDURE — 82010 KETONE BODYS QUAN: CPT

## 2018-07-25 PROCEDURE — 81003 URINALYSIS AUTO W/O SCOPE: CPT

## 2018-07-26 ENCOUNTER — HOSPITAL ENCOUNTER (EMERGENCY)
Dept: HOSPITAL 92 - ERS | Age: 53
Discharge: HOME | End: 2018-07-26
Payer: COMMERCIAL

## 2018-07-26 DIAGNOSIS — E11.65: Primary | ICD-10-CM

## 2018-07-26 DIAGNOSIS — I25.10: ICD-10-CM

## 2018-07-26 DIAGNOSIS — F17.210: ICD-10-CM

## 2018-07-26 DIAGNOSIS — I10: ICD-10-CM

## 2018-07-26 DIAGNOSIS — E78.5: ICD-10-CM

## 2018-07-26 DIAGNOSIS — F32.9: ICD-10-CM

## 2018-07-26 LAB
ALBUMIN SERPL BCG-MCNC: 4.1 G/DL (ref 3.5–5)
ALP SERPL-CCNC: 69 U/L (ref 40–150)
ALT SERPL W P-5'-P-CCNC: 39 U/L (ref 8–55)
ANION GAP SERPL CALC-SCNC: 16 MMOL/L (ref 10–20)
AST SERPL-CCNC: 29 U/L (ref 5–34)
BASOPHILS # BLD AUTO: 0.1 THOU/UL (ref 0–0.2)
BASOPHILS NFR BLD AUTO: 1 % (ref 0–1)
BILIRUB SERPL-MCNC: 0.3 MG/DL (ref 0.2–1.2)
BUN SERPL-MCNC: 19 MG/DL (ref 9.8–20.1)
CALCIUM SERPL-MCNC: 9.8 MG/DL (ref 7.8–10.44)
CHLORIDE SERPL-SCNC: 103 MMOL/L (ref 98–107)
CK MB SERPL-MCNC: 0.6 NG/ML (ref 0–6.6)
CK MB SERPL-MCNC: 1 NG/ML (ref 0–6.6)
CO2 SERPL-SCNC: 25 MMOL/L (ref 22–29)
CREAT CL PREDICTED SERPL C-G-VRATE: 0 ML/MIN (ref 70–130)
EOSINOPHIL # BLD AUTO: 0.2 THOU/UL (ref 0–0.7)
EOSINOPHIL NFR BLD AUTO: 1.7 % (ref 0–10)
GLOBULIN SER CALC-MCNC: 2.6 G/DL (ref 2.4–3.5)
GLUCOSE SERPL-MCNC: 171 MG/DL (ref 70–105)
HGB BLD-MCNC: 12.1 G/DL (ref 12–16)
LYMPHOCYTES # BLD AUTO: 3.7 THOU/UL (ref 1.2–3.4)
LYMPHOCYTES NFR BLD AUTO: 36.2 % (ref 21–51)
MCH RBC QN AUTO: 28.6 PG (ref 27–31)
MCV RBC AUTO: 86.7 FL (ref 78–98)
MONOCYTES # BLD AUTO: 0.6 THOU/UL (ref 0.11–0.59)
MONOCYTES NFR BLD AUTO: 6.1 % (ref 0–10)
NEUTROPHILS # BLD AUTO: 5.6 THOU/UL (ref 1.4–6.5)
NEUTROPHILS NFR BLD AUTO: 55.1 % (ref 42–75)
PLATELET # BLD AUTO: 167 THOU/UL (ref 130–400)
POTASSIUM SERPL-SCNC: 3.2 MMOL/L (ref 3.5–5.1)
RBC # BLD AUTO: 4.24 MILL/UL (ref 4.2–5.4)
SODIUM SERPL-SCNC: 141 MMOL/L (ref 136–145)
SP GR UR STRIP: 1.02 (ref 1–1.03)
TROPONIN I SERPL DL<=0.01 NG/ML-MCNC: (no result) NG/ML (ref ?–0.03)
TROPONIN I SERPL DL<=0.01 NG/ML-MCNC: (no result) NG/ML (ref ?–0.03)
WBC # BLD AUTO: 10.2 THOU/UL (ref 4.8–10.8)

## 2018-07-26 PROCEDURE — 93005 ELECTROCARDIOGRAM TRACING: CPT

## 2018-07-26 PROCEDURE — 82553 CREATINE MB FRACTION: CPT

## 2018-07-26 PROCEDURE — 36416 COLLJ CAPILLARY BLOOD SPEC: CPT

## 2018-07-26 PROCEDURE — 84484 ASSAY OF TROPONIN QUANT: CPT

## 2018-07-26 PROCEDURE — 36415 COLL VENOUS BLD VENIPUNCTURE: CPT

## 2018-07-26 NOTE — RAD
CHEST TWO VIEWS:

7/25/18

 

COMPARISON:  

4/30/18

 

HISTORY: 

Cough. Diaphoretic. Dizziness.

 

FINDINGS:  

There is cervical fusion hardware. Normal cardiac silhouette. Pulmonary vessels and hilum are normal.
 Costophrenic angles clear. No consolidation or mass. No pneumothorax or osseous abnormalities. 

 

IMPRESSION:  

No acute cardiopulmonary process.

 

POS: [facility]

## 2018-07-28 NOTE — EKG
Test Reason : 

Blood Pressure : ***/*** mmHG

Vent. Rate : 064 BPM     Atrial Rate : 064 BPM

   P-R Int : 158 ms          QRS Dur : 092 ms

    QT Int : 450 ms       P-R-T Axes : 042 008 069 degrees

   QTc Int : 464 ms

 

Normal sinus rhythm

Prolonged QT

Abnormal ECG

 

Confirmed by RITIKA INFANTE ([id number]),  SHELBIE FELIPE ([id number]) on 7/28/2018 12:09:32 PM

 

Referred By:             Confirmed By:RITIKA INFANTE

## 2018-09-10 ENCOUNTER — HOSPITAL ENCOUNTER (OUTPATIENT)
Dept: HOSPITAL 18 - NAV CT | Age: 53
Discharge: HOME | End: 2018-09-10
Payer: COMMERCIAL

## 2018-09-10 DIAGNOSIS — I25.10: ICD-10-CM

## 2018-09-10 DIAGNOSIS — J84.10: Primary | ICD-10-CM

## 2018-09-10 LAB — CREAT CL PREDICTED SERPL C-G-VRATE: 0 ML/MIN (ref 70–130)

## 2018-09-10 PROCEDURE — 82565 ASSAY OF CREATININE: CPT

## 2018-09-10 PROCEDURE — 71250 CT THORAX DX C-: CPT

## 2018-09-10 PROCEDURE — 36415 COLL VENOUS BLD VENIPUNCTURE: CPT

## 2018-09-10 NOTE — CT
CHEST CT SCAN WITHOUT IV CONTRAST:

 

HISTORY: 

A 53-year-old female with a history of single hyalinizing granuloma of lung.  IV contrast was not giv
en because of GFR of 37.

 

COMPARISON: 

CT angiogram chest 3/6/18.

 

FINDINGS: 

No evidence for pulmonary nodule or pleural effusion.  No pericardial effusion.  No mediastinal mass 
or adenopathy.  Three-vessel coronary artery calcific disease, evidence for coronary artery arteriova
scular disease.  Visualized liver, pancreas, spleen, adrenal glands, and kidneys are unremarkable as 
visualized.  Lower cervical spine anterior cervical fusion changes.  

 

IMPRESSION: 

No significant acute process in the chest.  No pulmonary nodules.  No mediastinal mass or adenopathy.
  No pleural effusion or pericardial effusion.  Three-vessel coronary artery calcific disease, eviden
ce for coronary artery atherosclerosis.

 

POS: [facility]

## 2018-09-19 ENCOUNTER — HOSPITAL ENCOUNTER (EMERGENCY)
Dept: HOSPITAL 18 - NAV ERS | Age: 53
Discharge: HOME | End: 2018-09-19
Payer: COMMERCIAL

## 2018-09-19 DIAGNOSIS — I25.10: ICD-10-CM

## 2018-09-19 DIAGNOSIS — E78.2: ICD-10-CM

## 2018-09-19 DIAGNOSIS — E11.9: ICD-10-CM

## 2018-09-19 DIAGNOSIS — F31.9: ICD-10-CM

## 2018-09-19 DIAGNOSIS — Z79.899: ICD-10-CM

## 2018-09-19 DIAGNOSIS — R07.9: Primary | ICD-10-CM

## 2018-09-19 DIAGNOSIS — I10: ICD-10-CM

## 2018-09-19 DIAGNOSIS — E66.9: ICD-10-CM

## 2018-09-19 DIAGNOSIS — I25.2: ICD-10-CM

## 2018-09-19 DIAGNOSIS — F17.210: ICD-10-CM

## 2018-09-19 LAB
ALBUMIN SERPL BCG-MCNC: 4.1 G/DL (ref 3.5–5)
ALP SERPL-CCNC: 63 U/L (ref 40–150)
ALT SERPL W P-5'-P-CCNC: 38 U/L (ref 8–55)
ANION GAP SERPL CALC-SCNC: 15 MMOL/L (ref 10–20)
AST SERPL-CCNC: 30 U/L (ref 5–34)
BASOPHILS # BLD AUTO: 0.1 THOU/UL (ref 0–0.2)
BASOPHILS NFR BLD AUTO: 0.9 % (ref 0–1)
BILIRUB SERPL-MCNC: 0.5 MG/DL (ref 0.2–1.2)
BUN SERPL-MCNC: 13 MG/DL (ref 9.8–20.1)
CALCIUM SERPL-MCNC: 9.5 MG/DL (ref 7.8–10.44)
CHLORIDE SERPL-SCNC: 102 MMOL/L (ref 98–107)
CK MB SERPL-MCNC: 0.6 NG/ML (ref 0–6.6)
CO2 SERPL-SCNC: 26 MMOL/L (ref 22–29)
CREAT CL PREDICTED SERPL C-G-VRATE: 0 ML/MIN (ref 70–130)
EOSINOPHIL # BLD AUTO: 0.1 THOU/UL (ref 0–0.7)
EOSINOPHIL NFR BLD AUTO: 1 % (ref 0–10)
GLOBULIN SER CALC-MCNC: 2.6 G/DL (ref 2.4–3.5)
GLUCOSE SERPL-MCNC: 146 MG/DL (ref 70–105)
HGB BLD-MCNC: 12.5 G/DL (ref 12–16)
LYMPHOCYTES # BLD AUTO: 2.9 THOU/UL (ref 1.2–3.4)
LYMPHOCYTES NFR BLD AUTO: 27 % (ref 21–51)
MCH RBC QN AUTO: 29.8 PG (ref 27–31)
MCV RBC AUTO: 89.7 FL (ref 78–98)
MONOCYTES # BLD AUTO: 0.5 THOU/UL (ref 0.11–0.59)
MONOCYTES NFR BLD AUTO: 4.5 % (ref 0–10)
NEUTROPHILS # BLD AUTO: 7.1 THOU/UL (ref 1.4–6.5)
NEUTROPHILS NFR BLD AUTO: 66.6 % (ref 42–75)
PLATELET # BLD AUTO: 166 THOU/UL (ref 130–400)
POTASSIUM SERPL-SCNC: 3.4 MMOL/L (ref 3.5–5.1)
RBC # BLD AUTO: 4.2 MILL/UL (ref 4.2–5.4)
SODIUM SERPL-SCNC: 140 MMOL/L (ref 136–145)
TROPONIN I SERPL DL<=0.01 NG/ML-MCNC: (no result) NG/ML (ref ?–0.03)
TROPONIN I SERPL DL<=0.01 NG/ML-MCNC: (no result) NG/ML (ref ?–0.03)
WBC # BLD AUTO: 10.6 THOU/UL (ref 4.8–10.8)

## 2018-09-19 PROCEDURE — 82553 CREATINE MB FRACTION: CPT

## 2018-09-19 PROCEDURE — 71045 X-RAY EXAM CHEST 1 VIEW: CPT

## 2018-09-19 PROCEDURE — 96360 HYDRATION IV INFUSION INIT: CPT

## 2018-09-19 PROCEDURE — 85025 COMPLETE CBC W/AUTO DIFF WBC: CPT

## 2018-09-19 PROCEDURE — 96361 HYDRATE IV INFUSION ADD-ON: CPT

## 2018-09-19 PROCEDURE — 84484 ASSAY OF TROPONIN QUANT: CPT

## 2018-09-19 PROCEDURE — 80053 COMPREHEN METABOLIC PANEL: CPT

## 2018-09-19 PROCEDURE — 93005 ELECTROCARDIOGRAM TRACING: CPT

## 2018-09-19 PROCEDURE — 83880 ASSAY OF NATRIURETIC PEPTIDE: CPT

## 2018-09-19 PROCEDURE — 94760 N-INVAS EAR/PLS OXIMETRY 1: CPT

## 2018-09-19 NOTE — RAD
AP VIEW CHEST:

9/19/18

 

HISTORY: 

Chest pain.

 

AP view chest is obtained on 9/19/18.

 

Comparison made to a previous exam from 4/30/18.

 

AP view chest demonstrates lower cervical ACDF plates and screws in place. 

 

The lungs are well aerated. No evidence of acute intrathoracic abnormality seen. No evidence of effus
ions, pneumonia, or pneumothorax seen.

 

IMPRESSION:  

Unremarkable AP view chest. 

 

POS: [facility]

## 2018-09-23 ENCOUNTER — HOSPITAL ENCOUNTER (EMERGENCY)
Dept: HOSPITAL 18 - NAV ERS | Age: 53
Discharge: ANOTHER HOSPITAL | End: 2018-09-23
Payer: COMMERCIAL

## 2018-09-23 ENCOUNTER — HOSPITAL ENCOUNTER (OUTPATIENT)
Dept: HOSPITAL 92 - ERS | Age: 53
Setting detail: OBSERVATION
LOS: 2 days | Discharge: HOME | End: 2018-09-25
Payer: COMMERCIAL

## 2018-09-23 VITALS — BODY MASS INDEX: 44.5 KG/M2

## 2018-09-23 DIAGNOSIS — Z88.4: ICD-10-CM

## 2018-09-23 DIAGNOSIS — I50.9: ICD-10-CM

## 2018-09-23 DIAGNOSIS — R20.0: Primary | ICD-10-CM

## 2018-09-23 DIAGNOSIS — Z79.899: ICD-10-CM

## 2018-09-23 DIAGNOSIS — I25.10: ICD-10-CM

## 2018-09-23 DIAGNOSIS — F17.210: ICD-10-CM

## 2018-09-23 DIAGNOSIS — E66.9: ICD-10-CM

## 2018-09-23 DIAGNOSIS — Z88.2: ICD-10-CM

## 2018-09-23 DIAGNOSIS — E11.22: ICD-10-CM

## 2018-09-23 DIAGNOSIS — J44.1: ICD-10-CM

## 2018-09-23 DIAGNOSIS — I10: ICD-10-CM

## 2018-09-23 DIAGNOSIS — E11.9: ICD-10-CM

## 2018-09-23 DIAGNOSIS — R20.2: Primary | ICD-10-CM

## 2018-09-23 DIAGNOSIS — I25.2: ICD-10-CM

## 2018-09-23 DIAGNOSIS — F31.9: ICD-10-CM

## 2018-09-23 DIAGNOSIS — E78.2: ICD-10-CM

## 2018-09-23 DIAGNOSIS — I13.0: ICD-10-CM

## 2018-09-23 LAB
ALBUMIN SERPL BCG-MCNC: 4.1 G/DL (ref 3.5–5)
ALP SERPL-CCNC: 62 U/L (ref 40–150)
ALT SERPL W P-5'-P-CCNC: 40 U/L (ref 8–55)
ANION GAP SERPL CALC-SCNC: 16 MMOL/L (ref 10–20)
APTT PPP: 27.3 SEC (ref 22.9–36.1)
AST SERPL-CCNC: 36 U/L (ref 5–34)
BASOPHILS # BLD AUTO: 0.1 THOU/UL (ref 0–0.2)
BASOPHILS NFR BLD AUTO: 1.4 % (ref 0–1)
BILIRUB SERPL-MCNC: 0.5 MG/DL (ref 0.2–1.2)
BUN SERPL-MCNC: 16 MG/DL (ref 9.8–20.1)
CALCIUM SERPL-MCNC: 9.5 MG/DL (ref 7.8–10.44)
CHLORIDE SERPL-SCNC: 105 MMOL/L (ref 98–107)
CK MB SERPL-MCNC: 1.2 NG/ML (ref 0–6.6)
CK SERPL-CCNC: 110 U/L (ref 29–168)
CO2 SERPL-SCNC: 25 MMOL/L (ref 22–29)
CREAT CL PREDICTED SERPL C-G-VRATE: 0 ML/MIN (ref 70–130)
DRUG SCREEN CUTOFF: (no result)
EOSINOPHIL # BLD AUTO: 0.2 THOU/UL (ref 0–0.7)
EOSINOPHIL NFR BLD AUTO: 1.9 % (ref 0–10)
GLOBULIN SER CALC-MCNC: 2.4 G/DL (ref 2.4–3.5)
GLUCOSE SERPL-MCNC: 115 MG/DL (ref 70–105)
HGB BLD-MCNC: 12.7 G/DL (ref 12–16)
INR PPP: 1
LIPASE SERPL-CCNC: 97 U/L (ref 8–78)
LYMPHOCYTES # BLD AUTO: 3.1 THOU/UL (ref 1.2–3.4)
LYMPHOCYTES NFR BLD AUTO: 32.2 % (ref 21–51)
MCH RBC QN AUTO: 29.3 PG (ref 27–31)
MCV RBC AUTO: 90.5 FL (ref 78–98)
MDIFF COMPLETE?: YES
MEDTOX CONTROL LINE VALID?: (no result)
MONOCYTES # BLD AUTO: 0.5 THOU/UL (ref 0.11–0.59)
MONOCYTES NFR BLD AUTO: 5.6 % (ref 0–10)
NEUTROPHILS # BLD AUTO: 5.6 THOU/UL (ref 1.4–6.5)
NEUTROPHILS NFR BLD AUTO: 58.8 % (ref 42–75)
PLATELET # BLD AUTO: 169 THOU/UL (ref 130–400)
PLATELET BLD QL SMEAR: (no result)
POTASSIUM SERPL-SCNC: 3.6 MMOL/L (ref 3.5–5.1)
PROTHROMBIN TIME: 12.9 SEC (ref 12–14.7)
RBC # BLD AUTO: 4.33 MILL/UL (ref 4.2–5.4)
SODIUM SERPL-SCNC: 142 MMOL/L (ref 136–145)
SP GR UR STRIP: 1.01 (ref 1–1.03)
TROPONIN I SERPL DL<=0.01 NG/ML-MCNC: (no result) NG/ML (ref ?–0.03)
WBC # BLD AUTO: 9.5 THOU/UL (ref 4.8–10.8)

## 2018-09-23 PROCEDURE — 70450 CT HEAD/BRAIN W/O DYE: CPT

## 2018-09-23 PROCEDURE — 80053 COMPREHEN METABOLIC PANEL: CPT

## 2018-09-23 PROCEDURE — 83690 ASSAY OF LIPASE: CPT

## 2018-09-23 PROCEDURE — 70551 MRI BRAIN STEM W/O DYE: CPT

## 2018-09-23 PROCEDURE — 36415 COLL VENOUS BLD VENIPUNCTURE: CPT

## 2018-09-23 PROCEDURE — 70498 CT ANGIOGRAPHY NECK: CPT

## 2018-09-23 PROCEDURE — 82553 CREATINE MB FRACTION: CPT

## 2018-09-23 PROCEDURE — 84443 ASSAY THYROID STIM HORMONE: CPT

## 2018-09-23 PROCEDURE — 85025 COMPLETE CBC W/AUTO DIFF WBC: CPT

## 2018-09-23 PROCEDURE — 93005 ELECTROCARDIOGRAM TRACING: CPT

## 2018-09-23 PROCEDURE — 71045 X-RAY EXAM CHEST 1 VIEW: CPT

## 2018-09-23 PROCEDURE — 80061 LIPID PANEL: CPT

## 2018-09-23 PROCEDURE — 80306 DRUG TEST PRSMV INSTRMNT: CPT

## 2018-09-23 PROCEDURE — 85610 PROTHROMBIN TIME: CPT

## 2018-09-23 PROCEDURE — G0378 HOSPITAL OBSERVATION PER HR: HCPCS

## 2018-09-23 PROCEDURE — 96372 THER/PROPH/DIAG INJ SC/IM: CPT

## 2018-09-23 PROCEDURE — 85730 THROMBOPLASTIN TIME PARTIAL: CPT

## 2018-09-23 PROCEDURE — 36416 COLLJ CAPILLARY BLOOD SPEC: CPT

## 2018-09-23 PROCEDURE — 70496 CT ANGIOGRAPHY HEAD: CPT

## 2018-09-23 PROCEDURE — 84484 ASSAY OF TROPONIN QUANT: CPT

## 2018-09-23 PROCEDURE — 81003 URINALYSIS AUTO W/O SCOPE: CPT

## 2018-09-23 NOTE — CT
CT BRAIN WITHOUT CONTRAST ENHANCEMENT:

 

HISTORY:

Left-sided paresthesias.

 

FINDINGS:

The ventricular and cisternal system are within normal limits.  No signs of intracerebral hemorrhage 
or extraaxial fluid collections.  The mastoid air cells and visualized sinuses are clear.

 

IMPRESSION:

No acute intracranial abnormalities.

 

Findings telephoned to Dr. Palm at 2015 hours.

 

CODE CR

 

POS: [facility]

## 2018-09-23 NOTE — PDOC.FPRHP
- History of Present Illness


Chief Complaint: Tremulousness


History of Present Illness: 


Ms. Nguyen presented to the [place] ER earlier this evening with a 4 day 

history of intermittent tremulousness and left sided paresthesias. She reports 

the symptoms worsen as she moves around and are relieved by rest. She reports L 

sided weakness since this morning, prescyncope feelings, nausea, and a 

headache. She denies changes in vision, falls, or chest pain. 


She has had similar symptoms in the past with negative or inconclusive 

diagnosis of multiple sclerosis, stroke, TIA. 





She also notes that recently she has felt that she has some bronchitis coming 

on and has associated productive cough, shortness of breath, and fatigue. 





ED Course: 


NIH 0 at [place], negative CT head, UDS, CBC


CMP with kidney function at baseline 





- Allergies/Adverse Reactions


 Allergies











Allergy/AdvReac Type Severity Reaction Status Date / Time


 


adhesive Allergy   Verified 06/25/18 14:52


 


aspirin Allergy   Verified 06/25/18 14:52


 


codeine Allergy   Verified 06/25/18 14:52


 


gluten Allergy   Verified 06/25/18 14:52


 


hydrocodone Allergy   Verified 06/25/18 14:52


 


levofloxacin [From Levaquin] Allergy   Verified 06/25/18 14:52


 


NSAIDS (Non-Steroidal Allergy   Verified 06/25/18 14:52





Anti-Inflamma     


 


Sulfa (Sulfonamide Allergy   Verified 06/25/18 14:52





Antibiotics)     


 


trimethoprim [From Bactrim] Allergy   Verified 06/25/18 14:52


 


tramadol AdvReac Mild ITCHING Verified 06/25/18 14:52














- Home Medications


 











 Medication  Instructions  Recorded  Confirmed  Type


 


Cetirizine HCl [Zyrtec] 10 mg PO HS 01/31/17 06/25/18 History


 


Gabapentin 900 mg PO BID 01/31/17 06/25/18 History


 


Lisinopril 5 mg PO QPM 01/31/17 06/25/18 History


 


Magnesium 250 mg PO HS 01/31/17 06/25/18 History


 


PARoxetine HCl [Paroxetine HCl] 20 mg PO HS 01/31/17 06/25/18 History


 


risperiDONE [Risperidone] 0.5 mg PO HS 01/31/17 06/25/18 History


 


Pancrelipase  50791 [Creon DR 4 cap PO TID-WM #30 cap 10/15/17 06/25/18 Rx





12,000 Units]    


 


Ondansetron [Zofran ODT] 4 mg PO Q6H PRN 4 Days #16 tab 03/07/18 06/25/18 Rx


 


Atorvastatin Calcium [Lipitor] 40 mg PO HS 30 Days #30 tab 03/08/18 06/25/18 Rx


 


Ranolazine [Ranexa] 500 mg PO BID 30 Days #60 tab 03/09/18 04/30/18 Rx


 


Ticagrelor [Brilinta] 90 mg PO BID 30 Days #60 tab 03/09/18 04/30/18 Rx


 


Potassium Chloride [Klor-Con 10] 10 meq PO DAILY #30 tab 03/13/18 06/25/18 Rx


 


Torsemide 20 mg PO ASDIR #90 tablet 03/13/18 06/25/18 Rx


 


Ergocalciferol (Vitamin D2) 50,000 unit PO ASDIR 04/30/18 06/25/18 History





[Vitamin D2]    


 


Fenofibrate [Tricor] 160 mg PO HS 04/30/18 06/25/18 History


 


Isosorbide Mononitrate [Imdur] 90 mg PO DAILY 04/30/18 06/25/18 History


 


Omega-3 Acid Ethyl Esters [Lovaza] 2 gm PO BID 04/30/18 06/25/18 History


 


Promethazine [Phenergan] 25 mg PO Q6HR PRN 04/30/18 06/25/18 History


 


Ranitidine HCl 150 mg PO BID 04/30/18 06/25/18 History


 


Insulin Glargine,Hum.Rec.Anlog 87 units SC DAILY #1 insuln.pen 05/02/18 06/25/ 18 Rx





[Toujeo Solostar]    


 


Morphine Sulfate 3 ml PO Q4H PRN 06/25/18 06/25/18 History














- History


PMHx: CAD, DMII, HTN, HLD, CKDII, chronic pancreatitis, NAFLD, Bipolar 


 


PSHx: hysterectomy with b/l oopherectomy, L knee scope





FHx: Aunt with lupus 


 


Social: current pack a day smoker, no alcohol or drugs


 








- Review of Systems


General: denies: fever/chills, weight/appetite/sleep changes


Eyes: reports: eye pain, vision changes


ENT: denies: nasal congestion, rhinorrhea


Respiratory: reports: cough, shortness of breath


Cardiovascular: denies: chest pain, palpitation, edema


Gastrointestinal: reports: nausea.  denies: vomiting, diarrhea


Genitourinary: denies: incontinence, dysuria


Skin: denies: rashes, lesions, jaundice


Musculoskeletal: denies: pain, tenderness


Neurological: reports: numbness, syncope, weakness





- Vital signs


BP: [117/80]  HR: [74] RR: [16] Tmax: [98] Pox: [96]% on [RA]  Wt: [112.6kg]   








- Physical Exam


Constitutional: NAD, awake, alert and oriented


HEENT: normocephalic and atraumatic, EOMI, grossly normal vision, grossly 

normal hearing


Neck: supple, trachea midline


Chest: no-tender to palpation


Heart: RRR, normal S1/S2


Lungs: CTAB, no respiratory distress, good air movement


Abdomen: soft, bowel sounds present, other (tenderness over epigastric area)


Musculoskeletal: normal structure, normal tone


Neurological: CN II-XII intact, other (reported L sided abnormal sensation, 

/flexion/extension strength reduced on left side)


Skin: no rash/lesions, good turgor


Heme/Lymphatic: no unusual bruising or bleeding


Psychiatric: normal mood and affect





FMR H&P: A/P





- Problem List


(1) Neurological symptoms


Current Visit: No   Status: Acute   Code(s): R29.90 - UNSPECIFIED SYMPTOMS AND 

SIGNS INVOLVING THE NERVOUS SYSTEM   





(2) Chronic kidney disease, stage 3


Current Visit: No   Status: Chronic   





(3) Chronic pancreatitis


Current Visit: No   Status: Acute   Code(s): K86.1 - OTHER CHRONIC PANCREATITIS

   





(4) CHF (congestive heart failure)


Current Visit: No   Status: Acute   Code(s): I50.9 - HEART FAILURE, UNSPECIFIED

   





(5) CAD (coronary artery disease)


Current Visit: No   Status: Chronic   Code(s): I25.10 - ATHSCL HEART DISEASE OF 

NATIVE CORONARY ARTERY W/O ANG PCTRS   


Qualifiers: 


   Coronary Disease-Associated Artery/Lesion type: native artery   Native vs. 

transplanted heart: native heart   Associated angina: angina presence 

unspecified   Qualified Code(s): I25.10 - Atherosclerotic heart disease of 

native coronary artery without angina pectoris   





(6) Diabetes mellitus type 2 in obese


Current Visit: No   Status: Chronic   Code(s): E11.9 - TYPE 2 DIABETES MELLITUS 

WITHOUT COMPLICATIONS; E66.9 - OBESITY, UNSPECIFIED   





(7) Bipolar disorder


Current Visit: No   Status: Chronic   Code(s): F31.9 - BIPOLAR DISORDER, 

UNSPECIFIED   


Qualifiers: 


   Active/Remission status: remission status unspecified   Qualified Code(s): 

F31.9 - Bipolar disorder, unspecified   





(8) Hyperlipidemia


Current Visit: No   Status: Chronic   Code(s): E78.5 - HYPERLIPIDEMIA, 

UNSPECIFIED   


Qualifiers: 


   Hyperlipidemia type: unspecified   Qualified Code(s): E78.5 - Hyperlipidemia

, unspecified   





(9) Hypertension


Current Visit: No   Status: Chronic   Code(s): I10 - ESSENTIAL (PRIMARY) 

HYPERTENSION   


Qualifiers: 


   Hypertension type: essential hypertension   Qualified Code(s): I10 - 

Essential (primary) hypertension   





- Plan





1. Neurological symptoms


- Ischemic stroke vs TIA vs MS flare


- allergy to NSAIDs, outside window for TPA, declines intervention 


- q4hr neuro checks, monitor on stroke 


- MRI to r/o ischemic event and evaluate for MS


- carotid artery US within 5 months 


- consult neurology for further recommendations 


2. CKDIII


- kidney function at baseline


- monitor labs


- encourage PO hydration 


3. Chronic pancreatitis


- baseline level of pain


- continue home meds 


4. CHF


- controlled, continue home meds


5. CAD


- hx of MI


- unlikely cause of current symptoms


- TTE within 5 months


- EKG WNL, monitor vitals 


6. DMII


- controlled, continue home meds 


7. Bipolar disorder


- controlled, continue home meds 


8. HLD


- controlled, continue home meds 


9. HTN


- controlled, hold home meds for permissive HTN


Disposition/LOS: 





neuro checks overnight, MRI and neuro consult in the AM, possible DC pending 

negative tests





FMR H&P: Upper [place]





- Pertinent history





53F arrives from outside ER with cc of left sided parasthesia and new complaint 

of weakness. Stated today she did not feel well and felt fatigued. At 

approximately 1700 hour, shelf felt both legs and arm felt like "rubber". She 

then presented to be evaluated at outside ER where NIHSS was zero. CT did not 

find hemorrhage or any abnormalities. TPA was not started as she had no 

neurological deficit besides parasthesia. She was transfered here for further 

work up. She now complain of new left sided weakness and continuation of her 

parasthesia.





- Pertinent findings





Gen: Alert, grossly oriented


HEENT: Moist mucosal membrane


CV: RRR with no apparent m/g/r. No brui


Resp: CTA bilaterally


GI: not tender to palpation, normoactive bowel


Neuro: CN II-XII grossly intact. 4/5 strength for left UE and LE. Sensation 

grossly intact though patient continue to complain of parasthesia. Normal tone.


Derm: No lesion observed. No bruising noted.





- Plan


Date/Time: 09/23/18 2304








I, [Deshaun Quintana], have evaluated this patient and agree with findings/plan as 

outlined by intern resident. Pertinent changes/additions are listed here.





1. TIA like symptoms


- CT negative for hemorrhagic stroke. NIHSS score of 0. Patient on exam had 

weakness of both upper and lower left limbs.


- 5 months ago, she has MRI, carotid doppler that was read as normal


- Will rule out stroke/TI, but consider conversion disorder, somatization. 

Unlikely MS as previous MRI neg.


- Plan for MRI. COnsider carotid/cardiac echo, but has had previously done this 

year. At this point, will hold off on echo. NIH scoring q 4hr





2. CKD stage 3


- In comparison to previous visit, CKD was at baseline


- WIll monitor with routine lab





3. Hx MI with CAD and stents


- Continue withstatin,  Brillinta and ranexa


- No cardiac symptom at this time


- Advise smoking cessation.





4.  Hx chronic pancreatitis


- No abdominal complaint


- Continue with pancreatic enzyme replacement





5. HTN


- Hold BP med. Permissive HTN in light of recent stroke light symptom





6. DM2


- SSI. Glucose in appropriate range at this time





7. Bipolar


- Not acutely psychotic. Continue home medication





8. Hx cHF with pEF


- Hold metoprolol at this time


- Monitor fluid status


9. HLD


- Continue statin and tricor

## 2018-09-23 NOTE — RAD
PORTABLE CHEST:

 

HISTORY:

Dyspnea.

 

COMPARISON:

09/19/2018

 

FINDINGS:

Heart size and mediastinum are within normal limits.  Lungs appear clear of infiltrates.  Postoperati
ve changes of the cervical spine are seen.

 

IMPRESSION:

No active intrathoracic disease.

 

POS: [facility]

## 2018-09-24 LAB
CHD RISK SERPL-RTO: 9 (ref ?–4.5)
CHOLEST SERPL-MCNC: 171 MG/DL
HDLC SERPL-MCNC: 19 MG/DL
LDLC SERPL CALC-MCNC: (no result) MG/DL
TRIGL SERPL-MCNC: 419 MG/DL (ref ?–150)

## 2018-09-24 RX ADMIN — PANCRELIPASE SCH CAP: 60000; 12000; 38000 CAPSULE, DELAYED RELEASE PELLETS ORAL at 09:39

## 2018-09-24 RX ADMIN — TICAGRELOR SCH MG: 90 TABLET ORAL at 23:34

## 2018-09-24 RX ADMIN — INSULIN GLARGINE SCH MLS: 100 INJECTION, SOLUTION SUBCUTANEOUS at 21:22

## 2018-09-24 RX ADMIN — PANCRELIPASE SCH CAP: 60000; 12000; 38000 CAPSULE, DELAYED RELEASE PELLETS ORAL at 12:27

## 2018-09-24 RX ADMIN — PANCRELIPASE SCH CAP: 60000; 12000; 38000 CAPSULE, DELAYED RELEASE PELLETS ORAL at 21:21

## 2018-09-24 RX ADMIN — PANCRELIPASE SCH CAP: 60000; 12000; 38000 CAPSULE, DELAYED RELEASE PELLETS ORAL at 17:16

## 2018-09-24 RX ADMIN — INSULIN GLARGINE SCH MLS: 100 INJECTION, SOLUTION SUBCUTANEOUS at 09:40

## 2018-09-24 RX ADMIN — TICAGRELOR SCH MG: 90 TABLET ORAL at 09:39

## 2018-09-24 NOTE — CON
DATE OF CONSULTATION:  09/24/2018

 

REFERRING PROVIDER:  Dr. Richard Francois.

 

REASON FOR CONSULTATION:  Left-sided numbness.

 

HISTORY OF PRESENT ILLNESS:  Ms. Nguyen is a pleasant 53-year-old  female who has been co
nsulted for evaluation of left-sided numbness.  The patient reports that she has a history of neuropa
thy which has resulted in numbness in both upper and lower extremities.  This has been ongoing for at
 least past 2 years.  She reports that since yesterday, she has been having numbness in the left side
 of the face, upper extremity, and lower extremity which is more than her normal symptoms that have b
een present in the past.  It is also unilateral and not consistent with her prior episodes of neuropa
thy.  She denied having any headache.  She does complain of having vision changes and that she feels 
like she is seeing halo around the periphery on the left side.  She denies changes in her speech, swa
llowing, difficulty with gait or balance.  She denies weakness in upper or lower extremities.

 

PAST MEDICAL HISTORY:  Significant for hypertension, diabetes, coronary artery disease, hyperlipidemi
a, chronic kidney disease stage 2, chronic pancreatitis, bipolar disorder.

 

PAST SURGICAL HISTORY:  Significant for hysterectomy, left knee scope surgery, and cardiac stent plac
ement.

 

SOCIAL HISTORY:  She denies alcohol use or illicit drug use.  She does smoke 1 pack per day.

 

FAMILY HISTORY:  Significant for lupus in her hand.

 

CURRENT MEDICATIONS:  Please review MAR.

 

ALLERGIES:  Include ASPIRIN, CODEINE, HYDROCODONE, LEVOFLOXACIN, NSAIDS, ADHESIVE TAPE, and GLUTEN.

 

REVIEW OF SYSTEMS:  As mentioned above in the HPI, otherwise negative.

 

PHYSICAL EXAMINATION:

VITAL SIGNS:  Blood pressure of 158/78, pulse of 78, temperature of 97.5, respirations of 18 and sats
 of 99% on room air.

GENERAL:  Well-developed, well-nourished  female in no apparent distress.

RESPIRATORY:  Clear to auscultation bilaterally.

CARDIOVASCULAR:  Regular rate and rhythm.

NEUROLOGIC:  Mental status:  The patient is awake, alert, oriented x3.  Speech and language:  Fluent 
speech.  Cranial nerves:  Pupils are 3 mm and reactive.  Visual fields are intact.  External muscles 
are intact.  No nystagmus noted.  Face is symmetric.  Tongue and uvula are midline.  Motor exam showe
d normal tone and bulk with 5/5 strength in both upper and lower extremities.  Sensory:  Sensation is
 intact and symmetric.  Deep tendon reflexes 2+ reflexes in both upper and lower extremities.  [place]:  Plantar responses flexion bilaterally.  Coordination intact to finger-nose-finger tapping bilate
rally.

 

LABORATORY DATA:  Reviewed, which included CBC, lipid profile, CMP, urine drug screen and urinalysis,
 which is significant for glucose of 190, total cholesterol 171, triglycerides of 419, HDL of 19, LDL
 not performed.

 

IMAGING STUDIES:  CT head without contrast and CT angio of the head and neck were reviewed, which ray
wed no acute intracranial abnormality.  There was no acute intracranial or extracranial vascular abno
rmality noted.

 

IMPRESSION:

1.  Left-sided numbness.

2.  Hypertension.

3.  Diabetes.

4.  Coronary artery disease.

 

ASSESSMENT AND PLAN:  Ms. Nguyen is a pleasant 53-year-old  female who presented with the
 left-sided paresthesia.  Her neurological exam is essentially normal.  At this time, I will recommen
d obtaining MRI brain without contrast.  If MRI brain without contrast is normal, then the patient is
 okay to be discharged to home.  If MRI brain without contrast does show acute ischemic event, then matias miller may need to discuss with Cardiology to see if we can add Plavix to her antiplatelet regimen.  She h
ad a stroke while on Brilinta and unfortunately, she is not a candidate for aspirin or Aggrenox that 
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

 

Thank you for your consultation.

## 2018-09-24 NOTE — MRI
MRI BRAIN WITHOUT CONTRAST:

 

HISTORY:

Left arm numbness and weakness.

 

CORRELATION:

The previous day's CT scan.

 

FINDINGS:

No restricted diffusion is seen.  No evidence of infarct, hemorrhage, midline shift, or abnormal extr
aaxial fluid collections is seen.  The ventricular size is normal, and the basilar cisterns are paten
t.  No significant abnormalities are noted on the highly sensitive FLAIR images.  No blood products a
re seen on the gradient echo sequences.  There is mild mucosal disease in the paranasal sinuses.

 

IMPRESSION:

No evidence of acute intracranial process.

 

POS: [facility]

## 2018-09-24 NOTE — PDOC.FM
- Subjective


Subjective: 





52 yo F here for left sided paresis. Pt states that her symptoms continue this 

am, however are stable. She complains of left sided numbness that involves her 

entire body. Denies weakness and can use limbs. No new symptoms. Denies headache

, n/v, changes in vision, or CP. No events over night





- Objective


MAR Reviewed: Yes


Vital Signs & Weight: 


 Vital Signs (12 hours)











  Temp Pulse Resp BP BP Pulse Ox


 


 09/24/18 07:39  97.7 F  51 L  16   128/52 L  93 L


 


 09/24/18 04:00  97.8 F  57 L  18  137/60   97


 


 09/23/18 23:40  98.6 F  62  16   171/77 H  99








 Weight











Weight                         110.54 kg














I&O: 


 











 09/23/18 09/24/18 09/25/18





 06:59 06:59 06:59


 


Intake Total  240 


 


Balance  240 














<Richard Francois - Last Filed: 09/24/18 08:29>





- Objective


Vital Signs & Weight: 


 Vital Signs (12 hours)











  Temp Pulse Pulse Pulse Resp BP BP


 


 09/24/18 16:00  97.8 F  63    16  


 


 09/24/18 13:57    71  80   124/60  141/74 H


 


 09/24/18 11:59  97.5 F L  78    18  


 


 09/24/18 07:39  97.7 F  51 L    16  














  BP Pulse Ox


 


 09/24/18 16:00  160/62 H  96


 


 09/24/18 13:57  


 


 09/24/18 11:59  158/78 H  99


 


 09/24/18 07:39  128/52 L  93 L








 Weight











Weight                         110.54 kg














I&O: 


 











 09/23/18 09/24/18 09/25/18





 06:59 06:59 06:59


 


Intake Total  240 240


 


Balance  240 240














<Joe Carrillo - Last Filed: 09/24/18 17:23>





Phys Exam





- Physical Examination


Constitutional: NAD


HEENT: PERRLA, moist MMs


Neck: no nodes, no JVD


Respiratory: clear to auscultation bilateral


Cardiovascular: RRR, no significant murmur


Gastrointestinal: soft, non-tender, no distention, positive bowel sounds


Musculoskeletal: no edema, pulses present


Neurological: non-focal, moves all 4 limbs


Diffuse L sided decreased sensation. Normal strength and proprioception


Psychiatric: A&O x 3


Skin: no rash, normal turgor





<Richard Francois - Last Filed: 09/24/18 08:29>





Dx/Plan


(1) Paresthesia


Code(s): R20.2 - PARESTHESIA OF SKIN   Status: Acute   





(2) CHF (congestive heart failure)


Code(s): I50.9 - HEART FAILURE, UNSPECIFIED   Status: Chronic   





(3) Bipolar disorder


Code(s): F31.9 - BIPOLAR DISORDER, UNSPECIFIED   Status: Chronic   


Qualifiers: 


   Active/Remission status: remission status unspecified   Qualified Code(s): 

F31.9 - Bipolar disorder, unspecified   





(4) CAD (coronary artery disease)


Code(s): I25.10 - ATHSCL HEART DISEASE OF NATIVE CORONARY ARTERY W/O ANG PCTRS 

  Status: Chronic   


Qualifiers: 


   Coronary Disease-Associated Artery/Lesion type: native artery   Native vs. 

transplanted heart: native heart   Associated angina: angina presence 

unspecified   Qualified Code(s): I25.10 - Atherosclerotic heart disease of 

native coronary artery without angina pectoris   





(5) Chronic kidney disease, stage 3


Status: Chronic   





(6) Diabetes mellitus type 2 in obese


Code(s): E11.9 - TYPE 2 DIABETES MELLITUS WITHOUT COMPLICATIONS; E66.9 - OBESITY

, UNSPECIFIED   Status: Chronic   





(7) Hyperlipidemia


Code(s): E78.5 - HYPERLIPIDEMIA, UNSPECIFIED   Status: Chronic   


Qualifiers: 


   Hyperlipidemia type: unspecified   Qualified Code(s): E78.5 - Hyperlipidemia

, unspecified   





(8) Hypertension


Code(s): I10 - ESSENTIAL (PRIMARY) HYPERTENSION   Status: Chronic   


Qualifiers: 


   Hypertension type: essential hypertension   Qualified Code(s): I10 - 

Essential (primary) hypertension   





- Plan


Plan: 





Paresthesia 


- unclear etiology. This is very likely vascular in nature, however psychogenic 

etiology cannot be ruled out given recent negative work up


- CT shows no acute event in outside facility. MRI pending. Pt has had normal 

carotid doppler withing the past 6mo


- Consult neurology 





BPD


- dx by history, however pt is not on meds to suggest the diagnosis. Pt is on 

SSRI at home which would point away from BPD





CKD3


- baseline. Monitor labs every 2 days 





CAD


- continue home meds





HTN


- controlled on home meds 





DM2


- Home meds. low carb diet. ACHS BG checks





CHF


- at baseline. No signs of overload at this time 





HLD


- home statin 





Dispo: continue to monitor on stroke. LOS pending neuro eval 





<Richard Francois - Last Filed: 09/24/18 08:29>





Attending Addendum





- Attending Addendum


Date/Time: 09/24/18 1722





I discussed the management with Dr. Francois.  Unfortunately on both occasions 

of my rounding this morning and this evening the patient has been unavailable 

for examination.


I agree with the History, Examination, Assessment and Plan documented above 

with any addition or exceptions noted below.








<Joe Carrillo - Last Filed: 09/24/18 17:23>

## 2018-09-24 NOTE — CT
NONCONTRAST HEAD CT

CT ANGIOGRAM OF THE HEAD

CT ANGIOGRAM OF THE NECK:

 

History: Evaluate for stroke. Numbness, weakness on the left side. Hypoglycemia. 

 

Technique: Noncontrast head CT was performed from skull base to skull vertex. CT angiogram of the hea
d was performed in the axial plane. 3D reformatted images are submitted for interpretation. 

 

FINDINGS: 

 

NONCONTRAST HEAD CT:

No parenchymal hemorrhage. No extraaxial hematoma. No midline shift. Basilar cisterns are patent. Bra
in volume is age appropriate. Cortical gray white matter differentiation is preserved. 

 

Ventricles and sulci are patent and symmetric. 

 

Adequate aeration of the sinuses and mastoid air cells. Calvarium is intact. 

 

On post contrast images, cortical gray white matter differentiation is preserved. Ventricles and sulc
i are patent and symmetric. 

 

Bilateral ocular lenses are located. Both globes are intact. Retrobulbar fat is preserved. Symmetric 
attenuation of the optic nerves and ocular muscles. 

 

Aerodigestive tract is patent. No mucosal abnormality. Symmetric attenuation of the carotid and subma
ndibular glands. Symmetric attenuation of the cleidomastoid muscles. 

 

No evidence of lymphadenopathy by size criteria. 

 

Cervical fusion changes from C4 through C7. No perihardware lucency. There is central canal stenosis 
and foraminal narrowing on the basis of degenerative change. 

 

Upper mediastinum and lung apices are unremarkable. 

 

CT ANGIOGRAM:

There is atherosclerosis of nonaneursymal aorta. 

 

Right carotid: Right carotid artery origin has appropriate enhancement and luminal diameter. The righ
t common carotid artery, carotid bifurcation, and internal carotid artery have appropriate enhancemen
t and luminal diameter. No significant stenosis based upon NASCET criteria. 

 

Left carotid: Left carotid artery has appropriate enhancement and luminal diameter. Left common carot
id, carotid bifurcation, and internal carotid artery have appropriate enhancement and luminal diamete
r. There is no significant stenosis based on NASCET criteria. 

 

Bilateral subclavian arteries are patent. 

 

Bilateral vertebral artery origins are patent. Both cervical vertebral arteries are patent throughout
 their course in the neck. Left vertebral artery is dominant. 

 

CT ANGIOGRAM OF THE HEAD:

There is symmetric enhancement and luminal diameter in both intracranial internal carotid arteries. T
here is some atherosclerosis in both cavernous segments and paraclinoid segments, no significant sten
osis. 

 

Anterior circulation: Symmetric enhancement and luminal diameter of A1 and M1 segments. Symmetric enh
ancement of the proximal MCA branches approximately 2 segments in length. 

 

Posterior circulation: Limited evaluation of the PICA artery origins. There is some atherosclerosis o
f a nonansysmal intracranial left vertebral artery. Both vertebral arteries supply normal caliber bas
ilar artery. Bilateral P1 segments have symmetric enhancement and luminal diameter. 

 

IMPRESSION: 

1. No significant stenosis in the cervical carotid or vertebral arteries based on NASCET criteria. 

2. Unremarkable CT angiogram of the [place]. 

 

 

 

POS: [facility]

## 2018-09-25 VITALS — SYSTOLIC BLOOD PRESSURE: 142 MMHG | TEMPERATURE: 98.6 F | DIASTOLIC BLOOD PRESSURE: 61 MMHG

## 2018-09-25 RX ADMIN — INSULIN GLARGINE SCH MLS: 100 INJECTION, SOLUTION SUBCUTANEOUS at 09:13

## 2018-09-25 RX ADMIN — TICAGRELOR SCH MG: 90 TABLET ORAL at 09:10

## 2018-09-25 NOTE — ADD-PRG
ADDENDUM:

 

DATE OF SERVICE:  09/25/2018

 

Please add this as an addendum to the note of Dr. Richard Francois.

 

Mrs. Nguyen is a pleasant, obese 53-year-old lady who was admitted with a left hemiparesis and lef
t hemianesthesia.  She has had a thorough workup with no demonstration of any central nervous system 
disease or stroke or other malady.  Her brain MRI which was read, there is no evidence of acute intra
cranial process.  She seems to be moving all extremities well.  In fact does not demonstrate any foca
l deficits.  I wonder if this could be some type of emotional conversion type disorder as she states 
does not seem to demonstrate any neurological issues other than her aforestated numbness on the left 
side.  She also is a heavy smoker and is counseled regarding quitting.  She has been seen by Dr. Magaña
 and is cleared for discharge later this afternoon.

## 2018-09-25 NOTE — PDOC.FM
- Subjective


Subjective: 


Pt found resting comfortably this morning. She complains of continued 

paresthesia on her left side. She also complains of SOB that she states has 

been present since her initial evaluation at [facility]. She complains of 

associated productive cough without fever or other symptoms for the past 3-4 

days. She states that she has been told in the past that she has COPD and she 

has an albuterol [facility] at home. She denies CP or LE pain/swelling





- Objective


Vital Signs & Weight: 


 Vital Signs (12 hours)











  Temp Pulse Resp BP BP Pulse Ox


 


 09/25/18 07:42  98.3 F  47 L  12   168/87 H  98


 


 09/25/18 05:28  97.8 F  54 L  16  117/56 L   96


 


 09/25/18 00:00  97.5 F L  60  20  120/55 L   94 L








 Weight











Weight                         110.677 kg














I&O: 


 











 09/24/18 09/25/18 09/26/18





 06:59 06:59 06:59


 


Intake Total 240 1340 


 


Balance 240 1340 














Phys Exam





- Physical Examination


Constitutional: NAD


HEENT: PERRLA, moist MMs


Neck: no nodes, no JVD


Respiratory: clear to auscultation bilateral


Cardiovascular: RRR, no significant murmur


Gastrointestinal: soft, non-tender, no distention


Musculoskeletal: no edema, pulses present


Neurological: non-focal, moves all 4 limbs


Complains of L sided paresthesia involving entire body


Lymphatic: no nodes


Psychiatric: normal affect, A&O x 3


Skin: no rash, normal turgor





Dx/Plan


(1) Paresthesia


Code(s): R20.2 - PARESTHESIA OF SKIN   Status: Acute   





(2) CHF (congestive heart failure)


Code(s): I50.9 - HEART FAILURE, UNSPECIFIED   Status: Chronic   





(3) Bipolar disorder


Code(s): F31.9 - BIPOLAR DISORDER, UNSPECIFIED   Status: Chronic   


Qualifiers: 


   Active/Remission status: remission status unspecified   Qualified Code(s): 

F31.9 - Bipolar disorder, unspecified   





(4) CAD (coronary artery disease)


Code(s): I25.10 - ATHSCL HEART DISEASE OF NATIVE CORONARY ARTERY W/O ANG PCTRS 

  Status: Chronic   


Qualifiers: 


   Coronary Disease-Associated Artery/Lesion type: native artery   Native vs. 

transplanted heart: native heart   Associated angina: angina presence 

unspecified   Qualified Code(s): I25.10 - Atherosclerotic heart disease of 

native coronary artery without angina pectoris   





(5) Chronic kidney disease, stage 3


Status: Chronic   





(6) Diabetes mellitus type 2 in obese


Code(s): E11.9 - TYPE 2 DIABETES MELLITUS WITHOUT COMPLICATIONS; E66.9 - OBESITY

, UNSPECIFIED   Status: Chronic   





(7) Hyperlipidemia


Code(s): E78.5 - HYPERLIPIDEMIA, UNSPECIFIED   Status: Chronic   


Qualifiers: 


   Hyperlipidemia type: unspecified   Qualified Code(s): E78.5 - Hyperlipidemia

, unspecified   





(8) Hypertension


Code(s): I10 - ESSENTIAL (PRIMARY) HYPERTENSION   Status: Chronic   


Qualifiers: 


   Hypertension type: essential hypertension   Qualified Code(s): I10 - 

Essential (primary) hypertension   





(9) COPD exacerbation


Code(s): J44.1 - CHRONIC OBSTRUCTIVE PULMONARY DISEASE W (ACUTE) EXACERBATION   

Status: Acute   





- Plan


Plan: 





Paresthesia 


- unclear etiology. CTA head/neck and MRI are negative. Neuro has been 

consulted.


- At this time pt may be most appropriate for continued work up in outpatient 

setting. 





COPD exacerbation 


- most likely dx for SOB and cough. Will give duoneb and reassess. Consider 

starting Levaquin and PO steroids





BPD


- continue home meds





CKD3


- baseline. Monitor labs every 2 days 





CAD


- continue home meds





HTN


- controlled on home meds 





DM2


- Home meds. low carb diet. ACHS BG checks





CHF


- at baseline. No signs of overload at this time 





HLD


- home statin 





Dispo: continue to monitor on stroke. ready for dc today

## 2018-09-25 NOTE — CT
NONCONTRAST HEAD CT

CT ANGIOGRAM OF THE HEAD

CT ANGIOGRAM OF THE NECK:

 

History: Evaluate for stroke. Numbness, weakness on the left side. Hypoglycemia. 

 

Technique: Noncontrast head CT was performed from skull base to skull vertex. CT angiogram of the hea
d was performed in the axial plane. 3D reformatted images are submitted for interpretation. 

 

FINDINGS: 

 

NONCONTRAST HEAD CT:

No parenchymal hemorrhage. No extraaxial hematoma. No midline shift. Basilar cisterns are patent. Bra
in volume is age appropriate. Cortical gray white matter differentiation is preserved. 

 

Ventricles and sulci are patent and symmetric. 

 

Adequate aeration of the sinuses and mastoid air cells. Calvarium is intact. 

 

On post contrast images, cortical gray white matter differentiation is preserved. Ventricles and sulc
i are patent and symmetric. 

 

Bilateral ocular lenses are located. Both globes are intact. Retrobulbar fat is preserved. Symmetric 
attenuation of the optic nerves and ocular muscles. 

 

Aerodigestive tract is patent. No mucosal abnormality. Symmetric attenuation of the carotid and subma
ndibular glands. Symmetric attenuation of the cleidomastoid muscles. 

 

No evidence of lymphadenopathy by size criteria. 

 

Cervical fusion changes from C4 through C7. No perihardware lucency. There is central canal stenosis 
and foraminal narrowing on the basis of degenerative change. 

 

Upper mediastinum and lung apices are unremarkable. 

 

CT ANGIOGRAM:

There is atherosclerosis of nonaneursymal aorta. 

 

Right carotid: Right carotid artery origin has appropriate enhancement and luminal diameter. The righ
t common carotid artery, carotid bifurcation, and internal carotid artery have appropriate enhancemen
t and luminal diameter. No significant stenosis based upon NASCET criteria. 

 

Left carotid: Left carotid artery has appropriate enhancement and luminal diameter. Left common carot
id, carotid bifurcation, and internal carotid artery have appropriate enhancement and luminal diamete
r. There is no significant stenosis based on NASCET criteria. 

 

Bilateral subclavian arteries are patent. 

 

Bilateral vertebral artery origins are patent. Both cervical vertebral arteries are patent throughout
 their course in the neck. Left vertebral artery is dominant. 

 

CT ANGIOGRAM OF THE HEAD:

There is symmetric enhancement and luminal diameter in both intracranial internal carotid arteries. T
here is some atherosclerosis in both cavernous segments and paraclinoid segments, no significant sten
osis. 

 

Anterior circulation: Symmetric enhancement and luminal diameter of A1 and M1 segments. Symmetric enh
ancement of the proximal MCA branches approximately 2 segments in length. 

 

Posterior circulation: Limited evaluation of the PICA artery origins. There is some atherosclerosis o
f a nonansysmal intracranial left vertebral artery. Both vertebral arteries supply normal caliber bas
ilar artery. Bilateral P1 segments have symmetric enhancement and luminal diameter. 

 

IMPRESSION: 

1. No significant stenosis in the cervical carotid or vertebral arteries based on NASCET criteria. 

2. Unremarkable CT angiogram of the [place].

## 2018-09-26 NOTE — DIS-2
DATE OF ADMISSION:  09/23/2018

 

DATE OF DISCHARGE:  09/25/2018

 

RESIDENT:  Richard Francois DO

 

ADMITTING ATTENDING:  Henok Cottrell MD

 

DISCHARGE ATTENDING:  Joe Carrillo MD

 

CONSULTATION:  Neurology, Annalee Magaña MD

 

PROCEDURES:  MRI of brain on 09/24/2018 with findings of no evidence of acute 
intracranial process.  CT angio of [place] and neck and no significant 
stenosis of the carotid or vertebral arteries and unremarkable CT angio of the 
[place].

 

ADMITTING DIAGNOSIS:  Left hemiparesis.

 

SECONDARY DIAGNOSES:  Chronic kidney disease, stage 3; chronic pancreatitis; 
congestive heart failure; coronary artery disease; diabetes type 2; bipolar 
disorder; hyperlipidemia; and hypertension.

 

DISCHARGE MEDICATIONS:  Gabapentin 800 mg p.o. b.i.d.; lisinopril 10 mg p.o. 
q.p.m.; paroxetine 20 mg p.o. at bedtime; risperidone 0.5 mg p.o. at bedtime; 
Zyrtec 10 mg p.o. at bedtime; magnesium 250 mg p.o. at bedtime; Pancrelipase DR 
12,000, 4 capsules p.o. t.i.d. with meals; Zofran 4 mg p.o. q.6 hours p.r.n. 
nausea, vomiting; Lipitor 40 mg p.o. at bedtime; Ranexa 500 mg p.o. b.i.d.; 
Brilinta 90 mg p.o. b.i.d.; potassium chloride 10 mEq p.o. daily; torsemide 20 
mg p.o. daily; Phenergan 25 mg p.o. q.6 hours p.r.n. nausea, vomiting; 
fenofibrate 160 mg p.o. at bedtime; vitamin D2, 50,000 units p.o. daily; Lovaza 
2 grams p.o. b.i.d.; ranitidine 150 mg p.o. b.i.d.; Imdur 30 mg p.o. b.i.d.; 
insulin glargine 80 units subcu t.i.d.; NicoDerm CQ 14 mg patch transdermal 
q.24 hours.

 

DISCONTINUED MEDICATIONS:  None.

 

HOSPITAL COURSE:  This is a 53-year-old female who was admitted to this 
facility after being seen in the [facility] with a 4-day history of intermittent 
tremulousness and left-sided paresthesia.  At the [facility], she had a negative 
CT with concern for a CVA versus TIA.  Given her history, she was transported 
to this facility for a workup. A this facility testing for vascular etiology to 
include MRI, CTA were negative.  The patient had continued symptoms of 
paresthesia, but no notable weakness.  Paresthesia included left side of her 
face, neck, shoulder, left arm, left side of her abdomen and trunk, left leg, 
and left foot.  Thorough evaluation by Neurology has determined this is likely 
not related to an acute vascular event.  The patient should be sent home on her 
current medical management.  It was recommended that the patient follow up with 
her PCP and Neurology upon discharge for further work up.

 

DISCHARGE INSTRUCTIONS:

1.  Location:  Home.

2.  Diet:  Heart healthy and low carb.

3.  Activity:  Ad german.

4.  Follow up with PCP, Dr. Khan in a week; and Neurology, Dr. Magaña.

 

[facility]

## 2018-10-14 ENCOUNTER — HOSPITAL ENCOUNTER (EMERGENCY)
Dept: HOSPITAL 18 - NAV ERS | Age: 53
Discharge: HOME | End: 2018-10-14
Payer: COMMERCIAL

## 2018-10-14 DIAGNOSIS — F31.9: ICD-10-CM

## 2018-10-14 DIAGNOSIS — F17.210: ICD-10-CM

## 2018-10-14 DIAGNOSIS — I25.2: ICD-10-CM

## 2018-10-14 DIAGNOSIS — L03.114: Primary | ICD-10-CM

## 2018-10-14 DIAGNOSIS — Z79.899: ICD-10-CM

## 2018-10-14 DIAGNOSIS — I25.10: ICD-10-CM

## 2018-10-14 DIAGNOSIS — E78.1: ICD-10-CM

## 2018-10-14 DIAGNOSIS — E66.9: ICD-10-CM

## 2018-10-14 DIAGNOSIS — E11.9: ICD-10-CM

## 2018-10-14 PROCEDURE — 99406 BEHAV CHNG SMOKING 3-10 MIN: CPT

## 2018-11-11 ENCOUNTER — HOSPITAL ENCOUNTER (EMERGENCY)
Dept: HOSPITAL 18 - NAV ERS | Age: 53
Discharge: HOME | End: 2018-11-11
Payer: COMMERCIAL

## 2018-11-11 DIAGNOSIS — E11.9: ICD-10-CM

## 2018-11-11 DIAGNOSIS — I25.10: ICD-10-CM

## 2018-11-11 DIAGNOSIS — R10.12: Primary | ICD-10-CM

## 2018-11-11 DIAGNOSIS — E66.9: ICD-10-CM

## 2018-11-11 DIAGNOSIS — I10: ICD-10-CM

## 2018-11-11 DIAGNOSIS — F17.210: ICD-10-CM

## 2018-11-11 DIAGNOSIS — E78.1: ICD-10-CM

## 2018-11-11 DIAGNOSIS — Z79.899: ICD-10-CM

## 2018-11-11 DIAGNOSIS — F31.9: ICD-10-CM

## 2018-11-11 LAB
ALBUMIN SERPL BCG-MCNC: 4.4 G/DL (ref 3.5–5)
ALP SERPL-CCNC: 62 U/L (ref 40–150)
ALT SERPL W P-5'-P-CCNC: 46 U/L (ref 8–55)
ANION GAP SERPL CALC-SCNC: 15 MMOL/L (ref 10–20)
AST SERPL-CCNC: 32 U/L (ref 5–34)
BASOPHILS # BLD AUTO: 0.1 THOU/UL (ref 0–0.2)
BASOPHILS NFR BLD AUTO: 1 % (ref 0–1)
BILIRUB SERPL-MCNC: 0.4 MG/DL (ref 0.2–1.2)
BUN SERPL-MCNC: 20 MG/DL (ref 9.8–20.1)
CALCIUM SERPL-MCNC: 10.1 MG/DL (ref 7.8–10.44)
CHLORIDE SERPL-SCNC: 102 MMOL/L (ref 98–107)
CO2 SERPL-SCNC: 26 MMOL/L (ref 22–29)
CREAT CL PREDICTED SERPL C-G-VRATE: 0 ML/MIN (ref 70–130)
EOSINOPHIL # BLD AUTO: 0.2 THOU/UL (ref 0–0.7)
EOSINOPHIL NFR BLD AUTO: 1.7 % (ref 0–10)
GLOBULIN SER CALC-MCNC: 2.9 G/DL (ref 2.4–3.5)
GLUCOSE SERPL-MCNC: 150 MG/DL (ref 70–105)
HGB BLD-MCNC: 12.7 G/DL (ref 12–16)
LIPASE SERPL-CCNC: 81 U/L (ref 8–78)
LYMPHOCYTES # BLD AUTO: 3.7 THOU/UL (ref 1.2–3.4)
LYMPHOCYTES NFR BLD AUTO: 38.6 % (ref 21–51)
MCH RBC QN AUTO: 29.4 PG (ref 27–31)
MCV RBC AUTO: 89.4 FL (ref 78–98)
MONOCYTES # BLD AUTO: 0.5 THOU/UL (ref 0.11–0.59)
MONOCYTES NFR BLD AUTO: 4.8 % (ref 0–10)
NEUTROPHILS # BLD AUTO: 5.2 THOU/UL (ref 1.4–6.5)
NEUTROPHILS NFR BLD AUTO: 53.9 % (ref 42–75)
PLATELET # BLD AUTO: 158 THOU/UL (ref 130–400)
POTASSIUM SERPL-SCNC: 3.4 MMOL/L (ref 3.5–5.1)
RBC # BLD AUTO: 4.32 MILL/UL (ref 4.2–5.4)
SODIUM SERPL-SCNC: 140 MMOL/L (ref 136–145)
WBC # BLD AUTO: 9.7 THOU/UL (ref 4.8–10.8)

## 2018-11-11 PROCEDURE — 96375 TX/PRO/DX INJ NEW DRUG ADDON: CPT

## 2018-11-11 PROCEDURE — 83690 ASSAY OF LIPASE: CPT

## 2018-11-11 PROCEDURE — 96366 THER/PROPH/DIAG IV INF ADDON: CPT

## 2018-11-11 PROCEDURE — 96365 THER/PROPH/DIAG IV INF INIT: CPT

## 2018-11-11 PROCEDURE — 80053 COMPREHEN METABOLIC PANEL: CPT

## 2018-11-11 PROCEDURE — 74177 CT ABD & PELVIS W/CONTRAST: CPT

## 2018-11-11 PROCEDURE — 85025 COMPLETE CBC W/AUTO DIFF WBC: CPT

## 2018-11-11 PROCEDURE — 36415 COLL VENOUS BLD VENIPUNCTURE: CPT

## 2018-11-11 NOTE — CT
ABDOMEN AND PELVIC CT SCAN WITH IV CONTRAST:

 

History: 

53-year-old female with mid epigastric left sided pain. History of chronic pancreatitis. 

 

FINDINGS: 

Hepatomegaly with fatty changes in the liver. Borderline splenomegaly. Status post cholecystectomy wi
thout ductal dilatation. Stable appearing pancreatic duct dilatation as well as a stable small cystic
 mass in the body of the pancreas, unchanged from 10-13-17. Stable small left adrenal nodule. No lazaro
l calculi or acute  obstruction. No abscess, adenopathy, or abnormal fluid collection. Minimal colo
to diverticulosis without acute diverticulitis. 

 

IMPRESSION: 

Stable pancreatic ductal dilatation and cystic focus in the body of the pancreas, unchanged from 10-1
3-17. Hepatomegaly with fatty changes. Borderline splenomegaly. No other significant acute process. 

 

POS: [facility]

## 2018-12-31 ENCOUNTER — HOSPITAL ENCOUNTER (EMERGENCY)
Dept: HOSPITAL 18 - NAV ERS | Age: 53
Discharge: HOME | End: 2018-12-31
Payer: COMMERCIAL

## 2018-12-31 DIAGNOSIS — I25.10: ICD-10-CM

## 2018-12-31 DIAGNOSIS — E66.9: ICD-10-CM

## 2018-12-31 DIAGNOSIS — F31.9: ICD-10-CM

## 2018-12-31 DIAGNOSIS — Z79.899: ICD-10-CM

## 2018-12-31 DIAGNOSIS — I10: ICD-10-CM

## 2018-12-31 DIAGNOSIS — E78.1: ICD-10-CM

## 2018-12-31 DIAGNOSIS — E11.9: ICD-10-CM

## 2018-12-31 DIAGNOSIS — F17.210: ICD-10-CM

## 2018-12-31 DIAGNOSIS — J44.0: Primary | ICD-10-CM

## 2018-12-31 PROCEDURE — 71046 X-RAY EXAM CHEST 2 VIEWS: CPT

## 2019-01-08 ENCOUNTER — HOSPITAL ENCOUNTER (OUTPATIENT)
Dept: HOSPITAL 92 - ERS | Age: 54
Setting detail: OBSERVATION
LOS: 2 days | Discharge: HOME | End: 2019-01-10
Attending: FAMILY MEDICINE | Admitting: FAMILY MEDICINE
Payer: COMMERCIAL

## 2019-01-08 VITALS — BODY MASS INDEX: 43.3 KG/M2

## 2019-01-08 DIAGNOSIS — Z79.02: ICD-10-CM

## 2019-01-08 DIAGNOSIS — E78.00: ICD-10-CM

## 2019-01-08 DIAGNOSIS — I25.110: Primary | ICD-10-CM

## 2019-01-08 DIAGNOSIS — E66.9: ICD-10-CM

## 2019-01-08 DIAGNOSIS — K86.1: ICD-10-CM

## 2019-01-08 DIAGNOSIS — E78.5: ICD-10-CM

## 2019-01-08 DIAGNOSIS — Z91.018: ICD-10-CM

## 2019-01-08 DIAGNOSIS — I12.9: ICD-10-CM

## 2019-01-08 DIAGNOSIS — Z88.5: ICD-10-CM

## 2019-01-08 DIAGNOSIS — N18.3: ICD-10-CM

## 2019-01-08 DIAGNOSIS — Z95.5: ICD-10-CM

## 2019-01-08 DIAGNOSIS — Z88.6: ICD-10-CM

## 2019-01-08 DIAGNOSIS — Z79.4: ICD-10-CM

## 2019-01-08 DIAGNOSIS — K21.9: ICD-10-CM

## 2019-01-08 DIAGNOSIS — E11.42: ICD-10-CM

## 2019-01-08 DIAGNOSIS — F17.210: ICD-10-CM

## 2019-01-08 DIAGNOSIS — F31.9: ICD-10-CM

## 2019-01-08 DIAGNOSIS — E11.22: ICD-10-CM

## 2019-01-08 DIAGNOSIS — Z91.048: ICD-10-CM

## 2019-01-08 DIAGNOSIS — Z88.2: ICD-10-CM

## 2019-01-08 DIAGNOSIS — K76.0: ICD-10-CM

## 2019-01-08 DIAGNOSIS — I25.2: ICD-10-CM

## 2019-01-08 DIAGNOSIS — Z88.8: ICD-10-CM

## 2019-01-08 DIAGNOSIS — Z79.899: ICD-10-CM

## 2019-01-08 LAB
ALBUMIN SERPL BCG-MCNC: 3.9 G/DL (ref 3.5–5)
ALP SERPL-CCNC: 64 U/L (ref 40–150)
ALT SERPL W P-5'-P-CCNC: 39 U/L (ref 8–55)
ANION GAP SERPL CALC-SCNC: 14 MMOL/L (ref 10–20)
AST SERPL-CCNC: 21 U/L (ref 5–34)
BASOPHILS # BLD AUTO: 0 THOU/UL (ref 0–0.2)
BASOPHILS NFR BLD AUTO: 0.2 % (ref 0–1)
BILIRUB SERPL-MCNC: 0.3 MG/DL (ref 0.2–1.2)
BUN SERPL-MCNC: 20 MG/DL (ref 9.8–20.1)
CALCIUM SERPL-MCNC: 9.5 MG/DL (ref 7.8–10.44)
CHLORIDE SERPL-SCNC: 104 MMOL/L (ref 98–107)
CK SERPL-CCNC: 42 U/L (ref 29–168)
CO2 SERPL-SCNC: 22 MMOL/L (ref 22–29)
CREAT CL PREDICTED SERPL C-G-VRATE: 0 ML/MIN (ref 70–130)
EOSINOPHIL # BLD AUTO: 0.1 THOU/UL (ref 0–0.7)
EOSINOPHIL NFR BLD AUTO: 0.4 % (ref 0–10)
GLOBULIN SER CALC-MCNC: 2.8 G/DL (ref 2.4–3.5)
GLUCOSE SERPL-MCNC: 231 MG/DL (ref 70–105)
HGB BLD-MCNC: 13.7 G/DL (ref 12–16)
LIPASE SERPL-CCNC: 61 U/L (ref 8–78)
LYMPHOCYTES # BLD: 1.5 THOU/UL (ref 1.2–3.4)
LYMPHOCYTES NFR BLD AUTO: 10.2 % (ref 21–51)
MCH RBC QN AUTO: 31.4 PG (ref 27–31)
MCV RBC AUTO: 90.8 FL (ref 78–98)
MONOCYTES # BLD AUTO: 0.9 THOU/UL (ref 0.11–0.59)
MONOCYTES NFR BLD AUTO: 6 % (ref 0–10)
NEUTROPHILS # BLD AUTO: 12.3 THOU/UL (ref 1.4–6.5)
NEUTROPHILS NFR BLD AUTO: 83.2 % (ref 42–75)
PLATELET # BLD AUTO: 196 THOU/UL (ref 130–400)
POTASSIUM SERPL-SCNC: 3.8 MMOL/L (ref 3.5–5.1)
RBC # BLD AUTO: 4.36 MILL/UL (ref 4.2–5.4)
SODIUM SERPL-SCNC: 136 MMOL/L (ref 136–145)
WBC # BLD AUTO: 14.8 THOU/UL (ref 4.8–10.8)

## 2019-01-08 PROCEDURE — 96374 THER/PROPH/DIAG INJ IV PUSH: CPT

## 2019-01-08 PROCEDURE — 82550 ASSAY OF CK (CPK): CPT

## 2019-01-08 PROCEDURE — 82947 ASSAY GLUCOSE BLOOD QUANT: CPT

## 2019-01-08 PROCEDURE — 83690 ASSAY OF LIPASE: CPT

## 2019-01-08 PROCEDURE — G0378 HOSPITAL OBSERVATION PER HR: HCPCS

## 2019-01-08 PROCEDURE — 36416 COLLJ CAPILLARY BLOOD SPEC: CPT

## 2019-01-08 PROCEDURE — 71045 X-RAY EXAM CHEST 1 VIEW: CPT

## 2019-01-08 PROCEDURE — 93458 L HRT ARTERY/VENTRICLE ANGIO: CPT

## 2019-01-08 PROCEDURE — C1769 GUIDE WIRE: HCPCS

## 2019-01-08 PROCEDURE — 84484 ASSAY OF TROPONIN QUANT: CPT

## 2019-01-08 PROCEDURE — 96361 HYDRATE IV INFUSION ADD-ON: CPT

## 2019-01-08 PROCEDURE — 85025 COMPLETE CBC W/AUTO DIFF WBC: CPT

## 2019-01-08 PROCEDURE — 93005 ELECTROCARDIOGRAM TRACING: CPT

## 2019-01-08 PROCEDURE — 99153 MOD SED SAME PHYS/QHP EA: CPT

## 2019-01-08 PROCEDURE — 99152 MOD SED SAME PHYS/QHP 5/>YRS: CPT

## 2019-01-08 PROCEDURE — 36415 COLL VENOUS BLD VENIPUNCTURE: CPT

## 2019-01-08 PROCEDURE — 80053 COMPREHEN METABOLIC PANEL: CPT

## 2019-01-08 PROCEDURE — 96375 TX/PRO/DX INJ NEW DRUG ADDON: CPT

## 2019-01-08 PROCEDURE — 94760 N-INVAS EAR/PLS OXIMETRY 1: CPT

## 2019-01-08 PROCEDURE — 96372 THER/PROPH/DIAG INJ SC/IM: CPT

## 2019-01-08 NOTE — RAD
PORTABLE CHEST:

1/8/19

 

PROVIDED CLINICAL HISTORY:  

Chest pain.

 

FINDINGS:  

Comparison 12/31/18.

 

The cardiac and mediastinal silhouette is within normal limits. Lungs appear clear. No  pleural fluid
 or pneumothorax apparent.

 

IMPRESSION:  

No evidence for an acute cardiopulmonary process.

 

POS: [facility]

## 2019-01-09 LAB
TROPONIN I SERPL DL<=0.01 NG/ML-MCNC: 0.01 NG/ML (ref ?–0.03)
TROPONIN I SERPL DL<=0.01 NG/ML-MCNC: 0.01 NG/ML (ref ?–0.03)

## 2019-01-09 RX ADMIN — TICAGRELOR SCH MG: 90 TABLET ORAL at 12:11

## 2019-01-09 RX ADMIN — TICAGRELOR SCH MG: 90 TABLET ORAL at 21:36

## 2019-01-09 NOTE — CON
DATE OF CONSULTATION:  01/09/2019



REASON FOR CONSULTATION:  Chest pain.



PRIMARY CARDIOLOGIST:  Wally Bishop MD



HISTORY OF PRESENT ILLNESS:  Ms. Nguyen is a pleasant 53-year-old white female,

who comes to the hospital for chest pain and shortness of breath. She had similar

symptoms in March of this last year. She underwent stress testing and showed an

apical defect. She had a heart catheterization and was found to have a significant

lesion on her LAD and this was stented with a 3.5 stent, post dilated approximately

to 4.0. She states that her symptoms actually improved for the next 8 months, but a

month ago, she started noticing increased shortness of breath with any exertion,

that gotto the point where it was at rest yesterday, so she decided to come in for

evaluation. She is ruled out with negative enzymes and negative EKG. 



PAST MEDICAL HISTORY:  

1. Coronary artery disease.

2. Type 2 diabetes.

3. Hyperlipidemia.

4. Bipolar disorder.

5. Depression.

6. Asthma.

7. Pancreatic mass with chronic pancreatitis.

8. AGUILAR.



SURGICAL HISTORY:  

1. Neck surgery.

2. Hysterectomy.

3. Bilateral oophorectomy.

4. Left knee surgery.

5. Skin cancer removal.

6. Bilateral lumpectomy.

7. Partial left breast removal.



FAMILY HISTORY:  Mother with cardiac issues.



SOCIAL HISTORY:  Continues to smoke a pack a day since she was 11, 42 year pack

history. No alcohol or drug use. 



OUTPATIENT MEDICATIONS:  Include,

1. Cetirizine.

2. Gabapentin.

3. Lisinopril 10 mg q.p.m.

4. Magnesium.

5. Paroxetine.

6. Risperidone.

7. Pancrelipase 

8. Zofran.

9. Lipitor 40 at bedtime.

10. Ranexa 500 b.i.d.

11. Ticagrelor 90 b.i.d.

12. Potassium chloride 10 mEq a day.

13. Torsemide 20 mg a day.

14. Vitamin D2.

15. Tricor.

16. Imdur 30 mg b.i.d.

17. Omega-3.

18. Promethazine.

19. Ranitidine.

20. Insulin.

21. Nicotine CQ transdermal patch.



ALLERGIES:  

1. ASPIRIN.

2. CODEINE.

3. GLUTEN.

4. HYDROCODONE.

5. LEVOFLOXACIN.

6. NSAIDS.

7. SULFA DRUGS.

8. TRAMADOL.



REVIEW OF SYSTEMS:  A 12-point review of systems was done and was all negative

unless stated in the history of present illness. 



PHYSICAL EXAMINATION:

VITAL SIGNS: Temperature 98.4, pulse 93, respiratory rate 16, saturation 97% on room

air, blood pressure 119/67. 

GENERAL: Awake, alert, and oriented x3. No distress. 

HEENT: Normocephalic, atraumatic. 

NECK: Supple. 

LUNGS: Clear. 

CARDIOVASCULAR: S1, S2. No S3 or S4. No murmurs. 

ABDOMEN: Soft. Positive bowel sounds. 

EXTREMITIES: 1+ edema. 

SKIN: Warm and dry.



LABORATORY WORK:  White count 14, hemoglobin 13, hematocrit 39, platelet count of

196. Chemistries; creatinine 1.3, GFR 41, and glucose of 231. Troponin was negative

x3. Albumin of 3.9. Normal sodium and potassium. 



EKG was reviewed. No ischemic changes. 



Chest x-ray unremarkable.



ASSESSMENT AND PLAN:  

1. Chest pain concerning for angina.

2. Ongoing tobacco abuse.

3. Hypertension.

4. Hyperlipidemia.

5. Coronary artery disease.



PLAN:  

1. We will further risk stratify her with heart catheterization, as she has exact

same symptoms that she had back when she needed a heart stenting to her LAD. She has

been compliant with her medications and her troponins are negative. We will see if

her symptoms are really related to coronary artery disease, as they might also be

related to her lung issues with her significant tobacco use history. 

2. We spoke at length about the risks and benefits of the procedure. Risks included,

but not limited to stroke, MI, death, bleeding, need for blood transfusion, limb

loss, organ loss, need for emergent bypass surgery, need for emergent vascular

repair. The patient understands and verbalized understanding of this. She agrees to

proceed. Drug-eluting stents, if needed. Radial approach, if possible. 

3. Further recommendations per results of coronary angiogram.







Job ID:  [id number]

## 2019-01-09 NOTE — PDOC.FPRHP
- History of Present Illness


Chief Complaint: chest pain


History of Present Illness: 





The patient is a 53YOF with a PMH significant for CAD s/p MI w/ stent placement 

x2, HTN, HLD, and DMII who presented to the ED with a CC of chest pain that she 

states began around 15:30 on the day of presentation. The patient reports 

starting to feel a little lightheaded around that time and thought it was due 

to her blood sugar. She says she checked her BG and it was over 400 so she gave 

herself about 270 units of insulin and said it came down to about 300 something 

which did help relieve her lightheadedness. However, over the course of those 3 

hours she began to develop gradually worsening chest pain that she reported to 

be substernal with radiation into her back. She described the pain as a tight, 

pressure-like sensation that was somewhat relieved with nitroglycerin spray at 

home. She reported it to be a going from a 6 to a 4-5/10 with that medicine. 

She did endorse exacerbation with movement or any exertion. She denied any 

associated N/V, or diaphoresis but did endorse some SOB with exertion as well. 

The patient stated that the pain eventually became so severe that she was not 

even able to walk from her bedroom into her bathroom without experiencing 

severe pain so she decided to go to the ER for evaluation. 





Of note, the patient has a significant cardiac history w/ a h/o 2 stent 

placements, the most recent being in March of 2018. The patient denies having a 

Cardiologist but says her last stress test was done before her last stent was 

placed.   


ED Course: 





0.4 of SL nitro x1





- Allergies/Adverse Reactions


 Allergies











Allergy/AdvReac Type Severity Reaction Status Date / Time


 


adhesive Allergy   Verified 06/25/18 14:52


 


aspirin Allergy   Verified 06/25/18 14:52


 


codeine Allergy   Verified 06/25/18 14:52


 


gluten Allergy   Verified 06/25/18 14:52


 


hydrocodone Allergy   Verified 06/25/18 14:52


 


levofloxacin [From Levaquin] Allergy   Verified 06/25/18 14:52


 


NSAIDS (Non-Steroidal Allergy   Verified 06/25/18 14:52





Anti-Inflamma     


 


Sulfa (Sulfonamide Allergy   Verified 06/25/18 14:52





Antibiotics)     


 


trimethoprim [From Bactrim] Allergy   Verified 06/25/18 14:52


 


tramadol AdvReac Mild ITCHING Verified 06/25/18 14:52














- Home Medications


 











 Medication  Instructions  Recorded  Confirmed  Type


 


Cetirizine HCl [Zyrtec] 10 mg PO HS 01/31/17 09/24/18 History


 


Gabapentin 800 mg PO BID 01/31/17 09/24/18 History


 


Lisinopril 10 mg PO QPM 01/31/17 09/24/18 History


 


Magnesium 250 mg PO HS 01/31/17 09/24/18 History


 


PARoxetine HCl [Paroxetine HCl] 20 mg PO HS 01/31/17 09/24/18 History


 


risperiDONE [Risperidone] 0.5 mg PO HS 01/31/17 09/24/18 History


 


Pancrelipase  54831 [Creon DR 4 cap PO TID-WM #30 cap 10/15/17 09/24/18 Rx





12,000 Units]    


 


Ondansetron [Zofran ODT] 4 mg PO Q6H PRN 4 Days #16 tab 03/07/18 09/24/18 Rx


 


Atorvastatin Calcium [Lipitor] 40 mg PO HS 30 Days #30 tab 03/08/18 09/24/18 Rx


 


Ranolazine [Ranexa] 500 mg PO BID 30 Days #60 tab 03/09/18 09/24/18 Rx


 


Ticagrelor [Brilinta] 90 mg PO BID 30 Days #60 tab 03/09/18 09/24/18 Rx


 


Potassium Chloride [Klor-Con 10] 10 meq PO DAILY #30 tab 03/13/18 09/24/18 Rx


 


Torsemide 20 mg PO ASDIR #90 tablet 03/13/18 09/24/18 Rx


 


Ergocalciferol (Vitamin D2) 50,000 unit PO ASDIR 04/30/18 09/24/18 History





[Vitamin D2]    


 


Fenofibrate [Tricor] 160 mg PO HS 04/30/18 09/24/18 History


 


Isosorbide Mononitrate [Imdur] 30 mg PO BID 04/30/18 09/24/18 History


 


Omega-3 Acid Ethyl Esters [Lovaza] 2 gm PO BID 04/30/18 09/24/18 History


 


Promethazine [Phenergan] 25 mg PO Q6HR PRN 04/30/18 09/24/18 History


 


Ranitidine HCl 150 mg PO BID 04/30/18 09/24/18 History


 


Morphine Sulfate 3 ml PO Q4H PRN 06/25/18 09/24/18 History


 


Insulin Glargine,Hum.Rec.Anlog 80 unit SQ TID 09/24/18 09/24/18 History





[Toujeo Solostar]    


 


Nicotine [Nicoderm CQ] 14 mg TD Q24HR #14 patch 09/25/18  Rx














- History


PMHx: CAD s/p stent placement x2, DMII, HLD, Bipolar, Depression, reported 

asthma, benign pancreatic mass w/ chronic pancreatitis, AGUILAR


 


PSHx: neck sx, hysterectomy, B/L oophrectomy, left knee, skin CA removal, B/L 

lumpectomy, partial L breast removal





FHx: mother with cardiac problems


 


Social: 42 pack year smoking history, No EtOH or drug use. 


 








- Review of Systems


General: reports: fatigue.  denies: fever/chills


Eyes: denies: eye pain, vision changes


ENT: denies: nasal congestion


Respiratory: reports: shortness of breath, exercise intolerance.  denies: cough


Cardiovascular: reports: chest pain, palpitation


Gastrointestinal: denies: nausea, vomiting


Genitourinary: reports: other (no hematuria).  denies: dysuria


Skin: denies: rashes, itching


Musculoskeletal: reports: pain (leg cramps).  denies: swelling


Neurological: denies: syncope, seizure


Psychological: denies: anxiety, depression





- Vital signs


BP: 137/79  HR: 67 RR: 16 Tmax: 98.F Pulse Ox: 99% on RA  Wt: 108kg   








- Physical Exam


Constitutional: awake, alert and oriented, well developed, other (mild distress 

intermittently 2/2 pain)


HEENT: normocephalic and atraumatic, conjunctiva clear, grossly normal vision, 

grossly normal hearing, MMM, oropharynx clear


Chest: no-tender to palpation


Heart: RRR, normal S1/S2, pulses present, no edema


Lungs: CTAB, no respiratory distress, good air movement, no wheezing


Abdomen: soft, bowel sounds present, other (TTP in LUQ)


Musculoskeletal: normal structure, ROM grossly normal


Neurological: no focal deficit, CN II-XII intact (grossly intact), normal 

sensation


Skin: no rash/lesions, capillary refill <2 seconds, no jaundice


Heme/Lymphatic: no unusual bruising or bleeding


Psychiatric: normal mood and affect, good judgment and insight, intact recent 

and remote memory





FMR H&P: Results





- Labs


Result Diagrams: 


 01/08/19 20:46





 01/08/19 20:46


Lab results: 


 











WBC  14.8 thou/uL (4.8-10.8)  H  01/08/19  20:46    


 


Hgb  13.7 g/dL (12.0-16.0)   01/08/19  20:46    


 


Hct  39.6 % (36.0-47.0)   01/08/19  20:46    


 


MCV  90.8 fL (78.0-98.0)   01/08/19  20:46    


 


Plt Count  196 thou/uL (130-400)   01/08/19  20:46    


 


Neutrophils %  83.2 % (42.0-75.0)  H  01/08/19  20:46    


 


Sodium  136 mmol/L (136-145)   01/08/19  20:46    


 


Potassium  3.8 mmol/L (3.5-5.1)   01/08/19  20:46    


 


Chloride  104 mmol/L ()   01/08/19  20:46    


 


Carbon Dioxide  22 mmol/L (22-29)   01/08/19  20:46    


 


BUN  20 mg/dL (9.8-20.1)   01/08/19  20:46    


 


Creatinine  1.34 mg/dL (0.6-1.1)  H  01/08/19  20:46    


 


Glucose  231 mg/dL ()  H  01/08/19  20:46    


 


Calcium  9.5 mg/dL (7.8-10.44)   01/08/19  20:46    


 


Total Bilirubin  0.3 mg/dL (0.2-1.2)   01/08/19  20:46    


 


AST  21 U/L (5-34)   01/08/19  20:46    


 


ALT  39 U/L (8-55)   01/08/19  20:46    


 


Alkaline Phosphatase  64 U/L ()   01/08/19  20:46    


 


Creatine Kinase  42 U/L ()   01/08/19  20:46    


 


Serum Total Protein  6.7 g/dL (6.0-8.3)   01/08/19  20:46    


 


Albumin  3.9 g/dL (3.5-5.0)   01/08/19  20:46    


 


Lipase  61 U/L (8-78)   01/08/19  20:46    











Additional comment: 





troponin I: <0.010





- EKG Interpretation


EKG: 





Initial: NSR, no acute ST changes


Repeat: NSR, no acute ST changes





- Radiology Interpretation


  ** Chest x-ray


Status: report reviewed by me (no acute cardiopulmonary process)





FMR H&P: A/P





- Problem List


(1) Chest pain, rule out acute myocardial infarction


Current Visit: No   Status: Acute   Code(s): R07.9 - CHEST PAIN, UNSPECIFIED   





(2) Chronic pancreatitis


Current Visit: No   Status: Acute   Code(s): K86.1 - OTHER CHRONIC PANCREATITIS

   





(3) Bipolar disorder


Current Visit: No   Status: Chronic   Code(s): F31.9 - BIPOLAR DISORDER, 

UNSPECIFIED   


Qualifiers: 


   Active/Remission status: remission status unspecified   Qualified Code(s): 

F31.9 - Bipolar disorder, unspecified   





(4) CAD (coronary artery disease)


Current Visit: No   Status: Chronic   Code(s): I25.10 - ATHSCL HEART DISEASE OF 

NATIVE CORONARY ARTERY W/O ANG PCTRS   


Qualifiers: 


   Coronary Disease-Associated Artery/Lesion type: native artery   Native vs. 

transplanted heart: native heart   Associated angina: angina presence 

unspecified   Qualified Code(s): I25.10 - Atherosclerotic heart disease of 

native coronary artery without angina pectoris   





(5) Chronic kidney disease, stage 3


Current Visit: No   Status: Chronic   





(6) Diabetes mellitus type 2 in obese


Current Visit: No   Status: Chronic   Code(s): E11.9 - TYPE 2 DIABETES MELLITUS 

WITHOUT COMPLICATIONS; E66.9 - OBESITY, UNSPECIFIED   





(7) Hyperlipidemia


Current Visit: No   Status: Chronic   Code(s): E78.5 - HYPERLIPIDEMIA, 

UNSPECIFIED   


Qualifiers: 


   Hyperlipidemia type: unspecified   Qualified Code(s): E78.5 - Hyperlipidemia

, unspecified   





(8) Hypertension


Current Visit: No   Status: Chronic   Code(s): I10 - ESSENTIAL (PRIMARY) 

HYPERTENSION   


Qualifiers: 


   Hypertension type: essential hypertension   Qualified Code(s): I10 - 

Essential (primary) hypertension   





(9) Peripheral neuropathy


Current Visit: No   Status: Chronic   Code(s): G62.9 - POLYNEUROPATHY, 

UNSPECIFIED   


Qualifiers: 


   Peripheral neuropathy type: polyneuropathy, unspecified   Qualified Code(s): 

G62.9 - Polyneuropathy, unspecified   





(10) Tobacco abuse


Current Visit: No   Status: Chronic   Code(s): Z72.0 - TOBACCO USE   





- Plan





53YOF w/ a PMH significant for CAD s/p stent placement x2, HTN, HLD, and DMII 

who presented to the ED with a CC of progressively worsening typical chest pain 

that began around 15:30 on the day of presentation.





Typical chest pain suspected to be 2/2 CAD:


- Patient is s/p 0.4mg of SL nitro in the ED and at home. Did not receive ASA 

as she has an anaphylactic reaction to ASA and NSAIDS.


- Vitals WNLs and patient is HD stable not in any acute distress other than 

discomfort from pain on exam.


- Initial & repeat ECG reassuring with no acute ST changes. Initial troponin 

was also negative x1. Will continue to trend and treat CP PRN with nitro and IV 

morphine.


- Patient is NPO pending a cardiology consult in the AM.





CAD s/p stent placement x2:


- Aware, will resume home meds.





Suspected h/o HTN:


- Patient denies a h/o HTN. States her antihypertensive meds are for her heart 

but she has never had high BP. Will resume home meds. 





HLD:


- Aware, will resume home meds but consider increasing atorvastatin to 80mg QHS 

to optimize treatment as tolerated by the patient. 





DMII w/ neuropathy:


- Aware, will resume home meds but hold full dose of home insulin while NPO. 

Will order aggressive SSI for now as she normally requires >80 units/day and 

ACHS accuchecks. 





Bipolar Disorder:


- Aware, will resume home meds.





CKD stage III:


- Aware, Cr on admission appears to be around patient's baseline w/ an eGFR of 

41. Will continue to follow and renally dose meds PRN.





h/o benign pancreatic mass:


- Aware, patient states she was instructed to get a follow-up CT and has not. 

Will encourage her to follow-up on this upon discharge.





Chronic pancreatitis:


- Aware, will give IV morphine for chest and abdominal pain PRN.


- Will also resume home nausea meds.





GERD:


- Aware, will resume home meds. 





Tobacco abuse:


- Will encourage cessation.


- No nicotine patch ordered due to chest pain. 


  





FMR H&P: Upper Level





- Pertinent history





53 yr old female with PMH of CAD s/p 2 stents, chronic pancreatitis, bipolar, 

NAFLD who presents with complaint of chest pain. States chest pain had onset 

around 3:30 PM earlier today at rest. Got really bad when she tried to walk to 

the bathroom. Pain is central, substernal and radiates towards left should and 

feels like pressure and tightness. No nausea or diaphoresis associated with 

pain. She stated nitro took pain from 6 to a 4-5. She had not received morphine 

yet for pain.  Has not taken ASA due to allergy. 


Reports intermittent nausea and abdominal pain 2/2 chronic pancreatitis





- Pertinent findings





EKG: normal sinus rhythm, no ST elevation, no T wave inversion 


Gen: Patient intermittently grabs her chest and makes a grimace on her face. 


Heart: RRR, no Murmurs, rubs, gallops 


Lungs: CTAB, no wheezes, rhales, rhonchi 


Chest: pain not reproducible 


Abd: nontender to palpation, BS +





- Plan


Date/Time: 01/09/19 0036








I, [Ana Warren], have evaluated this patient and agree with findings/plan as 

outlined by intern resident. Pertinent changes/additions are listed here.


53 yr old female with hx of CAD and new onset chest pain 





Typical chest pain


-hx of CAD s/p 2 stents 


-EKG x 3 neg 


-Trop x 2 neg, 3rd trop pending 


-nitro only minimally abates pain, morphine has helped a little


-unable to give ASA


-intermittently bradycardia so will hold beta blocker for now. 


-PRN morphine 


-currently on imdur


-last echo on 5/2018 without evidence of HF and normal EF at 50-55% 





CAD 


-see above 





HLD


-Last LDL in 6/2018 is 60 and pt is on statin 


-cont home atorvastatin


-consider increasing from 40 mg to 80 mg 





Insulin dependent diabetes


-check BG q ACHS


-patient on toujeo- interesting regimen, will do sliding scale here. Likely 

will need home toujeo or start lantus in interim. 





HTN


-cont home Lisinopril 





Bipolar


-cont home meds 


-currently controlled and stable 





NAFLD





Peripheral neuropathy 


-cont home gabapentin 





PCP: [facility]


Code status: FULL 


DVT ppx: lovenox 


GI ppx: ranitidine 


Diet: NPO after midnight 





Addendum - Attending





- Attending Attestation


Date/Time: 01/09/19 1145





I personally evaluated the patient and discussed the management with Dr. Hickman.


I agree with the History, Examination, Assessment and Plan documented above 

with any addition or exceptions noted below.


The patient presented with chest pain.  She states it is better than yesterday 

but still occurs occasionally.  Troponins are negative and repeat ekg's are 

negative.  Will consult cardiology with her hx of CAD and stents.

## 2019-01-10 VITALS — SYSTOLIC BLOOD PRESSURE: 147 MMHG | TEMPERATURE: 97.7 F | DIASTOLIC BLOOD PRESSURE: 73 MMHG

## 2019-01-10 PROCEDURE — 4A023N7 MEASUREMENT OF CARDIAC SAMPLING AND PRESSURE, LEFT HEART, PERCUTANEOUS APPROACH: ICD-10-PCS | Performed by: INTERNAL MEDICINE

## 2019-01-10 PROCEDURE — B2111ZZ FLUOROSCOPY OF MULTIPLE CORONARY ARTERIES USING LOW OSMOLAR CONTRAST: ICD-10-PCS | Performed by: INTERNAL MEDICINE

## 2019-01-10 RX ADMIN — TICAGRELOR SCH MG: 90 TABLET ORAL at 05:50

## 2019-01-10 RX ADMIN — PANCRELIPASE SCH: 60000; 12000; 38000 CAPSULE, DELAYED RELEASE PELLETS ORAL at 05:49

## 2019-01-10 RX ADMIN — PANCRELIPASE SCH CAP: 60000; 12000; 38000 CAPSULE, DELAYED RELEASE PELLETS ORAL at 10:58

## 2019-01-10 NOTE — PDOC.FM
- Subjective


Subjective: 





Ms. Nguyen is resting comfortably in bed. she denies SOB or CP. 





- Objective


Vital Signs & Weight: 


 Vital Signs (12 hours)











  Temp Pulse Resp BP BP BP Pulse Ox


 


 01/10/19 03:42  98.4 F  70  13    125/61  96


 


 01/09/19 23:55  98.8 F  81  16   140/63   95


 


 01/09/19 21:34   76   131/64   


 


 01/09/19 19:03  97.5 F L  76  16   131/64   98








 Weight











Weight                         118.206 kg














I&O: 


 











 01/08/19 01/09/19 01/10/19





 06:59 06:59 06:59


 


Intake Total   985


 


Balance   985











Result Diagrams: 


 01/08/19 20:46





 01/08/19 20:46





Phys Exam





- Physical Examination


Constitutional: NAD


HEENT: moist MMs


Neck: no JVD


Respiratory: clear to auscultation bilateral


Cardiovascular: RRR, no significant murmur


Gastrointestinal: soft, non-tender


Musculoskeletal: pulses present, edema present


Neurological: moves all 4 limbs


Psychiatric: normal affect


Skin: no rash





Dx/Plan


(1) Chest pain, rule out acute myocardial infarction


Code(s): R07.9 - CHEST PAIN, UNSPECIFIED   Status: Acute   





(2) Chronic pancreatitis


Code(s): K86.1 - OTHER CHRONIC PANCREATITIS   Status: Acute   





(3) Bipolar disorder


Code(s): F31.9 - BIPOLAR DISORDER, UNSPECIFIED   Status: Chronic   


Qualifiers: 


   Active/Remission status: remission status unspecified   Qualified Code(s): 

F31.9 - Bipolar disorder, unspecified   





(4) Chronic kidney disease, stage 3


Status: Chronic   





(5) Diabetes mellitus type 2 in obese


Code(s): E11.9 - TYPE 2 DIABETES MELLITUS WITHOUT COMPLICATIONS; E66.9 - OBESITY

, UNSPECIFIED   Status: Chronic   





(6) Hyperlipidemia


Code(s): E78.5 - HYPERLIPIDEMIA, UNSPECIFIED   Status: Chronic   


Qualifiers: 


   Hyperlipidemia type: unspecified   Qualified Code(s): E78.5 - Hyperlipidemia

, unspecified   





(7) Hypertension


Code(s): I10 - ESSENTIAL (PRIMARY) HYPERTENSION   Status: Chronic   


Qualifiers: 


   Hypertension type: essential hypertension   Qualified Code(s): I10 - 

Essential (primary) hypertension   





(8) Tobacco abuse


Code(s): Z72.0 - TOBACCO USE   Status: Chronic   





- Plan


Plan: 





Typical chest pain suspected to be 2/2 CAD:


- Patient is s/p 0.4mg of SL nitro in the ED and at home. Did not receive ASA 

as she has an anaphylactic reaction to ASA and NSAIDS.


- Vitals WNLs and patient is HD stable.


- Initial & repeat ECG reassuring with no acute ST changes. Initial troponins 

was also negativex4. 


- nitro, ekg, stat trop for chest pain


- Cardiology consulted, recommend cath





CAD s/p stent placement x2:


- Aware, will resume home meds.





Suspected h/o HTN:


- Patient denies a h/o HTN. States her antihypertensive meds are for her heart 

but she has never had high BP. 


- Will resume home meds. 





HLD:


- Aware, will resume home meds 





DMII w/ neuropathy:


- Aware, will resume home meds but hold full dose of home insulin while NPO. 


- Will order aggressive SSI for now as she normally requires >80 units/day 


- ACHS accuchecks. 





Bipolar Disorder:


- Aware, will resume home meds.





CKD stage III:


- Aware, Cr on admission appears to be around patient's baseline w/ an eGFR of 

41. 


- renally dose meds PRN


- monitor daily CMP 





h/o benign pancreatic mass:


- Aware, patient states she was instructed to get a follow-up CT and has not. 


- Will encourage her to follow-up on this upon discharge.





Chronic pancreatitis


- Will also resume home nausea meds.


- morphine prn





GERD:


- Aware, will resume home meds. 





Tobacco abuse:


- Will encourage cessation.


- No nicotine patch ordered due to chest pain. 





code: full 


ppx: hold lovenox until after cath


Dispo: [facility] possibly today, continue to monitor for chest pain





Addendum - Attending





- Attending Attestation


Date/Time: 01/10/19 8005





I personally evaluated the patient and discussed the management with Dr. Ferro.


I agree with the History, Examination, Assessment and Plan documented above 

with any addition or exceptions noted below.


The patient had a cath today.  Will f/u with cardiology recs.

## 2019-01-12 NOTE — EKG
Test Reason : 

Blood Pressure : ***/*** mmHG

Vent. Rate : 098 BPM     Atrial Rate : 098 BPM

   P-R Int : 136 ms          QRS Dur : 090 ms

    QT Int : 356 ms       P-R-T Axes : 060 015 077 degrees

   QTc Int : 454 ms

 

Normal sinus rhythm

Cannot rule out Anterior infarct , age undetermined

Abnormal ECG

 

Confirmed by TY CASTRO ([id number]),  CARMELLA KENYON ([id number]) on 1/12/2019 9:25:57 PM

 

Referred By:             Confirmed By:TY CASTRO

## 2019-01-12 NOTE — EKG
Test Reason : CHEST PAIN

Blood Pressure : ***/*** mmHG

Vent. Rate : 061 BPM     Atrial Rate : 061 BPM

   P-R Int : 132 ms          QRS Dur : 090 ms

    QT Int : 428 ms       P-R-T Axes : 051 039 066 degrees

   QTc Int : 430 ms

 

Normal sinus rhythm

Normal ECG

 

Confirmed by FANNY MEZA DO ([id number]),  CARMELLA KENYON ([id number]) on 1/12/2019 6:47:08 PM

 

Referred By:  RESIDENTS           Confirmed By:FANNY MEZA DO

## 2019-01-14 NOTE — DIS
DATE OF ADMISSION:  01/08/2019



DATE OF DISCHARGE:  01/10/2019



RESIDENT:  Raffaele Ferro DO



ADMITTING ATTENDING:  Chiqui Owens MD



DISCHARGE ATTENDING:  Chiqui Owens MD.



CONSULTS:  Cardiology, Chris Shah MD



PROCEDURES:  Left heart catheterization revealed no blockages, suggestive of

ischemia. 



PRIMARY DIAGNOSES:  Typical chest pain with history of coronary artery disease.



SECONDARY DIAGNOSES:  

1. Hypertension.

2. Hyperlipidemia.

3. Diabetes mellitus type 2.

4. Bipolar disorder.

5. Chronic kidney disease stage 3.

6. History of benign pancreatic mass.

7. Chronic pancreatitis.

8. Gastroesophageal reflux disease.

9. Tobacco abuse.



DISCHARGE MEDICATIONS:  

1. Gabapentin 800 mg p.o. b.i.d.

2. Lisinopril 10 mg p.o. q.p.m.

3. Paroxetine 20 mg p.o. at bedtime.

4. Risperidone 0.5 mg p.o. at bedtime.

5. Zyrtec 10 mg p.o. at bedtime.

6. Magnesium 500 mg p.o. at bedtime.

7. Pancrelipase 4 capsules p.o. t.i.d. with meals.

8. __________.

9. Lipitor 40 mg p.o. at bedtime.

10. Ranexa 500 mg p.o. b.i.d.

11. Brilinta 90 mg p.o. b.i.d.

12. Klor-Con 10 mEq daily.

13. TriCor 160 mg p.o. at bedtime.

14. Vitamin D2 of 50,000 units p.o. as directed.

15. Lovaza 2 g p.o. b.i.d.

16. Ranitidine 150 mg p.o. b.i.d.

17. Morphine sulfate 5 mL p.o. q.4 hour p.r.n.

18. Toujeo 8 units subcutaneously t.i.d.

19. Folvite 1 mg p.o. at bedtime.

20. Flonase 1 spray each nose daily.

21. Demadex 40 mg p.o. daily.

22. Imdur 60 mg p.o. daily.

23. Ranexa 1000 mg p.o. b.i.d.



DISCONTINUED MEDICATIONS:  

1. Imdur 60 mg p.o. b.i.d.

2. Ranexa 500 mg p.o. b.i.d.



HISTORY OF PRESENT ILLNESS/HOSPITAL COURSE:  Ms. Nguyen is a 53-year-old female,

who presents to the ER with chief complaint of chest pain similar to pain that she

has had in the past, where she was noted to have a myocardial infarction with stent

placement. The pain at this time did not resolve with nitroglycerine, but did

resolve with morphine. She denied at that time any nausea, vomiting, diaphoresis, or

palpitations. She report shortness of breath and had severe pain in her chest.

Cardiac risks were stratified and it was decided that with her significant history

and similar type pain, that this was not musculoskeletal in nature, even though her

troponins were negative x4 and EKG with sinus rhythm without ST elevation and

Cardiology was consulted and recommended left heart catheterization. The patient

underwent left heart catheterization without complication, tolerated the procedure

well and it was deemed by Cardiology to be stable for discharge. 



DISCHARGE INSTRUCTIONS:  



LOCATION:  Home.



DIET:  Heart healthy/diabetic.



ACTIVITY:  As tolerated.



FOLLOWUP:  Followup with cardiologist in 3 to 4 weeks and PCP within 7 days.







Job ID:  [id number]

## 2019-01-28 ENCOUNTER — HOSPITAL ENCOUNTER (EMERGENCY)
Dept: HOSPITAL 18 - NAV ERS | Age: 54
Discharge: HOME | End: 2019-01-28
Payer: COMMERCIAL

## 2019-01-28 DIAGNOSIS — E78.2: ICD-10-CM

## 2019-01-28 DIAGNOSIS — E11.9: ICD-10-CM

## 2019-01-28 DIAGNOSIS — Z79.899: ICD-10-CM

## 2019-01-28 DIAGNOSIS — E66.9: ICD-10-CM

## 2019-01-28 DIAGNOSIS — F31.9: ICD-10-CM

## 2019-01-28 DIAGNOSIS — I25.10: ICD-10-CM

## 2019-01-28 DIAGNOSIS — I25.2: ICD-10-CM

## 2019-01-28 DIAGNOSIS — J18.9: Primary | ICD-10-CM

## 2019-01-28 DIAGNOSIS — F17.210: ICD-10-CM

## 2019-01-28 DIAGNOSIS — E78.5: ICD-10-CM

## 2019-01-28 LAB
ALBUMIN SERPL BCG-MCNC: 4 G/DL (ref 3.5–5)
ALP SERPL-CCNC: 57 U/L (ref 40–150)
ALT SERPL W P-5'-P-CCNC: 41 U/L (ref 8–55)
ANION GAP SERPL CALC-SCNC: 15 MMOL/L (ref 10–20)
AST SERPL-CCNC: 39 U/L (ref 5–34)
BASOPHILS # BLD AUTO: 0.1 THOU/UL (ref 0–0.2)
BASOPHILS NFR BLD AUTO: 0.7 % (ref 0–1)
BILIRUB SERPL-MCNC: 0.5 MG/DL (ref 0.2–1.2)
BUN SERPL-MCNC: 15 MG/DL (ref 9.8–20.1)
CALCIUM SERPL-MCNC: 9.8 MG/DL (ref 7.8–10.44)
CHLORIDE SERPL-SCNC: 106 MMOL/L (ref 98–107)
CO2 SERPL-SCNC: 24 MMOL/L (ref 22–29)
CREAT CL PREDICTED SERPL C-G-VRATE: 0 ML/MIN (ref 70–130)
EOSINOPHIL # BLD AUTO: 0.2 THOU/UL (ref 0–0.7)
EOSINOPHIL NFR BLD AUTO: 1.9 % (ref 0–10)
GLOBULIN SER CALC-MCNC: 2.5 G/DL (ref 2.4–3.5)
GLUCOSE SERPL-MCNC: 95 MG/DL (ref 70–105)
HGB BLD-MCNC: 12 G/DL (ref 12–16)
LYMPHOCYTES # BLD AUTO: 3 THOU/UL (ref 1.2–3.4)
LYMPHOCYTES NFR BLD AUTO: 34.2 % (ref 21–51)
MCH RBC QN AUTO: 29.7 PG (ref 27–31)
MCV RBC AUTO: 88.1 FL (ref 78–98)
MONOCYTES # BLD AUTO: 0.6 THOU/UL (ref 0.11–0.59)
MONOCYTES NFR BLD AUTO: 6.6 % (ref 0–10)
NEUTROPHILS # BLD AUTO: 5 THOU/UL (ref 1.4–6.5)
NEUTROPHILS NFR BLD AUTO: 56.6 % (ref 42–75)
PLATELET # BLD AUTO: 159 THOU/UL (ref 130–400)
POTASSIUM SERPL-SCNC: 3.7 MMOL/L (ref 3.5–5.1)
RBC # BLD AUTO: 4.05 MILL/UL (ref 4.2–5.4)
SODIUM SERPL-SCNC: 141 MMOL/L (ref 136–145)
WBC # BLD AUTO: 8.9 THOU/UL (ref 4.8–10.8)

## 2019-01-28 PROCEDURE — 93005 ELECTROCARDIOGRAM TRACING: CPT

## 2019-01-28 PROCEDURE — 80053 COMPREHEN METABOLIC PANEL: CPT

## 2019-01-28 PROCEDURE — 71046 X-RAY EXAM CHEST 2 VIEWS: CPT

## 2019-01-28 PROCEDURE — 94640 AIRWAY INHALATION TREATMENT: CPT

## 2019-01-28 PROCEDURE — 83880 ASSAY OF NATRIURETIC PEPTIDE: CPT

## 2019-01-28 PROCEDURE — 85025 COMPLETE CBC W/AUTO DIFF WBC: CPT

## 2019-01-28 PROCEDURE — 94760 N-INVAS EAR/PLS OXIMETRY 1: CPT

## 2019-01-28 NOTE — RAD
CHEST TWO VIEWS:

 

INDICATIONS:

Dyspnea with history of shortness of breath, congestion, and wheezing.

 

COMPARISON:

01/08/2019

 

FINDINGS:

There is interstitial prominence of the lungs bilaterally.  The cardiac silhouette is accentuated by 
patient rotation.  There is osseous degenerative change.

 

IMPRESSION:

Interstitial prominence of the lungs bilaterally may relate to edema from fluid overload.  Alternativ
ely, this could represent pneumonitis.  Correlate clinically and, as necessary, imaging followup may 
be obtained.

 

POS: [facility]

## 2019-02-05 ENCOUNTER — HOSPITAL ENCOUNTER (EMERGENCY)
Dept: HOSPITAL 18 - NAV ERS | Age: 54
LOS: 1 days | Discharge: HOME | End: 2019-02-06
Payer: COMMERCIAL

## 2019-02-05 DIAGNOSIS — Z79.51: ICD-10-CM

## 2019-02-05 DIAGNOSIS — E78.1: ICD-10-CM

## 2019-02-05 DIAGNOSIS — F31.9: ICD-10-CM

## 2019-02-05 DIAGNOSIS — F17.210: ICD-10-CM

## 2019-02-05 DIAGNOSIS — R25.2: Primary | ICD-10-CM

## 2019-02-05 DIAGNOSIS — I25.10: ICD-10-CM

## 2019-02-05 DIAGNOSIS — E66.9: ICD-10-CM

## 2019-02-05 DIAGNOSIS — Z79.899: ICD-10-CM

## 2019-02-05 LAB
BASOPHILS # BLD AUTO: 0.1 THOU/UL (ref 0–0.2)
BASOPHILS NFR BLD AUTO: 0.6 % (ref 0–1)
EOSINOPHIL # BLD AUTO: 0 THOU/UL (ref 0–0.7)
EOSINOPHIL NFR BLD AUTO: 0.1 % (ref 0–10)
HGB BLD-MCNC: 12.8 G/DL (ref 12–16)
LYMPHOCYTES # BLD AUTO: 3.6 THOU/UL (ref 1.2–3.4)
LYMPHOCYTES NFR BLD AUTO: 19.7 % (ref 21–51)
MCH RBC QN AUTO: 29.9 PG (ref 27–31)
MCV RBC AUTO: 87.5 FL (ref 78–98)
MONOCYTES # BLD AUTO: 0.9 THOU/UL (ref 0.11–0.59)
MONOCYTES NFR BLD AUTO: 4.9 % (ref 0–10)
NEUTROPHILS # BLD AUTO: 13.7 THOU/UL (ref 1.4–6.5)
NEUTROPHILS NFR BLD AUTO: 74.7 % (ref 42–75)
PLATELET # BLD AUTO: 198 THOU/UL (ref 130–400)
RBC # BLD AUTO: 4.3 MILL/UL (ref 4.2–5.4)
WBC # BLD AUTO: 18.3 THOU/UL (ref 4.8–10.8)

## 2019-02-05 PROCEDURE — 85025 COMPLETE CBC W/AUTO DIFF WBC: CPT

## 2019-02-05 PROCEDURE — 99283 EMERGENCY DEPT VISIT LOW MDM: CPT

## 2019-02-05 PROCEDURE — 36415 COLL VENOUS BLD VENIPUNCTURE: CPT

## 2019-02-05 PROCEDURE — 82550 ASSAY OF CK (CPK): CPT

## 2019-02-05 PROCEDURE — 80053 COMPREHEN METABOLIC PANEL: CPT

## 2019-02-06 LAB
ALBUMIN SERPL BCG-MCNC: 4.2 G/DL (ref 3.5–5)
ALP SERPL-CCNC: 56 U/L (ref 40–150)
ALT SERPL W P-5'-P-CCNC: 52 U/L (ref 8–55)
ANION GAP SERPL CALC-SCNC: 15 MMOL/L (ref 10–20)
AST SERPL-CCNC: 43 U/L (ref 5–34)
BILIRUB SERPL-MCNC: 0.4 MG/DL (ref 0.2–1.2)
BUN SERPL-MCNC: 32 MG/DL (ref 9.8–20.1)
CALCIUM SERPL-MCNC: 9.9 MG/DL (ref 7.8–10.44)
CHLORIDE SERPL-SCNC: 104 MMOL/L (ref 98–107)
CO2 SERPL-SCNC: 23 MMOL/L (ref 22–29)
CREAT CL PREDICTED SERPL C-G-VRATE: 0 ML/MIN (ref 70–130)
GLOBULIN SER CALC-MCNC: 2.4 G/DL (ref 2.4–3.5)
GLUCOSE SERPL-MCNC: 100 MG/DL (ref 70–105)
POTASSIUM SERPL-SCNC: 3.6 MMOL/L (ref 3.5–5.1)
SODIUM SERPL-SCNC: 138 MMOL/L (ref 136–145)

## 2019-02-18 ENCOUNTER — HOSPITAL ENCOUNTER (EMERGENCY)
Dept: HOSPITAL 18 - NAV ERS | Age: 54
Discharge: HOME | End: 2019-02-18
Payer: COMMERCIAL

## 2019-02-18 DIAGNOSIS — I25.2: ICD-10-CM

## 2019-02-18 DIAGNOSIS — Z79.51: ICD-10-CM

## 2019-02-18 DIAGNOSIS — F31.9: ICD-10-CM

## 2019-02-18 DIAGNOSIS — E78.1: ICD-10-CM

## 2019-02-18 DIAGNOSIS — Z79.891: ICD-10-CM

## 2019-02-18 DIAGNOSIS — Z79.4: ICD-10-CM

## 2019-02-18 DIAGNOSIS — F17.210: ICD-10-CM

## 2019-02-18 DIAGNOSIS — E66.9: ICD-10-CM

## 2019-02-18 DIAGNOSIS — I25.10: ICD-10-CM

## 2019-02-18 DIAGNOSIS — Z79.899: ICD-10-CM

## 2019-02-18 DIAGNOSIS — E11.9: ICD-10-CM

## 2019-02-18 DIAGNOSIS — R53.81: Primary | ICD-10-CM

## 2019-02-18 LAB
ALBUMIN SERPL BCG-MCNC: 4.2 G/DL (ref 3.5–5)
ALP SERPL-CCNC: 58 U/L (ref 40–150)
ALT SERPL W P-5'-P-CCNC: 49 U/L (ref 8–55)
ANION GAP SERPL CALC-SCNC: 18 MMOL/L (ref 10–20)
AST SERPL-CCNC: 37 U/L (ref 5–34)
BASOPHILS # BLD AUTO: 0.1 THOU/UL (ref 0–0.2)
BASOPHILS NFR BLD AUTO: 1 % (ref 0–1)
BILIRUB SERPL-MCNC: 0.6 MG/DL (ref 0.2–1.2)
BUN SERPL-MCNC: 23 MG/DL (ref 9.8–20.1)
CALCIUM SERPL-MCNC: 9.1 MG/DL (ref 7.8–10.44)
CHLORIDE SERPL-SCNC: 100 MMOL/L (ref 98–107)
CK SERPL-CCNC: 70 U/L (ref 29–168)
CO2 SERPL-SCNC: 22 MMOL/L (ref 22–29)
CREAT CL PREDICTED SERPL C-G-VRATE: 0 ML/MIN (ref 70–130)
EOSINOPHIL # BLD AUTO: 0.1 THOU/UL (ref 0–0.7)
EOSINOPHIL NFR BLD AUTO: 0.8 % (ref 0–10)
GLOBULIN SER CALC-MCNC: 2.8 G/DL (ref 2.4–3.5)
GLUCOSE SERPL-MCNC: 66 MG/DL (ref 70–105)
HGB BLD-MCNC: 13.9 G/DL (ref 12–16)
LIPASE SERPL-CCNC: 94 U/L (ref 8–78)
LYMPHOCYTES # BLD AUTO: 3.4 THOU/UL (ref 1.2–3.4)
LYMPHOCYTES NFR BLD AUTO: 27.3 % (ref 21–51)
MCH RBC QN AUTO: 29.6 PG (ref 27–31)
MCV RBC AUTO: 89.3 FL (ref 78–98)
MONOCYTES # BLD AUTO: 0.6 THOU/UL (ref 0.11–0.59)
MONOCYTES NFR BLD AUTO: 5 % (ref 0–10)
NEUTROPHILS # BLD AUTO: 8.2 THOU/UL (ref 1.4–6.5)
NEUTROPHILS NFR BLD AUTO: 66 % (ref 42–75)
PLATELET # BLD AUTO: 151 THOU/UL (ref 130–400)
POTASSIUM SERPL-SCNC: 4 MMOL/L (ref 3.5–5.1)
RBC # BLD AUTO: 4.68 MILL/UL (ref 4.2–5.4)
SODIUM SERPL-SCNC: 136 MMOL/L (ref 136–145)
WBC # BLD AUTO: 12.4 THOU/UL (ref 4.8–10.8)

## 2019-02-18 PROCEDURE — 71045 X-RAY EXAM CHEST 1 VIEW: CPT

## 2019-02-18 PROCEDURE — 80053 COMPREHEN METABOLIC PANEL: CPT

## 2019-02-18 PROCEDURE — 93005 ELECTROCARDIOGRAM TRACING: CPT

## 2019-02-18 PROCEDURE — 82550 ASSAY OF CK (CPK): CPT

## 2019-02-18 PROCEDURE — 36416 COLLJ CAPILLARY BLOOD SPEC: CPT

## 2019-02-18 PROCEDURE — 84484 ASSAY OF TROPONIN QUANT: CPT

## 2019-02-18 PROCEDURE — 85025 COMPLETE CBC W/AUTO DIFF WBC: CPT

## 2019-02-18 PROCEDURE — 83690 ASSAY OF LIPASE: CPT

## 2019-02-18 PROCEDURE — 87804 INFLUENZA ASSAY W/OPTIC: CPT

## 2019-02-18 NOTE — RAD
CHEST ONE VIEW:

 

INDICATIONS:

History of syncope.

 

COMPARISON:

01/28/2019

 

FINDINGS:

The lungs are clear.  The heart size is normal.  No definite pleural effusion or pneumothorax is evid
ent.

 

IMPRESSION:

No acute cardiopulmonary abnormality.

 

POS: [facility]

## 2019-03-16 ENCOUNTER — HOSPITAL ENCOUNTER (EMERGENCY)
Dept: HOSPITAL 18 - NAV ERS | Age: 54
Discharge: HOME | End: 2019-03-16
Payer: COMMERCIAL

## 2019-03-16 DIAGNOSIS — I25.2: ICD-10-CM

## 2019-03-16 DIAGNOSIS — Z79.4: ICD-10-CM

## 2019-03-16 DIAGNOSIS — F31.9: ICD-10-CM

## 2019-03-16 DIAGNOSIS — J20.9: Primary | ICD-10-CM

## 2019-03-16 DIAGNOSIS — E66.9: ICD-10-CM

## 2019-03-16 DIAGNOSIS — I25.10: ICD-10-CM

## 2019-03-16 DIAGNOSIS — Z79.899: ICD-10-CM

## 2019-03-16 DIAGNOSIS — Z79.51: ICD-10-CM

## 2019-03-16 DIAGNOSIS — F17.210: ICD-10-CM

## 2019-03-16 DIAGNOSIS — E78.2: ICD-10-CM

## 2019-03-16 DIAGNOSIS — E11.9: ICD-10-CM

## 2019-03-16 DIAGNOSIS — K29.70: ICD-10-CM

## 2019-03-16 PROCEDURE — 36416 COLLJ CAPILLARY BLOOD SPEC: CPT

## 2019-03-16 PROCEDURE — 71046 X-RAY EXAM CHEST 2 VIEWS: CPT

## 2019-03-16 NOTE — RAD
TWO VIEWS CHEST:

3/16/19

 

HISTORY: 

Cough and wheezing for one month. 

 

PA and lateral views of the chest is obtained on 3/16/19.

 

Comparison made to previous exam from 1/28/19.

 

Two views chest demonstrate the lungs to be well aerated. No evidence of active intrathoracic disease
 seen. No evidence of effusions, pneumonia or pneumothorax seen. 

 

IMPRESSION:  

Normal two views chest. 

 

POS: [facility]

## 2019-04-02 ENCOUNTER — HOSPITAL ENCOUNTER (OUTPATIENT)
Dept: HOSPITAL 92 - ERS | Age: 54
Setting detail: OBSERVATION
LOS: 1 days | Discharge: HOME | End: 2019-04-03
Attending: FAMILY MEDICINE | Admitting: FAMILY MEDICINE
Payer: COMMERCIAL

## 2019-04-02 VITALS — BODY MASS INDEX: 40.8 KG/M2

## 2019-04-02 DIAGNOSIS — E78.5: ICD-10-CM

## 2019-04-02 DIAGNOSIS — Z88.6: ICD-10-CM

## 2019-04-02 DIAGNOSIS — Z98.890: ICD-10-CM

## 2019-04-02 DIAGNOSIS — I25.10: ICD-10-CM

## 2019-04-02 DIAGNOSIS — K86.1: ICD-10-CM

## 2019-04-02 DIAGNOSIS — F17.210: ICD-10-CM

## 2019-04-02 DIAGNOSIS — I13.0: ICD-10-CM

## 2019-04-02 DIAGNOSIS — Z88.5: ICD-10-CM

## 2019-04-02 DIAGNOSIS — Z79.51: ICD-10-CM

## 2019-04-02 DIAGNOSIS — Z79.899: ICD-10-CM

## 2019-04-02 DIAGNOSIS — Z95.5: ICD-10-CM

## 2019-04-02 DIAGNOSIS — I50.9: ICD-10-CM

## 2019-04-02 DIAGNOSIS — N18.3: ICD-10-CM

## 2019-04-02 DIAGNOSIS — E11.22: ICD-10-CM

## 2019-04-02 DIAGNOSIS — Z90.12: ICD-10-CM

## 2019-04-02 DIAGNOSIS — Z79.4: ICD-10-CM

## 2019-04-02 DIAGNOSIS — Z79.02: ICD-10-CM

## 2019-04-02 DIAGNOSIS — Z90.710: ICD-10-CM

## 2019-04-02 DIAGNOSIS — K75.81: ICD-10-CM

## 2019-04-02 DIAGNOSIS — F31.9: ICD-10-CM

## 2019-04-02 DIAGNOSIS — Z88.2: ICD-10-CM

## 2019-04-02 DIAGNOSIS — J44.1: Primary | ICD-10-CM

## 2019-04-02 DIAGNOSIS — Z88.1: ICD-10-CM

## 2019-04-02 DIAGNOSIS — Z90.722: ICD-10-CM

## 2019-04-02 DIAGNOSIS — Z91.09: ICD-10-CM

## 2019-04-02 LAB
ALBUMIN SERPL BCG-MCNC: 3.9 G/DL (ref 3.5–5)
ALP SERPL-CCNC: 106 U/L (ref 40–150)
ALT SERPL W P-5'-P-CCNC: 40 U/L (ref 8–55)
ANION GAP SERPL CALC-SCNC: 12 MMOL/L (ref 10–20)
AST SERPL-CCNC: 29 U/L (ref 5–34)
BASOPHILS # BLD AUTO: 0.1 THOU/UL (ref 0–0.2)
BASOPHILS NFR BLD AUTO: 0.7 % (ref 0–1)
BILIRUB SERPL-MCNC: 0.6 MG/DL (ref 0.2–1.2)
BUN SERPL-MCNC: 13 MG/DL (ref 9.8–20.1)
CALCIUM SERPL-MCNC: 9.1 MG/DL (ref 7.8–10.44)
CHLORIDE SERPL-SCNC: 100 MMOL/L (ref 98–107)
CO2 SERPL-SCNC: 29 MMOL/L (ref 22–29)
CREAT CL PREDICTED SERPL C-G-VRATE: 0 ML/MIN (ref 70–130)
EOSINOPHIL # BLD AUTO: 0.1 THOU/UL (ref 0–0.7)
EOSINOPHIL NFR BLD AUTO: 1.6 % (ref 0–10)
GLOBULIN SER CALC-MCNC: 2.2 G/DL (ref 2.4–3.5)
GLUCOSE SERPL-MCNC: 398 MG/DL (ref 70–105)
HGB BLD-MCNC: 12.9 G/DL (ref 12–16)
LYMPHOCYTES # BLD: 1.9 THOU/UL (ref 1.2–3.4)
LYMPHOCYTES NFR BLD AUTO: 23.1 % (ref 21–51)
MCH RBC QN AUTO: 30 PG (ref 27–31)
MCV RBC AUTO: 88 FL (ref 78–98)
MONOCYTES # BLD AUTO: 0.5 THOU/UL (ref 0.11–0.59)
MONOCYTES NFR BLD AUTO: 5.9 % (ref 0–10)
NEUTROPHILS # BLD AUTO: 5.6 THOU/UL (ref 1.4–6.5)
NEUTROPHILS NFR BLD AUTO: 68.7 % (ref 42–75)
PLATELET # BLD AUTO: 140 THOU/UL (ref 130–400)
POTASSIUM SERPL-SCNC: 3.8 MMOL/L (ref 3.5–5.1)
RBC # BLD AUTO: 4.31 MILL/UL (ref 4.2–5.4)
SODIUM SERPL-SCNC: 137 MMOL/L (ref 136–145)
WBC # BLD AUTO: 8.1 THOU/UL (ref 4.8–10.8)

## 2019-04-02 PROCEDURE — 85025 COMPLETE CBC W/AUTO DIFF WBC: CPT

## 2019-04-02 PROCEDURE — 83036 HEMOGLOBIN GLYCOSYLATED A1C: CPT

## 2019-04-02 PROCEDURE — 82805 BLOOD GASES W/O2 SATURATION: CPT

## 2019-04-02 PROCEDURE — 93005 ELECTROCARDIOGRAM TRACING: CPT

## 2019-04-02 PROCEDURE — 94760 N-INVAS EAR/PLS OXIMETRY 1: CPT

## 2019-04-02 PROCEDURE — 71045 X-RAY EXAM CHEST 1 VIEW: CPT

## 2019-04-02 PROCEDURE — 80048 BASIC METABOLIC PNL TOTAL CA: CPT

## 2019-04-02 PROCEDURE — 80053 COMPREHEN METABOLIC PANEL: CPT

## 2019-04-02 PROCEDURE — 96372 THER/PROPH/DIAG INJ SC/IM: CPT

## 2019-04-02 PROCEDURE — 84484 ASSAY OF TROPONIN QUANT: CPT

## 2019-04-02 PROCEDURE — 72040 X-RAY EXAM NECK SPINE 2-3 VW: CPT

## 2019-04-02 PROCEDURE — 36415 COLL VENOUS BLD VENIPUNCTURE: CPT

## 2019-04-02 PROCEDURE — 85379 FIBRIN DEGRADATION QUANT: CPT

## 2019-04-02 PROCEDURE — 84145 PROCALCITONIN (PCT): CPT

## 2019-04-02 PROCEDURE — 94640 AIRWAY INHALATION TREATMENT: CPT

## 2019-04-02 PROCEDURE — 36416 COLLJ CAPILLARY BLOOD SPEC: CPT

## 2019-04-02 PROCEDURE — G0378 HOSPITAL OBSERVATION PER HR: HCPCS

## 2019-04-02 RX ADMIN — INSULIN LISPRO PRN UNIT: 100 INJECTION, SOLUTION INTRAVENOUS; SUBCUTANEOUS at 23:48

## 2019-04-02 NOTE — PDOC.FPRHP
- History of Present Illness


Chief Complaint: SOB


History of Present Illness: 





55yo F with pmh of copd comes in with 2 months hx of nasal congestion and SOB, 

with greater SOB x1 day. Pt was Dx with COPD back in January 2018 and COPD 

exacerbation then. Since then she has been controlled with pro-air alone only 

using it 4 times over the last year. She does report having a cough but has had 

no change in clear sputum. Pt also reports intermittent subjective chills and 

increased fatigue, intermittent diarrhea (2/day, non bloody), and nausea.  





Of note pt reports that she was given azithromycin and a steroid 2 months ago 

for some sort of suspected respiratory infection (presumably COPD exacerbation)

. she completed antibiotics and had stable symptoms until 1-2 days ago. 


ED Course: 





solumedrol and duonebs





- Allergies/Adverse Reactions


 Allergies











Allergy/AdvReac Type Severity Reaction Status Date / Time


 


adhesive Allergy Severe  Verified 04/03/19 01:59


 


aspirin Allergy   Verified 01/09/19 18:42


 


codeine Allergy   Verified 01/09/19 18:42


 


hydrocodone Allergy   Verified 01/09/19 18:42


 


levofloxacin [From Levaquin] Allergy   Verified 01/09/19 18:42


 


NSAIDS (Non-Steroidal Allergy   Verified 01/09/19 18:42





Anti-Inflamma     


 


Sulfa (Sulfonamide Allergy   Verified 01/09/19 18:42





Antibiotics)     


 


trimethoprim [From Bactrim] Allergy   Verified 01/09/19 18:42


 


tramadol AdvReac Mild ITCHING Verified 01/09/19 18:42














- Home Medications


 











 Medication  Instructions  Recorded  Confirmed  Type


 


Cetirizine HCl [Zyrtec] 10 mg PO HS 01/31/17 04/02/19 History


 


Gabapentin 800 mg PO BID 01/31/17 04/02/19 History


 


Lisinopril 10 mg PO QPM 01/31/17 04/02/19 History


 


Magnesium 500 mg PO BID 01/31/17 04/02/19 History


 


PARoxetine HCl [Paroxetine HCl] 20 mg PO HS 01/31/17 04/02/19 History


 


risperiDONE [Risperidone] 0.5 mg PO HS 01/31/17 04/02/19 History


 


Pancrelipase  53163 [Creon DR 4 cap PO TID-WM #30 cap 10/15/17 04/02/19 Rx





12,000 Units]    


 


Ondansetron [Zofran ODT] 4 mg PO Q6H PRN 4 Days #16 tab 03/07/18 04/02/19 Rx


 


Atorvastatin Calcium [Lipitor] 40 mg PO HS 30 Days #30 tab 03/08/18 04/02/19 Rx


 


Potassium Chloride [Klor-Con 10] 10 meq PO DAILY #30 tab 03/13/18 04/02/19 Rx


 


Ergocalciferol (Vitamin D2) 50,000 unit PO ASDIR 04/30/18 04/02/19 History





[Vitamin D2]    


 


Fenofibrate [Tricor] 160 mg PO HS 04/30/18 04/02/19 History


 


Omega-3 Acid Ethyl Esters [Lovaza] 2 gm PO BID 04/30/18 04/02/19 History


 


Promethazine [Phenergan] 25 mg PO Q6HR PRN 04/30/18 04/02/19 History


 


Ranitidine HCl 300 mg PO BID 04/30/18 04/02/19 History


 


Insulin Glargine,Hum.Rec.Anlog 87 unit SQ BID 09/24/18 04/02/19 History





[Toujeo Solostar]    


 


Folic Acid [Folvite] 1 mg PO HS 01/09/19 04/02/19 History


 


Torsemide [Demadex] 40 mg PO ASDIR 01/09/19 04/02/19 History


 


Isosorbide Mononitrate [Imdur] 60 mg PO DAILY #30 tab 01/10/19 04/02/19 Rx


 


Morphine IR [Morphine Oral 6 ml PO Q4HR PRN 04/02/19 04/02/19 History





Solution]    


 


Clopidogrel Bisulfate [Plavix] 75 mg PO DAILY 04/03/19 04/03/19 History














- History


PMHx: CAD s/p stent placement x2, DMII, HLD, Bipolar, Depression, COPD, asthma, 

benign pancreatic mass w/ chronic pancreatitis, AGUILAR


 


PSHx: neck sx, hysterectomy, B/L oophrectomy, left knee, skin CA removal, B/L 

lumpectomy, partial L breast removal





FHx: mother with cardiac problems


 


Social: 42 pack year smoking history, No EtOH or drug use. 





Allergy: aspirin, Bactrim, codeine sulfate, Levaquin, NSAIDS (Non-Steroidal Anti

-Inflammatory Drug), traMADol, Vicodin





CODE: FULL





- Review of Systems


General: reports: fatigue, other (no fevers, intermittent subjective chills)


Eyes: denies: eye pain, vision changes


ENT: reports: nasal congestion


Respiratory: reports: cough, shortness of breath


Cardiovascular: denies: chest pain, palpitation


Gastrointestinal: reports: nausea, diarrhea.  denies: vomiting, constipation


Genitourinary: denies: incontinence, dysuria


Skin: denies: rashes, lesions


Musculoskeletal: denies: pain, tenderness


Neurological: denies: numbness, syncope, seizure


Psychological: denies: anxiety, depression





- Vital signs


BP: 119/56, Pulse: 90, Resp: 17 (Non-Labored), Pain: 4, O2 sat: 97 on Room Air, 

TMAX 97.9 degrees F,  Time: 4/2/2019 19:23.


weight: 108kg








- Physical Exam


Constitutional: NAD, awake, alert and oriented


HEENT: EOMI, grossly normal vision, grossly normal hearing


Neck: supple, trachea midline


Chest: no-tender to palpation


Heart: RRR, normal S1/S2


-Lungs: 


decreased breath sounds bilaterally, mild end expiratory wheezign on bilateral 

lung bases


Abdomen: soft, non-tender


Musculoskeletal: normal structure, normal tone


Neurological: no focal deficit, normal sensation


Skin: no rash/lesions, good turgor


Heme/Lymphatic: no purpura, no petechia


Psychiatric: normal mood and affect, good judgment and insight





FMR H&P: Results





- Labs


Result Diagrams: 


 04/03/19 05:59





 04/03/19 05:59


Lab results: 


 











WBC  8.1 thou/uL (4.8-10.8)   04/02/19  17:38    


 


Hgb  12.9 g/dL (12.0-16.0)   04/02/19  17:38    


 


Hct  37.9 % (36.0-47.0)   04/02/19  17:38    


 


MCV  88.0 fL (78.0-98.0)   04/02/19  17:38    


 


Plt Count  140 thou/uL (130-400)   04/02/19  17:38    


 


Neutrophils %  68.7 % (42.0-75.0)   04/02/19  17:38    


 


Sodium  137 mmol/L (136-145)   04/02/19  17:38    


 


Potassium  3.8 mmol/L (3.5-5.1)   04/02/19  17:38    


 


Chloride  100 mmol/L ()   04/02/19  17:38    


 


Carbon Dioxide  29 mmol/L (22-29)   04/02/19  17:38    


 


BUN  13 mg/dL (9.8-20.1)   04/02/19  17:38    


 


Creatinine  1.18 mg/dL (0.6-1.1)  H  04/02/19  17:38    


 


Glucose  398 mg/dL ()  H  04/02/19  17:38    


 


Calcium  9.1 mg/dL (7.8-10.44)   04/02/19  17:38    


 


Total Bilirubin  0.6 mg/dL (0.2-1.2)   04/02/19  17:38    


 


AST  29 U/L (5-34)   04/02/19  17:38    


 


ALT  40 U/L (8-55)   04/02/19  17:38    


 


Alkaline Phosphatase  106 U/L ()   04/02/19  17:38    


 


Serum Total Protein  6.1 g/dL (6.0-8.3)   04/02/19  17:38    


 


Albumin  3.9 g/dL (3.5-5.0)   04/02/19  17:38    














FMR H&P: A/P





- Problem List


(1) COPD exacerbation


Current Visit: No   Status: Acute   Code(s): J44.1 - CHRONIC OBSTRUCTIVE 

PULMONARY DISEASE W (ACUTE) EXACERBATION   





(2) Chronic pancreatitis


Current Visit: No   Status: Acute   Code(s): K86.1 - OTHER CHRONIC PANCREATITIS

   





(3) Bipolar disorder


Current Visit: No   Status: Chronic   Code(s): F31.9 - BIPOLAR DISORDER, 

UNSPECIFIED   


Qualifiers: 


   Active/Remission status: remission status unspecified   Qualified Code(s): 

F31.9 - Bipolar disorder, unspecified   





(4) CAD (coronary artery disease)


Current Visit: No   Status: Chronic   Code(s): I25.10 - ATHSCL HEART DISEASE OF 

NATIVE CORONARY ARTERY W/O ANG PCTRS   


Qualifiers: 


   Coronary Disease-Associated Artery/Lesion type: native artery   Native vs. 

transplanted heart: native heart   Associated angina: angina presence 

unspecified   Qualified Code(s): I25.10 - Atherosclerotic heart disease of 

native coronary artery without angina pectoris   





(5) Chronic kidney disease, stage 3


Current Visit: No   Status: Chronic   





(6) Diabetes mellitus type 2 in obese


Current Visit: No   Status: Chronic   Code(s): E11.9 - TYPE 2 DIABETES MELLITUS 

WITHOUT COMPLICATIONS; E66.9 - OBESITY, UNSPECIFIED   





(7) Hyperlipidemia


Current Visit: No   Status: Chronic   Code(s): E78.5 - HYPERLIPIDEMIA, 

UNSPECIFIED   


Qualifiers: 


   Hyperlipidemia type: unspecified   Qualified Code(s): E78.5 - Hyperlipidemia

, unspecified   





(8) Hypertension


Current Visit: No   Status: Chronic   Code(s): I10 - ESSENTIAL (PRIMARY) 

HYPERTENSION   


Qualifiers: 


   Hypertension type: essential hypertension   Qualified Code(s): I10 - 

Essential (primary) hypertension   





- Plan


COPD exacerbation


A- mild in severity, though pt has a somewhat impressive exam she is satting 

well on RA with minimal work of breathing. She does desat while talking without 

taking a breath down to 80s. Pt reports to be previously controlled on 

albuterol alone for over 1 year.


P- admit to medical obs


- ABG to establish baseline for CO2 retention


- prednisone daily


- duonebs q2hr schedule, will plan to space out as tolerated


- O2 prn, keep sats 89-92





Diarrhea


A- gastroenteritis vs. other infectious etiology


P- if pt continues to have loose stools will collect to c. diff and consider 

other stool studies





CKD3


A- Cr. 1.18, lowest its been in some time


P- monitor with BMPs





DMII


A- Pt on toujeo and agressive SSI. Toujeo not carried by hospital


P- Lantus 80u daily, Agressive SSI, accuchecks





HLD


-home statin





CAD s/p stent placement x2


- home medications





Bipolar


home meds





Depression


-home meds





benign pancreatic mass w/ chronic pancreatitis


-MD aware





AGUILAR


- continue home statin





Tobacco use


- patch daily,  cessation





CODE: FULL





FMR H&P: Upper Level





- Pertinent history


Carmen Nguyen is a 54 year old female who presents to the ED with worsening 

dyspnea and cough over the past day. She has had daily symptoms for the past 2 

months. She was treated with antibiotics for dyspnea by her PCP 2 months ago. 

She was given Solumedrol and Duonebs in the ED. 





- Pertinent findings





Vitals as described above. Hypoxia noted with minimal exertion.





Exam:


General: alert and oriented x 3 in no distress.


Heart: regular rate and rhythm, no murmurs, rubs, or gallops


Lungs: clear to auscultation bilaterally, no crackles, wheezes, or rhonchi.








CXR: no acute cardiopulmonary process.





- Plan


Date/Time: 04/02/19 1949








I, Rebekah Jeffers, have evaluated this patient and agree with findings/plan as 

outlined by intern resident. Pertinent changes/additions are listed here.





Acute hypoxic respiratory failure


- 2/2 to COPD exacerbation


- supplemental O2 as needed.








COPD exacerbation


- Duonebs IVAN and PRN.


- will recommend Spiriva after discharge.


- prednisone


- smoking cessation strongly encouraged.





Neck pain


- pt has a history of cervical fusion and notes increased neck pain triggered 

by coughing. Pt expressed concern for status of plates, will check plain film 

of c-spine.





Poorly controlled diabetes


- will check A1C


- restart home insulin


- AC/HS accuchecks


- pt not on PO meds; will discuss further with patient and consider starting 

additional agent.





Addendum - Attending





- Attending Attestation


Date/Time: 04/03/19 0705





I personally evaluated the patient and discussed the management with Dr. Xiong last night.


I agree with the History, Examination, Assessment and Plan documented above 

with any addition or exceptions noted below.

## 2019-04-03 VITALS — SYSTOLIC BLOOD PRESSURE: 146 MMHG | TEMPERATURE: 97.7 F | DIASTOLIC BLOOD PRESSURE: 78 MMHG

## 2019-04-03 LAB
ANALYZER IN CARDIO: (no result)
ANION GAP SERPL CALC-SCNC: 16 MMOL/L (ref 10–20)
BASE EXCESS STD BLDA CALC-SCNC: -1 MEQ/L
BASOPHILS # BLD AUTO: 0 THOU/UL (ref 0–0.2)
BASOPHILS NFR BLD AUTO: 0.1 % (ref 0–1)
BUN SERPL-MCNC: 19 MG/DL (ref 9.8–20.1)
CA-I BLDA-SCNC: 1.17 MMOL/L (ref 1.12–1.3)
CALCIUM SERPL-MCNC: 9.6 MG/DL (ref 7.8–10.44)
CHLORIDE SERPL-SCNC: 98 MMOL/L (ref 98–107)
CO2 SERPL-SCNC: 26 MMOL/L (ref 22–29)
CREAT CL PREDICTED SERPL C-G-VRATE: 88 ML/MIN (ref 70–130)
EOSINOPHIL # BLD AUTO: 0 THOU/UL (ref 0–0.7)
EOSINOPHIL NFR BLD AUTO: 0.1 % (ref 0–10)
GLUCOSE SERPL-MCNC: 567 MG/DL (ref 70–105)
HCO3 BLDA-SCNC: 23.6 MEQ/L (ref 22–28)
HCT VFR BLDA CALC: 38 % (ref 36–47)
HGB BLD-MCNC: 12.2 G/DL (ref 12–16)
HGB BLDA-MCNC: 12.9 G/DL (ref 12–16)
LYMPHOCYTES # BLD: 1 THOU/UL (ref 1.2–3.4)
LYMPHOCYTES NFR BLD AUTO: 9.9 % (ref 21–51)
MCH RBC QN AUTO: 30 PG (ref 27–31)
MCV RBC AUTO: 89.1 FL (ref 78–98)
MONOCYTES # BLD AUTO: 0.3 THOU/UL (ref 0.11–0.59)
MONOCYTES NFR BLD AUTO: 3 % (ref 0–10)
NEUTROPHILS # BLD AUTO: 9.1 THOU/UL (ref 1.4–6.5)
NEUTROPHILS NFR BLD AUTO: 86.9 % (ref 42–75)
PCO2 BLDA: 39 MMHG (ref 35–45)
PH BLDA: 7.4 [PH] (ref 7.35–7.45)
PLATELET # BLD AUTO: 138 THOU/UL (ref 130–400)
PO2 BLDA: 75.9 MMHG (ref 80–100)
POTASSIUM BLD-SCNC: 4.1 MMOL/L (ref 3.7–5.3)
POTASSIUM SERPL-SCNC: 4.5 MMOL/L (ref 3.5–5.1)
RBC # BLD AUTO: 4.07 MILL/UL (ref 4.2–5.4)
SODIUM SERPL-SCNC: 135 MMOL/L (ref 136–145)
SPECIMEN DRAWN FROM PATIENT: (no result)
WBC # BLD AUTO: 10.4 THOU/UL (ref 4.8–10.8)

## 2019-04-03 RX ADMIN — INSULIN LISPRO PRN UNIT: 100 INJECTION, SOLUTION INTRAVENOUS; SUBCUTANEOUS at 06:17

## 2019-04-03 NOTE — RAD
CERVICAL SPINE 4 VIEWS:

 

Date:  04/03/19 

 

HISTORY:  

Neck pain. History of prior fusion. 

 

FINDINGS:

Evidence of prior fusion procedure. Anterior plate and screws transfix C4, C5, C6, and C7. Interbody 
implants and interbody fusion is noted. Posterior cerclage wires are also seen throughout these level
s. 

 

Posterior alignment appears maintained. The C2-3 and C3-4 disc spaces are preserved. C7-T1 level not 
adequately evaluated. 

 

There are mild degenerative changes, mild spurring, and mild facet hypertrophy noted. 

 

Small osteophyte seen anteriorly at C2-3. 

 

IMPRESSION: 

Degenerative and postoperative changes of cervical spine as described. 

 

 

POS: [facility]

## 2019-04-03 NOTE — PDOC.FM
- Subjective


Subjective: 





VLADIMIR overnight. Pt reports imprvement of respiratory status with current 

medications. Pt is ID diabetic and had draastically [id number] blood sugars 

after the administration of steroids. Otherwise, no complaints. Pt is 

ambulating halls and will have walk test. Given extensive smoking history and 

frequent hospitalizations for cardiac vs COPD recommend increased OP coverage 

with LAMA LABA and establishing with OP pulmonology. Pt agreeable. Will try 

walk test to test for home O2 requirement. 





- Objective


Vital Signs & Weight: 


 Vital Signs (12 hours)











  Temp Pulse Resp BP BP Pulse Ox


 


 04/03/19 10:33   95  16    98


 


 04/03/19 07:32  98.1 F  95  16  144/68 H   95


 


 04/03/19 06:27       97


 


 04/03/19 06:24   83  16    97


 


 04/03/19 05:06  97.1 F L  93  18   128/67  99


 


 04/03/19 04:59   92  16   


 


 04/03/19 02:07       96


 


 04/03/19 00:53    16   


 


 04/02/19 23:29  98.3 F  90  20   147/72 H  94 L


 


 04/02/19 22:50   90  16    96








 Weight











Weight                         107.955 kg














I&O: 


 











 04/02/19 04/03/19 04/04/19





 06:59 06:59 06:59


 


Intake Total  480 


 


Balance  480 











Result Diagrams: 


 04/03/19 05:59





 04/03/19 05:59





Phys Exam





- Physical Examination


Constitutional: NAD


HEENT: PERRLA, sclera anicteric


Neck: no nodes, no JVD


Respiratory: no wheezing, no rales, no rhonchi, clear to auscultation bilateral


Cardiovascular: RRR, no significant murmur, no rub


Gastrointestinal: soft, non-tender, no distention, positive bowel sounds


Musculoskeletal: no edema, pulses present


Neurological: non-focal, moves all 4 limbs


Psychiatric: normal affect


Skin: no rash, cap refill <2 seconds





Dx/Plan


(1) COPD exacerbation


Code(s): J44.1 - CHRONIC OBSTRUCTIVE PULMONARY DISEASE W (ACUTE) EXACERBATION   

Status: Acute   





(2) CHF (congestive heart failure)


Code(s): I50.9 - HEART FAILURE, UNSPECIFIED   Status: Chronic   





(3) Chronic kidney disease, stage 3


Status: Chronic   





(4) Diabetes mellitus type 2 in obese


Code(s): E11.9 - TYPE 2 DIABETES MELLITUS WITHOUT COMPLICATIONS; E66.9 - OBESITY

, UNSPECIFIED   Status: Chronic   





- Plan


Plan: 





1) COPD exacerbation


- appears improved and pt satting well on room air with benign lung exam 


- will plan to DC home wiht oral steroid regimen and LAMA/LABA combined inhaler 

in addition to proair prn 


- OP pulm information given 


- pt has 80+ pack year history 





2) CHF: appears stable, cont home medications





3) DMII: cont insulin


- may need to increase SA insulin at home 2/2 steroid use





4) CKD:


- Cr at baseline





Dispo: stable, will give SA insulin and plan for LAMA LABA addition OP. Plan 

for dc to home today. 








Addendum - Attending





- Attending Attestation


Date/Time: 04/03/19 2394





I personally evaluated the patient and discussed the management with Dr. Hernández.


I agree with the History, Examination, Assessment and Plan documented above 

with any addition or exceptions noted below.


Pt is hyperglycemic this morning.  Giving increased humalog dose.  Likely 2/2 

steroids.  Will adjust home insulin regimen.  If sugar improves, pt can 

discharge home as she is ambulating in the hallways and maintaining sats.

## 2019-05-18 ENCOUNTER — HOSPITAL ENCOUNTER (EMERGENCY)
Dept: HOSPITAL 18 - NAV ERS | Age: 54
Discharge: HOME | End: 2019-05-18
Payer: COMMERCIAL

## 2019-05-18 DIAGNOSIS — E11.9: ICD-10-CM

## 2019-05-18 DIAGNOSIS — F17.210: ICD-10-CM

## 2019-05-18 DIAGNOSIS — E66.9: ICD-10-CM

## 2019-05-18 DIAGNOSIS — F31.9: ICD-10-CM

## 2019-05-18 DIAGNOSIS — Z79.51: ICD-10-CM

## 2019-05-18 DIAGNOSIS — Z79.4: ICD-10-CM

## 2019-05-18 DIAGNOSIS — E78.1: ICD-10-CM

## 2019-05-18 DIAGNOSIS — I25.2: ICD-10-CM

## 2019-05-18 DIAGNOSIS — I25.10: ICD-10-CM

## 2019-05-18 DIAGNOSIS — Z79.899: ICD-10-CM

## 2019-05-18 DIAGNOSIS — K86.1: Primary | ICD-10-CM

## 2019-05-18 LAB
ALBUMIN SERPL BCG-MCNC: 4 G/DL (ref 3.5–5)
ALP SERPL-CCNC: 100 U/L (ref 40–150)
ALT SERPL W P-5'-P-CCNC: 42 U/L (ref 8–55)
ANION GAP SERPL CALC-SCNC: 18 MMOL/L (ref 10–20)
AST SERPL-CCNC: 32 U/L (ref 5–34)
BASOPHILS # BLD AUTO: 0.1 THOU/UL (ref 0–0.2)
BASOPHILS NFR BLD AUTO: 0.9 % (ref 0–1)
BILIRUB SERPL-MCNC: 0.4 MG/DL (ref 0.2–1.2)
BUN SERPL-MCNC: 14 MG/DL (ref 9.8–20.1)
CALCIUM SERPL-MCNC: 9.7 MG/DL (ref 7.8–10.44)
CHLORIDE SERPL-SCNC: 97 MMOL/L (ref 98–107)
CO2 SERPL-SCNC: 27 MMOL/L (ref 22–29)
CREAT CL PREDICTED SERPL C-G-VRATE: 0 ML/MIN (ref 70–130)
EOSINOPHIL # BLD AUTO: 0.1 THOU/UL (ref 0–0.7)
EOSINOPHIL NFR BLD AUTO: 0.7 % (ref 0–10)
GLOBULIN SER CALC-MCNC: 2.5 G/DL (ref 2.4–3.5)
GLUCOSE SERPL-MCNC: 171 MG/DL (ref 70–105)
HGB BLD-MCNC: 13.4 G/DL (ref 12–16)
LIPASE SERPL-CCNC: 69 U/L (ref 8–78)
LYMPHOCYTES # BLD AUTO: 2.9 THOU/UL (ref 1.2–3.4)
LYMPHOCYTES NFR BLD AUTO: 24.4 % (ref 21–51)
MCH RBC QN AUTO: 27.8 PG (ref 27–31)
MCV RBC AUTO: 87.9 FL (ref 78–98)
MONOCYTES # BLD AUTO: 0.8 THOU/UL (ref 0.11–0.59)
MONOCYTES NFR BLD AUTO: 6.9 % (ref 0–10)
NEUTROPHILS # BLD AUTO: 7.9 THOU/UL (ref 1.4–6.5)
NEUTROPHILS NFR BLD AUTO: 67.1 % (ref 42–75)
PLATELET # BLD AUTO: 159 THOU/UL (ref 130–400)
POTASSIUM SERPL-SCNC: 3.4 MMOL/L (ref 3.5–5.1)
RBC # BLD AUTO: 4.81 MILL/UL (ref 4.2–5.4)
SODIUM SERPL-SCNC: 139 MMOL/L (ref 136–145)
WBC # BLD AUTO: 11.8 THOU/UL (ref 4.8–10.8)

## 2019-05-18 PROCEDURE — 80053 COMPREHEN METABOLIC PANEL: CPT

## 2019-05-18 PROCEDURE — 96374 THER/PROPH/DIAG INJ IV PUSH: CPT

## 2019-05-18 PROCEDURE — 83690 ASSAY OF LIPASE: CPT

## 2019-05-18 PROCEDURE — 85025 COMPLETE CBC W/AUTO DIFF WBC: CPT

## 2019-05-18 PROCEDURE — 36415 COLL VENOUS BLD VENIPUNCTURE: CPT

## 2019-05-18 PROCEDURE — 96361 HYDRATE IV INFUSION ADD-ON: CPT

## 2019-05-18 PROCEDURE — 96375 TX/PRO/DX INJ NEW DRUG ADDON: CPT

## 2019-05-23 ENCOUNTER — HOSPITAL ENCOUNTER (EMERGENCY)
Dept: HOSPITAL 18 - NAV ERS | Age: 54
Discharge: HOME | End: 2019-05-23
Payer: COMMERCIAL

## 2019-05-23 DIAGNOSIS — I25.10: ICD-10-CM

## 2019-05-23 DIAGNOSIS — E66.9: ICD-10-CM

## 2019-05-23 DIAGNOSIS — F17.210: ICD-10-CM

## 2019-05-23 DIAGNOSIS — I25.2: ICD-10-CM

## 2019-05-23 DIAGNOSIS — E78.2: ICD-10-CM

## 2019-05-23 DIAGNOSIS — E11.9: ICD-10-CM

## 2019-05-23 DIAGNOSIS — Z79.899: ICD-10-CM

## 2019-05-23 DIAGNOSIS — K86.1: Primary | ICD-10-CM

## 2019-05-23 LAB
ALBUMIN SERPL BCG-MCNC: 4.4 G/DL (ref 3.5–5)
ALP SERPL-CCNC: 115 U/L (ref 40–150)
ALT SERPL W P-5'-P-CCNC: 55 U/L (ref 8–55)
ANION GAP SERPL CALC-SCNC: 19 MMOL/L (ref 10–20)
AST SERPL-CCNC: 50 U/L (ref 5–34)
BASOPHILS # BLD AUTO: 0.1 THOU/UL (ref 0–0.2)
BASOPHILS NFR BLD AUTO: 0.9 % (ref 0–1)
BILIRUB SERPL-MCNC: 0.5 MG/DL (ref 0.2–1.2)
BUN SERPL-MCNC: 18 MG/DL (ref 9.8–20.1)
CALCIUM SERPL-MCNC: 9.9 MG/DL (ref 7.8–10.44)
CHLORIDE SERPL-SCNC: 101 MMOL/L (ref 98–107)
CO2 SERPL-SCNC: 24 MMOL/L (ref 22–29)
CREAT CL PREDICTED SERPL C-G-VRATE: 0 ML/MIN (ref 70–130)
EOSINOPHIL # BLD AUTO: 0 THOU/UL (ref 0–0.7)
EOSINOPHIL NFR BLD AUTO: 0.2 % (ref 0–10)
GLOBULIN SER CALC-MCNC: 2.8 G/DL (ref 2.4–3.5)
GLUCOSE SERPL-MCNC: 102 MG/DL (ref 70–105)
HGB BLD-MCNC: 14.7 G/DL (ref 12–16)
LIPASE SERPL-CCNC: 42 U/L (ref 8–78)
LYMPHOCYTES # BLD AUTO: 3.3 THOU/UL (ref 1.2–3.4)
LYMPHOCYTES NFR BLD AUTO: 24.4 % (ref 21–51)
MCH RBC QN AUTO: 27.8 PG (ref 27–31)
MCV RBC AUTO: 86.5 FL (ref 78–98)
MONOCYTES # BLD AUTO: 0.8 THOU/UL (ref 0.11–0.59)
MONOCYTES NFR BLD AUTO: 6.2 % (ref 0–10)
NEUTROPHILS # BLD AUTO: 9.1 THOU/UL (ref 1.4–6.5)
NEUTROPHILS NFR BLD AUTO: 68.4 % (ref 42–75)
PLATELET # BLD AUTO: 184 THOU/UL (ref 130–400)
POTASSIUM SERPL-SCNC: 3.9 MMOL/L (ref 3.5–5.1)
RBC # BLD AUTO: 5.28 MILL/UL (ref 4.2–5.4)
SODIUM SERPL-SCNC: 140 MMOL/L (ref 136–145)
WBC # BLD AUTO: 13.3 THOU/UL (ref 4.8–10.8)

## 2019-05-23 PROCEDURE — 96365 THER/PROPH/DIAG IV INF INIT: CPT

## 2019-05-23 PROCEDURE — 80053 COMPREHEN METABOLIC PANEL: CPT

## 2019-05-23 PROCEDURE — 85025 COMPLETE CBC W/AUTO DIFF WBC: CPT

## 2019-05-23 PROCEDURE — 74177 CT ABD & PELVIS W/CONTRAST: CPT

## 2019-05-23 PROCEDURE — 83690 ASSAY OF LIPASE: CPT

## 2019-05-23 PROCEDURE — 96375 TX/PRO/DX INJ NEW DRUG ADDON: CPT

## 2019-05-23 PROCEDURE — 96361 HYDRATE IV INFUSION ADD-ON: CPT

## 2019-05-23 NOTE — CT
Contrast-enhanced images abdomen pelvis.



HISTORY: Bilateral abdominal pain



Contrast-enhanced images of the abdomen pelvis obtained.



The lung bases are unremarkable.



Hepatic steatosis seen. No other hepatic lesions seen. Surgical clips seen in the gallbladder fossa.



The spleen is unremarkable except for some small calcifications.



Again pancreatic ductal dilatation seen.



Inferior mid body pancreatic hypodense lesion seen on numerous previous CTs again remains visible and
 is stable. Malignancy cannot be completely excluded.



Calcification of the aorta seen. No evidence of periaortic lymphadenopathy seen.



No dilated loops of small bowel seen.



Extensive sigmoid colonic diverticulosis is present.



IMPRESSION:

1.: Hypodense area seen persistent in the body of the pancreas.



2: Sigmoid colonic diverticulosis.



Reported By: Abdi Hoover 

Electronically Signed:  5/23/2019 8:52 PM

## 2019-06-13 ENCOUNTER — HOSPITAL ENCOUNTER (EMERGENCY)
Dept: HOSPITAL 18 - NAV ERS | Age: 54
Discharge: HOME | End: 2019-06-13
Payer: COMMERCIAL

## 2019-06-13 DIAGNOSIS — J44.1: Primary | ICD-10-CM

## 2019-06-13 DIAGNOSIS — Z79.899: ICD-10-CM

## 2019-06-13 DIAGNOSIS — F17.210: ICD-10-CM

## 2019-06-13 DIAGNOSIS — I25.10: ICD-10-CM

## 2019-06-13 DIAGNOSIS — I25.2: ICD-10-CM

## 2019-06-13 DIAGNOSIS — E66.9: ICD-10-CM

## 2019-06-13 DIAGNOSIS — E11.9: ICD-10-CM

## 2019-06-13 DIAGNOSIS — E78.1: ICD-10-CM

## 2019-06-13 DIAGNOSIS — Z79.4: ICD-10-CM

## 2019-06-13 PROCEDURE — 96372 THER/PROPH/DIAG INJ SC/IM: CPT

## 2019-07-24 ENCOUNTER — HOSPITAL ENCOUNTER (EMERGENCY)
Dept: HOSPITAL 18 - NAV ERS | Age: 54
Discharge: HOME | End: 2019-07-24
Payer: COMMERCIAL

## 2019-07-24 DIAGNOSIS — J44.9: ICD-10-CM

## 2019-07-24 DIAGNOSIS — I25.2: ICD-10-CM

## 2019-07-24 DIAGNOSIS — E66.9: ICD-10-CM

## 2019-07-24 DIAGNOSIS — Z79.1: ICD-10-CM

## 2019-07-24 DIAGNOSIS — R11.0: ICD-10-CM

## 2019-07-24 DIAGNOSIS — I11.0: ICD-10-CM

## 2019-07-24 DIAGNOSIS — I50.9: ICD-10-CM

## 2019-07-24 DIAGNOSIS — F31.9: ICD-10-CM

## 2019-07-24 DIAGNOSIS — E78.2: ICD-10-CM

## 2019-07-24 DIAGNOSIS — Z79.899: ICD-10-CM

## 2019-07-24 DIAGNOSIS — E86.0: Primary | ICD-10-CM

## 2019-07-24 DIAGNOSIS — R42: ICD-10-CM

## 2019-07-24 DIAGNOSIS — E11.9: ICD-10-CM

## 2019-07-24 DIAGNOSIS — I25.10: ICD-10-CM

## 2019-07-24 DIAGNOSIS — Z79.51: ICD-10-CM

## 2019-07-24 DIAGNOSIS — F17.210: ICD-10-CM

## 2019-07-24 LAB
ALBUMIN SERPL BCG-MCNC: 3.8 G/DL (ref 3.5–5)
ALP SERPL-CCNC: 88 U/L (ref 40–150)
ALT SERPL W P-5'-P-CCNC: 28 U/L (ref 8–55)
ANION GAP SERPL CALC-SCNC: 17 MMOL/L (ref 10–20)
AST SERPL-CCNC: 17 U/L (ref 5–34)
BASOPHILS # BLD AUTO: 0.2 THOU/UL (ref 0–0.2)
BASOPHILS NFR BLD AUTO: 1.2 % (ref 0–1)
BILIRUB SERPL-MCNC: 0.4 MG/DL (ref 0.2–1.2)
BUN SERPL-MCNC: 16 MG/DL (ref 9.8–20.1)
CALCIUM SERPL-MCNC: 9 MG/DL (ref 7.8–10.44)
CHLORIDE SERPL-SCNC: 103 MMOL/L (ref 98–107)
CO2 SERPL-SCNC: 21 MMOL/L (ref 22–29)
CREAT CL PREDICTED SERPL C-G-VRATE: 0 ML/MIN (ref 70–130)
EOSINOPHIL # BLD AUTO: 0.2 THOU/UL (ref 0–0.7)
EOSINOPHIL NFR BLD AUTO: 1 % (ref 0–10)
GLOBULIN SER CALC-MCNC: 2.4 G/DL (ref 2.4–3.5)
GLUCOSE SERPL-MCNC: 117 MG/DL (ref 70–105)
HGB BLD-MCNC: 13 G/DL (ref 12–16)
LIPASE SERPL-CCNC: 94 U/L (ref 8–78)
LYMPHOCYTES # BLD AUTO: 3.2 THOU/UL (ref 1.2–3.4)
LYMPHOCYTES NFR BLD AUTO: 20.4 % (ref 21–51)
MCH RBC QN AUTO: 29.2 PG (ref 27–31)
MCV RBC AUTO: 86.1 FL (ref 78–98)
MONOCYTES # BLD AUTO: 0.9 THOU/UL (ref 0.11–0.59)
MONOCYTES NFR BLD AUTO: 5.6 % (ref 0–10)
NEUTROPHILS # BLD AUTO: 11.4 THOU/UL (ref 1.4–6.5)
NEUTROPHILS NFR BLD AUTO: 71.9 % (ref 42–75)
PLATELET # BLD AUTO: 172 THOU/UL (ref 130–400)
POTASSIUM SERPL-SCNC: 4.1 MMOL/L (ref 3.5–5.1)
RBC # BLD AUTO: 4.46 MILL/UL (ref 4.2–5.4)
SODIUM SERPL-SCNC: 137 MMOL/L (ref 136–145)
WBC # BLD AUTO: 15.8 THOU/UL (ref 4.8–10.8)

## 2019-07-24 PROCEDURE — 96361 HYDRATE IV INFUSION ADD-ON: CPT

## 2019-07-24 PROCEDURE — 93005 ELECTROCARDIOGRAM TRACING: CPT

## 2019-07-24 PROCEDURE — 81003 URINALYSIS AUTO W/O SCOPE: CPT

## 2019-07-24 PROCEDURE — 96374 THER/PROPH/DIAG INJ IV PUSH: CPT

## 2019-07-24 PROCEDURE — 84484 ASSAY OF TROPONIN QUANT: CPT

## 2019-07-24 PROCEDURE — 80053 COMPREHEN METABOLIC PANEL: CPT

## 2019-07-24 PROCEDURE — 83690 ASSAY OF LIPASE: CPT

## 2019-07-24 PROCEDURE — 85025 COMPLETE CBC W/AUTO DIFF WBC: CPT

## 2019-08-12 ENCOUNTER — HOSPITAL ENCOUNTER (OUTPATIENT)
Dept: HOSPITAL 18 - NAV ULT | Age: 54
Discharge: HOME | End: 2019-08-12
Payer: COMMERCIAL

## 2019-08-12 DIAGNOSIS — N18.9: Primary | ICD-10-CM

## 2019-08-12 PROCEDURE — 76770 US EXAM ABDO BACK WALL COMP: CPT

## 2019-08-12 NOTE — ULT
Exam: Bilateral renal ultrasound



HISTORY: Renal failure



COMPARISON: None





FINDINGS: 

Right kidney: Normal cortical echotexture. No hydronephrosis.

Right kidney measurements: 10.8 x 4.3 x 5.4 cm. 

Left kidney: Normal cortical echotexture. No hydronephrosis

Left kidney measurements 12.0 x 4.9 x 4.6 cm. 



Urinary bladder: Normal mucosal appearance. 146 mL bladder prevoid volume; 11 mL postvoid volume





IMPRESSION: No hydronephrosis.



Reported By: Ryan Roberson 

Electronically Signed:  8/12/2019 11:23 AM

## 2019-08-15 ENCOUNTER — HOSPITAL ENCOUNTER (OUTPATIENT)
Dept: HOSPITAL 18 - NAV CT | Age: 54
Discharge: HOME | End: 2019-08-15
Payer: COMMERCIAL

## 2019-08-15 DIAGNOSIS — Z98.1: ICD-10-CM

## 2019-08-15 DIAGNOSIS — M48.02: ICD-10-CM

## 2019-08-15 DIAGNOSIS — M50.21: ICD-10-CM

## 2019-08-15 DIAGNOSIS — M54.2: Primary | ICD-10-CM

## 2019-08-15 PROCEDURE — 72125 CT NECK SPINE W/O DYE: CPT

## 2019-08-15 NOTE — CT
Exam: CT cervical spine without contrast



HISTORY: Trauma. Pain.



COMPARISON: None



FINDINGS: 

No craniocervical dissociation. Appropriate alignment of the lateral masses of C1 and C2. Intact odon
toid process

Appropriate alignment of the facets.

Anterior fusion plate with transvertebral body screw at C4, C5, C6 and C7. Disc prosthesis at C3-4-C5
, C5-6 and C6-C7. Posterior cerclage wires C4 through C7. No perihardware lucency.





Soft tissue neck structures: No mass, lymphadenopathy or hematoma. No prevertebral soft tissue swelli
ng.

Upper mediastinum and lung apices: Unremarkable

Central spinal canal: Significant multilevel neural foraminal narrowing. There appears to be severe c
entral canal stenosis at C3-C4 due to a central disc herniation. If there is concern, MRI of the

cervical line is recommended. There are varying degrees of central canal stenosis and foraminal narro
wing at the level of cervical fusion, likely due to a chronic process.

Vertebral bodies: Cervical spine vertebral body height is maintained. No fracture.





IMPRESSION:



1. No cervical spine fracture

2. Uncomplicated cervical fusion hardware.

3. Severe central canal stenosis at C3-C4 secondary to a central disc herniation. MRI should be perfo
rmed if there is clinical concern for cord injury.



Reported By: Ryan Roberson 

Electronically Signed:  8/15/2019 4:17 PM

## 2019-08-31 ENCOUNTER — HOSPITAL ENCOUNTER (EMERGENCY)
Dept: HOSPITAL 18 - NAV ERS | Age: 54
Discharge: HOME | End: 2019-08-31
Payer: COMMERCIAL

## 2019-08-31 DIAGNOSIS — I95.9: Primary | ICD-10-CM

## 2019-08-31 DIAGNOSIS — E11.22: ICD-10-CM

## 2019-08-31 DIAGNOSIS — E66.9: ICD-10-CM

## 2019-08-31 DIAGNOSIS — Z79.899: ICD-10-CM

## 2019-08-31 DIAGNOSIS — E78.5: ICD-10-CM

## 2019-08-31 DIAGNOSIS — Z79.4: ICD-10-CM

## 2019-08-31 DIAGNOSIS — Z95.5: ICD-10-CM

## 2019-08-31 DIAGNOSIS — F17.210: ICD-10-CM

## 2019-08-31 DIAGNOSIS — J44.9: ICD-10-CM

## 2019-08-31 DIAGNOSIS — Z79.01: ICD-10-CM

## 2019-08-31 DIAGNOSIS — R40.0: ICD-10-CM

## 2019-08-31 DIAGNOSIS — I25.10: ICD-10-CM

## 2019-08-31 DIAGNOSIS — I25.2: ICD-10-CM

## 2019-08-31 DIAGNOSIS — F31.9: ICD-10-CM

## 2019-08-31 DIAGNOSIS — I50.9: ICD-10-CM

## 2019-08-31 DIAGNOSIS — E78.1: ICD-10-CM

## 2019-08-31 DIAGNOSIS — N18.2: ICD-10-CM

## 2019-08-31 LAB
ALBUMIN SERPL BCG-MCNC: 3.7 G/DL (ref 3.5–5)
ALP SERPL-CCNC: 93 U/L (ref 40–150)
ALT SERPL W P-5'-P-CCNC: 26 U/L (ref 8–55)
ANION GAP SERPL CALC-SCNC: 15 MMOL/L (ref 10–20)
AST SERPL-CCNC: 19 U/L (ref 5–34)
BACTERIA UR QL AUTO: (no result) HPF
BASOPHILS # BLD AUTO: 0.1 THOU/UL (ref 0–0.2)
BASOPHILS NFR BLD AUTO: 1 % (ref 0–1)
BILIRUB SERPL-MCNC: 0.5 MG/DL (ref 0.2–1.2)
BUN SERPL-MCNC: 14 MG/DL (ref 9.8–20.1)
CALCIUM SERPL-MCNC: 9 MG/DL (ref 7.8–10.44)
CHLORIDE SERPL-SCNC: 101 MMOL/L (ref 98–107)
CO2 SERPL-SCNC: 25 MMOL/L (ref 22–29)
CREAT CL PREDICTED SERPL C-G-VRATE: 0 ML/MIN (ref 70–130)
DRUG SCREEN CUTOFF: (no result)
EOSINOPHIL # BLD AUTO: 0.1 THOU/UL (ref 0–0.7)
EOSINOPHIL NFR BLD AUTO: 1.2 % (ref 0–10)
GLOBULIN SER CALC-MCNC: 2.3 G/DL (ref 2.4–3.5)
GLUCOSE SERPL-MCNC: 181 MG/DL (ref 70–105)
HGB BLD-MCNC: 12.2 G/DL (ref 12–16)
LYMPHOCYTES # BLD AUTO: 2.8 THOU/UL (ref 1.2–3.4)
LYMPHOCYTES NFR BLD AUTO: 25.5 % (ref 21–51)
MCH RBC QN AUTO: 28.3 PG (ref 27–31)
MCV RBC AUTO: 87.6 FL (ref 78–98)
MEDTOX CONTROL LINE VALID?: (no result)
MONOCYTES # BLD AUTO: 0.7 THOU/UL (ref 0.11–0.59)
MONOCYTES NFR BLD AUTO: 6.4 % (ref 0–10)
NEUTROPHILS # BLD AUTO: 7.3 THOU/UL (ref 1.4–6.5)
NEUTROPHILS NFR BLD AUTO: 65.9 % (ref 42–75)
PLATELET # BLD AUTO: 140 THOU/UL (ref 130–400)
POTASSIUM SERPL-SCNC: 4.3 MMOL/L (ref 3.5–5.1)
RBC # BLD AUTO: 4.32 MILL/UL (ref 4.2–5.4)
RBC UR QL AUTO: (no result) HPF (ref 0–3)
SODIUM SERPL-SCNC: 137 MMOL/L (ref 136–145)
WBC # BLD AUTO: 11 THOU/UL (ref 4.8–10.8)
WBC UR QL AUTO: (no result) HPF (ref 0–3)

## 2019-08-31 PROCEDURE — 87086 URINE CULTURE/COLONY COUNT: CPT

## 2019-08-31 PROCEDURE — 96360 HYDRATION IV INFUSION INIT: CPT

## 2019-08-31 PROCEDURE — 80306 DRUG TEST PRSMV INSTRMNT: CPT

## 2019-08-31 PROCEDURE — 80053 COMPREHEN METABOLIC PANEL: CPT

## 2019-08-31 PROCEDURE — 83605 ASSAY OF LACTIC ACID: CPT

## 2019-08-31 PROCEDURE — 81003 URINALYSIS AUTO W/O SCOPE: CPT

## 2019-08-31 PROCEDURE — 94760 N-INVAS EAR/PLS OXIMETRY 1: CPT

## 2019-08-31 PROCEDURE — 84484 ASSAY OF TROPONIN QUANT: CPT

## 2019-08-31 PROCEDURE — 93005 ELECTROCARDIOGRAM TRACING: CPT

## 2019-08-31 PROCEDURE — 85025 COMPLETE CBC W/AUTO DIFF WBC: CPT

## 2019-08-31 PROCEDURE — 81015 MICROSCOPIC EXAM OF URINE: CPT

## 2019-10-16 ENCOUNTER — HOSPITAL ENCOUNTER (EMERGENCY)
Dept: HOSPITAL 18 - NAV ERS | Age: 54
Discharge: HOME | End: 2019-10-16
Payer: COMMERCIAL

## 2019-10-16 DIAGNOSIS — E78.5: ICD-10-CM

## 2019-10-16 DIAGNOSIS — I25.2: ICD-10-CM

## 2019-10-16 DIAGNOSIS — Z79.4: ICD-10-CM

## 2019-10-16 DIAGNOSIS — G47.10: ICD-10-CM

## 2019-10-16 DIAGNOSIS — J44.9: ICD-10-CM

## 2019-10-16 DIAGNOSIS — F17.210: ICD-10-CM

## 2019-10-16 DIAGNOSIS — N18.2: ICD-10-CM

## 2019-10-16 DIAGNOSIS — I50.9: ICD-10-CM

## 2019-10-16 DIAGNOSIS — Z79.899: ICD-10-CM

## 2019-10-16 DIAGNOSIS — G24.9: Primary | ICD-10-CM

## 2019-10-16 DIAGNOSIS — F31.9: ICD-10-CM

## 2019-10-16 DIAGNOSIS — E11.22: ICD-10-CM

## 2019-10-16 LAB
ALBUMIN SERPL BCG-MCNC: 3.7 G/DL (ref 3.5–5)
ALP SERPL-CCNC: 94 U/L (ref 40–110)
ALT SERPL W P-5'-P-CCNC: 35 U/L (ref 8–55)
ANION GAP SERPL CALC-SCNC: 15 MMOL/L (ref 10–20)
AST SERPL-CCNC: 17 U/L (ref 5–34)
BASOPHILS # BLD AUTO: 0.1 THOU/UL (ref 0–0.2)
BASOPHILS NFR BLD AUTO: 0.6 % (ref 0–1)
BILIRUB SERPL-MCNC: 0.4 MG/DL (ref 0.2–1.2)
BUN SERPL-MCNC: 11 MG/DL (ref 9.8–20.1)
CALCIUM SERPL-MCNC: 8.9 MG/DL (ref 7.8–10.44)
CHLORIDE SERPL-SCNC: 99 MMOL/L (ref 98–107)
CO2 SERPL-SCNC: 29 MMOL/L (ref 22–29)
CREAT CL PREDICTED SERPL C-G-VRATE: 0 ML/MIN (ref 70–130)
DRUG SCREEN CUTOFF: (no result)
EOSINOPHIL # BLD AUTO: 0.2 THOU/UL (ref 0–0.7)
EOSINOPHIL NFR BLD AUTO: 1.4 % (ref 0–10)
GLOBULIN SER CALC-MCNC: 2.4 G/DL (ref 2.4–3.5)
GLUCOSE SERPL-MCNC: 126 MG/DL (ref 70–105)
HGB BLD-MCNC: 12.8 G/DL (ref 12–16)
LYMPHOCYTES # BLD AUTO: 2.5 THOU/UL (ref 1.2–3.4)
LYMPHOCYTES NFR BLD AUTO: 20.6 % (ref 21–51)
MCH RBC QN AUTO: 27.7 PG (ref 27–31)
MCV RBC AUTO: 87.3 FL (ref 78–98)
MEDTOX CONTROL LINE VALID?: (no result)
MONOCYTES # BLD AUTO: 0.7 THOU/UL (ref 0.11–0.59)
MONOCYTES NFR BLD AUTO: 5.8 % (ref 0–10)
NEUTROPHILS # BLD AUTO: 8.9 THOU/UL (ref 1.4–6.5)
NEUTROPHILS NFR BLD AUTO: 71.7 % (ref 42–75)
PLATELET # BLD AUTO: 143 THOU/UL (ref 130–400)
POTASSIUM SERPL-SCNC: 3.9 MMOL/L (ref 3.5–5.1)
RBC # BLD AUTO: 4.63 MILL/UL (ref 4.2–5.4)
SODIUM SERPL-SCNC: 139 MMOL/L (ref 136–145)
WBC # BLD AUTO: 12.3 THOU/UL (ref 4.8–10.8)

## 2019-10-16 PROCEDURE — 80306 DRUG TEST PRSMV INSTRMNT: CPT

## 2019-10-16 PROCEDURE — 81003 URINALYSIS AUTO W/O SCOPE: CPT

## 2019-10-16 PROCEDURE — 70450 CT HEAD/BRAIN W/O DYE: CPT

## 2019-10-16 PROCEDURE — 83690 ASSAY OF LIPASE: CPT

## 2019-10-16 PROCEDURE — 80053 COMPREHEN METABOLIC PANEL: CPT

## 2019-10-16 PROCEDURE — 84443 ASSAY THYROID STIM HORMONE: CPT

## 2019-10-16 PROCEDURE — 93005 ELECTROCARDIOGRAM TRACING: CPT

## 2019-10-16 PROCEDURE — 85025 COMPLETE CBC W/AUTO DIFF WBC: CPT

## 2019-10-16 NOTE — CT
CT BRAIN:

 

10/16/2019

 

PROVIDED CLINICAL HISTORY:

Dizziness.

 

COMPARISON:

09/23/2018

 

FINDINGS:

The ventricular system appears normal in size and morphology. There is no evidence for intracranial h
emorrhage or mass effect. The extracranial soft tissues and osseous structures demonstrate an unremar
kable CT appearance.

 

IMPRESSION:

No evidence for intracranial hemorrhage or mass effect.

 

POS: [facility]

## 2019-10-19 ENCOUNTER — HOSPITAL ENCOUNTER (OUTPATIENT)
Dept: HOSPITAL 92 - ERS | Age: 54
Setting detail: OBSERVATION
LOS: 1 days | Discharge: HOME | End: 2019-10-20
Attending: FAMILY MEDICINE | Admitting: FAMILY MEDICINE
Payer: COMMERCIAL

## 2019-10-19 VITALS — BODY MASS INDEX: 44.7 KG/M2

## 2019-10-19 DIAGNOSIS — R53.1: Primary | ICD-10-CM

## 2019-10-19 DIAGNOSIS — Z95.5: ICD-10-CM

## 2019-10-19 DIAGNOSIS — Z79.899: ICD-10-CM

## 2019-10-19 DIAGNOSIS — J44.9: ICD-10-CM

## 2019-10-19 DIAGNOSIS — K86.1: ICD-10-CM

## 2019-10-19 DIAGNOSIS — I13.0: ICD-10-CM

## 2019-10-19 DIAGNOSIS — I70.8: ICD-10-CM

## 2019-10-19 DIAGNOSIS — Z88.1: ICD-10-CM

## 2019-10-19 DIAGNOSIS — F17.210: ICD-10-CM

## 2019-10-19 DIAGNOSIS — Z91.048: ICD-10-CM

## 2019-10-19 DIAGNOSIS — I65.21: ICD-10-CM

## 2019-10-19 DIAGNOSIS — I25.2: ICD-10-CM

## 2019-10-19 DIAGNOSIS — Z79.4: ICD-10-CM

## 2019-10-19 DIAGNOSIS — E11.22: ICD-10-CM

## 2019-10-19 DIAGNOSIS — Z88.2: ICD-10-CM

## 2019-10-19 DIAGNOSIS — E66.9: ICD-10-CM

## 2019-10-19 DIAGNOSIS — Z88.6: ICD-10-CM

## 2019-10-19 DIAGNOSIS — R51: ICD-10-CM

## 2019-10-19 DIAGNOSIS — I50.9: ICD-10-CM

## 2019-10-19 DIAGNOSIS — E78.5: ICD-10-CM

## 2019-10-19 DIAGNOSIS — N18.3: ICD-10-CM

## 2019-10-19 DIAGNOSIS — Z85.3: ICD-10-CM

## 2019-10-19 DIAGNOSIS — I25.10: ICD-10-CM

## 2019-10-19 DIAGNOSIS — Z88.5: ICD-10-CM

## 2019-10-19 DIAGNOSIS — R47.81: ICD-10-CM

## 2019-10-19 DIAGNOSIS — Z88.8: ICD-10-CM

## 2019-10-19 LAB
ALBUMIN SERPL BCG-MCNC: 3.7 G/DL (ref 3.5–5)
ALP SERPL-CCNC: 88 U/L (ref 40–110)
ALT SERPL W P-5'-P-CCNC: 23 U/L (ref 8–55)
ANION GAP SERPL CALC-SCNC: 14 MMOL/L (ref 10–20)
APTT PPP: 25.7 SEC (ref 22.9–36.1)
AST SERPL-CCNC: 16 U/L (ref 5–34)
BASOPHILS # BLD AUTO: 0.1 THOU/UL (ref 0–0.2)
BASOPHILS NFR BLD AUTO: 0.6 % (ref 0–1)
BILIRUB SERPL-MCNC: 0.7 MG/DL (ref 0.2–1.2)
BUN SERPL-MCNC: 13 MG/DL (ref 9.8–20.1)
CALCIUM SERPL-MCNC: 8.5 MG/DL (ref 7.8–10.44)
CHLORIDE SERPL-SCNC: 100 MMOL/L (ref 98–107)
CO2 SERPL-SCNC: 25 MMOL/L (ref 22–29)
CREAT CL PREDICTED SERPL C-G-VRATE: 0 ML/MIN (ref 70–130)
EOSINOPHIL # BLD AUTO: 0.3 THOU/UL (ref 0–0.7)
EOSINOPHIL NFR BLD AUTO: 2.2 % (ref 0–10)
GLOBULIN SER CALC-MCNC: 2 G/DL (ref 2.4–3.5)
GLUCOSE SERPL-MCNC: 101 MG/DL (ref 70–105)
HGB BLD-MCNC: 12.9 G/DL (ref 12–16)
INR PPP: 1.1
LYMPHOCYTES # BLD: 3.2 THOU/UL (ref 1.2–3.4)
LYMPHOCYTES NFR BLD AUTO: 24.1 % (ref 21–51)
MCH RBC QN AUTO: 29.9 PG (ref 27–31)
MCV RBC AUTO: 89.4 FL (ref 78–98)
MONOCYTES # BLD AUTO: 1 THOU/UL (ref 0.11–0.59)
MONOCYTES NFR BLD AUTO: 7.3 % (ref 0–10)
NEUTROPHILS # BLD AUTO: 8.6 THOU/UL (ref 1.4–6.5)
NEUTROPHILS NFR BLD AUTO: 66 % (ref 42–75)
PLATELET # BLD AUTO: 131 THOU/UL (ref 130–400)
POTASSIUM SERPL-SCNC: 4.4 MMOL/L (ref 3.5–5.1)
PROTHROMBIN TIME: 13.7 SEC (ref 12–14.7)
RBC # BLD AUTO: 4.31 MILL/UL (ref 4.2–5.4)
SODIUM SERPL-SCNC: 135 MMOL/L (ref 136–145)
WBC # BLD AUTO: 13.1 THOU/UL (ref 4.8–10.8)

## 2019-10-19 PROCEDURE — 96372 THER/PROPH/DIAG INJ SC/IM: CPT

## 2019-10-19 PROCEDURE — 85025 COMPLETE CBC W/AUTO DIFF WBC: CPT

## 2019-10-19 PROCEDURE — G0378 HOSPITAL OBSERVATION PER HR: HCPCS

## 2019-10-19 PROCEDURE — 36416 COLLJ CAPILLARY BLOOD SPEC: CPT

## 2019-10-19 PROCEDURE — 36415 COLL VENOUS BLD VENIPUNCTURE: CPT

## 2019-10-19 PROCEDURE — 93005 ELECTROCARDIOGRAM TRACING: CPT

## 2019-10-19 PROCEDURE — 70496 CT ANGIOGRAPHY HEAD: CPT

## 2019-10-19 PROCEDURE — 94640 AIRWAY INHALATION TREATMENT: CPT

## 2019-10-19 PROCEDURE — 70551 MRI BRAIN STEM W/O DYE: CPT

## 2019-10-19 PROCEDURE — 71045 X-RAY EXAM CHEST 1 VIEW: CPT

## 2019-10-19 PROCEDURE — 84484 ASSAY OF TROPONIN QUANT: CPT

## 2019-10-19 PROCEDURE — 80048 BASIC METABOLIC PNL TOTAL CA: CPT

## 2019-10-19 PROCEDURE — 80053 COMPREHEN METABOLIC PANEL: CPT

## 2019-10-19 PROCEDURE — 80061 LIPID PANEL: CPT

## 2019-10-19 PROCEDURE — 83036 HEMOGLOBIN GLYCOSYLATED A1C: CPT

## 2019-10-19 PROCEDURE — 70450 CT HEAD/BRAIN W/O DYE: CPT

## 2019-10-19 PROCEDURE — 85610 PROTHROMBIN TIME: CPT

## 2019-10-19 PROCEDURE — 85730 THROMBOPLASTIN TIME PARTIAL: CPT

## 2019-10-19 PROCEDURE — 70498 CT ANGIOGRAPHY NECK: CPT

## 2019-10-19 RX ADMIN — ALBUTEROL SULFATE SCH: 108 AEROSOL, METERED RESPIRATORY (INHALATION) at 18:44

## 2019-10-19 RX ADMIN — ALBUTEROL SULFATE SCH PUFF: 108 AEROSOL, METERED RESPIRATORY (INHALATION) at 22:09

## 2019-10-19 RX ADMIN — PANCRELIPASE SCH CAP: 60000; 12000; 38000 CAPSULE, DELAYED RELEASE PELLETS ORAL at 18:01

## 2019-10-19 NOTE — CT
CT BRAIN NONCONTRAST:



DATE:

10/19/2019



HISTORY:

54-year-old female with acute stroke symptoms: Left-sided neurologic deficits, ataxia, and blurred vi
keyanna.



Dr. Yuong gave this acute stroke alert protocol report by telephone to ER physician Dr. Antonio Huizar at 10
:11 AM on power scribe today.



FINDINGS:

There is no evidence of acute intra-axial or extra-axial hemorrhage. There is no midline shift or any
 other mass effect. There is no extra-axial fluid collection. There is no evidence of obstructive

hydrocephalus. Calvarium is intact.



IMPRESSION:

No acute intracranial findings.



Reported By: Og Young 

Electronically Signed:  10/19/2019 11:12 AM

## 2019-10-19 NOTE — PDOC.FPRHP
- History of Present Illness


Chief Complaint: Left Sided Weakness, Slurred Speech


History of Present Illness: 





Pt is a 55 yo female with PMH significant for COPD, DMII, HLD, CKD III, CAD w/ 

2 stents, and tobacco abuse who presents for left sided weakness, slurred 

speech beginning this morning.  Pt states symptoms began when she woke up this 

morning with a headache and weakness.  She went to bed w/o symptoms.  HA 

located in L occipital region, 8/10 pain.  She also complains of the room 

spinning.  Pt denies any falls.  She is able to walk but feels unsteady.  





Of note pt states she has been confused over the previous week or two.  During 

this time she has hallucinations but knows they are not real. She also has 

instances of "spacing out".  Her sister agrees.  She also has COPD w/ chronic 

cough but no significant changes in cough, sputum production/color.





In the ED pt's CXR, CT Brain showed no acute abnormalities.  Her CTA head/neck 

revealed mild R ICA stenosis.  She was also found to have a mild elevation in 

white count, 13.1





- Allergies/Adverse Reactions


 Allergies











Allergy/AdvReac Type Severity Reaction Status Date / Time


 


adhesive Allergy Severe  Verified 04/03/19 01:59


 


aspirin Allergy   Verified 01/09/19 18:42


 


codeine Allergy   Verified 01/09/19 18:42


 


hydrocodone Allergy   Verified 01/09/19 18:42


 


levofloxacin [From Levaquin] Allergy   Verified 01/09/19 18:42


 


NSAIDS (Non-Steroidal Allergy   Verified 01/09/19 18:42





Anti-Inflamma     


 


Sulfa (Sulfonamide Allergy   Verified 01/09/19 18:42





Antibiotics)     


 


trimethoprim [From Bactrim] Allergy   Verified 01/09/19 18:42


 


tramadol AdvReac Mild ITCHING Verified 01/09/19 18:42














- Home Medications


 











 Medication  Instructions  Recorded  Confirmed  Type


 


Cetirizine HCl [Zyrtec] 10 mg PO HS 01/31/17 10/19/19 History


 


Gabapentin 800 mg PO BID 01/31/17 10/19/19 History


 


Lisinopril 10 mg PO QPM 01/31/17 10/19/19 History


 


Magnesium 500 mg PO BID 01/31/17 10/19/19 History


 


PARoxetine HCl [Paroxetine HCl] 10 mg PO HS 01/31/17 10/19/19 History


 


risperiDONE [Risperidone] 0.5 mg PO HS 01/31/17 10/19/19 History


 


Pancrelipase  47132 [Creon DR 4 cap PO TID-WM #30 cap 10/15/17 10/19/19 Rx





12,000 Units]    


 


Ondansetron [Zofran ODT] 4 mg PO Q6H PRN 4 Days #16 tab 03/07/18 10/19/19 Rx


 


Ergocalciferol (Vitamin D2) 50,000 unit PO ASDIR 04/30/18 10/19/19 History





[Vitamin D2]    


 


Fenofibrate [Tricor] 160 mg PO HS 04/30/18 10/19/19 History


 


Omega-3 Acid Ethyl Esters [Lovaza] 2 gm PO BID 04/30/18 10/19/19 History


 


Folic Acid [Folvite] 1 mg PO HS 01/09/19 10/19/19 History


 


Torsemide [Demadex] 40 mg PO ASDIR 01/09/19 10/19/19 History


 


Isosorbide Mononitrate [Imdur] 60 mg PO DAILY #30 tab 01/10/19 10/19/19 Rx


 


Albuterol Sulfate [Proair 90 mcg IH Q4HR #1 aer.pow.ba 04/03/19 10/19/19 Rx





Respiclick]    


 


Clopidogrel Bisulfate [Plavix] 75 mg PO DAILY  tab 04/03/19 10/19/19 Rx


 


Fluticasone Propionate [Flonase 0 gm NASAL DAILY  bot 04/03/19 10/19/19 Rx





Nasal Spray]    


 


ALButerol Sulfate [Ventolin] 3 ml NEB Q8HR PRN 10/19/19 10/19/19 History


 


Famotidine [Pepcid AC] 20 mg PO DAILY 10/19/19 10/19/19 History


 


Folic Acid 1 tab PO DAILY 10/19/19 10/19/19 History


 


HumaLOG 0 unit SC TID-WM PRN 10/19/19 10/19/19 History


 


Insulin Glargine,Hum.Rec.Anlog 87 units SC TID 10/19/19 10/19/19 History





[Toujeo Solostar]    


 


Metoprolol Tartrate [Lopressor] 1 tab PO BID 10/19/19 10/19/19 History


 


Morphine Sulfate [Morphine Sulfate 15 mg PO DAILY PRN 10/19/19 10/19/19 History





ER]    


 


Ranolazine [Ranexa] 500 mg PO BID 10/19/19 10/19/19 History


 


sitaGLIPtin Phosphate [Januvia] 50 mg PO DAILY 10/19/19 10/19/19 History














- History


PMHx: COPD, DMII, HLD, CKD 3, CAD 2 stents, Breast cancer


 


PSHx: hysterectomy w/ oophorectomy, L Knee replacement, Cholecystectomy





FHx: Father - brain aneurysm 


 


Social: denies alcohol, drugs, endorsed tobacco, lives with sister, works at 

[employer]


 








- Review of Systems


General: denies: fever/chills, weight/appetite/sleep changes


Eyes: reports: vision changes (blurry).  denies: eye pain


ENT: denies: nasal congestion, rhinorrhea


Respiratory: reports: cough, shortness of breath, exercise intolerance.  denies

: congestion


Cardiovascular: denies: chest pain, palpitation, edema


Gastrointestinal: reports: nausea.  denies: vomiting, diarrhea, constipation, 

abdominal pain, GI bleeding


Genitourinary: denies: incontinence, dysuria, polyuria


Skin: denies: rashes, lesions


Musculoskeletal: denies: pain, swelling


Neurological: reports: numbness, weakness.  denies: syncope





- Vital signs


BP: [102/55]  HR: [60] RR: [18] Tmax: [98.1] Pox: [98%]% on [RA]  Wt: [114.57 kg

]   








- Physical Exam


Constitutional: NAD, awake, alert and oriented


-Constitutional: 





Obese


HEENT: PERRLA, EOMI


Neck: FROM, trachea midline


Heart: RRR, normal S1/S2, pulses present, no edema


-Lungs: 





Inspiratory wheeze bilaterally, did no appreciate expiratory wheeze, poor air 

movement


Abdomen: soft, non-tender


Neurological: CN II-XII intact, normal sensation


-Neurological: 





4/5 Left extremity strength, 5/5 Right extremity strength


Skin: no rash/lesions, capillary refill <2 seconds


Heme/Lymphatic: no purpura, no petechia


-Psychiatric: 


 did not appreciate hallucinatins/delusions





FMR H&P: Results





- Labs


Result Diagrams: 


 10/19/19 11:33





 10/19/19 11:34


Lab results: 


 











WBC  13.1 thou/uL (4.8-10.8)  H  10/19/19  11:33    


 


Hgb  12.9 g/dL (12.0-16.0)   10/19/19  11:33    


 


Hct  38.5 % (36.0-47.0)   10/19/19  11:33    


 


MCV  89.4 fL (78.0-98.0)   10/19/19  11:33    


 


Plt Count  131 thou/uL (130-400)   10/19/19  11:33    


 


Neutrophils %  66.0 % (42.0-75.0)   10/19/19  11:33    


 


Sodium  135 mmol/L (136-145)  L  10/19/19  11:34    


 


Potassium  4.4 mmol/L (3.5-5.1)   10/19/19  11:34    


 


Chloride  100 mmol/L ()   10/19/19  11:34    


 


Carbon Dioxide  25 mmol/L (22-29)   10/19/19  11:34    


 


BUN  13 mg/dL (9.8-20.1)   10/19/19  11:34    


 


Creatinine  1.38 mg/dL (0.6-1.1)  H  10/19/19  11:34    


 


Glucose  101 mg/dL ()   10/19/19  11:34    


 


Calcium  8.5 mg/dL (7.8-10.44)   10/19/19  11:34    


 


Total Bilirubin  0.7 mg/dL (0.2-1.2)   10/19/19  11:34    


 


AST  16 U/L (5-34)   10/19/19  11:34    


 


ALT  23 U/L (8-55)   10/19/19  11:34    


 


Alkaline Phosphatase  88 U/L ()   10/19/19  11:34    


 


Serum Total Protein  5.7 g/dL (6.0-8.3)  L  10/19/19  11:34    


 


Albumin  3.7 g/dL (3.5-5.0)   10/19/19  11:34    














- Radiology Interpretation


  ** CT scan - head


Status: report reviewed by me (No acute abnormalities)





  ** Chest x-ray


Status: report reviewed by me (no acute abnormalities)





  ** Other


Status: report reviewed by me (CTA head/neck revealed mild R ICA stenosis)





FMR H&P: A/P





- Problem List


(1) Chronic pancreatitis


Current Visit: No   Status: Acute   Code(s): K86.1 - OTHER CHRONIC PANCREATITIS

   





(2) Neurological symptoms


Current Visit: No   Status: Acute   Code(s): R29.90 - UNSPECIFIED SYMPTOMS AND 

SIGNS INVOLVING THE NERVOUS SYSTEM   





(3) CAD (coronary artery disease)


Current Visit: No   Status: Chronic   Code(s): I25.10 - ATHSCL HEART DISEASE OF 

NATIVE CORONARY ARTERY W/O ANG PCTRS   


Qualifiers: 


   Coronary Disease-Associated Artery/Lesion type: native artery   Native vs. 

transplanted heart: native heart   Associated angina: angina presence 

unspecified   Qualified Code(s): I25.10 - Atherosclerotic heart disease of 

native coronary artery without angina pectoris   





(4) CHF (congestive heart failure)


Current Visit: No   Status: Chronic   Code(s): I50.9 - HEART FAILURE, 

UNSPECIFIED   





(5) Chronic kidney disease, stage 3


Current Visit: No   Status: Chronic   





(6) Diabetes mellitus type 2 in obese


Current Visit: No   Status: Chronic   Code(s): E11.9 - TYPE 2 DIABETES MELLITUS 

WITHOUT COMPLICATIONS; E66.9 - OBESITY, UNSPECIFIED   





(7) Hyperlipidemia


Current Visit: No   Status: Chronic   Code(s): E78.5 - HYPERLIPIDEMIA, 

UNSPECIFIED   


Qualifiers: 


   Hyperlipidemia type: unspecified   Qualified Code(s): E78.5 - Hyperlipidemia

, unspecified   





(8) Hypertension


Current Visit: No   Status: Chronic   Code(s): I10 - ESSENTIAL (PRIMARY) 

HYPERTENSION   


Qualifiers: 


   Hypertension type: essential hypertension   Qualified Code(s): I10 - 

Essential (primary) hypertension   





(9) Tobacco abuse


Current Visit: No   Status: Chronic   Code(s): Z72.0 - TOBACCO USE   





- Plan





Pt is a 55 yo female with PMH for HTN, DMII, COPD, CKD III, tobacco abuse, CAD w

/ 2 stents who presents for left sided weakness who is pending an MRI Head to 

further assess for TIA vs Stroke





# Left Sided Weakness


TIA vs Stroke


4/5 strength L side, did not appreciate dysarthria on exam. CT head negative, 

CTA Head/neck revealed mild R ICA stenosis.


- MRI pending


- Continue plavix, statin


- Lipid panel pending, a1c





# HTN 


- held home meds for permissive htn





# DM II


She states home regimen is Glargine 87 TID.


- Need to med reconcile tomorrow, pt does not have med list. SHe states 

medications have changed from previous visit. Last visit discharged with 

Glargine 87 U BID.


- at this time wll start levemir w/ mod SSI





# Headache


- tylenol prn





# Chronic Pancreatitis


- held pain medications


-continue creon





# HLD


- statin therapy





# CKD III


- baseline





# COPD


- continue home meds





# Hx of CAD


- cont statin, plavix





Diet: HH


Fluids: none


VTE Prophylaxis: Lovenox


Code: Full





Dispo: admit to obs, likely < 2 midnight stay





FMR H&P: Upper Level





- Plan


Date/Time: 10/19/19 1345








I, Henok Xiong- PGY2, have evaluated this patient and agree with findings/

plan as outlined by intern resident. Pertinent changes/additions are listed 

here.





53yo F with pmh of CAD presents with L sided weakness and decreased sensation 

on whole L side of body. Pt also endorses difficulty with ambulation that was 

new. onset was upon waking this morning. pt denies hx of TIA or stroke. On my 

exam she has normal cranial nerves except decreased sensation on L side of 

face. She also has 4/5 strength on L hip flexion. Biceps and patellar reflexes 

wnl. In ER she had CT brain and CTA which showed NAF.





Pt is outside window for TPA and will be admitted for observation with plans 

for MRI. Will get lipids for risk stratification. All chronic medical problems 

will be managed per the intern note above.








Addendum - Attending





- Attending Attestation


Date/Time: 10/19/19 0122





I personally evaluated the patient and discussed the management with Dr. Silverio

/Tyrese.


I agree with the History, Examination, Assessment and Plan documented above 

with any addition or exceptions noted below.





patient is a 54-year-old female with history of coronary artery disease, COPD, 

diabetes mellitus type two, hypertension, chronic pain syndrome, chronic 

pancreatitis who presents with neurologic deficits. patient reports she was in 

normal state of health last night before going to bed. Upon awakening this 

morning she reported headache, blurry vision, generalized weakness but worse in 

the left upper and left lower extremities. She reported difficulty with 

ambulation. She denied any new onset sensory changes that she does endorse 

chronic paresthesias related to spinal disease. she presented to the ER with 

the symptoms. on my exam, she has four out of five strength in the bilateral 

upper and lower extremities. Some inconsistency to her neurological exam. No 

noted facial droop and no pupillary asymmetry. She did have one to two beats of 

myoclonus in the left lower extremity. CT brain and CT angiogram were obtained 

which did not reveal any acute findings nor did it reveal any acute thrombus. 

She is outside the window for TPA. Patient will be admitted to the hospital for 

stroke observation. Plan for MRI to confirm or refute CVA diagnosis. Otherwise 

we will risk stratify and control chronic conditions as necessary. Allow 

permissive hypertension for the next 24 hours. Further management per that work 

up.

## 2019-10-19 NOTE — CT
CT ANGIOGRAM NECK WITH CONTRAST

CT ANGIOGRAM BRAIN WITH CONTRAST:



DATE:

10/19/2019



HISTORY:

54-year-old female with acute stroke: Left-sided neurological deficits, ataxia, and blurry vision.



Telephone report to Dr. Antonio Huizar 11:51 AM on 10/19/2019.



TECHNIQUE:

After IV contrast injection, arterial bolus chasing technique scan performed from aortopulmonic windo
w. to vertex of head.

Coronal and sagittal 3-D MIP reconstructions.



FINDINGS:

Because bilateral shoulders are elevated, and because of large body habitus, plus additional streak a
rtifact from ACDF metallic hardware, image resolution is low involving the great vessels and

origins of bilateral vertebral arteries.



Brachiocephalic: Prominent calcified atheromatous plaque at origin. With mild stenosis.



Right subclavian: No high-grade stenosis.



Left subclavian: Prominent calcified plaque at origin with mild stenosis. No high-grade stenosis prox
imally. Mid and distal portions obscured by streak artifact from dense contrast bolus in adjacent

left subclavian vein.



Right common carotid: No high-grade stenosis.



Left common carotid: Bovine origin from innominate. No high-grade stenosis identified, although midpo
rtion is degraded by streak artifact from ACDF hardware.



Cervical right vertebral: Difficult to visualize origin. Multiple segments obscured completely by str
eak artifact from hardware. No high-grade stenosis or occlusion of distal cervical portions.



Left cervical vertebral: Dominant. No high-grade stenosis identified. Suboptimal visualization of mid
 and proximal levels due to reasons given above.



Intracranial bilateral vertebrals: No short segment high-grade focal stenosis.



Basilar: No stenosis.



Bilateral posterior cerebral's: No occlusion identified.



Bilateral superior cerebellar is: No occlusion proximally.



Right internal carotid: Calcified plaque at origin causing mild stenosis. Medialized, retropharyngeal
 course. No high-grade stenosis identified.



Left internal carotid: Prominent calcified plaque proximally. Medialized course along retropharyngeal
 space. No high-grade stenosis identified.



Right MCA: No M1 segment thrombus identified. Questionable short segment focal stenosis at origin of 
right MCA.



Left MCA: Possible short segment focal stenosis at origin. No evidence of thrombosis of M1 segment.



Bilateral ACAs: No occlusion of A1 and A2 segments.



Dural venous sinuses: No evidence of thrombosis.



IMPRESSION:





Reported By: Og Young 

Electronically Signed:  10/19/2019 11:54 AM

## 2019-10-19 NOTE — RAD
Portable chest:



HISTORY: Mental status change. Dizziness.



COMPARISON:  4/2/2019



FINDINGS:Mild cardiomegaly mild vascular engorgement again noted. No focal infiltrate or significant 
effusion. No interval change.



IMPRESSION: No acute finding



Reported By: Chino Thomas 

Electronically Signed:  10/19/2019 11:48 AM

## 2019-10-20 VITALS — SYSTOLIC BLOOD PRESSURE: 147 MMHG | DIASTOLIC BLOOD PRESSURE: 75 MMHG

## 2019-10-20 VITALS — TEMPERATURE: 98.4 F

## 2019-10-20 LAB
ANION GAP SERPL CALC-SCNC: 13 MMOL/L (ref 10–20)
BASOPHILS # BLD AUTO: 0 THOU/UL (ref 0–0.2)
BASOPHILS NFR BLD AUTO: 0.4 % (ref 0–1)
BUN SERPL-MCNC: 13 MG/DL (ref 9.8–20.1)
CALCIUM SERPL-MCNC: 9.3 MG/DL (ref 7.8–10.44)
CHD RISK SERPL-RTO: 5.2 (ref ?–4.5)
CHLORIDE SERPL-SCNC: 103 MMOL/L (ref 98–107)
CHOLEST SERPL-MCNC: 119 MG/DL
CO2 SERPL-SCNC: 28 MMOL/L (ref 22–29)
CREAT CL PREDICTED SERPL C-G-VRATE: 82 ML/MIN (ref 70–130)
EOSINOPHIL # BLD AUTO: 0.2 THOU/UL (ref 0–0.7)
EOSINOPHIL NFR BLD AUTO: 2.6 % (ref 0–10)
GLUCOSE SERPL-MCNC: 74 MG/DL (ref 70–105)
HDLC SERPL-MCNC: 23 MG/DL
HGB BLD-MCNC: 12.3 G/DL (ref 12–16)
LDLC SERPL CALC-MCNC: 63 MG/DL
LYMPHOCYTES # BLD: 2.3 THOU/UL (ref 1.2–3.4)
LYMPHOCYTES NFR BLD AUTO: 28.3 % (ref 21–51)
MCH RBC QN AUTO: 29.2 PG (ref 27–31)
MCV RBC AUTO: 89 FL (ref 78–98)
MONOCYTES # BLD AUTO: 0.6 THOU/UL (ref 0.11–0.59)
MONOCYTES NFR BLD AUTO: 7.4 % (ref 0–10)
NEUTROPHILS # BLD AUTO: 4.9 THOU/UL (ref 1.4–6.5)
NEUTROPHILS NFR BLD AUTO: 61.3 % (ref 42–75)
PLATELET # BLD AUTO: 120 THOU/UL (ref 130–400)
POTASSIUM SERPL-SCNC: 4 MMOL/L (ref 3.5–5.1)
RBC # BLD AUTO: 4.22 MILL/UL (ref 4.2–5.4)
SODIUM SERPL-SCNC: 140 MMOL/L (ref 136–145)
TRIGL SERPL-MCNC: 163 MG/DL (ref ?–150)
WBC # BLD AUTO: 8.1 THOU/UL (ref 4.8–10.8)

## 2019-10-20 RX ADMIN — ALBUTEROL SULFATE PRN MG: 2.5 SOLUTION RESPIRATORY (INHALATION) at 06:21

## 2019-10-20 RX ADMIN — ALBUTEROL SULFATE PRN MG: 2.5 SOLUTION RESPIRATORY (INHALATION) at 01:58

## 2019-10-20 RX ADMIN — PANCRELIPASE SCH CAP: 60000; 12000; 38000 CAPSULE, DELAYED RELEASE PELLETS ORAL at 09:54

## 2019-10-20 RX ADMIN — PANCRELIPASE SCH CAP: 60000; 12000; 38000 CAPSULE, DELAYED RELEASE PELLETS ORAL at 12:56

## 2019-10-20 RX ADMIN — ALBUTEROL SULFATE SCH: 2.5 SOLUTION RESPIRATORY (INHALATION) at 13:15

## 2019-10-20 RX ADMIN — ALBUTEROL SULFATE SCH: 2.5 SOLUTION RESPIRATORY (INHALATION) at 07:24

## 2019-10-20 RX ADMIN — ALBUTEROL SULFATE SCH: 108 AEROSOL, METERED RESPIRATORY (INHALATION) at 01:59

## 2019-10-20 RX ADMIN — ALBUTEROL SULFATE SCH: 108 AEROSOL, METERED RESPIRATORY (INHALATION) at 06:23

## 2019-10-20 NOTE — PDOC.FM
- Subjective


Subjective: 


Pt remains the same. She has a L occipital  HA. No other pains. She remains "

not feeling like herself".





- Objective


Vital Signs & Weight: 


 Vital Signs (12 hours)











  Temp Pulse Resp BP Pulse Ox


 


 10/20/19 03:56  98.1 F  50 L  16  120/55 L  94 L


 


 10/20/19 01:58   61  20   95


 


 10/19/19 23:26  98.3 F  55 L  18  115/60  92 L


 


 10/19/19 22:09   57 L  18  


 


 10/19/19 19:44  98.3 F  60  20  105/52 L  94 L








 Weight











Weight                         114.577 kg














I&O: 


 











 10/18/19 10/19/19 10/20/19





 06:59 06:59 06:59


 


Intake Total   500


 


Balance   500











Result Diagrams: 


 10/20/19 04:14





 10/20/19 04:14





Phys Exam





- Physical Examination


Constitutional: NAD


Neck: no JVD, full ROM


Inspiratory, expiratory wheeze, poor air movement


Cardiovascular: RRR, no significant murmur


Gastrointestinal: soft, no distention, positive bowel sounds


Musculoskeletal: no edema, pulses present


Psychiatric: A&O x 3


Deviation from normal: Decrease short term memory, difficult recall





Dx/Plan


(1) Chronic pancreatitis


Code(s): K86.1 - OTHER CHRONIC PANCREATITIS   Status: Acute   





(2) Neurological symptoms


Code(s): R29.90 - UNSPECIFIED SYMPTOMS AND SIGNS INVOLVING THE NERVOUS SYSTEM   

Status: Acute   





(3) CAD (coronary artery disease)


Code(s): I25.10 - ATHSCL HEART DISEASE OF NATIVE CORONARY ARTERY W/O ANG PCTRS 

  Status: Chronic   


Qualifiers: 


   Coronary Disease-Associated Artery/Lesion type: native artery   Native vs. 

transplanted heart: native heart   Associated angina: angina presence 

unspecified   Qualified Code(s): I25.10 - Atherosclerotic heart disease of 

native coronary artery without angina pectoris   





(4) CHF (congestive heart failure)


Code(s): I50.9 - HEART FAILURE, UNSPECIFIED   Status: Chronic   





(5) Chronic kidney disease, stage 3


Status: Chronic   





(6) Diabetes mellitus type 2 in obese


Code(s): E11.9 - TYPE 2 DIABETES MELLITUS WITHOUT COMPLICATIONS; E66.9 - OBESITY

, UNSPECIFIED   Status: Chronic   





(7) Hyperlipidemia


Code(s): E78.5 - HYPERLIPIDEMIA, UNSPECIFIED   Status: Chronic   


Qualifiers: 


   Hyperlipidemia type: unspecified   Qualified Code(s): E78.5 - Hyperlipidemia

, unspecified   





(8) Hypertension


Code(s): I10 - ESSENTIAL (PRIMARY) HYPERTENSION   Status: Chronic   


Qualifiers: 


   Hypertension type: essential hypertension   Qualified Code(s): I10 - 

Essential (primary) hypertension   





(9) Tobacco abuse


Code(s): Z72.0 - TOBACCO USE   Status: Chronic   





- Plan


Plan: 


Pt is a 55 yo female with PMH for HTN, DMII, COPD, CKD III, tobacco abuse, CAD w

/ 2 stents who presents for left sided weakness who is pending an MRI Head to 

further assess for TIA vs Stroke





# Left Sided Weakness


TIA vs Stroke


4/5 strength L side, did not appreciate dysarthria on exam. CT head negative, 

CTA Head/neck revealed mild R ICA stenosis.


- MRI pending


- Continue plavix, statin


- LFT's mildly elevated - contine statin, a1c pending





# HTN 


- held home meds for permissive htn


- restart in afternoon





# DM II


She states home regimen is Glargine 87 TID. Possible if she is taking this much 

insulin pt is dropping BG below what she normally runs.  Will check a1c to see 

normal BG.


- Need to med reconcile tomorrow, pt does not have med list. She states 

medications have changed from previous visit. Last visit discharged with 

Glargine 87 U BID.


- at this time will start levemir w/ mod SSI


- controlled 10/20 am





# Headache


- tylenol prn





# Chronic Pancreatitis


- held pain medications


- continue creon





# HLD


- statin therapy





# CKD III


- baseline





# COPD


- continue home meds





# Hx of CAD


- cont statin, plavix





Diet: HH


Fluids: none


VTE Prophylaxis: Lovenox


Code: Full





Dispo: admit to obs, likely < 2 midnight stay








Addendum - Attending





- Attending Attestation


Date/Time: 10/20/19 3803





I personally evaluated the patient and discussed the management with Dr. Silverio. 


I agree with the History, Examination, Assessment and Plan documented above 

with any addition or exceptions noted below.





Patient stable. Here for CVA r/o. Needs MRI today. Therapy evaluations pending. 

Further mgmt per MRI result.

## 2019-10-20 NOTE — MRI
MRI BRAIN NONCONTRAST:



DATE:

10/20/2019



HISTORY:

54-year-old female with acute stroke symptoms: Left upper extremity weakness and blurred vision.



FINDINGS:

There is no obstructive hydrocephalus. There is no midline shift or any other evidence of mass effect
. There is no extra-axial fluid collection. There are minimal chronic ischemic white matter changes

due to microvascular atherosclerosis. There is otherwise no major intra-axial signal abnormality, rec
ent hemorrhage, or restricted diffusion.



IMPRESSION:

1) minimal chronic ischemic white matter changes.

2) otherwise negative



Reported By: Og Young 

Electronically Signed:  10/20/2019 10:31 AM

## 2019-10-21 NOTE — DIS
DATE OF ADMISSION:  10/19/2019



DATE OF DISCHARGE:  10/20/2019



RESIDENT:  Richard Silverio DO.



ADMITTING ATTENDING:  Shiva Pena MD



DISCHARGE ATTENDING:  Shiva Pena MD



CONSULTS:  None.



PROCEDURES:  

1. On 10/19/2019, CT brain without contrast revealed no acute intracranial

abnormalities. 

2. CT angiography of the head revealed prominent calcified atheromatous plaque 
at

origin in the brachiocephalic artery with mild stenosis.  Right subclavian, no

high-grade stenosis.  Left subclavian prominent calcified plaque at the origin 
with

mild stenosis.  No high-grade stenosis proximally.  No high-grade stenosis at 
the

right common carotid.  No high-grade stenosis at the left common carotid.  No

high-grade stenosis at the right vertebral artery or left vertebral artery or

intracranial vertebrals.  No stenosis at the basilar artery or posterior 
cerebrals,

superior cerebellar artery.  No high-grade stenosis at the right ICA or left 
ICA or

bilateral ACAs or right MCA or left MCA.  Chest x-ray on 10/19/2019 revealed no

acute findings. 

3. Brain MRI on 10/20/2019 revealed minimal chronic ischemic white matter 
changes,

but otherwise no acute findings. 



DISCHARGE DIAGNOSES:  

1. Left-sided weakness, likely secondary to fatigue.

2. Transient ischemic attack versus stroke, rule out.

3. Headache.



SECONDARY DIAGNOSES:  

1. Hypertension.

2. Diabetes type 2.

3. Chronic pancreatitis.

4. Hyperlipidemia.

5. Chronic kidney disease 3.

6. Chronic obstructive pulmonary disease.

7. History of coronary artery disease.



DISCHARGE MEDICATIONS:  

1. Gabapentin 800 mg p.o. b.i.d.

2. Lisinopril 10 mg p.o. daily.

3. Paroxetine 10 mg p.o. at bedtime.

4. Risperidone 0.5 mg p.o. at bedtime.

5. Zyrtec 10 mg p.o. at bedtime.

6. Magnesium 500 mg p.o. b.i.d.

7. Creon 4 mg cap p.o. t.i.d.

8. Zofran 4 mg p.o. q.6 p.r.n.

9. Fenofibrate 160 mg p.o. at bedtime.

10. Vitamin D2 50,000 units p.o. as directed.

11. Lovaza __________ p.o. b.i.d.

12. Folic acid 1 mg p.o. at bedtime.

13. Torsemide 40 mg p.o. p.r.n.

14. Imdur 60 mg p.o. daily.

15. Plavix 75 mg p.o. daily.

16. Flonase p.r.n.

17. Albuterol sulfate 90 mcg inhaled q.4 hours p.r.n.

18. Metoprolol 25 mg p.o. b.i.d.

19. Ranexa 500 mg p.o. b.i.d.

20. Pepcid 20 mg p.o. daily.

21. Januvia 50 mg p.o. daily.

22. Morphine 50 mg p.o. daily p.r.n. pain.

23. Albuterol 2.5 mg, 3 mL neb q.8 p.r.n.

24. Lipitor 40 mg p.o. at bedtime.

25. Toujeo 87 units subcu b.i.d.



DISCONTINUE MEDICATION:  Toujeo 87 units t.i.d.



HISTORY OF PRESENT ILLNESS/HOSPITAL COURSE:  The patient is a 54-year-old female

with past medical history significant for COPD, type 2 diabetes, hyperlipidemia
, CKD

3, coronary artery disease with two stents, and long history of tobacco abuse, 
who

presented for left-sided weakness and slurred speech beginning the morning of

arrival.  She was worked up for TIA versus stroke due to these complications.  
She

states that the symptoms began when she woke up in the morning and she began 
feeling

weak and had a headache.  The headache did not wake her.  She states that she 
went

to bed without these symptoms.  Her headache was located in the left occipital

region.  She said it was an 8/10.  She complained of the room spinning.  She 
denied

any falls, but felt unsteady while walking. 



She also stated that she had been feeling confused over the previous week for 
two.

She also stated she had been having some hallucinations, but she had insight to 
them

and did not believe that there were real. 



In the emergency department, her chest x-ray and CT brain without contrast and 
CT

head and neck revealed no significant abnormalities or stenosis.  She did have 
right

ICA stenosis, but was mild. 



She was kept overnight without any worsening symptoms. 



On exam, cranial nerves 2 through 12 are intact.  She had normal sensation.  
She has

4/5 strength on her left extremity and 5/5 strength on her right extremity, but 
she

was able to ambulate without any complications. 



MRI revealed no acute abnormalities, but did show chronic changes, but nothing

suggesting an acute stroke at this time.  Due to her negative MRI, she was

discharged and advised to follow up with her primary care provider. 



She did have glargine 87 units t.i.d. noted by her for diabetes.  On her last

discharge summary, it states she had glargine 87 b.i.d.  I have discontinued 87

units t.i.d. and restarted the 87 units b.i.d.  Her blood sugars were running 
in the

90s to 140s on discharge.  I advised her to follow up with her primary care 
provider

to further assess her proper insulin control. 



DISPOSITION:  Stable.



LOCATION: [facility]



DIET:  Heart healthy.



EXERCISE ACTIVITY:  Ad german.



FOLLOWUP:  

1. Follow up with primary care provider Dr. Duke within the next 7 to 14 
days.







Job ID:  [id number]



[facility]

## 2019-11-04 NOTE — RAD
EXAM: 4 views of the right knee



HISTORY: Knee pain after fall



COMPARISON: None



FINDINGS: No knee effusion is seen. There is no evidence of acute fracture or dislocation. No signifi
cant degenerative changes are seen. No soft tissue swelling is present.



IMPRESSION: No evidence of acute osseous abnormality.



Reported By: Scout Carpenter 

Electronically Signed:  11/4/2019 5:16 PM

## 2019-11-04 NOTE — RAD
EXAM: 3 views of the right foot



HISTORY: Foot pain after fall



COMPARISON: None



FINDINGS: 3 views of the right foot shows no evidence of acute fracture or dislocation. Mild dorsal s
oft tissue swelling is seen. Moderate degenerative changes are seen in the midfoot.



IMPRESSION: No evidence of acute osseous abnormality.



Reported By: Scout Carpenter 

Electronically Signed:  11/4/2019 5:04 PM

## 2019-11-04 NOTE — PDOC.FPRHP
Addendum entered and electronically signed by Lyn Reyes MD  11/05/19 04:29: 











Original Note:








- History of Present Illness


Chief Complaint: generalized weakness, falls


History of Present Illness: 





Pt fell yesterday while walking down the [place]. Legs collapsed on her. Fell 

forward and hit R knee and foot. 


She also endorses tremors in hands and feet. Tremors in hands three months ago. 

Notice hands and legs feel jerky. Feels numbness in hands and feet all the time 

from severe cervical spinal stenosis. She has trouble pouring coffee.


Today she fell while on the porch. Today she "went straight down and collapsed"

. Said her sugars were "okay" during this time. Denies dizziness. Denies 

feeling like she is going to pass out. Dx with recent TIA. No hx of seizures. 

No new medications.


No chest pain or trouble breathing. Has chronic pancreatitis and feels nauseous 

all the time.








ED Course: 





EKG showed T wave inversion in aVL. 





- Allergies/Adverse Reactions


 Allergies











Allergy/AdvReac Type Severity Reaction Status Date / Time


 


adhesive Allergy Severe  Verified 04/03/19 01:59


 


aspirin Allergy   Verified 01/09/19 18:42


 


codeine Allergy   Verified 01/09/19 18:42


 


hydrocodone Allergy   Verified 01/09/19 18:42


 


levofloxacin [From Levaquin] Allergy   Verified 01/09/19 18:42


 


NSAIDS (Non-Steroidal Allergy   Verified 01/09/19 18:42





Anti-Inflamma     


 


Sulfa (Sulfonamide Allergy   Verified 01/09/19 18:42





Antibiotics)     


 


trimethoprim [From Bactrim] Allergy   Verified 01/09/19 18:42


 


tramadol AdvReac Mild ITCHING Verified 01/09/19 18:42














- Home Medications


 











 Medication  Instructions  Recorded  Confirmed  Type


 


Cetirizine HCl [Zyrtec] 10 mg PO HS 01/31/17 11/04/19 History


 


Gabapentin 800 mg PO BID 01/31/17 11/04/19 History


 


Lisinopril 10 mg PO QPM 01/31/17 11/04/19 History


 


Magnesium 500 mg PO BID 01/31/17 11/04/19 History


 


PARoxetine HCl [Paroxetine HCl] 10 mg PO HS 01/31/17 11/04/19 History


 


risperiDONE [Risperidone] 0.5 mg PO HS 01/31/17 11/04/19 History


 


Pancrelipase  42560 [Creon DR 4 cap PO TID-WM #30 cap 10/15/17 11/04/19 Rx





12,000 Units]    


 


Ondansetron [Zofran ODT] 4 mg PO Q6H PRN 4 Days #16 tab 03/07/18 11/04/19 Rx


 


Ergocalciferol (Vitamin D2) 50,000 unit PO ASDIR 04/30/18 11/04/19 History





[Vitamin D2]    


 


Fenofibrate [Tricor] 160 mg PO HS 04/30/18 11/04/19 History


 


Omega-3 Acid Ethyl Esters [Lovaza] 2 gm PO BID 04/30/18 11/04/19 History


 


Folic Acid [Folvite] 1 mg PO DAILY 01/09/19 11/04/19 History


 


Torsemide [Demadex] 40 mg PO ASDIR 01/09/19 11/04/19 History


 


Isosorbide Mononitrate [Imdur] 60 mg PO DAILY #30 tab 01/10/19 11/04/19 Rx


 


Clopidogrel Bisulfate [Plavix] 75 mg PO DAILY  tab 04/03/19 11/04/19 Rx


 


Fluticasone Propionate [Flonase 0 gm NASAL DAILY  bot 04/03/19 11/04/19 Rx





Nasal Spray]    


 


ALButerol Sulfate [Ventolin Neb] 3 ml NEB Q8HR PRN 10/19/19 11/04/19 History


 


Famotidine [Pepcid AC] 20 mg PO DAILY 10/19/19 11/04/19 History


 


Folic Acid 1 tab PO DAILY 10/19/19 11/04/19 History


 


Metoprolol Tartrate [Lopressor] 0.5 tab PO BID 10/19/19 11/04/19 History


 


Morphine Sulfate [Morphine Sulfate 15 mg PO QID 10/19/19 11/04/19 History





ER]    


 


Ranolazine [Ranexa] 500 mg PO BID 10/19/19 11/04/19 History


 


sitaGLIPtin Phosphate [Januvia] 50 mg PO DAILY 10/19/19 11/04/19 History


 


Atorvastatin Calcium [Lipitor] 40 mg PO HS #30 tab 10/20/19 11/04/19 Rx


 


Insulin Glargine,Hum.Rec.Anlog 87 unit SQ BID #9 insuln.pen 10/20/19 11/04/19 Rx





[Toujeo Solostar]    


 


Albuterol Sulfate [Proair 90 mcg IH Q4HR PRN 11/04/19 11/04/19 History





Respiclick]    


 


HumaLOG 0 unit SC AC PRN 11/04/19 11/04/19 History














- History


PMHx: type 2 diabetes, TIA, COPD, HTN, CAD, HLD, chronic pancreatitis


 


PSHx: Neck fusion, lumpectomy of breast x 2 for precancerous lesions, 

hysterectomy





FHx: denies family hx of parkinsons, tremors, dad side- heart disease; father-

brain aneurysm, family hx of diabetes


 


Social:


Smokes: trying to quit. On wellbutrin.


denies drinking, other drugs


Lives in mobile home w/ roommate. Has no children. Has a sister.


 








- Review of Systems


General: denies: fever/chills, weight/appetite/sleep changes, night sweats


Eyes: denies: vision changes


ENT: denies: nasal congestion


Respiratory: denies: cough, congestion, shortness of breath


Cardiovascular: denies: edema, orthopnea


Gastrointestinal: reports: nausea, abdominal pain (mild on left side).  denies: 

vomiting, diarrhea, constipation


Genitourinary: reports: incontinence (stress and urge, chronic)


Skin: denies: rashes


Musculoskeletal: reports: pain (in legs where she fell)


Neurological: reports: weakness (of legs), other (tremors).  denies: syncope, 

seizure


Psychological: reports: other (no anxiety depression reported, but takes ssri)





- Vital signs


BP: 148/67  HR: 70 RR: 19 Tmax: 98.3 Pox: 94% on RA  Wt: 114 kg








- Physical Exam


Constitutional: NAD, awake, alert and oriented, well developed


HEENT: normocephalic and atraumatic, PERRLA, EOMI, conjunctiva clear, no 

scleral icterus, grossly normal vision, TM's clear and intact, grossly normal 

hearing, normal nasal mucosa, MMM, oropharynx clear, good dention


Neck: supple, trachea midline


Heart: RRR, normal S1/S2, no murmurs/rubs/gallops


Lungs: CTAB, no respiratory distress, good air movement


Abdomen: soft, bowel sounds present, no masses/distention


Musculoskeletal: normal structure


Neurological: no focal deficit, CN II-XII intact, normal sensation


Skin: no rash/lesions


Heme/Lymphatic: no unusual bruising or bleeding, no purpura, no petechia


Psychiatric: normal mood and affect, intact recent and remote memory





FMR H&P: Results





- Labs


Result Diagrams: 


 11/05/19 05:56





 11/05/19 05:56


Lab results: 


 











WBC  10.8 thou/uL (4.8-10.8)   11/04/19  17:00    


 


Hgb  13.8 g/dL (12.0-16.0)   11/04/19  17:00    


 


Hct  43.3 % (36.0-47.0)   11/04/19  17:00    


 


MCV  89.7 fL (78.0-98.0)   11/04/19  17:00    


 


Plt Count  184 thou/uL (130-400)   11/04/19  17:00    


 


Neutrophils %  66.6 % (42.0-75.0)   11/04/19  17:00    


 


ABG pH  7.38  (7.35-7.45)   11/04/19  17:25    


 


ABG pCO2  45.2 mmHg (35.0-45.0)  H  11/04/19  17:25    


 


ABG pO2  63.5 mmHg (80.0-100.0)  L  11/04/19  17:25    


 


Sodium  137 mmol/L (136-145)   11/04/19  17:00    


 


Potassium  4.7 mmol/L (3.5-5.1)   11/04/19  17:00    


 


Chloride  105 mmol/L ()   11/04/19  17:00    


 


Carbon Dioxide  23 mmol/L (22-29)   11/04/19  17:00    


 


BUN  15 mg/dL (9.8-20.1)   11/04/19  17:00    


 


Creatinine  1.18 mg/dL (0.6-1.1)  H  11/04/19  17:00    


 


Glucose  83 mg/dL ()   11/04/19  17:00    


 


Calcium  9.1 mg/dL (7.8-10.44)   11/04/19  17:00    


 


Total Bilirubin  0.6 mg/dL (0.2-1.2)   11/04/19  17:00    


 


AST  18 U/L (5-34)   11/04/19  17:00    


 


ALT  18 U/L (8-55)   11/04/19  17:00    


 


Alkaline Phosphatase  80 U/L ()   11/04/19  17:00    


 


Serum Total Protein  6.7 g/dL (6.0-8.3)   11/04/19  17:00    


 


Albumin  3.9 g/dL (3.5-5.0)   11/04/19  17:00    














FMR H&P: A/P





- Problem List


(1) Chronic pancreatitis


Current Visit: No   Status: Chronic   Code(s): K86.1 - OTHER CHRONIC 

PANCREATITIS   





(2) Bipolar disorder


Current Visit: No   Status: Chronic   Code(s): F31.9 - BIPOLAR DISORDER, 

UNSPECIFIED   


Qualifiers: 


   Active/Remission status: remission status unspecified   Qualified Code(s): 

F31.9 - Bipolar disorder, unspecified   





(3) CAD (coronary artery disease)


Current Visit: No   Status: Chronic   Code(s): I25.10 - ATHSCL HEART DISEASE OF 

NATIVE CORONARY ARTERY W/O ANG PCTRS   


Qualifiers: 


   Coronary Disease-Associated Artery/Lesion type: native artery   Native vs. 

transplanted heart: native heart   Associated angina: angina presence 

unspecified   Qualified Code(s): I25.10 - Atherosclerotic heart disease of 

native coronary artery without angina pectoris   





(4) Chronic kidney disease, stage 3


Current Visit: No   Status: Chronic   





(5) Diabetes mellitus type 2 in obese


Current Visit: No   Status: Chronic   Code(s): E11.9 - TYPE 2 DIABETES MELLITUS 

WITHOUT COMPLICATIONS; E66.9 - OBESITY, UNSPECIFIED   





(6) Hyperlipidemia


Current Visit: No   Status: Chronic   Code(s): E78.5 - HYPERLIPIDEMIA, 

UNSPECIFIED   


Qualifiers: 


   Hyperlipidemia type: unspecified   Qualified Code(s): E78.5 - Hyperlipidemia

, unspecified   





(7) Hypertension


Current Visit: No   Status: Chronic   Code(s): I10 - ESSENTIAL (PRIMARY) 

HYPERTENSION   


Qualifiers: 


   Hypertension type: essential hypertension   Qualified Code(s): I10 - 

Essential (primary) hypertension   





(8) Tobacco abuse


Current Visit: No   Status: Chronic   Code(s): Z72.0 - TOBACCO USE   





(9) Recurrent falls


Current Visit: Yes   Status: Acute   Code(s): R29.6 - REPEATED FALLS   





(10) Generalized weakness


Current Visit: Yes   Status: Acute   Code(s): R53.1 - WEAKNESS   





(11) Tremor


Current Visit: Yes   Status: Acute   Code(s): R25.1 - TREMOR, UNSPECIFIED   





- Plan





54-yo female admitted for evaluation of :





Recurrent falls


Tremor, intention vs postural


Deconditioning vs. neurologic etiology


- Generalized weakness and falls are concerning along w/ worsening tremor over 

past 3 months. 


- May be due to pt's history of cervical spine stenosis or some other 

neurologic process


- MRI with and without contrast of Brain and Cervical Spine to rule out new 

etiology of leg weakness.


- orthostatic vital signs ordered in case any orthostasis and near-syncope may 

be playing a role in her falls.


- Rehab screen placed. Pt may not be able to safely take care of herself at 

home and is deconditioned at the very least. 


- Case mgmt consulted for discharge planning





Atypical chest pain


- EKG finding pathologic: T wave inversion in aVL which is new


- HEART score is 4


- magnesium, phosphorus, TSH pending


- pt has heartburn symptoms and chronic nausea which may be due to other 

problems, but also may be an atypical presentation of chest pain


- trend troponins


- NPO at midnight for stress test. Will hold beta blocker





T2DM


- last A1C in October was 7.0


- hypoglycemia protocol


- fasting lipid panel for AM





COPD


Bipolar disorder


Hx of TIA


HLD


- continue home meds





Code: full


VTE ppx: plavix


GI ppx: famotidine





Porsha Pedraza MD


[id number]














Disposition/LOS: 





Admit to telemetry observation. Stress test in AM. 





FMR H&P: Upper Level





- Pertinent history





55 yo f c/o two falls over the past week. She was walking both times and 

suddenly collapsed. She has felt weak for several weeks, in her lower 

extremities. She denies LOC. She also denies lightheadedness or dizziness. She 

felt weak on her feet, but did not say the room was spinning. She checked her 

blood sugar right after and it was in the 90s. She was seen at the ER recently 

for a CVA workup and was told she had a TIA. She endorses chronic tingling and 

numbness in b/l upper and lower extremities that she attributes to spinal 

stenosis; these sx are not new. She has not had any episodes of bowel or 

bladder incontinence. She also endorse a new tremor with pouring coffee and 

writing her name but also with different postures; its a shaking of her hands. 

She has no fam hx of parkinsons. She is on a lot of medications however. She 

has chronic mid epigastric pain she attributes to chronic pancreatitis. She 

denied classic chest pain, but does endorse chronic abdominal pain. She 

endorses chronic nausea but no vomiting. She has not been sick recently, denies 

fever. Denies shortness of breath. Endorses some orthopnea.





VSS


PE:


decreased sensation right side Leg=arm=CN


4/5 muscle strength in upper and lower extremities


CN 2-12 intact


faint wheezing left lung field, initial crackles left sided that cleared with 

additional inspirations


RRR


no edema


1+ pulses LE b/l





Labs:


trop negative x2


cbc wnl


Cr 1.18 (lower than prior visits)


tsh normal





EKG: 


inverted t wave avL





Foot, right xray


Knee, right xray


-both negative for fracture





recent MRI 10/21: negative for CVA, minimal chronic ischemic changes





A/P:


Generalized weakness with frequent falls-


-DDX: Central canal stenosis vs new CVA vs near syncope vs deconditioning vs 

hypoglycemia


-pt with known central canal stenosis; unsure when her last imaging of her 

spine was completed


-Recent MRI of brain wwo contrast on 10/20 was negative for an acute CVA


-will repeat an MRI of the brain and spine tomorrow to rule out an acute CVA as 

well as further assess degree of stenosis


-will monitor on tele obs for arrythmias and will check blood sugar frequently 

to monitor for hypoglycemia episodes


-Pt seems to be doconditioned and would benefit from home PT


-will also rule out orthostatic hypotension as the cause for her falls





#abnormal EKG-


-pt denies classic chest pain, however given that she is female, type 2 diabetic

, has HTN, HLD, and a hx of CAD s/p a stent placed, giving her a heart score of 

4, with no cardiologist that she currently sees, we will order a stress test 

for her tomorrow; we will also trend her troponins 





#Tremor, new


-Postural vs Intention


-trial of propranolol, pt does not have cogwheeling rigidity, and tremor is not 

pillrolling. Low suspicion for parkinsons





#Type 2 Diabetes


-restart home meds


-monitor for episodes of hypoglycemia





#HTN


-home meds, hold bb for stress in am





#HLD


-restart home meds








HCastillo Oneill MD, PGY-3





- Plan


Date/Time: 11/04/19 1829








I, [], have evaluated this patient and agree with findings/plan as outlined by 

intern resident. Pertinent changes/additions are listed here.








Addendum - Attending





- Attending Attestation


Date/Time: 11/05/19 1841





I personally evaluated the patient and discussed the management with Dr. Gamez yesterday evening. 


I agree with the History, Examination, Assessment and Plan documented above 

with any addition or exceptions noted below.

## 2019-11-05 NOTE — MRI
MRI Brain W WO Con: 



11/5/2019 12:00 AM



CLINICAL HISTORY: .



TECHNIQUE: Multiplanar, multisequence images were obtained of the brain with and without contrast. 20
 cc of MultiHance was utilized for the examination. Motion artifact heavily degrades image detail.



COMPARISON: CTA of the brain dated October 19, 2019. MR of the brain dated May 1, 2018



FINDINGS: 



Extra axial spaces: Normal in size and morphology for the patient's age.

Hemorrhage: None.

Ventricular system: Normal in size and morphology for the patient's age.

Basal cisterns: Normal.

Cerebral parenchyma: There is a remote lacunar infarct involving the left inferior cerebellar hemisph
ere which is stable. No additional gross signal abnormality or region of abnormal enhancement is

demonstrated.. No abnormal enhancement demonstrated.

Midline shift: None.

Cerebellum: As above

Brainstem: Normal.



OTHER:



Calvarium: Normal.

Vascular system: The right vertebral artery appears diminutive but with appropriate flow void. There 
are appropriate flow void seen within the major intravascular structures..

Visualized Paranasal sinuses: Clear.

Visualized Orbits: Normal.

Visualized upper cervical spine: Normal.

Sella and skull base: Normal. Small 9 mm sebaceous cyst suspected within the left maxillary region on
 image 5 of series 8.

There is a small Tornwaldt cyst measuring 12 mm within the posterior midline nasopharynx which is sta
ble.



IMPRESSION:



No acute intracranial abnormality.

Stable remote lacunar infarct involving the left inferior cerebellar hemisphere.



Reported By: Carlos Veras 

Electronically Signed:  11/5/2019 3:56 PM

## 2019-11-05 NOTE — MRI
MR CERVICAL SPINE WITH AND WITHOUT CONTRAST



INDICATION: Weakness with recurrent falls



TECHNIQUE: Multiplanar multisequence MR images were obtained of the cervical spine with and without c
ontrast. 20 cc of MultiHance was utilized for the exam. Patient reportedly was falling asleep

during the examination. Motion artifact limits image detail.



COMPARISON: None



FINDINGS:



Posterior fossa: Within normal limits.



Bone marrow signal intensity: There is postsurgical change of an ACDF spanning C4-C7. There is suscep
tibility artifact within the posterior elements of C4, C5 and C6 likely related to cerclage

banding.



Spinal alignment: Normal.



Craniocervical junction: Normal appearing.



Prevertebral and perivertebral soft tissues: Visualized soft tissues appear within normal limits.





Vertebral levels:



C2-C3: There is a prominent central protrusion inducing mild to moderate effacement of ventral spinal
 cord.



C3-4: There is a prominent central protrusion inducing severe central canal narrowing with moderate t
o severe spinal cord compression.



C4-5:  There is a residual osteophyte complex likely inducing at least mild osseous central canal oscar
rowing with mild right osseous neural foraminal narrowing.



C5-C6: No appreciable central canal or neuroforaminal narrowing.



C6-C7:, No appreciable central canal or neuroforaminal narrowing.



C7-T1: No appreciable central canal or neuroforaminal narrowing.



No definite abnormal region of enhancement is demonstrated.



IMPRESSION:

1. Large central disc protrusion at C3-4 causing severe central canal narrowing with moderate to nano
re spinal cord compression.

2. Prominent central disc protrusion at C2-C3 causing moderate central canal narrowing with mild to m
oderate effacement of the ventral spinal cord.

3. Mild osseous central canal narrowing and mild right neural foraminal narrowing at C4-5.

4. Some limitations in exam due to motion artifact.



Reported By: Carlos Veras 

Electronically Signed:  11/5/2019 3:49 PM

## 2019-11-05 NOTE — NM
Exam:

Cardiac stress only SPECT with EF and wall motion



Patient was injected with 31 mCi technetium 99m sestamibi intravenously.



There is a Lexiscan protocol study.



No scan evidence for infarct or ischemia involving the visualized short axis, vertical long axis, and
 horizontal long axis SPECT images.



LHR equals 0.42

EDV equals 108 mL

Ejection fraction equals 70%.

Wall motion: Unremarkable



IMPRESSION:

Unremarkable stress only cardiac SPECT with EF and wall motion.



Reported By: Julian Salazar 

Electronically Signed:  11/5/2019 1:35 PM

## 2019-11-05 NOTE — PDOC.FM
- Subjective


Subjective: 





Patient doing well this morning. Reports that she continues to feel weak. 

Denies symptoms of cp, sob, abdominal pain. Discussed plans for MRI and stress 

test today. Patient agreeable to current plan of care.





- Objective


Vital Signs & Weight: 


 Vital Signs (12 hours)











  Temp Pulse Resp BP BP BP BP


 


 11/05/19 05:16  98.3 F  77  18  151/66 H   


 


 11/04/19 23:00  98.3 F  70  19   144/65 H  129/51 L  134/60


 


 11/04/19 22:56     134/60   


 


 11/04/19 20:14  97.7 F  71  20     148/67 H














  Pulse Ox


 


 11/05/19 05:16  77 L


 


 11/04/19 23:00  94 L


 


 11/04/19 22:56 


 


 11/04/19 20:14  95








 Weight











Weight                         114.623 kg














I&O: 


 











 11/03/19 11/04/19 11/05/19





 06:59 06:59 06:59


 


Intake Total   240


 


Output Total   1100


 


Balance   -860











Result Diagrams: 


 11/05/19 05:56





 11/05/19 05:56





Phys Exam





- Physical Examination


Constitutional: NAD


HEENT: moist MMs, sclera anicteric


Neck: supple, full ROM


Respiratory: clear to auscultation bilateral


Cardiovascular: RRR, no significant murmur


Gastrointestinal: soft, non-tender


Musculoskeletal: pulses present


Neurological: moves all 4 limbs


Sensation R>L, Strength L>R


Psychiatric: normal affect, A&O x 3


Skin: no rash, normal turgor





Dx/Plan


(1) Generalized weakness


Code(s): R53.1 - WEAKNESS   Status: Acute   





(2) Recurrent falls


Code(s): R29.6 - REPEATED FALLS   Status: Acute   





(3) Tremor


Code(s): R25.1 - TREMOR, UNSPECIFIED   Status: Acute   





(4) CAD (coronary artery disease)


Code(s): I25.10 - ATHSCL HEART DISEASE OF NATIVE CORONARY ARTERY W/O ANG PCTRS 

  Status: Chronic   


Qualifiers: 


   Coronary Disease-Associated Artery/Lesion type: native artery   Native vs. 

transplanted heart: native heart   Associated angina: angina presence 

unspecified   Qualified Code(s): I25.10 - Atherosclerotic heart disease of 

native coronary artery without angina pectoris   





(5) Diabetes mellitus type 2 in obese


Code(s): E11.9 - TYPE 2 DIABETES MELLITUS WITHOUT COMPLICATIONS; E66.9 - OBESITY

, UNSPECIFIED   Status: Chronic   





(6) Hyperlipidemia


Code(s): E78.5 - HYPERLIPIDEMIA, UNSPECIFIED   Status: Chronic   


Qualifiers: 


   Hyperlipidemia type: unspecified   Qualified Code(s): E78.5 - Hyperlipidemia

, unspecified   





(7) Hypertension


Code(s): I10 - ESSENTIAL (PRIMARY) HYPERTENSION   Status: Chronic   


Qualifiers: 


   Hypertension type: essential hypertension   Qualified Code(s): I10 - 

Essential (primary) hypertension   





(8) Tobacco abuse


Code(s): Z72.0 - TOBACCO USE   Status: Chronic   





- Plan


Plan: 





54-yo female admitted for evaluation of recurrent falls and generalized weakness

/deconditioning:





#Recurrent falls due to spinal stenosis vs TIA vs arrhythmia vs hypoglycemia vs 

deconditioning


#Tremor, intention vs postural


- Generalized weakness and falls w/ worsening tremor over past 3 months


- patient has hx of cervical spinal stenosis, DM2, CAD and HTN


- MRI with and without contrast of Brain and Cervical Spine today to rule out 

new neurologic etiology of leg weakness.


- orthostatic vital signs positive, systolic dropped 19 and diastolic dropped >

10 when patient went from sitting to standing


- on tele overnight for arrhythmia evaluation, nsr overnight


- blood sugar monitored, wnl overnight


- Rehab screen placed. Pt may not be able to safely take care of herself at 

home and is de-conditioned at the very least. 


- Case mgmt consulted for discharge planning





#Possible Atypical chest pain


- EKG finding pathologic: T wave inversion in aVL which is new


- HEART score is 4


- magnesium, phosphorus, TSH wnl


- pt has heartburn symptoms and chronic nausea which may be due to other 

problems, but also may be an atypical presentation of chest pain


- troponins neg x3


- Stress test today, beta blocker held


- FLP





#T2DM


- last A1C in October was 7.0


- hypoglycemia protocol


- lowest blood sugar reported overnight was 78








COPD


Bipolar disorder


Hx of TIA


HLD


HTN


- continue home meds





Code: full


VTE ppx: plavix


GI ppx: famotidine











Addendum - Attending





- Attending Attestation


Date/Time: 11/06/19 3786





I personally evaluated the patient and discussed the management with Dr. Bean 

on 11/5/2019


I agree with the History, Examination, Assessment and Plan documented above 

with any addition or exceptions noted below- Patient still feels a little weak. 

c/o right arm numbness that has worsening over many months and now having 

difficulty gripping items. Afebrile VSS A/P: 1) Fall - MRI showed central disc 

protrusion at C3-C4 causing central canal stenosis with severe cord 

compression. Findings explain numbness in arms. May be cause of falls also. 

Will consult neurosurgery for opinion and evaluation. 2) Abnormal EKG- stress 

test normal.

## 2019-11-06 NOTE — PDOC.FM
- Subjective


Subjective: 





Patient doing well this morning. Spent much of yesterday walking around the 

hospital, reports she is re-training herself to walk by locking her knees when 

she steps. Discussed that we consulted neurosurgery. Discussed that she has 

talked with them before coming to the hospital and they had recommended that 

she get a myelogram outpatient, but she had been asked to stop her plavix 5 

days before the imaging and she had never done so. Discussed that neurosurgery 

stated that they would come by today to talk with her and formulate a plan 

going forward, whether to follow up outpatient or to get imaging here today. 





- Objective


Vital Signs & Weight: 


 Vital Signs (12 hours)











  Temp Pulse Resp BP Pulse Ox


 


 11/06/19 02:40   92  18  125/59 L  95


 


 11/05/19 21:01     112/58 L 


 


 11/05/19 20:10  98.2 F  98  18  112/58 L  97








 Weight











Weight                         114.623 kg














I&O: 


 











 11/04/19 11/05/19 11/06/19





 06:59 06:59 06:59


 


Intake Total  240 600


 


Output Total  1100 


 


Balance  -860 600











Result Diagrams: 


 11/05/19 05:56





 11/05/19 05:56


EKG Reviewed by me: Yes (sinus 90s-100s)





Phys Exam





- Physical Examination


Constitutional: NAD


HEENT: sclera anicteric


Neck: supple, full ROM


Respiratory: clear to auscultation bilateral


Cardiovascular: RRR, no significant murmur


Gastrointestinal: soft, non-tender, no distention


Musculoskeletal: no edema, pulses present


Neurological: moves all 4 limbs


strength R>L, sensation L>R


Psychiatric: normal affect, A&O x 3


Skin: no rash, normal turgor





Dx/Plan


(1) Generalized weakness


Code(s): R53.1 - WEAKNESS   Status: Acute   





(2) Recurrent falls


Code(s): R29.6 - REPEATED FALLS   Status: Acute   





(3) Tremor


Code(s): R25.1 - TREMOR, UNSPECIFIED   Status: Acute   





(4) CAD (coronary artery disease)


Code(s): I25.10 - ATHSCL HEART DISEASE OF NATIVE CORONARY ARTERY W/O ANG PCTRS 

  Status: Chronic   


Qualifiers: 


   Coronary Disease-Associated Artery/Lesion type: native artery   Native vs. 

transplanted heart: native heart   Associated angina: angina presence 

unspecified   Qualified Code(s): I25.10 - Atherosclerotic heart disease of 

native coronary artery without angina pectoris   





(5) Diabetes mellitus type 2 in obese


Code(s): E11.9 - TYPE 2 DIABETES MELLITUS WITHOUT COMPLICATIONS; E66.9 - OBESITY

, UNSPECIFIED   Status: Chronic   





(6) Hyperlipidemia


Code(s): E78.5 - HYPERLIPIDEMIA, UNSPECIFIED   Status: Chronic   


Qualifiers: 


   Hyperlipidemia type: unspecified   Qualified Code(s): E78.5 - Hyperlipidemia

, unspecified   





(7) Hypertension


Code(s): I10 - ESSENTIAL (PRIMARY) HYPERTENSION   Status: Chronic   


Qualifiers: 


   Hypertension type: essential hypertension   Qualified Code(s): I10 - 

Essential (primary) hypertension   





(8) Tobacco abuse


Code(s): Z72.0 - TOBACCO USE   Status: Chronic   





- Plan


Plan: 





54-yo female admitted for evaluation of recurrent falls and generalized weakness

/deconditioning:





#Recurrent falls due to spinal stenosis vs TIA vs arrhythmia vs hypoglycemia vs 

deconditioning


#Tremor, intention vs postural


- Generalized weakness and falls w/ worsening tremor over past 3 months


- patient has hx of cervical spinal stenosis, DM2, CAD and HTN


- MRI with and without contrast of Brain negative aside from old infarct


- MRI of Cervical Spine demonstrates:


   -large central disc protrusion of C3-C4 causing severe central canal 

narrowing with moderate to severe spinal cord compression


   -prominent central disc protrusion at C2-C3 causing moderate central canal 

narrowing with mild to moderate effacement of the ventral spinal cord


   -mild osseous central canal narrowing and mild right neural foraminal 

narrowing at C4-C5


- Neurosurgery consulted, appreciate recs regarding imaging today vs f/u 

outpatient


- orthostatic vital signs positive, diastolic dropped >10 when patient went 

from sitting to standing; patient reports she had been deydrated


- on tele overnight for arrhythmia evaluation, nsr overnight


- blood sugar monitored, wnl overnight


- OT/PT rec HH with OT/PT, no inpatient rehab


- Case mgmt consulted for discharge planning





#Possible Atypical chest pain


- EKG finding pathologic: T wave inversion in aVL which is new


- HEART score is 4


- magnesium, phosphorus, TSH wnl


- pt has heartburn symptoms and chronic nausea 


- troponins neg x3


- Stress test 11/5, normal stress test with EF of 70%





#T2DM


- last A1C in October was 7.0


- hypoglycemia protocol


- lowest blood sugar reported overnight was 78








COPD


Bipolar disorder


Hx of TIA


HLD


HTN


- continue home meds





Code: full


VTE ppx: plavix


GI ppx: famotidine





Dispo: neurosurgery consult for severe spinal stenosis, possible d/c today if 

neurosurgery wants to follow her outpatient








Addendum - Attending





- Attending Attestation


Date/Time: 11/06/19 6935





I personally evaluated the patient and discussed the management with Dr. Otero


I agree with the History, Examination, Assessment and Plan documented above 

with any addition or exceptions noted below.





Jessika

## 2019-11-06 NOTE — CON
DATE OF CONSULTATION:  



HISTORY OF PRESENT ILLNESS:  Ms. Nguyen was brought into the emergency department

on November 4th for generalized weakness and she just had a fall, she states that

her legs gave out.  The most recent one on the 4th resulted in her injuring her

right foot.  Neurosurgery has been consulted on Ms. Nguyen to clarify whether if

__________ cervical stenosis could be the cause of her symptoms.  Ms. Nguyen is

actually known to our office whom we saw her on October 1, 2019 for right greater

than left leg and arm pain.  The patient had a previous ACDF and posterior wiring

procedure in the 90s.  She had good recovery following that surgery.  However, over

the last year or so, her right arm has since numb and tingly along with her left

leg.  She also states that the left hand is beginning to get tingly as well.  She

has significant weakness in the right compared to left upper extremity when we saw

her in October.  Today, actually, she has improved strength in the right upper

extremity in comparison.  Her sensation has stayed the same with decreased sensation

in her right upper extremity and tingling in her left hand.  She states that her

right lower leg is numb and tingly as well in compared to the left.  The patient

describes her fall as her leg is giving out on her from buckling.  She does not

appear to be too unsteady, but she has been falling more frequently.  Her hands are

not working the way that she would like them to.  They are weak and she drops items

quite frequently.  The patient is a smoker. 



REVIEW OF SYSTEMS:  10-point review of systems has been completed and is negative

other than stated in the above HPI. 



PAST MEDICAL HISTORY:  Congestive heart failure, 2 MIs, pancreatic mass, renal

disease, kidney disease, coronary artery disease, she has had 2 stents placed,

diabetes, hyperlipidemia, obesity, and COPD. 



PAST SURGICAL HISTORY:  Appendectomy, cholecystectomy, anterior cervical fusion and

posterior wiring in the 90s, hysterectomy, bilateral oophorectomy, left knee

surgery, bilateral lumpectomy, left breast removal, skin cancer. 



PSYCHIATRIC HISTORY:  Bipolar disorder.



SOCIAL HISTORY:  The patient denies alcohol use.  Denies drug use.  Uses tobacco,

smokes cigarettes daily.  She has been 2 packs a day for some time, has tried to

recently decrease about a pack a day, maybe a little less.  Lives at home with

family and her sister. 



ALLERGIES:  

1. ASPIRIN.

2. BACTRIM.

3. CODEINE.

4. LEVAQUIN.

5. NSAIDS.

6. TRAMADOL.

7. VICODIN.



MEDICATIONS:  

1. Lisinopril.

2. Gabapentin.

3. Isosorbide.

4. Omega-3.

5. Vitamin D3.

6. Ranexa.

7. Torsemide.

8. Pepcid.

9. Morphine.

10. Paxil.

11. Risperdal.

12. Humalog.

13. Plavix.



PHYSICAL EXAMINATION:

VITAL SIGNS:  Temperature 98.2, heart rate 92, blood pressure 125/59, respirations

18, and O2 sats 95% on room air. 

CONSTITUTIONAL:  The patient is alert and oriented.  She is afebrile, normotensive,

in no apparent distress.  Nontoxic appearing. 

HEENT:  Head is normocephalic and atraumatic.  Pupils are equal, round, and reactive

to light.  Extraocular movements are intact.  Hearing is intact.  Moist mucous

membranes. 

RESPIRATIONS:  Normal work of breathing on room air.  Symmetric chest rise. 

EXTREMITIES:  Upper extremities, 4/5 in left deltoids, biceps, triceps; finger

extension 3/5; wrist extension 3/5; finger intrinsics right side 5/5 deltoids,

biceps, triceps; and wrist extension 3/5; finger extension and finger intrinsics,

there is decreased sensation in the right hand and arm compared to the left. 



Lower extremity 4+/5 bilaterally in hip flexion and extension, knee flexion and

extension.  Decreased strength in the right dorsiflexion due to pain in foot, 4/5 in

left dorsiflexion, 5/5 bilateral plantar flexion, and 4+/5 in EHL bilaterally.

Decreased sensation in the left leg compared to right. 



NEUROLOGIC:  The patient is awake, alert, and oriented x3.  Cranial nerves are

grossly intact.  Speech is spontaneous and fluent.  Normal fund of knowledge. 



IMAGING STUDIES:  MRI of cervical spine has been completed.  However, given artifact

and the fact that she has anterior and posterior elements from C4 through C7 has

caused distortion to imaging.  There is disk protrusion at C3-C4 causing central

canal stenosis with disk at C2-C3 as well.  Brain MRI, there are no acute

intracranial abnormalities.  There is a stable remote lacunar infarct involving the

left superior cerebellar hemisphere. 



ASSESSMENT AND PLAN:  Ms. Nguyen is a 54-year-old female with history of a

generalized weakness and falls.  She is known to our neurosurgery clinic when we saw

her on October 1st.  We had recommended a myelogram of her cervical and lumbar spine

along with flexion-extension x-rays of both cervical and lumbar spine.  We also

recommended that she quit smoking given the deterioration of her exam which actually

has improved slightly since we saw her on the 10th, compared to this morning, we

would likely consider surgery once we know better ways to visualize the spinal cord.

 At this point, we recommend that these imaging to be done, there is no emergent

need, these processes have been acting over time and is advisable for the patient to

quit smoking.  She will need to be off Plavix for at least a week prior to obtaining

the myelogram, so she will need to do this as outpatient.  Once the imaging has been

completed, we can review these with her in the office.  If there are any further

questions, please contact Neurosurgery. 





Job ID:  [id number]

## 2019-11-07 NOTE — DIS
DATE OF ADMISSION:  2019



DATE OF DISCHARGE:  2019



ADMITTING RESIDENT:  Porsha Pedraza MD



ADMITTING ATTENDING:  Joe Carrillo MD



DISCHARGE ATTENDING:  Keyona Kelly MD



DISCHARGE RESIDENT:  Didi Bean MD



CONSULTS:  Case Management, OT, Neurosurgery.



PROCEDURES PERFORMED:  None.



IMAGIN. Brain MRI:  No acute intracranial abnormality, stable remote lacunar infarct

involving the left inferior cerebellar hemisphere. 

2. Cervical spine MRI:

a. Large central disk protrusion of C3-C4 causing severe central canal narrowing

with moderate-to-severe spinal cord compression. 

b. Prominent central disk protrusion at C2-C3 causing moderate central canal

narrowing with mild-to-moderate effacement of the ventral spinal cord. 

c. Mild osseous central canal narrowing and mild right neural foraminal 
narrowing at

C4-C5. 

3. Nuclear stress test:  Unremarkable stress, EF 70%.



PRIMARY DIAGNOSES:  Generalized deconditioning, severe cervical spinal stenosis,

atypical chest pain. 



SECONDARY DIAGNOSIS:  Type 2 diabetes, chronic obstructive pulmonary disease,

bipolar disorder, history of transient ischemic attack, hyperlipidemia, and

hypertension. 



DISCHARGE MEDICATIONS:  

1. 3 mL albuterol sulfate nebulized q.8 hours p.r.n.

2. 90 mcg albuterol sulfate inhaled q.4 hours p.r.n.

3. 40 mg atorvastatin p.o. at bedtime.

4. 10 mg cetirizine p.o. at bedtime.

5. 75 mg clopidogrel p.o. daily.

6. 50,000 units vitamin D2 p.o. as directed.

7. 20 mg famotidine p.o. daily.

8. 160 mg fenofibrate p.o. at bedtime.

9. Flonase nasal spray.

10. One tab folic acid p.o. daily.

11. 800 mg gabapentin p.o. b.i.d.

12. Humalog.

13. Toujeo 87 units subcu b.i.d.

14. 60 mg isosorbide mononitrate p.o. daily.

15. 10 mg lisinopril p.o. q.p.m.

16. 500 mg magnesium p.o. b.i.d.

17. 12.5 mg metoprolol p.o. b.i.d.

18. 15 mg morphine sulfate p.o. q.i.d.

19. 2 g omega-3 acid p.o. b.i.d.

20. 4 mg Zofran p.o. q.6 hours p.r.n.

21. 10 mg paroxetine p.o. at bedtime.

22. Pancrelipase 4 capsules p.o. t.i.d. with meals.

23. 20 mEq potassium chloride p.o. b.i.d.

24. 500 mg ranolazine p.o. b.i.d.

25. 40 mg torsemide p.o. as directed.

26. 0.5 mg risperidone p.o. at bedtime.

27. 50 mg Januvia p.o. daily.



DISCONTINUED MEDICATIONS:  

Lovenox.



HISTORY OF PRESENT ILLNESS/HOSPITAL COURSE:  The patient presented to the 
hospital

after having falls at home, diagnosed with generalized deconditioning and 
admitted

for workup.  Orthostatic vitals were slightly positive for diastolic blood 
pressure

dropping greater than 10 from sitting to standing.  The patient received fluids 
and

imaging, see above.  She also had atypical chest pain, so a stress test was done
,

see above.  Due to the findings on the cervical MRI, Neurosurgery was consulted 
and

they decided to follow up with the patient outpatient.  The patient was 
directed to

stop Plavix 7 days before anticipated imaging study outpatient per 
Neurosurgery. 



On the day of discharge, the patient had often been found walking the halls of 
the

hospital on her own without falls.  She reported that she felt somewhat better 
after

fluid resuscitation, and she was finding ways to lock her knees while walking, 
so

that her legs would not "buckle" underneath her.  She was in stable condition 
and

agreeable with the plan of discharge and followup outpatient with her scheduled 
appointment with Neurosurgery and

within 1 week with her PCP. 



DISPOSITION:  Stable.



DISCHARGE INSTRUCTIONS:  

1. Location:  Home.

2. Diet:  Diabetic, heart healthy.

3. Activity:  As tolerated.

4. Followup:  Follow up with Dr. Khan in 7 days, and with Surgery at 
scheduled

appointment. 







Job ID:  [id number]



[facility]

## 2019-11-10 NOTE — HP
TRAUMA SURGEON:  Dr. Crews.



CONSULTING PHYSICIANS:  Dr. [facility]

residents. 



HISTORY OF PRESENT ILLNESS:  The patient is a 54-year-old female, who presented to

the emergency department after a mechanical fall while ambulating.  The patient was

recently admitted to the hospital, and was discharged 2 days ago after reporting

weakness and multiple falls as well as chest pain.  She was worked up and discharged

with followups with Neurosurgery for spinal stenosis.  At that time, a stress test

was also completed, which demonstrated unremarkable stress test.  At the time of my

evaluation, the patient had received 100 mcg of fentanyl and was quite sleepy.  She

was arousable to voice, but only answer questions intermittently.  She denied any

loss of consciousness.  On her med rec, it does show that the patient is on Plavix.

She has been hemodynamically stable in the emergency department.  She has no

complaints at the time of my evaluation.  Due to her complex medical history and

multiple recent admissions to the Family Medicine Service, we have asked them to see

this patient and have formally consulted them for complex medical management.  I

have spoken to the residents who have agreed to see the patient and assist in her

care. 



REVIEW OF SYSTEMS:  Unable to complete due to the patient's altered mentation

secondary to narcotic pain medication. 



PAST MEDICAL HISTORY:  Type 2 diabetes, COPD, bipolar disorder, TIAs,

hyperlipidemia, hypertension, CHF, CKD, CAD with 2 cardiac stents, and chronic

pancreatitis. 



PAST SURGICAL HISTORY:  Neck fusion, lumpectomy of breast x2 for precancerous

lesion, hysterectomy, and appendectomy. 



SOCIAL HISTORY:  The patient is a smoker.  She denies drug or alcohol use.



MEDICATIONS:  The patient is not able to review her medications, but on her

discharge summary from a discharge on November 6, 2019, shows that the patient was

discharged with, 

1. Albuterol treatments.

2. Atorvastatin.

3. Cetirizine.

4. Plavix.

5. Vitamin D.

6. Famotidine.

7. Fenofibrate.

8. Flonase.

9. Folic acid.

10. Gabapentin.

11. Humalog.

12. Toujeo.

13. Isosorbide mononitrate.

14. Lisinopril.

15. Magnesium.

16. Metoprolol.

17. Morphine sulfate extended release.

18. Omega-3.

19. Zofran.

20. Paroxetine.

21. Pancrelipase.

22. Potassium chloride.

23. Ranolazine.

24. Torsemide.

25. Risperidone.

26. Januvia.



ALLERGIES:  ADHESIVE TAPE, ASPIRIN, CODEINE, HYDROCODONE, LEVOTHYROXINE, NSAIDS,

SULFA DRUGS, TRIMETHOPRIM, BACTRIM, AND TRAMADOL. 



OBJECTIVE:  VITAL SIGNS:  Temperature 98.4, pulse 70, respirations 18, oxygen

saturation 95% on room air, and blood pressure 165/87. 



PRIMARY SURVEY: 

Airway intact. 

Adequate breath sounds bilaterally. 

2+ pulses in the bilateral radials, femorals, and DPs. 

GCS is 13 to 14, -1 for confusion, -1 for eyes, secondary to 100 mcg of fentanyl.

The patient's GCS was 15 before the medication and she reports no loss of

consciousness.  Gross motor and sensation are intact.  No laceration, bruising, or

external bleeding. 



SECONDARY SURVEY: 

HEAD:  Normocephalic and atraumatic.  No gross palpable skull deformities. 

EYES:  Pupils 3-2, equal, round, and reactive to light bilaterally. 

ENT:  No hemotympanum.  No epistaxis.  No septal hematoma.  Midface stable to

manipulation.  No blood in the oropharynx.  Dentition is intact.  No anterior neck

injury/crepitus/tenderness. 

C-SPINE:  No step-offs or deformities.  Nontender.  C-collar in place. 

CHEST:  Nontender.  No crepitus.  No abrasions or ecchymosis.  Equal chest movement. 

ABDOMEN:  Soft, nontender, and nondistended. 

PELVIS:  Stable to palpation, nontender.  No abrasions or ecchymosis. 

RECTAL:  Deferred. 

GENITOURINARY:  Deferred. 

EXTREMITIES:  Mild-to-moderate swelling over the right ankle.  No abrasions noted.

No ecchymosis noted.  2+ pulses positive in the bilateral radials, femorals, and

DPs. 

BACK/SPINE:  No step-offs or deformities.  Nontender.  No palpation of the thoracic

or lumbar spine. 

NEUROLOGIC:  5/5 strength in bilateral , plantar flexion, dorsiflexion.  Gross

normal sensation x4 extremities. 



LABORATORY FINDING:  White count 11.8, hemoglobin 15.2, and hematocrit 46.2.  INR

1.0.  Sodium 138, potassium 4.4, chloride 99, bicarb 29, BUN 14, creatinine 1.2, and

glucose 111.  Alcohol less than 10. 



DIAGNOSTIC FINDINGS:  Chest x-ray demonstrates no acute pulmonary findings.  X-ray

of the right tib-fib demonstrates acute traumatic trimalleolar fracture/dislocation. 



ASSESSMENT:  

1. Status post mechanical fall.

2. Multiple recent falls.

3. Right trimalleolar fracture/dislocation.

4. History of diabetes, chronic obstructive pulmonary disease, bipolar disorder,

hypertension, transient ischemic attacks, hyperlipidemia, congestive heart failure,

chronic kidney disease, coronary artery disease with 2 cardiac stents, and chronic

pancreatitis. 



PLAN:  The patient will be admitted under the Trauma Service.  She will go to [place] [id number] to the regular surgical nursing floor.  She will receive both p.o. and IV pain

medications.  She has multiple allergies to pain medications, which will make her

pain management quite difficult.  The patient will be n.p.o. after midnight.

Otherwise, she will have a diabetic diet.  We will hold off on IV fluids as the

patient has a history of CHF.  Dr. Guzman will take the patient to the OR tomorrow

for fixation of that right ankle.  We have asked the Family Medicine resident to see

the patient and assist with complex medical management.  They have agreed to see the

patient and will restart home medications as clinically indicated.  This patient was

discussed with Dr. Crews before this dictation. 







Job ID:  [id number]

## 2019-11-10 NOTE — RAD
Radiograph right ankle 3 views:



DATE:

11/10/2019



Time:

5:11 PM



HISTORY:

54-year-old female status post first reduction of acute, traumatic fracture-dislocation of ankle.



COMPARISON:

Right leg radiograph of 11/10/2019 at 3:20 PM



FINDINGS:

There has been interval improvement in the angulation and displacement of the distal fibular shaft an
d proximal metaphyseal fracture. There has been reduction of the tibiotalar joint dislocation. At

least mildly displaced posterior malleolar fracture fragment is identified. At least mildly displaced
 medial malleolus fracture, possibly greater degree of displacement uncertain. Fracture also

involves distal metaphysis of the tibia medially. Ankle mortise is now grossly congruent. Small minim
ally displaced fracture fragment at the far anterior edge of the tibial plafond the. Talar dome not

collapsed. There is a new splint.



IMPRESSION:

Interval reduction of the dislocation and multiple fractures of ankle, with interval improvement in a
lignment.



Reported By: Og Young 

Electronically Signed:  11/10/2019 5:22 PM

## 2019-11-10 NOTE — CON
DATE OF CONSULTATION:  11/10/2019



HISTORY OF PRESENT ILLNESS:  The patient is a 54-year-old female who fell while

ambulating, had immediate pain and deformity in the right ankle.  The patient was

unable to apply weight to the right foot after the fall.  She was brought to the

emergency room, and x-rays revealed a displaced trimalleolar fracture dislocation of

the right ankle.  The patient denies any neurologic complaints in the right foot.

No other complaints elsewhere. 



PAST MEDICAL HISTORY:  Type 2 diabetes mellitus, COPD, bipolar disease, TIAs,

hyperlipidemia, hypertension, CHF, chronic kidney disease, coronary artery disease

with 2 cardiac stents, chronic pancreatitis, spinal stenosis. 



PAST SURGICAL HISTORY:  Neck fusion, lumpectomy of the breast x2, hysterectomy,

appendectomy. 



SOCIAL HISTORY:  The patient lives at home.  She is a smoker.  She denies drug or

alcohol use. 



CURRENT MEDICATIONS:  Include;

1. Albuterol.

2. Atorvastatin.

3. Cetirizine.

4. Plavix.

5. Vitamin D.

6. Famotidine.

7. Fenofibrate.

8. Flonase.

9. Folic acid.

10. Gabapentin.

11. Humalog.

12. Toujeo.

13. Isosorbide.

14. Lisinopril.

15. Magnesium.

16. Metoprolol.

17. Morphine sulfate extended release.

18. Omega-3.

19. Zofran.

20. Paroxetine.

21. Pancrelipase.

22. Potassium chloride.

23. Ranolazine.

24. Torsemide.

25. Risperidone.

26. Januvia.



ALLERGIES:  TO ASPIRIN, ADHESIVE TAPE, CODEINE, HYDROCODONE, LEVOTHYROXINE,

NONSTEROIDAL, SULFA DRUGS, AND TRAMADOL. 



PHYSICAL EXAMINATION:

VITAL SIGNS:  The patient is afebrile.  Her last vital signs; pulse 86, respiratory

rate 16, O2 saturation 96 on 2 L.  Blood pressure 122/50. 

EXTREMITIES:  Examination of her right leg shows skin is intact.  There is swelling

and bruising over the medial and lateral aspect of the ankle.  She is able to flex

and extend all of her toes and has good sensation. 



IMPRESSION:  

1. Status post trimalleolar fracture dislocation of the right ankle.

2. Diabetes mellitus type 2.

3. Chronic obstructive pulmonary disease.

4. Bipolar disease.

5. Hypertension.

6. Transient ischemic attack.

7. Hyperlipidemia.

8. Congestive heart failure.

9. Chronic kidney disease.

10. Coronary artery disease with 2 cardiac stents.

11. Chronic pancreatitis.

12. Spinal stenosis.



PLAN:  The patient will require open reduction and internal fixation of the right

ankle.  Plan on performing that tomorrow afternoon.  The potential risks with the

condition of surgery include, but are not limited to infection, bleeding, pain,

damage to blood vessels or nerves, nonunion, malunion, patient may require

additional surgery, DVT and PE formation. 







Job ID:  [id number]

## 2019-11-10 NOTE — CON
DATE OF CONSULTATION:  



HISTORY OF PRESENT ILLNESS:  The patient is a 54-year-old female who has a past

history of recurrent falls, who fell down while she was at home.  She fell 
forward

and had immediate pain in her right ankle.  The patient was then transferred 
here to

the emergency room and was found to have a trimalleolar fracture on her right 
lower

extremity.  By the time that I saw her in the emergency department, she had been

given 100 mcg of fentanyl and was essentially unable to complete the rest of the

interview.  We were consulted by JACI Jaime for Trauma Surgery for

medical management of her chronic conditions. 



REVIEW OF SYSTEMS:  Unobtainable to complete secondary to the patient's altered

mentation secondary to pain medication administration. 



PAST MEDICAL HISTORY:  Type 2 diabetes, COPD, bipolar disorder, TIAs,

hyperlipidemia, hypertension, CHF, CKD, CAD, and chronic pancreatitis. 



PAST SURGICAL HISTORY:  Neck fusion, lumpectomy of breast x2, hysterectomy, and

appendectomy. 



SOCIAL HISTORY:  The patient is a current everyday smoker at least one pack per 
day.

 The patient denies any alcohol or drug use. 



MEDICATIONS:  The patient's most recent hospitalization with a discharge on

11/06/2019, showed a medication list that included: 

1. Albuterol sulfate nebulized.

2. Atorvastatin 40 mg p.o. at bedtime.

3. 10 mg of cetirizine p.o. at bedtime.

4. 75 mg of clopidogrel p.o. daily.

5. 37914 units of vitamin D2 p.o.

6. 20 mg famotidine p.o. daily.

7. 160 mg fenofibrate p.o. at bedtime.

8. Flonase nasal spray.

9. Folic acid p.o. daily.

10. 800 mg of gabapentin p.o. b.i.d.

11. Humalog sliding scale.

12. Toujeo 87 units subcutaneously b.i.d.

13. 60 mg isosorbide mononitrate p.o. daily.

14. 10 mg lisinopril p.o. q.p.m.

15. 500 mg magnesium p.o. b.i.d.

16. 12.5 mg metoprolol p.o. b.i.d.

17. 15 mg morphine sulfate p.o. q.i.d.

18. 2 g omega-3 acids p.o. b.i.d.

19. 4 mg Zofran p.o. q.6 hours p.r.n.

20. 10 mg paroxetine p.o. at bedtime.

21. Pancrelipase four capsules p.o. t.i.d. with meals.

22. 20 mEq of potassium chloride p.o. b.i.d.

23. 500 mg of ranolazine p.o. b.i.d.

24. 40 mg torsemide p.o. as directed.

25. 0.5 mg risperidone p.o. at bedtime.

26. 50 mg of Januvia p.o. daily.



ALLERGIES:  ASPIRIN, CODEINE, HYDROCODONE, LEVOTHYROXINE, NSAIDS, SULFA DRUGS,

BACTRIM, AND TRAMADOL. 



PHYSICAL EXAMINATION:

VITAL SIGNS:  Blood pressure 122/61, pulse 79, respiratory rate 16, temperature

98.6, oxygen saturation 95% on 2 L. 

GENERAL:  Appears in no acute distress.  Somnolent, but arousable. 

HEENT:  Normocephalic, atraumatic.  PERRLA.  Moist mucous membranes. 

NECK:  Nontender.  Full range of motion.  Supple. 

CARDIOVASCULAR:  Regular rate and rhythm.  No murmurs. 

RESPIRATORY:  Mild bilateral wheezing with expiration. 

ABDOMEN:  Soft, nontender.  Bowel sounds present. 

EXTREMITIES:  Upper extremities, full range of motion.  Pulses present.  Lower

extremities, right lower extremity placed in a splint from the knee down, 
wrapped in

a bandage. 

NEUROLOGICAL:  A and O x4.  Speech was clear.  When arousable, no focal deficits
,

but unable to fully complete due to mental state. 



LABORATORY FINDINGS:  Sodium 138, potassium 4.4, chloride 99, carbon dioxide 29
, BUN

14, creatinine 1.2, glucose 117, AST 38, ALT 23, alcohol level of less than 10.
  PT

was 13.2, INR was 1.0.  White blood cell count was 11.8, hemoglobin 15.2, 
hematocrit

46.2, platelet count was 186.  The patient did have an x-ray of her chest that

showed no acute pulmonary process.  The patient did have a right tib-fib 2-view 
that

showed acute traumatic trimalleolar fracture with dislocation. 



ASSESSMENT:  

1. Status post mechanical fall with right trimalleolar fracture with 
dislocation.

The patient will be admitted by the trauma team with consultations with 
Orthopedic

Surgery for planned surgery on 11/11/2019. 

2. History of diabetes.  We will continue her insulin regimen while holding her

Toujeo due to her inability to eat and n.p.o. status at midnight.  She will 
have an

aggressive sliding scale with glucose checks q.6 hours to monitor for 
hypoglycemia

or hyperglycemia. 

3. Chronic obstructive pulmonary disease, oxygen supplementation as needed as 
well

as albuterol will be available as needed as well. 

4. Congestive heart failure, we will continue her diuretic therapy. 

5. Chronic kidney disease, appears to be at baseline.  We will make sure she is 
well

hydrated prior to her surgery to make sure to minimize any acute kidney injury. 

6. Coronary artery disease.  Continue current regimen. She had recent cardiac

stress testing that showed no reversible ischemia on 11/5/19. 

7. Chronic pancreatitis.  Continue her regimen from home.

8. Bipolar disorder.  Continue her current regimen.  We will allow the pain

management to be managed by the primary team, Dr. Crews and the Trauma Service. 





Thank you very much for your consultation and your opportunity to participate in

this patient's care.  We will follow during this hospitalization.  Anticipate 
likely

discharge to a rehab facility following the procedure. 







Job ID:  [id number]



MTDD

## 2019-11-10 NOTE — RAD
RADIOGRAPH CHEST 1 VIEW:



DATE:

11/10/2019



HISTORY:

54-year-old female for preoperative clearance.



FINDINGS:

There is no airspace density, pulmonary edema, or pneumothorax. The lateral costophrenic angles are n
ot effaced.



IMPRESSION:

No acute pulmonary findings.



Reported By: Og Young 

Electronically Signed:  11/10/2019 3:25 PM

## 2019-11-10 NOTE — RAD
Radiograph right leg tibia-fibula 2 views:



DATE:

11/10/2019



Time:

3:20 PM



HISTORY:

54-year-old female status post fall



FINDINGS:

Comminuted fracture of distal fibular metadiaphysis, with prominent anterior angulation and mild medi
al angulation of fracture apex (posterior and lateral angulation of major distal fragment).

Displacement of multiple small butterfly fragments. Posterior dislocation of talus relative to tibial
 plafond. Probable posterior malleolus fracture with significant displacement. Medial malleolar

fracture with mild displacement.



IMPRESSION:

 Acute, traumatic trimalleolar fracture-dislocation.



Reported By: Og Young 

Electronically Signed:  11/10/2019 3:32 PM

## 2019-11-10 NOTE — PDOC.EVN
Event Note





- Event Note


Event Note: 





The patient was seen in the ER at 1715.  She fell at home and suffered a 

trimalleolar fracture on the right.  She was given fentanyl and the fracture 

was set and placed in a splint.  Plan for surgery tomorrow.  Pt will be npo, 

will cover with sliding scale insulin.  Will restart appropriate home meds.  

Monitor renal fucntion.  Oxygen and nebs as needed.

## 2019-11-11 NOTE — RAD
Radiograph right ankle 3 views:



DATE:

11/11/2019



HISTORY:

54-year-old female with acute fracture-dislocation of right ankle.



FINDINGS:

Total of 4 fluoroscopic spot images obtained with C-arm in the OR. There is a new long lateral metall
ic plate attached to bone by multiple screws, from junction between middle and distal thirds of

fibular diaphysis to lateral malleolus, fixating the comminuted distal fibular metadiaphyseal fractur
e.

There are 2 lag screws through the medial malleolus, with distal tips in the distal tibial metaphysis
. Ankle mortise is congruent. Alignment is nearly anatomical.



IMPRESSION:

1. Status post open reduction internal fixation of high lateral malleolar, distal fibular fracture.

2. Status post screw fixation of medial malleolar fracture.



Reported By: Og Young 

Electronically Signed:  11/11/2019 3:04 PM

## 2019-11-11 NOTE — PDOC.FM
- Subjective


Subjective: 





Pt is sleepy this morning. She states her pain is controlled. She denies chest 

pain, headache, trouble breathing. 





- Objective


MAR Reviewed: Yes


Vital Signs & Weight: 


 Vital Signs (12 hours)











  Temp Pulse Resp BP BP Pulse Ox


 


 11/11/19 03:28  97.4 F L     


 


 11/10/19 23:37  97.1 F L     


 


 11/10/19 21:03     134/70  


 


 11/10/19 20:00       96


 


 11/10/19 19:38  97.2 F L     


 


 11/10/19 18:32   86  16    96


 


 11/10/19 18:00  98.1 F  81  18   142/73 H  94 L








 Weight











Weight                         108 kg











 Most Recent Monitor Data











Heart Rate from ECG            77


 


NIBP                           123/76


 


NIBP BP-Mean                   91


 


Respiration from ECG           14


 


SpO2                           96














Result Diagrams: 


 11/11/19 04:45





 11/11/19 04:45





Phys Exam





- Physical Examination


Constitutional: NAD


HEENT: moist MMs


Neck: no JVD


Respiratory: wheezing present (good air movement), clear to auscultation 

bilateral


Cardiovascular: RRR, no significant murmur


Gastrointestinal: soft, non-tender, no distention, positive bowel sounds


Musculoskeletal: no edema, pulses present


Neurological: moves all 4 limbs


Psychiatric: A&O x 3


Skin: cap refill <2 seconds





Dx/Plan


(1) Trimalleolar fracture


Code(s): S82.853A - DISPLACED TRIMALLEOLAR FRACTURE OF UNSP LOWER LEG, INIT   

Status: Acute   





(2) Chronic kidney disease, stage 3


Status: Chronic   





(3) Diabetes mellitus type 2 in obese


Code(s): E11.9 - TYPE 2 DIABETES MELLITUS WITHOUT COMPLICATIONS; E66.9 - OBESITY

, UNSPECIFIED   Status: Chronic   





- Plan


Plan: 





This is a 55 yo female with a pmh of DM2, HTN, COPD, Bipolar, and CKD3 who we 

are consulted for medical management








Trimalleolar fracture on the right, closed


-Plans for surgery today


-Management by ortho





DM2


-Pt NPO, covered by sliding scale, will restart medications after surgery


-Continue monitoring blood glucose





CKD3


-Will monitor, appears at baseline





CHF


-Continue home torsemide


-Last echo, 5/2018 shows normal heart function





HTN


-At goal, continue home lisinopril and metoprolol





COPD


-Provide respiratory support


-Continue home meds





Bipolar


-Continue home meds





Addendum - Attending





- Attending Attestation


Date/Time: 11/11/19 4826





I personally evaluated the patient and discussed the management with Dr. Acosta.


I agree with the History, Examination, Assessment and Plan documented above 

with any addition or exceptions noted below.

## 2019-11-11 NOTE — OP
DATE OF PROCEDURE:  11/11/2019



PREOPERATIVE DIAGNOSIS:  The patient is status post posterior trimalleolar fracture

dislocation of the right ankle. 



POSTOPERATIVE DIAGNOSIS:  The patient is status post posterior trimalleolar fracture

dislocation of the right ankle. 



PROCEDURE PERFORMED:  Open reduction and internal fixation of the trimalleolar

fracture of the right ankle. 



ANESTHESIA:  General.



TECHNIQUE:  The patient was given preoperative IV antibiotics.  She was taken to the

operating room, placed in the supine position.  Satisfactory general anesthesia was

performed.  The right foot, ankle and leg were sterilely prepped and draped in usual

fashion.  After exsanguination, tourniquet at the proximal right leg was raised to

250 mmHg.  A longitudinal incision was made over the lateral aspect of the lower leg

and ankle, approximately 6 inches in length.  Blunt dissection was made down to the

lateral aspect of the fibula.  The distal shaft fracture was noted to be comminuted.

 The fractures were reduced and then internally fixed using a third tubular locking

Synthes plate and using 3.5 cortical screws proximal and distal to the fracture to

pull the plate to the bone and then 3.5 locking screws.  One of the mid portions of

the hole was left open since it was right over the comminuted area.  A longitudinal

incision was made over the medial malleolus approximately 2 inches in length and

blunt dissection was made down to the medial malleolus.  It was partially

comminuted.  The main fragment was reduced and then internally fixed using two

cannulated 4.0 screws.  C-arm was used to verify good position of the plate and

screws and good position of the ankle including the posterior malleolus, which after

the medial and lateral malleoli were reduced and internally fixed, the posterior

malleolus was in excellent position.  The ankle was in good alignment.  The two

wounds were then copiously irrigated with antibiotic solution and closed using 0

Vicryl for the fat and subcutaneous tissues, and skin was closed with 3-0 Rapide.

Sterile dressing was applied along with a boot.  Tourniquet was released.  The

patient was awakened, extubated, and transferred to recovery room in stable

condition. 



ESTIMATED BLOOD LOSS:  Minimal.



COMPLICATIONS:  None.



TOURNIQUET TIME:  56 minutes.







Job ID:  [id number]

## 2019-11-11 NOTE — PRG
DATE OF SERVICE:  11/11/2019



SUBJECTIVE:  This is a 54-year-old lady, who presented to the emergency room after a

mechanical fall while ambulating.  The patient was recently admitted to the

hospital, was discharged 2 days ago after reporting weakness and multiple falls as

well as chest pain.  The patient is injury day #2, right trimalleolar fracture and

dislocation.  The patient is currently in the IMCU and reports that her pain is well

controlled.  The patient has been n.p.o. since midnight with maintenance fluids.

The patient voices no complaints at this time. 



OBJECTIVE:  VITAL SIGNS:  Blood pressure 150/74, respirations 18, SpO2 of 96% on

room air, and pulse 80. 

GENERAL:  Middle-aged female, resting comfortably in bed.  No acute distress. 

HEENT:  Atraumatic and normocephalic. 

RESPIRATORY:  Equal chest rise and fall, respirations unlabored, bilateral breath

sounds clear. 

CARDIAC:  Regular rate and regular rhythm. 

EXTREMITIES:  Moves all extremities, splint in place to right lower extremity.



LABORATORY DATA:  WBC 10.3, RBC 4.68, hemoglobin 13.3, hematocrit 41.3, platelets

183.  Sodium 137, potassium 4.6, chloride 102, carbon dioxide 27, BUN 13, creatinine

1.04, estimated GFR 55, glucose 104, calcium 9.1, phosphorus 3.3, and magnesium 2.4. 



DIAGNOSTICS:  There are no diagnostics to review today.



ASSESSMENT:  

1. Status post mechanical fall.

2. Multiple recent falls.

3. Right trimalleolar fracture/dislocation.

4. History of diabetes, chronic obstructive pulmonary disease, bipolar disorder,

hypertension, transient ischemic attack, hyperlipidemia, congestive heart failure,

chronic kidney disease, coronary artery disease with 2 cardiac stents, chronic

pancreatitis. 



PLAN:  Remain n.p.o. with maintenance fluids.  The patient plans for the OR this

morning for repair of her right trimalleolar fracture with Dr. Guzman.  Most

likely, the patient will need to remain in IMCU overnight with continuous pulse ox

and cardiac monitoring.  We will repeat labs in the morning.  We will have Physical

and Occupational Therapy work with the patient tomorrow.  We will place a rehab

screen as the patient will likely need inpatient rehab.  The patient can most likely

be moved to the surgical floor tomorrow.  The patient was examined by Dr. Hart

during morning rounds.  The plan was discussed with the patient, who agrees. 







Job ID:  [id number]

## 2019-11-12 NOTE — PRG
DATE OF SERVICE:  11/12/2019



The patient was seen with Dr. Hart on morning rounds.



SUBJECTIVE:  A 54-year-old female with trimalleolar fracture, status post ORIF

postop day #1.  The patient had reduction and fixation yesterday by Dr. Guzman.  There were no complications during the case.  Postoperatively, the 
patient

had increased somnolence and considering her previous reaction to narcotics, the

patient was readmitted to [facility] for further observation.  The patient was put in 
a

boot following the procedure overlying ACE wrap dressing.  Upon exam today, the

patient was sleepy and complaining of pain in the right lower extremity.  The

patient has p.r.n. fentanyl available for her pain as well as acetaminophen.  
The

patient has multiple pain medication allergies limiting our options.  We 
restarted

her home morphine that she takes for chronic pancreatitis in addition to 
continuing

her fentanyl. The patient is otherwise cardiopulmonarily stable with stable 
vitals.

Plan for transfer to medical floor later today.  We will have PT, OT come by and

screen patient for likely rehab placement upon discharge. 



OBJECTIVE:  VITAL SIGNS: Temperature 99.2, oxygen 94% on 4 L, heart rate 103, 
blood

pressure 125/73, respiratory rate of 23. 

GENERAL:  Middle-age female, who appears older than her age, mild distress 
secondary

to pain. 

HEENT: Atraumatic, normocephalic. 

CHEST:  Equal chest rise and fall. No respiratory distress. 

CARDIAC: Regular rate and rhythm. 

EXTREMITIES: Moving all proximal extremities with right lower limb in walking 
boot

cast, no appreciable edema.  Sensation and circulation intact in the distal 
right

lower extremity. 

NEURO: The patient is awake, alert, oriented, slightly sleepy.



LABORATORY DATA:  WBC 9.6, hemoglobin 12.1, hematocrit  37.6, platelets 141.

Chemistry; sodium 140, potassium 4.2, BUN 10, creatinine 0.98, glucose 121. 



ASSESSMENT:  

1. Status post mechanical fall.

2. Multiple recent falls.

3. Right trimalleolar fracture/dislocation, status post open reduction and 
internal

fixation yesterday. 

4. Acute traumatic pain, stable.

5. Chronic medical management per Family Medicine.



PLAN:  

1. Diet consistent carbs.

2. Pain control - continue p.r.n. fentanyl, we will add the patient's home 
morphine

regimen. 

3. PT/OT to consult for rehab screen today.

4. Plan for transfer to medical floor from [facility] later this afternoon.

5. Continue medical management per Family Medicine team.

6. I spoke with the patient and answered questions, agreeable with current plan 
of

care. 







Job ID:  [id number]



MTDD

## 2019-11-12 NOTE — PRG
DATE OF SERVICE:  11/12/2019



SUBJECTIVE:  The patient remains on the surgical floor.  She is status post ground

level fall when she sustained a right trimalleolar fracture.  She has undergone open

reduction and internal fixation of the same.  She is postop day #1.  She currently

has a block in place with an On-Q pump.  She is tolerating a diet.  States that she

does have some burning sensation in her right lower extremity.  Otherwise, she is

doing well at this time.  The patient postoperatively reportedly had difficulty with

pain control because she has significant chronic pain history to include p.o.

morphine use at home. 



PHYSICAL EXAMINATION:

VITAL SIGNS:  Stable.  The patient is afebrile. 

GENERAL:  The patient is resting comfortably in bed.  She is awake, alert, and

oriented x3.  [place] Coma Scale is 15. 

HEENT:  Unremarkable.  Respirations are nonlabored. 

EXTREMITIES:  Neurovascularly intact x4.  Right lower extremity is  immobilized in a

walking boot. 



ASSESSMENT AND PLAN:  

1. Status post ground level fall.

2. History of multiple recent falls.

3. Status post open reduction and internal fixation of right trimalleolar fracture.



PLAN:  Plan will be to continue supportive care.  Encourage physical and

occupational therapy and await final placement decision. 







Job ID:  [id number]

## 2019-11-12 NOTE — PDOC.FM
- Subjective


Subjective: 





Pt reports that her right leg is hurting and spasming but she says it is not 

too bad. She denies chest pain, SOB, or headache. She does report some 

abdominal pain and has not had a BM in about 3 days.





- Objective


MAR Reviewed: Yes


Vital Signs & Weight: 


 Vital Signs (12 hours)











  Temp Pulse Resp BP Pulse Ox


 


 11/12/19 00:00  97.6 F    


 


 11/11/19 21:10     146/78 H 


 


 11/11/19 20:00      96


 


 11/11/19 19:28  97.6 F    


 


 11/11/19 18:42   85  16   93 L








 Weight











Weight                         108 kg











 Most Recent Monitor Data











Heart Rate from ECG            101


 


NIBP                           148/80


 


NIBP BP-Mean                   102


 


Respiration from ECG           18


 


SpO2                           93














I&O: 


 











 11/10/19 11/11/19 11/12/19





 06:59 06:59 06:59


 


Intake Total  960 1180


 


Output Total  560 1700


 


Balance  400 -520











Result Diagrams: 


 11/12/19 04:06





 11/12/19 06:45





Phys Exam





- Physical Examination


Constitutional: NAD (sleepy)


HEENT: moist MMs


Neck: no JVD


Respiratory: no wheezing, clear to auscultation bilateral


Cardiovascular: RRR, no significant murmur


Gastrointestinal: soft, no distention, positive bowel sounds


Diffuse tenderness to palpation


Musculoskeletal: no edema, pulses present (unable to check right foot, however 

neurovascularly intact)


Neurological: moves all 4 limbs


Psychiatric: A&O x 3


Skin: cap refill <2 seconds





Dx/Plan


(1) Trimalleolar fracture


Code(s): S82.853A - DISPLACED TRIMALLEOLAR FRACTURE OF UNSP LOWER LEG, INIT   

Status: Acute   





(2) Chronic kidney disease, stage 3


Status: Chronic   





(3) Diabetes mellitus type 2 in obese


Code(s): E11.9 - TYPE 2 DIABETES MELLITUS WITHOUT COMPLICATIONS; E66.9 - OBESITY

, UNSPECIFIED   Status: Chronic   





- Plan


Plan: 





This is a 53 yo female with a pmh of DM2, HTN, COPD, Bipolar, and CKD3 who we 

are consulted for medical management








Trimalleolar fracture on the right, closed


-POD #1 ORIF


-Management by ortho





DM2


-Continue monitoring blood glucose


-Continue home alogliptin





CKD3


-Will monitor, appears at baseline





CHF


-Continue home torsemide


-Last echo, 5/2018 shows normal heart function





HTN


-continue home lisinopril and metoprolol


-Slightly elevated, may be related to pain vs wrist cuff, will monitor





COPD


-Provide respiratory support


-Continue home meds





Bipolar


-Continue home meds





Addendum - Attending





- Attending Attestation


Date/Time: 11/12/19 1845





I personally evaluated the patient and discussed the management with Dr. Acosta.


I agree with the History, Examination, Assessment and Plan documented above 

with any addition or exceptions noted below.

## 2019-11-12 NOTE — PQF
DISHA WILKS TABITHA                                            [facility] ACOSTA  
   *r

[id number]                                                             [facility]
B11

[id number]                             

                                   

CLINICAL DOCUMENTATION IMPROVEMENT CLARIFICATION FORM:  ICD-10 Updated



PLEASE DO AN ADDENDUM TO THE PROGRESS NOTE WITH ANY DOCUMENTATION UPDATES OR 
ADDITIONS AND CARRY THROUGH TO DC SUMMARY.   THANK YOU.



DATE: 11/12/19                                         ATTN:  Dr. Acosta





Please exercise your independent, professional judgment in responding to the 
clarification form. 

Clinical indicators are provided on the bottom of this form for your review



Please check appropriate box(s):

HEART FAILURE:



A. ACUITY

                        [ x ] Chronic

B.  TYPE

     [ x ] Diastolic / HFpEF       [  ] Combined Systolic / Diastolic

     [  ] Hypertensive Heart and Kidney disease

     [  ] Hypertensive Heart Disease

     [  ] Hypertensive Kidney Disease



[  ] Other diagnosis ___________

[x  ] Unable to determine



In addition, please specify:

Present on Admission (POA):  [ x ] Yes             [  ] No             [  ] 
Unable to determine



For continuity of documentation, please document condition throughout progress 
notes and discharge summary.  Thank You.



CLINICAL INDICATORS - SIGNS / SYMPTOMS / LABS  / RESULTS AND LOCATION IN EMR

11/11 (Edward): "CHF(LAST ECHO, 5/2018 SHOWS NORMAL HEART FUNCTION)"





RISKS FACTORS   / RESULTS AND LOCATION IN EMR

11/11 (Edward): "History of CAD, HTN, CKD 3 "





TREATMENTS    / RESULTS AND LOCATION IN EMR

Administration of ACE/BB --> Lisinopril 10mg po HS, metoprolol 12.5mg BID 11/10 
to date per orders

Cardiac monitoring / telemetry in IMCU 11/10 orders

PO Diuretics--> Torsemide 20mg PO qPM 11/10-11/11 to torsemide 40mg PO QAM 11/11
-date per orders

Oxygen--> 2L NC oxygen 11/10--11/12 4L NC per oders





(This form is maintained as a part of the permanent medical record)

 2015 [employer].  All Rights Reserved

Allegra Joiner RN, BSN, CCDS    [email or web address]    [phone]
[phone]

                                                              

 



MTDD

## 2019-11-13 NOTE — CON
DATE OF CONSULTATION:  



This is Evelio Krueger PA-C dictating a report for Shiva Dodson MD.



This is a 50-minute initial patient evaluation of which greater than 50% of the exam

was spent counseling and coordinating the patient's care.  Remainder of the exam was

spent in review of the patient's medical records and formulation of treatment plan. 



CHIEF COMPLAINT:  Right hand weakness and increased falls with cervical cord

compression. 



HISTORY OF PRESENT ILLNESS:  Ms. Nguyen is a 54-year-old female who originally

presented to [facility] 3 days ago, having sustained a fall and a

trimalleolar fracture.  The patient subsequently underwent surgical fixation and

this was done on the right.  The patient is normally on Plavix and 81 mg aspirin for

history of MI with stent placement more than 5 years ago.  Her last confirmed dose

of Plavix was on 11/12/2019.  The patient is a current everyday smoker.  The patient

was seen electively on an outpatient basis by a [facility] in

early October and was to follow up with a CT myelogram of both cervical and lumbar

spines.  However, the patient was unable to complete these studies for unknown

reasons.  She has remained at her neurologic baseline in regard to strength.  I

should also note that she had a right carpal tunnel release surgery roughly more

than 20 years ago and she notes significant mild hand intrinsic weakness at that

time.  I should note she also previously underwent C4-T1 anterior cervical

diskectomy and fusion as well as posterior spinous process wiring at an outside

institution at [facility] for myelopathy.  She has had a right greater than left arm

symptoms, though is really unable to give an exact dermatomal distribution of her

pain.  She has also noticed some balance difficulties and again as mentioned falls.

Review of the patient's CT scan shows stable hardware placement, C4-T1 as well as

posterior wiring noted at spinous processes from C4-T1.  Review of the patient's

cervical spine MRI is pending. 



PHYSICAL EXAMINATION:

GENERAL:  The patient is awake, alert, and appropriate.  She is morbidly obese. 

MUSCULOSKELETAL: She does have atrophy, especially into the thenar aspect of the

right hand and has some moderate hand  and hand intrinsic weakness on the right

and mild hand  weakness on the left.  She also has deltoid weakness, right

greater than left.  She appears to have good strength.  Trace weakness into the

biceps and triceps bilaterally with slightly more strength interestingly on the

right than the left.  She has antigravity in the bilateral lower extremities.  She

does appear to have more difficulty with moving the right leg secondary to a large

Cam boot after her ankle fixation surgery.  She is able to wiggle the toes on the

right, though has difficulty raising the big toe on the right.  She has generalized

weakness throughout the entire left lower extremity, though it appears to be

stronger on the left than the right.  She does not appear to have any tenderness to

palpation of the posterior neck. 



IMPRESSION AND DIAGNOSES:  

1. Neck pain with right greater than left upper extremity pain and weakness.

2. Right leg weakness.

3. History of falls with recent right trimalleolar fracture fixation.

4. Coronary artery disease with history of myocardial infarction and 2 stents

placement, one of which the patient says is occluded on Plavix and 81 mg aspirin. 

5. Diabetes mellitus type 2.

6. Morbid obesity.

7. Tobacco abuse.

8. Chronic kidney disease.

9. Hypertension.

10. Hyperlipidemia.

11. Congestive heart failure.



PLAN:  I discussed the patient's case and imaging with Dr. Dodson.  At this time, we

will follow up on the patient's cervical spine MRI.  I would like her to remain in

bed and I will discuss this with the nurse and we have also made her n.p.o. at

midnight.  I would like her to remain in her Harshaw J collar at all times and [facility] will fit her for this collar.  We would like to hold

her aspirin and Plavix at this time.  We will also start her on Decadron 4 mg q.6

hours and Protonix, may need to 

adjust her insulin based on this and we will defer to our hospital colleagues in

this regard.  Neurosurgery will follow up in the morning.  May be a plan for a

surgical intervention, but again we will need to review her studies to determine

what that will be.  Again, I have discussed the patient's case with her and her

nurse.  Please call with any changes in the patient's neurologic status. 





Job ID:  [id number]

## 2019-11-13 NOTE — PRG
DATE OF SERVICE:  



The patient is 2 days status post ORIF of a posterior trimalleolar fracture

dislocation of the right ankle.  The patient has been in her tall boot.  She has

been nonweightbearing on the right foot.  She has a block performed by Anesthesia

prior to surgery and is still in place.  The patient has chronic pain and complains

of pain, and pain medicine has been adjusted, starting a fentanyl patch and

increasing her morphine and her gabapentin. 



The patient has been afebrile.  Vital signs have been stable. 



The dressing was changed by me.  The incisions in the medial and lateral aspect of

right ankle are healing well.  She has usual amount of swelling and bruising.  There

is no erythema or drainage.  She has typical decreased sensation over the dorsum of

the foot. 



The patient will continue to work with Physical and Occupational Therapy.  I

stressed with the patient.  She understands, she needs to be nonweightbearing on the

right foot until the fractures have healed, which is going to take a minimum of 2

months, possibly longer.  The patient will probably need to go to rehab or to a

skilled nursing facility at discharge and she will follow up with me in 2 weeks. 







Job ID:  [id number]

## 2019-11-13 NOTE — MRI
CERVICAL SPINE MRI WITH AND WITHOUT CONTRAST

11/13/19

 

COMPARISON: 

Prior study performed eight days prior.

 

TECHNIQUE:  

Multiplanar and multisequence MR imaging of the cervical spine without contrast. 

 

HISTORY: 

Cervical stenosis with myelopathy. 

 

FINDINGS: 

Motion artifact limits detailed assessment. All provided pulse sequences are somewhat limited on the 
basis of motion artifact. 

 

Evaluation is further limited secondary to the presence of metallic postoperative hardware. This incl
udes metallic wires overlying the region of the spinous process at C4, C5, C6 and C7 as well as exten
sive anterior discectomy and fusion hardware at C4-5/C5-6/C6-7. 

 

No significant anterolisthesis or retrolisthesis is appreciated. The sagittal STIR imaging demonstrat
es no focal area of osseous marrow edema. 

 

C2-3: There is disc desiccation and disc bulge with a central disc protrusion effacing the ventral th
ecal sac and abutting the ventral aspect of the cord with a mild degree of central canal stenosis. No
 significant neural foraminal stenosis. 

 

C3-4: There is a large central disc herniation as before. This causes severe central canal stenosis w
ith mass effect on the cervical cord anteriorly with moderate/severe cervical cord compression, also 
stable when compared to prior imaging. Bilateral facet hypertrophy present with a mild degree of bila
teral neural foraminal stenosis. 

 

The large central disc herniation at C3-4 measures at least 6 mm in AP dimension. This disc herniatio
n is difficult to accurately measure on the sagittal imaging and may measure up to 1.5 cm suggesting 
significant extruded disc material located both cranial to and caudal to the axial level of the C3-4 
disc interspace. 

 

C4-5: Evaluation for central canal and neural foraminal stenosis is quite limited secondary to motion
 and artifact. There may be a small central disc herniation with mild central canal stenosis. In skyler
tion, there may be mild right neural foraminal stenosis on the basis of uncovertebral osteophytosis. 


 

C5-6: Probable mild right neural foraminal stenosis and mild central canal stenosis on the basis of m
inimal posterior osteophyte. No significant central canal or neural foraminal stenosis. 

 

C6-7: Limited assessment secondary to postoperative artifact and motion. No significant central canal
 or neural foraminal stenosis. 

 

C7-T1: Mild disc bulge with no significant central canal or neural foraminal stenosis. 

 

There is no obvious abnormal signal intensity identified within the cervical cord.  

 

Postcontrast imaging is grossly unremarkable, limited on the basis of artifact associated with postop
erative hardware. 

 

IMPRESSION: 

Limited examination of the cervical spine secondary to metallic postoperative hardware and motion. Th
ere is a large disc herniation/disc  extrusion centrally at the C3-4 level producing moderate/severe 
mass effect on the cervical cord with marked cord flattening and severe central canal stenosis. Overa
ll, the appearance of the cervical spine appears similar when compared to 11/5/19.  IF further imagin
g assessment is clinically warranted, follow-up cervical spine CT myelogram may be beneficial. Neuros
urgical consultation is advised given the severe canal compromise and marked mass effect on the cervi
stephen cord at the C3-4 level. 

 

POS: STEVEN

## 2019-11-13 NOTE — PDOC.FM
- Subjective


Subjective: 





Pt continues to report pain with her leg. She does report having a BM today.





- Objective


MAR Reviewed: Yes


Vital Signs & Weight: 


 Vital Signs (12 hours)











  Temp Pulse Resp BP BP Pulse Ox


 


 11/13/19 06:39   90  20    90 L


 


 11/13/19 03:56  97.8 F  80  18   145/80 H  92 L


 


 11/12/19 23:44  98.8 F  95  18   129/65  94 L


 


 11/12/19 22:20   103 H  16    91 L


 


 11/12/19 21:16     156/73 H  


 


 11/12/19 20:26  98.4 F  95  18   156/73 H  92 L


 


 11/12/19 20:00       92 L








 Weight











Weight                         109.996 kg











 Most Recent Monitor Data











Heart Rate from ECG            98


 


NIBP                           128/56


 


NIBP BP-Mean                   80


 


Respiration from ECG           16


 


SpO2                           99














I&O: 


 











 11/12/19 11/13/19 11/14/19





 06:59 06:59 06:59


 


Intake Total 1540 720 


 


Output Total 2100  


 


Balance -560 720 











Result Diagrams: 


 11/14/19 06:15





 11/14/19 06:15





Phys Exam





- Physical Examination


Constitutional: NAD


HEENT: moist MMs


Neck: no JVD


Respiratory: no wheezing, clear to auscultation bilateral


Cardiovascular: RRR, no significant murmur


Gastrointestinal: soft, non-tender, no distention, positive bowel sounds


Musculoskeletal: no edema, pulses present


Neurological: moves all 4 limbs


Psychiatric: A&O x 3


Skin: cap refill <2 seconds





Dx/Plan


(1) Trimalleolar fracture


Code(s): S82.853A - DISPLACED TRIMALLEOLAR FRACTURE OF UNSP LOWER LEG, INIT   

Status: Acute   





(2) Chronic kidney disease, stage 3


Status: Chronic   





(3) Diabetes mellitus type 2 in obese


Code(s): E11.9 - TYPE 2 DIABETES MELLITUS WITHOUT COMPLICATIONS; E66.9 - OBESITY

, UNSPECIFIED   Status: Chronic   





- Plan


Plan: 





This is a 55 yo female with a pmh of DM2, HTN, COPD, Bipolar, and CKD3 who we 

are consulted for medical management








Trimalleolar fracture on the right, closed


-POD #2 ORIF


-Management by ortho





DM2


-Continue monitoring blood glucose


-Continue home alogliptin





CKD3


-Will monitor, appears at baseline





CHF


-Continue home torsemide


-Last echo, 5/2018 shows normal heart function





HTN


-continue home lisinopril and metoprolol


-Slightly elevated, may be related to pain vs wrist cuff, will monitor





COPD


-Provide respiratory support


-Continue home meds





Bipolar


-Continue home meds








Addendum - Attending





- Attending Attestation


Date/Time: 11/14/19 2322





I personally evaluated the patient and discussed the management with Dr. Acosta.


I agree with the History, Examination, Assessment and Plan documented above 

with any addition or exceptions noted below.





Going to OR for spinal stenosis.

## 2019-11-13 NOTE — CT
CT cervical spine noncontrast



HISTORY: Neck pain. Hand numbness. Disc herniation.



COMPARISON: 8/15/2019.



FINDINGS: Anterior operative fixation of the mid to lower cervical spine is again demonstrated. Poste
rior cerclage wires in place. Vertebral body heights and alignment are maintained. No perihardware

lucency.



Large posterior disc herniation/protrusion at the C3-4 level is again demonstrated, severely compress
ing the thecal sac. Approximately 4 mm AP diameter of the remaining thecal sac. The appearance is

similar to the previous exam.



Multilevel degenerative changes throughout the remainder of the cervical spine are stable.











IMPRESSION: Large posterior disc herniation and spinal cord compression at the C3-4 level appears sta
ble compared to the prior CT from 8/15/2019. Other postoperative and degenerative changes also

appear stable.



Reported By: RODRICK Carey 

Electronically Signed:  11/13/2019 6:13 PM

## 2019-11-13 NOTE — PRG
DATE OF SERVICE:  11/13/2019



The patient was seen with Dr. Hart on morning rounds.



SUBJECTIVE:  A 54-year-old female with trimalleolar fracture, status post ORIF,

postop day 2.  The patient had a difficult day yesterday secondary to pain in 
her

right ankle as well as intermittent and increasing generalized weakness.  The

patient states that she is having a continuation of her arms and legs giving out
,

which she states was the cause of her previous falls in this current injury.  
The

patient notes a history of cervical as well as lumbar spinal stenosis.  Previous

cervical spinal fusion.  She states that she was followed by Dr. Munoz for 
this. 

The patient notes that she was supposed to have a myelogram at some point 

per Dr. Munoz to evaluate for her spinal stenosis.  We will plan to discuss 
this with him. 

The patient notes that her pain has not been well controlled on her home dose of

morphine that she takes for her chronic pancreatitis.  The patient was unable 
to be

seen by PT and OT yesterday due to her pain and weakness.  The patient was on 
the

commode having a bowel movement at the time of evaluation, she states that the 
stool

softeners are working.  



OBJECTIVE:  VITAL SIGNS:  Temperature 98.2, pulse 94, respirations 18, oxygen

saturation 93% on room air.  Blood pressure 168/72. 

GENERAL:  Middle-age female, who appears older than her age, no significant

distress. 

HEENT:  Atraumatic, normocephalic. 

RESPIRATORY:  Equal chest rise and fall, no respiratory distress. 

CARDIAC:  Regular rate and rhythm, good perfusion. 

ABDOMEN:  Soft, nontender.

EXTREMITIES:  Right lower extremity in knee-high boot, distal extremity with 
good

sensation, neurovascularly intact.  Moving all other extremities spontaneously. 

NEUROLOGIC:  The patient is awake, alert, oriented, cooperating and 
communicating

normally. 



LABORATORY DATA:  CBC and BMP not repeated due to stable levels.  Glucose point 
of

care is running between 100 and 145. 



ASSESSMENT:  

1. Status post mechanical fall.

2. Multiple recent falls.

3. Right trimalleolar fracture/dislocation, status post open reduction and 
internal

fixation. 

4. Acute traumatic pain, currently not well managed.

5. Chronic medical management per Family Medicine.



PLAN:  

1. Pain control - we will increase home morphine to q.i.d., increase home 
gabapentin

to t.i.d., we will add fentanyl patch 50 mcg every three days. 

2. The patient is instructed on the importance of working with PT and OT for 
overall

strength, improvement and mobility. 

3. We will consult Neurosurgery for patient's extremity weakness and history of

spinal stenosis, we will stop Plavix and Lovenox at this time in the instance 
that

they would wish to do a myelogram. 

4. Continue medical management per Family Medicine.

5. We spoke with the patient and answered questions, she is agreeable with 
current

plan of care.  Currently pending therapy evaluation, likely discharge placement 
to

rehab/SNF. 







Job ID:  [id number]



MTDARNOL

## 2019-11-14 NOTE — PRG
DATE OF SERVICE:  11/14/2019



SUBJECTIVE:  The patient this evening was seen in the PACU.  She is status post

cervical decompression.  The patient was initially admitted status post ground level

fall, which she sustained a right trimalleolar fracture.  She underwent open

reduction and internal fixation of that.  Upon further exam and discussion with the

patient, it was noted that she has been having multiple falls.  She was evaluated by

the Neurosurgical team.  After examination and MRI, determined that the patient

should undergo semi-urgent spinal cord decompression.  The patient is currently in

the PACU and scheduled to go to the critical care unit overnight for close

neurologic evaluation and ensure that her MAP stays above 70 and she will likely

start an 8-day Decadron taper tomorrow. 



PHYSICAL EXAMINATION:

VITAL SIGNS:  Stable.  The patient is afebrile. 

GENERAL:  The patient is currently sleeping.  She does open her eyes to gentle

verbal stimuli, nods her head as much as possible in a C-collar that she has no

complaints at this time. 

LUNGS:  Clear bilaterally. 

HEART:  Regular rate and rhythm. 

ABDOMEN:  Soft with hypoactive bowel sounds. 

EXTREMITIES:  Warm and dry.  Capillary refill is less than 2 seconds in all 4

extremities.  The patient is immobilized in a [place] collar. 



ASSESSMENT:  

1. Status post ground level fall.

2. History of multiple recent falls.

3. Status post open reduction and internal fixation of right trimalleolar fracture.

4. Status post cervical spinal cord decompression.



PLAN:  The patient transferred to the critical care unit overnight for frequent

neuro exams, tomorrow begin physical and occupational therapy and work on placement.

 The patient will also have her Decadron taper most likely started tomorrow. 







Job ID:  [id number]

## 2019-11-14 NOTE — PRG
DATE OF SERVICE:  11/14/2019



SUBJECTIVE:  I reviewed the notes of my colleague, Evelio Krueger PA-C, and agree

with its content.  Ms. Nguyen is a 54-year-old woman admitted for worsening right

greater than left quadriparesis with frequent falls over the last 8 weeks.  She has

a history of 20 years ago of an anterior-posterior decompression and fusion from C4

through C7 on CT scan.  She has a plate and screw construct from C4 through C7 and

also interspinous wiring from C4 through C7.  It does appear on CT that she has

fused from C4 through C7.  Unfortunately, on MRI, she has a very large disk

extrusion that extends up to C2 and down to C4 with severe spinal cord compression.

This has rendered her quadriparetic.  She is currently in a collar.  We have

initiated Decadron to try and assist with reduction of cord edema.  She is also on

Plavix, and has a history of myocardial ischemia.  Her labs are otherwise

unremarkable __________ surgery. 



OBJECTIVE:  NEURO:  On exam, she is alert and appropriate.  She has generalized

weakness throughout all myotomes in the upper and lower extremities below C5 with

right moderate upper and lower extremity weakness compared to mild left upper and

lower extremity weakness below C5.  She has a recent trimalleolar fracture that has

been surgically repaired by our orthopedic colleagues on November 11. 



I have let the patient know that her case in my opinion requires urgent procession

to the operating room for decompression of her spinal cord via C2 through C5

laminectomy with screw john fixation at C2, C3, C4, and C5.  I will also need to

remove her interspinous wiring at C4-C5 for the decompression.  I will assess her

fusion at C4-C5 and while it appears as if radiologically she is fused, I will use

those points as anchors and I want to make sure of this intraoperatively.  Following

this, she may need to be placed in the ICU for close monitoring given her cardiac

and neurologic history.  We will likely have to place drains in the surgical bed

just given the fact that we were operating on her with her still on Plavix with last

dose 2 days ago.  The goals, indications, risks, alternatives and complications were

discussed in detail with the patient.  She understands that if we do not proceed

with surgery that her chance of quadriparesis and eventual quadriplegia is

substantial.  She understands that the risks are up to and including wound healing

issues such as infection, bleeding, spinal fluid leak, injury, both temporary and

permanent neurologic deficit, swallowing deficits, breathing deficits, medical and

other surgical complications not yet specified.  Nevertheless, she understands that

the need for more surgery may be required as well, but she wishes that we proceed

with surgery. 



IMPRESSION:  Subacute worsening of myelopathy with severe spinal cord compression

consistent with proximal adjacent segment disease. 







Job ID:  [id number]

## 2019-11-14 NOTE — PRG
DATE OF SERVICE:  11/14/2019



The patient was seen with Dr. Hart during morning rounds.



SUBJECTIVE:  A 54-year-old female with trimalleolar fracture, status post ORIF,

postoperative day 3.  The patient had some improvement in her pain yesterday with

changes of pain medications with addition of fentanyl patch and increase in her home

dose of oral morphine.  The patient was evaluated by Neurosurgery yesterday, who

repeated a CT and MRI of her cervical spine, which showed severe central canal

stenosis stemming from a herniated disk at C3-C4.  The patient was subsequently made

n.p.o. at midnight in preparation for operative management today.  Currently, the

plan is for Dr. Dodson to bring the patient to the OR for possible further

decompression of her spinal stenosis.  The patient remains off her anticoagulation

at this time.  The patient was previously made to be on strict bed rest per

Neurosurgery recommendations; however, she requests to be able to go down to the [facility]

today to get money to pay for her rent.  Dr. Hart contacted Dr. Dodson regarding

this, who stated that as long as she goes down in a wheelchair and remains in her

C-collar and does not ambulate, this will be okay.  This was relayed to the patient,

she was grateful. 



OBJECTIVE:  VITAL SIGNS:  Temperature 98.3, pulse 85, respirations 16, oxygen

saturation 93% on room air, and blood pressure 128/70. 

GENERAL:  Middle-age female, who appears older than her stated age, morbidly obese,

in no significant distress. 

HEENT:  Atraumatic and normocephalic. 

RESPIRATORY:  Equal chest rise and fall, in no respiratory distress. 

CARDIAC:  Regular rate and rhythm.  Good perfusion. 

ABDOMEN:  Soft and nontender. 

EXTREMITIES:  Right lower extremity in knee-high boot, distal extremity with good

sensation, neurovascularly intact.  Moving all extremities spontaneously.  C-collar

in place. 

NEUROLOGIC:  The patient is awake, alert, and oriented.  Cooperating and

communicating normally. 



LABORATORY DATA:  WBC 8.4, hemoglobin 12.4, hematocrit 38.1, platelets 172.  Sodium

135, potassium 4.6, BUN 16, creatinine 0.96, glucose 158. 



ASSESSMENT:  

1. Status post mechanical fall.

2. Multiple recent falls.

3. Right trimalleolar fracture/dislocation, status post open reduction and internal

fixation. 

4. Acute traumatic pain, improvement in pain control from yesterday.

5. Chronic medical management, per Family Medicine.

6. Cervical central canal stenosis, likely operative management by Neurosurgery

today. 



PLAN:  

1. We will continue current pain control regimen.  We will adjust as needed

following operation today. 

2. The patient is on strict bed rest per Neurosurgery recommendations, allowed for

her to be in wheelchair for a short period of time, but emphasized no ambulating. 

3. Cervical CT and MRI yesterday confirmed severe central canal stenosis of the

patient's cervical spine.  The patient was evaluated by Neurosurgery last night.

Plan for likely operative management later today.  The patient remains off Plavix

and Lovenox. 

4. Continue medical management per Family Medicine.

5. Spoke with the patient and answered questions.  She is agreeable with current

plan of care.  Physical Therapy to see the patient following operation today. 

6. Physical Therapy saw the patient yesterday and recommended discharge to rehab.

We will pass this onto Case Management for postdischarge planning. 







Job ID:  [id number]

## 2019-11-14 NOTE — PDOC.FM
- Subjective


Subjective: 





Pt states she is moving better this morning. Her right leg is less stiff. She 

does report continued pain and some swelling in her toes. She states 

neurosurgery plans to potentially perform surgical intervention on her neck, 

pending review of records and imaging.





- Objective


MAR Reviewed: Yes


Vital Signs & Weight: 


 Vital Signs (12 hours)











  Temp Pulse Resp BP BP BP Pulse Ox


 


 11/14/19 03:20  98.3 F  88  16   160/84 H   92 L


 


 11/14/19 00:02  98.5 F  83  18   115/65   95


 


 11/13/19 20:53     145/70 H   


 


 11/13/19 20:17  98.1 F  91  16    145/70 H  93 L








 Weight











Weight                         109.996 kg











 Most Recent Monitor Data











Heart Rate from ECG            98


 


NIBP                           128/56


 


NIBP BP-Mean                   80


 


Respiration from ECG           16


 


SpO2                           99














I&O: 


 











 11/12/19 11/13/19 11/14/19





 06:59 06:59 06:59


 


Intake Total 1540 720 


 


Output Total 2100  


 


Balance -560 720 











Result Diagrams: 


 11/14/19 06:15





 11/14/19 06:15





Phys Exam





- Physical Examination


Constitutional: NAD


HEENT: moist MMs


Neck: no JVD


Respiratory: no wheezing, clear to auscultation bilateral


Cardiovascular: RRR, no significant murmur


Gastrointestinal: soft, non-tender, no distention, positive bowel sounds


Musculoskeletal: pulses present, edema present (mild swelling in right toes, 

neurovascularly intact)


Neurological: normal sensation, moves all 4 limbs


Psychiatric: A&O x 3


Skin: cap refill <2 seconds





Dx/Plan


(1) Trimalleolar fracture


Code(s): S82.853A - DISPLACED TRIMALLEOLAR FRACTURE OF UNSP LOWER LEG, INIT   

Status: Acute   





(2) Chronic kidney disease, stage 3


Status: Chronic   





(3) Diabetes mellitus type 2 in obese


Code(s): E11.9 - TYPE 2 DIABETES MELLITUS WITHOUT COMPLICATIONS; E66.9 - OBESITY

, UNSPECIFIED   Status: Chronic   





- Plan


Plan: 





This is a 53 yo female with a pmh of DM2, HTN, COPD, Bipolar, and CKD3 who we 

are consulted for medical management








Trimalleolar fracture on the right, closed


-POD #3 ORIF


-Management by ortho





Spinal stenosis


-Neurosurgery has been consulted


-Possible surgical intervention





DM2


-Continue monitoring blood glucose


-Continue home alogliptin


-Slightly elevated overnight but in an acceptable range





CKD3


-Will monitor, appears at baseline





CHF


-Continue home torsemide


-Last echo, 5/2018 shows normal heart function





HTN


-continue home lisinopril and metoprolol


-Slightly elevated, may be related to pain vs wrist cuff, remains in an 

acceptable range





COPD


-Provide respiratory support


-Continue home meds





Bipolar


-Continue home meds








Addendum - Attending





- Attending Attestation


Date/Time: 11/14/19 7508





I personally evaluated the patient and discussed the management with Dr. Acosta.


I agree with the History, Examination, Assessment and Plan documented above 

with any addition or exceptions noted below.

## 2019-11-14 NOTE — PRG
DATE OF SERVICE:  11/14/2019



SUBJECTIVE:  The patient remains on the surgical floor.  She is status post ground

level fall when she sustained a right trimalleolar fracture.  She has undergone open

reduction and internal fixation of same.  She is postop day #2.  Her pain is

currently being treated with a block with an On-Q pump.  She is tolerating a diet.

Today, she underwent evaluation by the Neurosurgery Team for history of repeated

falls and her neck pain.  This evening it was noted that she is in an [place] collar

and I have been told that she has been made n.p.o. after midnight to undergo

operative procedure possibly for a decompression surgery tomorrow. 



PHYSICAL EXAMINATION:

VITAL SIGNS:  Stable.  The patient is afebrile. 

GENERAL:  The patient is resting comfortably in bed.  She is awake, alert, and

oriented x3.  [place] Coma Scale is 15. 

HEENT:  Unremarkable. 

LUNGS:  Respirations are nonlabored. 

EXTREMITIES:  Neurovascularly intact x4.



ASSESSMENT AND PLAN:  

1. Status post ground level fall.

2. History of multiple recent falls.

3. Status post open reduction and internal fixation of right trimalleolar fracture. 

Plan will be to continue supportive care.  Await surgical decision by Neurosurgery.

Continue physical and occupational therapy and discuss placement.  The patient

informed me that she is from the [facility], would prefer placement in that area. 







Job ID:  [id number]

## 2019-11-15 NOTE — PRG
DATE OF SERVICE:  11/15/2019



Ms. Nguyen is improved this morning.  She has mild weakness on the right and

subtle weakness on the left.  She is able to move both extremities, up off to the

bed, some improvement.  We will leave her drain in place.  She may be transferred to

the floor.  She is nonweightbearing in the right lower extremity.  Otherwise,

activity as tolerated.  Collar when she is out of bed. 







Job ID:  [id number]

## 2019-11-15 NOTE — PDOC.FM
- Subjective


Subjective: 





Pt states she is sore but overall good. She reports improved ROM of her right 

leg. She states her numbness in her hand has not improved much, but she 

understands that will take time. She states her sugar was higher, requiring 

insulin.





- Objective


MAR Reviewed: Yes


Vital Signs & Weight: 


 Vital Signs (12 hours)











  Temp Pulse Ox


 


 11/15/19 04:00  99.0 F 


 


 11/15/19 00:00  98.4 F 


 


 11/14/19 21:42   97


 


 11/14/19 21:31   97


 


 11/14/19 20:00  97.0 F L  94 L








 Weight











Weight                         109.996 kg











 Most Recent Monitor Data











Heart Rate from ECG            83


 


NIBP                           135/67


 


NIBP BP-Mean                   89


 


Respiration from ECG           13


 


SpO2                           94














I&O: 


 











 11/13/19 11/14/19 11/15/19





 06:59 06:59 06:59


 


Intake Total 720  1000


 


Output Total   1100


 


Balance 720  -100











Result Diagrams: 


 11/15/19 04:04





 11/15/19 04:04





Phys Exam





- Physical Examination


Constitutional: NAD


HEENT: moist MMs


Neck: no JVD


Respiratory: no wheezing, clear to auscultation bilateral


Cardiovascular: RRR, no significant murmur


Gastrointestinal: soft, non-tender, no distention, positive bowel sounds


Musculoskeletal: pulses present, edema present (some in her right toes)


Neurological: moves all 4 limbs


Unchanged numbness in her right hand


Psychiatric: A&O x 3


Skin: cap refill <2 seconds





Dx/Plan


(1) Trimalleolar fracture


Code(s): S82.853A - DISPLACED TRIMALLEOLAR FRACTURE OF UNSP LOWER LEG, INIT   

Status: Acute   





(2) Chronic kidney disease, stage 3


Status: Chronic   





(3) Diabetes mellitus type 2 in obese


Code(s): E11.9 - TYPE 2 DIABETES MELLITUS WITHOUT COMPLICATIONS; E66.9 - OBESITY

, UNSPECIFIED   Status: Chronic   





- Plan


Plan: 





This is a 55 yo female with a pmh of DM2, HTN, COPD, Bipolar, and CKD3 who we 

are consulted for medical management








Trimalleolar fracture on the right, closed


-POD #4 ORIF


-Management by ortho





Spinal stenosis


-Neurosurgery has been consulted


-Possible cervical decompression





DM2


-Continue monitoring blood glucose


-Continue home alogliptin


-Pt required 6 units of insulin this AM for control. This is likely 2/2 

decadron and her recent sugar intake.





CKD3


-Will monitor, appears at baseline





CHF


-Continue home torsemide


-Last echo, 5/2018 shows normal heart function





HTN


-continue home lisinopril and metoprolol


-Slightly elevated, may be related to pain vs wrist cuff, remains in an 

acceptable range





COPD


-Provide respiratory support


-Continue home meds





Bipolar


-Continue home meds





Addendum - Attending





- Attending Attestation


Date/Time: 11/15/19 5412





I personally evaluated the patient and discussed the management with Dr. Acosta.


I agree with the History, Examination, Assessment and Plan documented above 

with any addition or exceptions noted below.





One time dose of morphine, then she can chat with her surgeons.  Monitor sugars 

and blood pressure.  DVT ppx per surgery.

## 2019-11-15 NOTE — PRG
DATE OF SERVICE:  11/15/2019



This is Earle Foster PA-C dictating a report for Rhys Hart MD.



SUBJECTIVE:  Ms. Nguyen is a 54-year-old female.  She is status post 
mechanical

fall.  She sustained right trimalleolar fracture and dislocation.  She underwent

ORIF with open reduction and internal fixation of the right trimalleolar 
fracture

and dislocation with Orthopedic, postop day 4 today.  She also has chronic C2-C5

stenosis with neuropathy.  Neurosurgery brought the patient to the OR yesterday 
for

decompression surgery. Postop, the patient reports her bilateral upper extremity

function is getting better.  Sensation is also getting better.  Her pain is well

controlled.  She tolerated with her full liquid diet at the moment.  Her vital 
signs

have been stable.  Her urine is adequate. 



OBJECTIVE:  GENERAL:  Currently, the patient is lying in bed with no acute

respiratory distress. 

VITAL SIGNS:  Temperature is 99.3, heart rate 92, blood pressure 126/84, 
respiratory

rate 17, and O2 saturation 97% on room air. 

LUNGS: Clear bilaterally. 

HEART: Regular rate and rhythm. 

ABDOMEN: Soft, Nondistended. 

EXTREMITIES: Neurovascularly intact x4.   Right lower extremity  postop

dressing clean, dry, and intact. 

NEUROLOGY:   C spine incision drain with appropriate volume, no neurology 
deficits.



ASSESSMENT:  

1. Status post mechanical fall.

2. Right trimalleolar fracture and dislocation, status post open reduction and

internal fixation of right trimalleolar fracture and dislocation, postop day 4. 

3. C2-C5 stenosis with compression syndrome, status post decompression surgery 
with

Neurosurgery, postop day 1. 

4. History of depression.

5. Bipolar.

6. Chronic kidney disease.

7. Diabetes.

8. Hypertension.



PLAN:  The patient will be transferred to the floor and start diabetic diet 
today,

Neurosurgery agreeable on the plan.  Neurosurgery will start Decadron taper

 from today.  The patient will continue  follow up with cervical spine drainage

and make decision on when to start pharmacological DVT prophylaxis with Lovenox.

Continue pain control.  Resume on her home meds.  Continue working with PT/OT. 
The

patient plans on placement in rehabilitation facility.  The patient was seen and

evaluated with Dr. Hart on round this morning. 







Job ID:  [id number]



MTDD

## 2019-11-15 NOTE — PRG
DATE OF SERVICE:  11/15/2019



SUBJECTIVE:  The patient remains on the surgical floor.  She is status post a ground

level fall in which she sustained a right trimalleolar fracture and dislocation.

She underwent an open reduction and internal fixation of the same and she has been

doing well from that standpoint.  She is currently postop day #4.  She was also

noted to have significant weakness and neuropathy that flared after her fall.  She

was examined by Neurosurgery who felt that she required decompressive surgery.  The

patient underwent cervical decompression and is currently postop day #1 from that.

Today, she has had no issues.  She has been moving around with a wheelchair.  She is

tolerating a diet.  She states that her pain is not adequately controlled at this

time.  Of note, the patient is a long-time chronic pain patient who has been on MS

Contin for many years. 



OBJECTIVE:  VITAL SIGNS:  Stable.  The patient is afebrile. 

GENERAL:  The patient is currently moving herself about in the hallway with a

wheelchair.  Her  strength presumably is equal bilaterally as she is able to

maintain a straight forward momentum with her wheelchair. 

LUNGS:  Respirations are nonlabored.   

EXTREMITIES:  Her right lower extremity has a walking boot and the dressing appears

to be clean, dry, and intact.  The patient is also wearing a fitted cervical collar. 



ASSESSMENT/PLAN:  

1. Status post mechanical fall.

2. Right trimalleolar fracture dislocation, status post open reduction and internal

fixation of same.  She is postoperative day #4. 

3. C2-C5 stenosis with compression syndrome, postoperative day #1, status post

decompressive surgery. 

4. History of depression, bipolar disorder, chronic kidney disease, diabetes,

hypertension. 



PLAN:  Plan will be to continue supportive care.  Encourage out of bed, Physical and

Occupational Therapy and discuss placement with the patient.  She prefers placement

in the [place] area.  We will make adjustments to her pain medications to add an

another dose of morphine available to her p.r.n. for breakthrough pain.  The patient 

asked about resuming her home insulin regimen.  Discuss this with her that until we

know that she is off her antibiotics and there are no signs of infection, we will

keep her on a sliding scale, but we should be able to transition her shortly and she

was in agreement with this. 







Job ID:  [id number]

## 2019-11-16 NOTE — PRG
DATE OF SERVICE:  11/16/2019



SUBJECTIVE:  Ms. Nguyen is a 54-year-old female, status post mechanical fall 
in

which she sustained right trimalleolar fracture and dislocation.  She also has a

history of diabetes, hypertension, chronic kidney disease, depression, bipolar, 
and

C5 stenosis.  She underwent ORIF of right trimalleolar fracture postop day 5, 
and

she underwent C5 stenosis decompression with Neurosurgery.  Postop day 2 today, 
she

complained of some degree of pain with activity.  She got another dose of 
morphine

liquid p.r.n. yesterday.  Her blood sugar is moderately controlled.  Her vital 
signs

are stable.  She has been working PT/OT.  She was ambulatory around the floor  
using a wheelchair today.

Her urine is adequate.  She tolerated with her regular diet. 



OBJECTIVE:  GENERAL:  The patient is lying in bed with no acute respiratory

distress. 

VITAL SIGNS:  Temperature 98, heart rate 60, respiratory rate 12, O2 saturation 
97%

on room air, blood pressure 149/80. 

LUNGS:  Clear bilaterally. 

HEART:  Regular rate and rhythm. 

ABDOMEN:  Soft, nondistended. 

EXTREMITIES:  pulse 2 ++ bilateral.  Right lower extremity postop dressing

clean, dry, intact. 

NEUROLOGY:  C5 collar is on, fitted well. 



Postop draining posterior neck ADRIANE is 60ml for 24 hours yesterday.



ASSESSMENT:  

1. Status post mechanical fall.

2. Right trimalleolar fracture and dislocation, status post open reduction and

internal fixation of right trimalleolar fracture and dislocation postoperative 
day

5; C2-C5 stenosis and compression syndrome, status post decompression surgery 
with

Neurosurgery postoperative day 2; history of diabetes; hypertension; chronic 
kidney

disease; depression and bipolar. 



PLAN:  Will be to continue supportive care.  Continue pain control.  We will ask

Neurosurgery and Pharmacology call DVT prophylaxis.  Continue working with PT 
and

OT.  Plan placement in [facility]. 







Job ID:  [id number]



MTDD

## 2019-11-16 NOTE — PDOC.FM
- Subjective


Subjective: 


Pt notes improvement in her weakness and sensations today. Still having 

numbness in hands but notes decreased tremors. Denies any events overnight. 

Notes some elevation in her sugars and BP since surgery.





- Objective


Vital Signs & Weight: 


 Vital Signs (12 hours)











  Temp Pulse Resp BP BP Pulse Ox


 


 11/16/19 03:54  98.1 F  62  18  157/68 H   93 L


 


 11/16/19 01:08  98.4 F  88  20   157/78 H  99


 


 11/15/19 20:00  98.3 F  92  16  147/77 H   93 L


 


 11/15/19 19:26       93 L


 


 11/15/19 19:22       93 L








 Weight











Weight                         109.996 kg











 Most Recent Monitor Data











Heart Rate from ECG            87


 


NIBP                           132/76


 


NIBP BP-Mean                   94


 


Respiration from ECG           22


 


SpO2                           98














I&O: 


 











 11/14/19 11/15/19 11/16/19





 06:59 06:59 06:59


 


Intake Total  1205 2545


 


Output Total  1670 330


 


Balance  -465 2215











Result Diagrams: 


 11/15/19 04:04





 11/15/19 04:04





Phys Exam





- Physical Examination


Constitutional: NAD


Morbidly obese, in wheelchair


HEENT: moist MMs, sclera anicteric


In c-collar


Respiratory: no wheezing, no rales, no rhonchi, clear to auscultation bilateral


Cardiovascular: RRR, no significant murmur


Gastrointestinal: soft, non-tender


Musculoskeletal: no edema, pulses present


Neurological: non-focal, moves all 4 limbs


Decreased sensation to upper extremities


Psychiatric: normal affect, A&O x 3


Skin: no rash, cap refill <2 seconds





Dx/Plan


(1) Trimalleolar fracture


Code(s): S82.853A - DISPLACED TRIMALLEOLAR FRACTURE OF UNSP LOWER LEG, INIT   

Status: Acute   





(2) Neurological symptoms


Code(s): R29.90 - UNSPECIFIED SYMPTOMS AND SIGNS INVOLVING THE NERVOUS SYSTEM   

Status: Acute   





(3) Recurrent falls


Code(s): R29.6 - REPEATED FALLS   Status: Acute   





(4) Bipolar disorder


Code(s): F31.9 - BIPOLAR DISORDER, UNSPECIFIED   Status: Chronic   


Qualifiers: 


   Active/Remission status: remission status unspecified   Qualified Code(s): 

F31.9 - Bipolar disorder, unspecified   





(5) CAD (coronary artery disease)


Code(s): I25.10 - ATHSCL HEART DISEASE OF NATIVE CORONARY ARTERY W/O ANG PCTRS 

  Status: Chronic   


Qualifiers: 


   Coronary Disease-Associated Artery/Lesion type: native artery   Native vs. 

transplanted heart: native heart   Associated angina: angina presence 

unspecified   Qualified Code(s): I25.10 - Atherosclerotic heart disease of 

native coronary artery without angina pectoris   





(6) CHF (congestive heart failure)


Code(s): I50.9 - HEART FAILURE, UNSPECIFIED   Status: Chronic   





(7) Chronic kidney disease, stage 3


Status: Chronic   





(8) Chronic pancreatitis


Code(s): K86.1 - OTHER CHRONIC PANCREATITIS   Status: Chronic   





(9) Diabetes mellitus type 2 in obese


Code(s): E11.9 - TYPE 2 DIABETES MELLITUS WITHOUT COMPLICATIONS; E66.9 - OBESITY

, UNSPECIFIED   Status: Chronic   





(10) Hypertension


Code(s): I10 - ESSENTIAL (PRIMARY) HYPERTENSION   Status: Chronic   


Qualifiers: 


   Hypertension type: essential hypertension   Qualified Code(s): I10 - 

Essential (primary) hypertension   





- Plan


Plan: 


This is a 53 yo female with a pmh of DM2, HTN, COPD, Bipolar, and CKD3 who we 

are consulted for medical management





Trimalleolar fracture on the right, closed


-POD #5 ORIF


-Management by ortho





Spinal stenosis


-POD #2 following cervical spinal decompression


-Drain in place


-Noting improvement in strength and sensation





DM2


-Continue monitoring blood glucose


-Continue home alogliptin


-Pt is on aggressive SSI - required 12 units yesterday. This is likely 2/2 

decadron and her recent sugar intake.





CKD3


-Will monitor, appears at baseline





CHF


-Continue home torsemide


-Last echo, 5/2018 shows normal heart function





HTN


-continue home lisinopril and metoprolol


-Elevated post operatively - may be 2/2 to pain, will continue to monitor, no 

rx change at this time, non-severe range





COPD


-Provide respiratory support


-Continue home meds





Bipolar


-Continue home meds

## 2019-11-16 NOTE — PRG
DATE OF SERVICE:  11/16/2019



Please see note from Dr. Timothy Real, for which I agree.  The patient is seen,

evaluated, discussed and examined with the residents by bedside.  We are here

following her after she had a fall which led to a right trimalleolar fracture that

was repaired and then spinal stenosis that was decompressed and repaired.

__________ following along medically, taking care of diabetes, hypertension, and

COPD, which have all been well controlled on current medicines and the plan is to

continue those medicines while she recovers from the surgeries. 







Job ID:  [id number]

## 2019-11-16 NOTE — PRG
DATE OF SERVICE:  11/16/2019



Ms. Nguyen is postop day #2 after posterior cervical decompression with Dr. Dodson.  She has a fair amount of neck pain, but also has chronic pain elsewhere,

takes morphine daily for this.  She appears to me to be in great spirits, is

talkative, and does not appear to have any limitation in her range of motion in her

bilateral upper extremities.  She is in a wheelchair secondary to nonweightbearing

status for ankle fracture and fixation with orthopedic screw.  The drain output has

tapered tremendously, and we will remove that today.  Her incision is dry, well

approximated with no concern for dehiscence.  There is no drainage.  Overall, I

think this looks quite well.  Neurosurgery is planning to continue to follow along

and track her healing.  She has some concerns about some give-way weakness that has

returned today, which had abated today after surgery.  I think this very well may be

related to inflammatory changes in the surgical bed.  We will start some steroid

treatment and see if this improves.  If not, we may have to consider potentially

reimaging sooner than we normally would.  We will recheck in the morning. 







Job ID:  [id number]

## 2019-11-17 NOTE — PRG
DATE OF SERVICE:  11/17/2019



Ms. Nguyen is status post cervical decompression and fusion.  She is resting

comfortably in her room today.  She is sitting in her wheelchair and has been

eating.  She has been mobilizing in the hallways in the wheelchair.  She has ankle

orthosis on which prohibits her from an attempt to weightbear.  Her arm strength is

quite solid.  Drains were removed yesterday in the cervical spine region. 



She has chosen to pursue a Swing Bed in [place] over rehab given its proximity to

her home.  The Case Management is working to make those arrangements.  Once they

have been arranged, she can be discharged at any time. 







Job ID:  [id number]

## 2019-11-17 NOTE — PDOC.FM
- Subjective


Subjective: 


Notes improved pain control. Denies any complaints at this time. Is working 

with PT daily and tolerating. Plans to dc to [place].





- Objective


Vital Signs & Weight: 


 Vital Signs (12 hours)











  Temp Pulse Resp BP BP BP Pulse Ox


 


 11/17/19 07:27  98.3 F  74  18    136/83  96


 


 11/17/19 07:04        100


 


 11/17/19 07:02   80  12    


 


 11/17/19 04:00  98.4 F  86  18   135/70   94 L


 


 11/16/19 23:45  98.3 F  73  16    149/87 H  95


 


 11/16/19 20:15     148/84 H   








 Weight











Weight                         109.996 kg











 Most Recent Monitor Data











Heart Rate from ECG            87


 


NIBP                           132/76


 


NIBP BP-Mean                   94


 


Respiration from ECG           22


 


SpO2                           98














I&O: 


 











 11/16/19 11/17/19 11/18/19





 06:59 06:59 06:59


 


Intake Total 2545 1750 


 


Output Total 350  


 


Balance 2195 1750 











Result Diagrams: 


 11/15/19 04:04





 11/15/19 04:04





Phys Exam





- Physical Examination


Constitutional: NAD


HEENT: moist MMs, sclera anicteric


c-collar


Respiratory: no wheezing, no rales, no rhonchi, clear to auscultation bilateral


Cardiovascular: RRR, no significant murmur


Gastrointestinal: soft, non-tender


Musculoskeletal: no edema, pulses present


Neurological: non-focal, normal sensation


Psychiatric: normal affect, A&O x 3


Skin: no rash, cap refill <2 seconds





Dx/Plan


(1) Trimalleolar fracture


Code(s): S82.853A - DISPLACED TRIMALLEOLAR FRACTURE OF UNSP LOWER LEG, INIT   

Status: Acute   





(2) Neurological symptoms


Code(s): R29.90 - UNSPECIFIED SYMPTOMS AND SIGNS INVOLVING THE NERVOUS SYSTEM   

Status: Acute   





(3) Recurrent falls


Code(s): R29.6 - REPEATED FALLS   Status: Acute   





(4) Bipolar disorder


Code(s): F31.9 - BIPOLAR DISORDER, UNSPECIFIED   Status: Chronic   


Qualifiers: 


   Active/Remission status: remission status unspecified   Qualified Code(s): 

F31.9 - Bipolar disorder, unspecified   





(5) CAD (coronary artery disease)


Code(s): I25.10 - ATHSCL HEART DISEASE OF NATIVE CORONARY ARTERY W/O ANG PCTRS 

  Status: Chronic   


Qualifiers: 


   Coronary Disease-Associated Artery/Lesion type: native artery   Native vs. 

transplanted heart: native heart   Associated angina: angina presence 

unspecified   Qualified Code(s): I25.10 - Atherosclerotic heart disease of 

native coronary artery without angina pectoris   





(6) CHF (congestive heart failure)


Code(s): I50.9 - HEART FAILURE, UNSPECIFIED   Status: Chronic   





(7) Chronic kidney disease, stage 3


Status: Chronic   





(8) Chronic pancreatitis


Code(s): K86.1 - OTHER CHRONIC PANCREATITIS   Status: Chronic   





(9) Diabetes mellitus type 2 in obese


Code(s): E11.9 - TYPE 2 DIABETES MELLITUS WITHOUT COMPLICATIONS; E66.9 - OBESITY

, UNSPECIFIED   Status: Chronic   





(10) Hypertension


Code(s): I10 - ESSENTIAL (PRIMARY) HYPERTENSION   Status: Chronic   


Qualifiers: 


   Hypertension type: essential hypertension   Qualified Code(s): I10 - 

Essential (primary) hypertension   





- Plan


Plan: 


This is a 53 yo female with a pmh of DM2, HTN, COPD, Bipolar, and CKD3 who we 

are consulted for medical management





Trimalleolar fracture on the right, closed


-POD #5 ORIF


-Management by ortho





Spinal stenosis


-POD #2 following cervical spinal decompression


-Drain in place


-Noting improvement in strength and sensation





DM2


-Continue monitoring blood glucose


-Continue home alogliptin


-Pt is on aggressive SSI - elevated glucose levels. This is likely 2/2 decadron 

and her recent sugar intake.





CKD3


-Will monitor, appears at baseline





CHF


-Continue home torsemide


-Last echo, 5/2018 shows normal heart function





HTN


-continue home lisinopril and metoprolol


-Elevated post operatively - may be 2/2 to pain, will continue to monitor, no 

rx change at this time, non-severe range





COPD


-Provide respiratory support


-Continue home meds





Bipolar


-Continue home meds

## 2019-11-17 NOTE — PRG
DATE OF SERVICE:  11/17/2019



SUBJECTIVE:  Ms. Nguyen is a 54-year-old female, status post mechanical fall in

which she sustained right trimalleolar fracture and dislocation.  She underwent ORIF

of right trimalleolar fracture and dislocation.  She also suffered from C2-C5

stenosis with compression syndrome.  She underwent decompression surgery with

Neurosurgery.  Postop day 3, her motor function of the upper extremity has come

back, but she reports her sensation has not yet normalized.  She has been working

with PT/OT yesterday.  She using wheelchair and mobilized around the floor very

good.  Her pain is well controlled.  She is not yet sleeping well and her bowel

regimen is normal. 



OBJECTIVE:  GENERAL:  The patient is lying down in bed with no acute respiratory

distress. 

VITAL SIGNS:  Temperature 98.3, heart rate 74, respiratory rate 18, O2 saturation

96% on room air, blood pressure 136/83. 

LUNGS:  Clear bilaterally. 

HEART:  Regular rate and rhythm. 

ABDOMEN:  Soft, nondistended.  Bowel sounds are active. 

EXTREMITIES:  Right lower extremity, postop dressing is clean, dry, and intact.

Upper extremity muscle strength is 4/5.  Sensation is diminished. 

NEUROLOGIC:  C5 collar is on, fitting well.  No chain of neuro deficits since

yesterday. 



ASSESSMENT:  

1. Status post mechanical fall.

2. Right trimalleolar fracture and dislocation, status post open reduction and

internal fixation of right trimalleolar fracture and dislocation, postop day 6.

C2-C5 spinal stenosis with compression syndrome, status post decompression surgery

with Neurosurgery, postop day 3. 

3. History of diabetes, hypertension, chronic kidney disease, depression and bipolar.



PLAN:  Continue supportive care.  Continue pain control.  Will be asking

Neurosurgery on DVT prophylaxis.  Continue working with PT/OT.  Plan placement in

[facility]. 





Job ID:  [id number]

## 2019-11-17 NOTE — CON
DATE OF CONSULTATION:  



Please see note from Dr. Flores, for which I agree.  The patient was seen, evaluated,

discussed, and examined with the residents by bedside.  She is status post both

C-spine surgery and right trimalleolar fracture fixation.  We are following her for

blood pressure and diabetes.  She is on the sliding scale, back on her diabetes

management.  Doing better now that her steroids are being weaned off.  Blood

pressure control seems better now.  It sounds like probably going to rehab in the

next couple of days.  We will continue the same diabetes and blood pressure

medicines there. 







Job ID:  [id number]

## 2019-11-17 NOTE — OP
DATE OF PROCEDURE:  11/14/2019



Modifier 57 should be added to this surgery as the decision to operate was made on

the day I saw the patient. 



ASSISTANT:  Evans.



PREPROCEDURE DIAGNOSIS:  Spinal cord compression with quadriparesis with proximal

adjacent segment disease. 



PROCEDURES:  

1. C2-C3, C3-C4, C4-C5 laminectomies, partial facetectomies, and foraminotomies.

2. Assessment of fusion at C4-C5.

3. Removal of hardware, C4-C5.

4. Arthrodesis of posterolateral C2-C3, C3-C4.

5. Placement screw ojhn fixation, C2, C3, C4, C5.



DESCRIPTION OF PROCEDURE:  After informed consent was obtained from the patient, the

patient was brought to the OR.  Proper patient, pause, and identification were

carried out.  She was placed under excellent general endotracheal anesthesia and

positioned prone on the OR table.  Following the placement of the Maxi head york

and cervical spine in neutral position, the prior wound was identified.  Hair was

clipped.  This region was sterilely cleansed, prepared, and draped.  Proper patient,

pause, and identification were carried out.  The wound was then opened with a

combination of sharp, monopolar, and blunt dissection.  The C2, C3, C4, C5 segments

were exposed.  She had fused C4-C5.  We then did C2, C3, C4, C5 laminectomies,

partial facetectomy, and foraminotomies.  Interspinous wiring hardware was removed

at C4-C5 to maximize decompression.  We then placed screws at C2, C3, C4, C5 with

gross and fluoroscopic visualization.  Rods were placed.  Final tightening occurred.

 Posterolateral arthrodesis was initiated at C2, C3, C4 as the patient was already

fused, C4-C5 was not necessary to fuse at segment.  Copious irrigation occurred

throughout as did maximizing hemostasis.  The wound was then closed in anatomic

layers following sprinkling of vancomycin powder. 





Job ID:  [id number]

## 2019-11-17 NOTE — PRG
DATE OF SERVICE:  



SUBJECTIVE:  The patient remains on the surgical floor.  The patient is status post

mechanical fall with a right trimalleolar fracture and dislocation.  The patient is

postop day #6 status post open reduction and internal fixation.  The patient is also

postop day #3 for decompression of C2 through C5.  She does report some

mild-to-moderate pain in her neck and right ankle currently.  The patient states

that the fentanyl patch only lasts about 2 days instead of 3. 



OBJECTIVE:  VITAL SIGNS:  Stable, afebrile. 

GENERAL:  Middle-aged female, sitting up in wheelchair, no acute distress. 

LUNGS:  Good inspiratory and expiratory effort, no respiratory distress. 

MUSCULOSKELETAL:  Right lower extremity postop dressing clean, dry and intact,

walking boot in place.  Well-fitting cervical collar in place.  Decreased sensation

in all extremities.  Continued upper extremity muscle strength is 4/5. 



ASSESSMENT:  

1. Status post mechanical fall.

2. Right trimalleolar fracture and dislocation, status post open reduction and

internal fixation, postop day #6. 

3. C2 through C5 spinal stenosis with compression syndrome, status post

decompression surgery with Neurosurgery, postop day #3. 

4. History of diabetes, hypertension, chronic kidney disease, depression, and

bipolar. 



PLAN:  Continue supportive care.  Continue pain control.  Continue to have the

patient work with Physical and Occupational Therapy.  The patient is pending

placement to [facility]. 







Job ID:  [id number]

## 2019-11-17 NOTE — PRG
DATE OF SERVICE:  



SUBJECTIVE:  The patient remains on the surgical floor.  She is status post a right

trimalleolar fracture dislocation.  She underwent open reduction and internal

fixation.  She is postop day 5 from that procedure.  She also while here underwent a

C5 decompression surgery with Neurosurgery.  She is postop day 2 from that

procedure.  She has been doing well.  She is moving herself around independently in

a wheelchair.  Her pain is controlled.  She is tolerating a diet, and her bowel

function has returned. 



PHYSICAL EXAMINATION:

VITAL SIGNS:  Stable.  The patient is afebrile. 

GENERAL:  The patient is sitting beside her bed.  She appears in no distress or

discomfort. 



ASSESSMENT/PLAN:  

1. Status post ground-level fall.

2. Status post open reduction and internal fixation of right trimalleolar fracture

dislocation. 

3. Status post C2-C5 stenosis and decompression surgery. 

Plan will be to continue supportive care and await final placement decision.







Job ID:  [id number]

## 2019-11-18 NOTE — ULT
EXAM:

Bilateral lower extremity venous Doppler US



HISTORY:

bilateral lower extremity edema and pain



FINDINGS:

Grayscale, color-flow, Doppler evaluation, spectral analysis of the bilateral lower extremities venou
s structures is performed with 2-D imaging. The bilateral common femoral, superficial femoral,

popliteal, posterior tibial, proximal greater saphenous and profunda femoral veins are imaged.



There is normal luminal compressibility, flow, and augmentation in the visualized deep venous structu
res of the bilateral lower extremities.



The right posterior tibial vein was not evaluated due to the presence of cast and boot.



There is a 4 x 2.2 x 1.3 cm avascular cyst in the left popliteal fossa

.

IMPRESSION:

No evidence of a deep vein thrombosis in either lower extremity.

Left-sided Baker's cyst.



Reported By: Reza Gomez 

Electronically Signed:  11/18/2019 1:25 PM

## 2019-11-18 NOTE — PRG
DATE OF SERVICE:  11/18/2019



Ms. Nguyen was admitted with a trimalleolar fracture and we have been consulted

for her medical problems.  She also had C-spine surgery for spinal stenosis.  We are

following her blood pressure and diabetes.  She also has the possibility of sleep

apnea and has been discussing with her PCP a sleep study done as an outpatient.  We

will continue to monitor blood pressure and diabetes. 







Job ID:  [id number]

## 2019-11-18 NOTE — PRG
DATE OF SERVICE:  11/18/2019



This is Evelio Krueger PA-C dictating a report for Shiva Dodson MD.



Ms. Nguyen is postoperative day #4, having undergone posterior cervical

laminectomies, fusion for significant spinal cord compression and myelopathy.  The

patient overall feels as though she is doing well.  She does remain with a

generalized weakness in all the extremities, but her exam remains at baseline

__________ cervical spine surgery.  She has some neck pain and right ankle pain, but

does not complain of any pain into the bilateral upper extremities.  She is

continuing to move with more vigor than she was preoperatively.  Her posterior

cervical drain was removed.  Her incision is healing well.  She remains again with a

generalized weakness in all the extremities, though appears to be more significant

on the left than the right.  Overall, I should note the patient is very pleased with

her progress and she is ready for discharge to skilled nursing facility at any time.

 We will plan to follow up with her in 3 weeks for an incision check and then again

at 6 weeks postop with cervical spine x-rays __________ pleased with the way the

patient is progressing.  The patient should be wearing her collar anytime she is up,

walking around, but does not need to wear this when she is sitting in chair, when

she lies in bed. 







Job ID:  [id number]

## 2019-11-18 NOTE — PRG
DATE OF SERVICE:  



SUBJECTIVE:  Patient remains on the surgical floor.  Patient is moving herself

around in the wheelchair currently.  Patient with a well-fitting cervical collar in

place.  Patient continues to have a walking boot to the right lower extremity.

Patient voices no complaints or concerns at this time.  Patient is pending placement

to [facility] bed pending insurance approval.  We will continue chemical and

mechanical DVT prophylaxis.  We will continue to have Physical and Occupational

Therapy work with the patient.  We will continue pain regimen.  The plan was

discussed with the patient who agrees. 







Job ID:  [id number]

## 2019-11-18 NOTE — PDOC.FM
- Subjective


Subjective: 





Pt states she is doing well this morning. 


Pt c/o pain that is worse on the third day of the fentanyl patch. 


Pt states she needs more emotional support from friends and family, requesting 

to be at rehab closer to home. 


Pt states she is usually on Buproprion for smoking cessation, pt has felt an 

increased urge to smoke the past X2 days. Stepping outside to smoke several 

times.


Pt would like buproprion restarted.  





- Objective


Vital Signs & Weight: 


 Vital Signs (12 hours)











  Temp Pulse Resp BP BP BP Pulse Ox


 


 11/18/19 03:59  98.4 F  66  16   127/76   95


 


 11/17/19 23:16  98.6 F  68  16    132/68  98


 


 11/17/19 21:34     161/82 H   


 


 11/17/19 21:33  98.5 F  92  16   161/82 H   94 L


 


 11/17/19 19:11   76  14     95








 Weight











Weight                         109.996 kg











 Most Recent Monitor Data











Heart Rate from ECG            87


 


NIBP                           132/76


 


NIBP BP-Mean                   94


 


Respiration from ECG           22


 


SpO2                           98














I&O: 


 











 11/16/19 11/17/19 11/18/19





 06:59 06:59 06:59


 


Intake Total 2545 1750 


 


Output Total 350  


 


Balance 2195 1750 











Result Diagrams: 


 11/15/19 04:04





 11/15/19 04:04





Phys Exam





- Physical Examination


Constitutional: NAD


HEENT: moist MMs, sclera anicteric


Pt in C-collar. Decreased Rotation of neck. 


Respiratory: no wheezing, no rales, no rhonchi, clear to auscultation bilateral


Cardiovascular: RRR, no significant murmur, no rub


Gastrointestinal: soft, non-tender, no distention, positive bowel sounds


R boot on foot, reaches up to knee. 


Decreased strength of L sided extremities. Decreased sensation on R extremi


Psychiatric: normal affect, A&O x 3


Skin: no rash, normal turgor, cap refill <2 seconds





Dx/Plan


(1) Trimalleolar fracture


Code(s): S82.853A - DISPLACED TRIMALLEOLAR FRACTURE OF UNSP LOWER LEG, INIT   

Status: Acute   


Qualifiers: 


   Laterality: right 





(2) Bipolar disorder


Code(s): F31.9 - BIPOLAR DISORDER, UNSPECIFIED   Status: Chronic   


Qualifiers: 


   Active/Remission status: remission status unspecified   Qualified Code(s): 

F31.9 - Bipolar disorder, unspecified   





(3) CHF (congestive heart failure)


Code(s): I50.9 - HEART FAILURE, UNSPECIFIED   Status: Chronic   


Qualifiers: 


   Heart failure type: diastolic   Heart failure chronicity: chronic   

Qualified Code(s): I50.32 - Chronic diastolic (congestive) heart failure   





(4) Diabetes mellitus type 2 in obese


Code(s): E11.9 - TYPE 2 DIABETES MELLITUS WITHOUT COMPLICATIONS; E66.9 - OBESITY

, UNSPECIFIED   Status: Chronic   





(5) Hyperlipidemia


Code(s): E78.5 - HYPERLIPIDEMIA, UNSPECIFIED   Status: Chronic   


Qualifiers: 


   Hyperlipidemia type: unspecified   Qualified Code(s): E78.5 - Hyperlipidemia

, unspecified   





(6) Hypertension


Code(s): I10 - ESSENTIAL (PRIMARY) HYPERTENSION   Status: Chronic   


Qualifiers: 


   Hypertension type: essential hypertension   Qualified Code(s): I10 - 

Essential (primary) hypertension   





(7) Peripheral neuropathy


Code(s): G62.9 - POLYNEUROPATHY, UNSPECIFIED   Status: Chronic   


Qualifiers: 


   Peripheral neuropathy type: polyneuropathy, unspecified   Qualified Code(s): 

G62.9 - Polyneuropathy, unspecified   





(8) Tobacco abuse


Code(s): Z72.0 - TOBACCO USE   Status: Chronic   





(9) Cervical stenosis of spinal canal


Code(s): M48.02 - SPINAL STENOSIS, CERVICAL REGION   Status: Acute   





- Plan


Plan: 





This is a 53 yo female with a pmh of DM2, HTN, COPD, Bipolar, and CKD3 who we 

are consulted for medical management





Trimalleolar fracture on the right, closed


-POD #5 ORIF


-Management by ortho





Spinal stenosis of C2-C5


-S/P cervical spinal decompression and cervical fusion 


-Drain removed 11/17


-Noting improvement in strength and sensation. R sided paresthesia and L sided 

weakness. Improving. 





DM2


-Continue monitoring blood glucose


-Continue home alogliptin


-Pt is on aggressive SSI - elevated glucose levels. This is likely 2/2 decadron 

and her recent sugar intake.





CKD3


-Will monitor, appears at baseline





CHF


-Continue home torsemide


-Last echo, 5/2018 shows normal heart function





HTN


-continue home lisinopril and metoprolol


-Elevated post operatively - may be 2/2 to pain, will continue to monitor, no 

rx change at this time, non-severe range





COPD


-Provide respiratory support


-Continue home meds





Bipolar


-Continue home meds








Dispo: Stable, inpatient. continue current therapy. Possible transfer to [place].

## 2019-11-19 NOTE — PDOC.FM
- Subjective


Subjective: 





Patient complains of twitching of her upper extremities. Patient also states 

she is having some intermittent weakness while using the wheelchair. Patient 

was informed by neurosurgery that this might be a complication for up to 18 

months post surgery. Patient denies inadequate control the pain today. 


Pt states she would be willing to go to inpatient rehab, as her insurance will 

not cover SNF. 





- Objective


MAR Reviewed: Yes


Vital Signs & Weight: 


 Vital Signs (12 hours)











  Temp Pulse Resp BP BP Pulse Ox


 


 11/19/19 05:55     99/65  


 


 11/19/19 05:20  98.1 F  60  16   99/65  100


 


 11/19/19 02:43       96


 


 11/18/19 23:36     132/71  


 


 11/18/19 23:25  98.0 F  67  16   132/71  97


 


 11/18/19 20:23     109/64  








 Weight











Weight                         109.996 kg











 Most Recent Monitor Data











Heart Rate from ECG            87


 


NIBP                           132/76


 


NIBP BP-Mean                   94


 


Respiration from ECG           22


 


SpO2                           98














I&O: 


 











 11/17/19 11/18/19 11/19/19





 06:59 06:59 06:59


 


Intake Total 5576 563 4620


 


Balance 0377 976 7601











Result Diagrams: 


 11/15/19 04:04





 11/15/19 04:04





Phys Exam





- Physical Examination


Constitutional: NAD


HEENT: moist MMs


Neck: no JVD


Deminished ROM of C-spine, rotation < flexion. 


Respiratory: no wheezing, no rales, no rhonchi, clear to auscultation bilateral


Cardiovascular: RRR, no significant murmur, no rub


Gastrointestinal: soft, non-tender, no distention, positive bowel sounds


Musculoskeletal: no edema, pulses present


BUE weakness, 4/5. Slight tremor in RUE with movement. 


Psychiatric: normal affect, A&O x 3


Skin: no rash, normal turgor, cap refill <2 seconds





Dx/Plan


(1) Trimalleolar fracture


Code(s): S82.853A - DISPLACED TRIMALLEOLAR FRACTURE OF UNSP LOWER LEG, INIT   

Status: Acute   


Qualifiers: 


   Laterality: right 





(2) Bipolar disorder


Code(s): F31.9 - BIPOLAR DISORDER, UNSPECIFIED   Status: Chronic   


Qualifiers: 


   Active/Remission status: remission status unspecified   Qualified Code(s): 

F31.9 - Bipolar disorder, unspecified   





(3) CHF (congestive heart failure)


Code(s): I50.9 - HEART FAILURE, UNSPECIFIED   Status: Chronic   


Qualifiers: 


   Heart failure type: diastolic   Heart failure chronicity: chronic   

Qualified Code(s): I50.32 - Chronic diastolic (congestive) heart failure   





(4) Diabetes mellitus type 2 in obese


Code(s): E11.9 - TYPE 2 DIABETES MELLITUS WITHOUT COMPLICATIONS; E66.9 - OBESITY

, UNSPECIFIED   Status: Chronic   





(5) Hyperlipidemia


Code(s): E78.5 - HYPERLIPIDEMIA, UNSPECIFIED   Status: Chronic   


Qualifiers: 


   Hyperlipidemia type: unspecified   Qualified Code(s): E78.5 - Hyperlipidemia

, unspecified   





(6) Hypertension


Code(s): I10 - ESSENTIAL (PRIMARY) HYPERTENSION   Status: Chronic   


Qualifiers: 


   Hypertension type: essential hypertension   Qualified Code(s): I10 - 

Essential (primary) hypertension   





(7) Peripheral neuropathy


Code(s): G62.9 - POLYNEUROPATHY, UNSPECIFIED   Status: Chronic   


Qualifiers: 


   Peripheral neuropathy type: polyneuropathy, unspecified   Qualified Code(s): 

G62.9 - Polyneuropathy, unspecified   





(8) Tobacco abuse


Code(s): Z72.0 - TOBACCO USE   Status: Chronic   





(9) Cervical stenosis of spinal canal


Code(s): M48.02 - SPINAL STENOSIS, CERVICAL REGION   Status: Acute   





- Plan


Plan: 





This is a 55 yo female with a pmh of DM2, HTN, COPD, Bipolar, and CKD3 who we 

are consulted for medical management





Trimalleolar fracture on the right, closed


-s/p ORIF


-Pain well managed, continue rehab PT/OT 





Spinal stenosis of C2-C5


-S/P cervical spinal decompression and cervical fusion 


-Drain removed 11/17


-Noting improvement in strength and sensation. R sided paresthesia and L sided 

weakness. Improving. 





DM2


-Continue monitoring blood glucose


-Continue home alogliptin, restarted 


-Pt is on mod SSI - elevated glucose levels. This is likely 2/2 decadron and 

her recent sugar intake.


- Increased dose of lantus to 20 units Qam. 





CKD3


-Will monitor, appears at baseline





CHF


-Continue home torsemide


-Last echo, 5/2018 shows normal heart function





HTN


-continue home lisinopril and metoprolol


-Elevated post operatively - may be 2/2 to pain, will continue to monitor, no 

rx change at this time, non-severe range





COPD


-Provide respiratory support


-Continue home meds





Bipolar


-Continue home meds








Dispo: Stable, inpatient. continue current therapy. Possible transfer to [place].

## 2019-11-19 NOTE — PRG
DATE OF SERVICE:  11/19/2019



Ms. Nguyen had been complaining of some twitching in her upper extremities, which

Neurosurgery says is a side effect of her surgery and will resolve after several

months.  Otherwise, no new issues.  We will continue to monitor her blood glucose

and hypertension, which are approaching good control. 







Job ID:  [id number]

## 2019-11-19 NOTE — PRG
DATE OF SERVICE:  11/18/2019



SUBJECTIVE:  Ms. Ngueyn is a  54-year-old female, status post mechanical fall.

She sustained right trimalleolar fracture and dislocation.  She underwent ORIF of

right trimalleolar fracture and dislocation, postoperative day 8.  She also suffered

from C2 through C5 stenosis with compression syndrome.  She underwent decompression

surgery with Neurosurgery, postoperative day 4.  Her motor function of upper

extremity has come back slowly.  Her sensation has not yet normalized.  She has been

working with PT and OT.  She has been using wheelchair and mobilized around the

floor very good.  Her pain is well controlled.  She tolerated regular diet.  Her

vital signs are stable. 



OBJECTIVE:  GENERAL:  The patient is lying down in bed with no acute respiratory

distress. 

VITAL SIGNS:  Temperature 97.8, heart rate 89, respiratory rate 18, O2 saturation

94% on room air, and blood pressure 108/64. 

LUNGS:  Clear bilaterally. 

HEART:  Regular rate and rhythm. 

ABDOMEN:  Soft and nondistended. 

EXTREMITIES:  Neurovascularly intact x4.  Her right lower extremity got a brace, and

dressing is clean, dry, and intact. 

NEUROLOGIC:  Diminished strength due to C-spine stenosis.  Sensation is diminished.

The patient is on C-collar. 



PLAN:  Continue supportive care.  Continue pain control.  Continue PT and OT.  The

patient is waiting for insurance authorization to go to [facility].

Neurosurgery is okay to use Lovenox, and she could be okay to take off C-collar

while she is lying down or sitting on the chair.  She will be seeing Neurosurgery in

another 2 weeks, and she will be seeing Orthopedic in 10 days to 2 weeks.  Continue

nonweightbearing of the right lower extremity. 







Job ID:  [id number]

## 2019-11-19 NOTE — PRG
DATE OF SERVICE:  11/19/2019



This is Evelio Krueger PA-C dictating a report for Shiva Dodson MD.



Ms. Nguyen is postoperative day #5, having undergone multilevel posterior

cervical fusion laminectomies and fusion.  She states she is having some aching into

the back of her neck as well as into the bilateral upper extremities intermittently,

especially present with coughing.  Overall, she is hopeful that she will go to rehab

today.  She remains with baseline weakness into all the extremities, though this is

continued to improve compared to her preoperative exam.  She does most notably have

right hand intrinsic weakness, though again this is improving.  She is up sitting in

a wheelchair and has her [place] J collar on.  We have looked to the incision and it

is clean and dry and healing well.  She is ready for discharge to skilled nursing

facility, inpatient rehab whenever arrangements have been made.  We will arrange

followup with Neurosurgery on an outpatient basis soon after 3 weeks postop.  Please

call with any changes in the patient's neurologic status.  Otherwise, we will follow

along. 







Job ID:  [id number]

## 2019-11-20 NOTE — DIS
DATE OF ADMISSION:  11/10/2019



DATE OF DISCHARGE:  11/19/2019



CONSULTING PHYSICIANS:  Dr. Guzman of Orthopedic Surgery, Dr. Dodson of

Neurosurgery, and Family Medicine resident physicians. 



ADMISSION DIAGNOSES:  Multiple recent falls, right trimalleolar fracture, recent

diagnosis of compression, spinal stenosis of the cervical spine. 



DISCHARGE DIAGNOSES:  Multiple recent falls, right trimalleolar fracture, recent

diagnosis of compression, spinal stenosis of the cervical spine. 



PROCEDURES:  The patient went to the OR on November 11, 2019, had a right ankle

ORIF.  She also went to the OR on November 14, 2019 and had a C2 through C5

decompression by Dr. Dodson. 



HOSPITAL COURSE:  The patient is a 54-year-old female presented to the emergency

department via EMS after she had a fall with a right lower extremity deformity.
  The

patient had just been discharged 2-4 days previous to readmission for multiple

recent falls.  The patient had been found to have C-spinal stenosis at that time
,

and outpatient followup was being considered for possible operative 
intervention.

At the time of admission, the patient only had a right trimalleolar ankle 
fracture

and went to the OR the next day with Dr. Guzman of Orthopedic Surgery for 
fixation

of the right ankle and had the right ankle ORIF.  Postoperatively, the patient

continued to have extremity weakness.  Subsequently, Dr. Dodson of Neurosurgery 
was

consulted to address the cervical spinal compression and he took her to the OR 
on

November 14, 2019 for C2 through C5 cervical spinal decompression.  
Postoperatively,

the patient continued to work physical and occupational therapy.  Family 
Medicine

residents were consulted for complicated medical management as they had just

recently admitted the patient to their service and discharge for weakness.  
During

the previous hospital admission, she had a full cardiac workup including a 
cardiac

catheterization.  At the time of discharge, the patient's pain was well 
controlled.

She was tolerating a diabetic diet and she wears NuvaRing around in a wheelchair

independently.  She was also able to transfer from the bed to the wheelchair and

from the wheelchair to the commode without assistance. 



DISCHARGE DISPOSITION:  Acute rehab.



DISCHARGE CONDITION:  Satisfactory.



PHYSICAL EXAMINATION:

VITAL SIGNS:  Temperature 98.2, pulse 85, respirations 15, oxygen saturation 95
% on

room air, blood pressure 119/69. 

GENERAL:  Well-appearing middle-aged female, sitting up in wheelchair with no 
signs

of acute distress. 

PULMONARY:  Equal chest rise and fall, clear breath sounds bilaterally.  No 
signs of

acute respiratory distress. 

CARDIAC:  Regular rate and rhythm.  No murmurs, gallops, or rubs. 

GASTROINTESTINAL:  Soft, nontender, nondistended. 

EXTREMITIES:  2+ pulses in all extremities.  Gross motor and sensation are 
intact.

Right lower extremity with boot that is clean and dry and in place. 

NEUROLOGIC:  GCS is 15.  Gross motor and sensation are intact equal strength in 
all

extremities. 

SKIN:  Final cervical dressing is clean, dry, and intact.



DISCHARGE INSTRUCTIONS:  The patient was discharged to acute rehab.  She is

nonweightbearing in the right lower extremity.  Activity as tolerated, 
otherwise.

She has a diabetic diet.  She is to receive physical and occupational therapy.  
She

has a walker, incentive spirometer, C-collar, and wheelchair. 



DISCHARGE MEDICATIONS:  Include Tylenol, Ventolin, albuterol, vitamin C,

atorvastatin, Wellbutrin, clonidine, Flexeril, Decadron taper for 3 additional 
days,

Lovenox, vitamin D2, TriCor, fentanyl patch 50 mcg, Flonase nasal spray, folic 
acid,

gabapentin, Imdur, lisinopril, magnesium tablets, metoprolol, morphine IR, 
morphine

ER, multivitamins with minerals, pancrelipase ER 12,000, paroxetine, MiraLAX,

Ranexa, risperidone, Senokot-S, Januvia, torsemide, insulin. 



FOLLOWUP APPOINTMENT:  The patient will follow up with Dr. Guzman of Orthopedic

Surgery in 2 weeks.  She will also follow up with Dr. Dodson of Neurosurgery in 
3

weeks with C-spine x-rays.  There is no need for followup with Dr. Jeffers in 
Trauma

Clinic. 



This is a summary of the patient's hospitalization.  For full details, please 
see

her medical record in its entirety. 







Job ID:  [id number]



[facility]

## 2019-12-05 NOTE — CT
Exam: Head CT without contrast





HISTORY: Altered mental status



COMPARISON: 10/19/2019





FINDINGS:

Hemorrhage: No intraparenchymal hemorrhage or extra-axial hematoma.



Brain parenchyma: Cortical gray-white matter differentiation is preserved. No mass effect or midline 
shift. Basilar cisterns are patent.

Ventricular system: Ventricles and sulci are patent and symmetric.

Calvarium: Intact.

Sinuses and mastoid air cells: Adequate aeration.



IMPRESSION:

No acute intracranial process.









Reported By: Ryan Roberson 

Electronically Signed:  12/5/2019 7:26 PM

## 2019-12-05 NOTE — MRI
Cervical spine MRI with and without contrast:

12/5/2019



COMPARISON: 11/13/2019



HISTORY: Weakness, spinal cord compression with weakness



TECHNIQUE: Multiplanar multisequence MR imaging of the cervical spine obtained with and without contr
ast



FINDINGS: There is posterior fusion hardware with associated artifact which limits detailed assessmen
t. This postoperative hardware is associated with the posterior elements at the C3, C4, C5, and C6

levels. The patient is status post bilateral laminectomy at C3, C4, and C5, new when compared to the 
prior examination. The STIR sequence demonstrates increased signal intensity dorsal to the thecal

sac at C3, C4, and C5 on the basis of postoperative change. Anterior discectomy and fusion hardware n
oted at C4-5 through C6-7.



There is prominent artifact associated with the extensive anterior and posterior fusion hardware whic
h significantly limits assessment for central canal stenosis and neural foraminal stenosis within

the cervical spine.



There is a moderate degree of degenerative change at the atlantoaxial interspace. There is no signifi
cant anterolisthesis or retrolisthesis noted.



C2-3: There is disc space narrowing and disc desiccation. There is mild disc bulge and bilateral face
t hypertrophy with probable mild central canal and mild bilateral neural foraminal stenosis.



C3-4: There is an anterior epidural mass measuring 7 mm in AP dimension and 2.6 cm in craniocaudal di
mension which appears to represent a large extruded disc fragment from the C3-4 level with superior

and inferior migration. This exerts mass effect on the ventral aspect of the cord. This disc extrusio
n does not appear significantly changed when compared to the prior examination. There is mild cord

deformity but there is CSF signal intensity posterior to the cord in this region secondary to interva
l bilateral laminectomy and thus the degree of central canal stenosis at this level is

mild/moderate, improved when compared to the prior study. There is probable at least mild bilateral n
eural foraminal stenosis.



C4-5: No significant central canal stenosis. Evaluation for neural foraminal stenosis is limited seco
ndary to artifact.



C5-6: Artifact severely limits assessment for central canal and/or neural foraminal stenosis. At leas
t mild central canal stenosis and mild/moderate bilateral neural foraminal stenosis.



C6-C7: Bilateral facet hypertrophy with mild bilateral neural foraminal stenosis. No significant cent
ral canal stenosis.



C7-T1: No significant central canal or neural foraminal stenosis.



The postcontrast imaging demonstrates no abnormal enhancement. The postcontrast imaging is quite limi
joyce secondary to artifact. No focal area of abnormal signal intensity is identified within the

cervical cord. There is a small fluid collection posterior to the midline posterior elements associat
ed with prior surgery at the C2-3 level and at the C6 level.



IMPRESSION: Extensive postoperative metallic hardware consistent with multilevel anterior discectomy 
and fusion as well as multilevel posterior upper cervical fusion. Since the prior examination there

has been bilateral laminectomy at C3, C4, and C5. There is a stable large disc extrusion ventral to t
he cord at C3-4 with a mild to moderate degree of central canal stenosis in this region, improved

since the prior examination secondary to interval surgery. No abnormal signal intensity is identified
 at the cervical cord. Please note that the C5-6 level is poorly assessed with regard to central

canal and/or neural foraminal stenosis secondary to artifact related to postoperative hardware.



Reported By: Shiva Galicia 

Electronically Signed:  12/5/2019 4:24 PM

## 2019-12-05 NOTE — PDOC.FPRHP
- History of Present Illness


Chief Complaint: confusion, generalized weakness


History of Present Illness: 





Patient is a 54F with PMHx of CHF, MI x 2, CKD2, CAD, DM2, HLD, bipolar disorder

, COPD, spinal stenosis, chronic pancreatitis with recent hospitalization and 

cervical decompression that presented with generalized weakness and confusion.





During evaluation patient was confused. Through discussing with different 

providers, at times she is A&O x 2, but was A&O x 0 with this writer. She was 

unable to answer any questions and when asked what was wrong she said she was 

confused.  


Much of her medical history was obtained from previous hospital records. 


Per nursing she had an episode of diarrhea in the ED.


ED Course: 





1mg ativan, 1L NS, 4mg zofran





- Allergies/Adverse Reactions


 Allergies











Allergy/AdvReac Type Severity Reaction Status Date / Time


 


adhesive Allergy Severe  Verified 12/06/19 00:22


 


aspirin Allergy Severe Anaphylaxis Unverified 12/06/19 00:22


 


levofloxacin [From Levaquin] Allergy Intermediate  Unverified 12/06/19 00:22


 


NSAIDS (Non-Steroidal Allergy Unknown  Verified 12/06/19 00:22





Anti-Inflamma     


 


codeine Allergy   Verified 12/06/19 00:22


 


hydrocodone Allergy   Verified 12/06/19 00:22


 


Sulfa (Sulfonamide Allergy   Verified 12/06/19 00:22





Antibiotics)     


 


trimethoprim [From Bactrim] Allergy   Verified 12/06/19 00:22


 


tramadol AdvReac Mild ITCHING Verified 12/06/19 00:22














- Home Medications


 











 Medication  Instructions  Recorded  Confirmed  Type


 


Cetirizine HCl [Zyrtec] 10 mg PO HS 01/31/17 11/15/19 History


 


Gabapentin 800 mg PO BID 01/31/17 11/15/19 History


 


PARoxetine HCl [Paroxetine HCl] 20 mg PO HS 01/31/17 11/15/19 History


 


risperiDONE [Risperidone] 1 mg PO HS 01/31/17 11/15/19 History


 


Pancrelipase  93962 [Creon DR 4 cap PO TID-WM #30 cap 10/15/17 11/15/19 Rx





12,000 Units]    


 


Ergocalciferol (Vitamin D2) 50,000 unit PO ASDIR 04/30/18 11/15/19 History





[Vitamin D2]    


 


Fenofibrate [Tricor] 160 mg PO HS 04/30/18 11/15/19 History


 


Torsemide [Demadex] 40 mg PO ASDIR 01/09/19 11/15/19 History


 


Isosorbide Mononitrate [Imdur] 60 mg PO DAILY #30 tab 01/10/19 11/15/19 Rx


 


Fluticasone Propionate [Flonase 0 gm NASAL DAILY  bot 04/03/19 11/15/19 Rx





Nasal Spray]    


 


Folic Acid 1 tab PO DAILY 10/19/19 11/15/19 History


 


Metoprolol Tartrate [Lopressor] 12.5 mg PO BID 10/19/19 11/15/19 History


 


Morphine Sulfate [Morphine Sulfate 15 mg PO QID PRN 10/19/19 11/15/19 History





ER]    


 


Ranolazine [Ranexa] 500 mg PO BID 10/19/19 11/15/19 History


 


sitaGLIPtin Phosphate [Januvia] 50 mg PO DAILY 10/19/19 11/15/19 History


 


Atorvastatin Calcium [Lipitor] 40 mg PO HS #30 tab 10/20/19 11/15/19 Rx


 


Insulin Glargine,Hum.Rec.Anlog 87 unit SQ BID #9 insuln.pen 10/20/19 11/15/19 Rx





[Toujeo Solostar]    


 


Albuterol Sulfate [Proair 2 puff IH Q4HR PRN 11/04/19 11/15/19 History





Respiclick]    


 


Potassium Chloride [K-Dur] 20 meq PO BID 11/05/19 11/15/19 History


 


Fluticasone/Umeclidin/Vilanter 1 puff INH DAILY 11/15/19 11/15/19 History





[Trelegy Ellipta 100-62.5-25]    


 


Magnesium Oxide 400 mg PO BID 11/15/19 11/15/19 History


 


Omega-3 Fatty Acids/Fish Oil 2 cap PO DAILY 11/15/19 11/15/19 History





[Omega 3 1,000 mg Softgel]    


 


Ranitidine HCl 150 mg PO BID 11/15/19 11/15/19 History


 


Acetaminophen [Tylenol Extra 1,000 mg PO Q6HR  tab 11/19/19  Rx





Strength]    


 


Ascorbic Acid [Vitamin C] 500 mg PO BID  tab 11/19/19  Rx


 


BuPROPion SR [Wellbutrin SR] 150 mg PO DAILY  tab 11/19/19  Rx


 


Cyclobenzaprine [Flexeril] 10 mg PO TID PRN  tab 11/19/19  Rx


 


Dexamethasone [Decadron] 1 mg PO Q6HR 2 Days  tab 11/19/19  Rx


 


Dexamethasone [Decadron] 2 mg PO Q6HR 2 Days  tab 11/19/19  Rx


 


Enoxaparin Sodium [Lovenox] 40 mg SC 0900  syringe 11/19/19  Rx


 


Ferrous Sulfate [Feosol] 325 mg PO BID-WM  tab 11/19/19  Rx


 


Insulin Glargine [Lantus Vial] 10 units SC QAM  vial 11/19/19  Rx


 


Lisinopril [Zestril] 10 mg PO QPM  tab 11/19/19  Rx


 


Morphine IR [Morphine Oral 5 mg PO Q4H PRN  udcup 11/19/19  Rx





Solution]    


 


Multivitamin W/ Minerals 1 tab PO DAILY  tab 11/19/19  Rx





[Theragran M]    


 


Polyethylene Glycol 3350 [Miralax] 17 gm PO DAILY  pk 11/19/19  Rx


 


Sennosides/Docusate Sodium 2 tab PO BID  tab 11/19/19  Rx





[Senokot S]    


 


cloNIDine [Catapres] 0.1 mg PO Q6HR  tab 11/19/19  Rx


 


fentaNYL [Duragesic] 50 mcg TD Q3D  patch 11/19/19  Rx














- History


PMHx: CHF, MI x 2, CKD2, CAD, DM2, HLD, bipolar disorder, COPD, spinal stenosis

, chronic pancreatitis with recent hospitalization and cervical decompression


 


PSHx: appendectomy, cholecystectomy, hysterectomy, bilateral oophorectomy, L 

knee surgery, bilateral breast lumpectomy, neck fusion, ORIF R ankle, recent 

cervical decompression





FHx: non-contributory


 


Social: smoker, no etoh or drug use


 








- Review of Systems


ROS unobtainable: due to mental status


General: reports: other (confused)





- Vital signs


BP: [135/55]  HR: [97] RR: [16] Tmax: [99.2F] Pox: [95]% on [RA]  Wt: [105.2kg]

   








- Physical Exam


Constitutional: NAD, other (A&O x 0)


HEENT: no scleral icterus, MMM


Neck: other (cervical collar in place)


Chest: no-tender to palpation, no lesions


Heart: RRR, normal S1/S2


Lungs: CTAB, no respiratory distress


Abdomen: soft, bowel sounds present


Musculoskeletal: normal tone, other (R ankle in boot)


Skin: no rash/lesions, good turgor


Heme/Lymphatic: no unusual bruising or bleeding, no purpura


Psychiatric: other (A&O x2)





FMR H&P: Results





- Labs


Result Diagrams: 


 12/05/19 13:13





 12/05/19 13:13


Lab results: 


 











WBC  9.1 thou/uL (4.8-10.8)   12/05/19  13:13    


 


Hgb  11.8 g/dL (12.0-16.0)  L  12/05/19  13:13    


 


Hct  36.5 % (36.0-47.0)   12/05/19  13:13    


 


MCV  85.8 fL (78.0-98.0)   12/05/19  13:13    


 


Plt Count  151 thou/uL (130-400)   12/05/19  13:13    


 


Band Neuts % (Manual)  22 % (5-11)  H  12/05/19  13:13    


 


ESR Westergren  28 mm/hr (Less than 30)   12/05/19  14:19    


 


Sodium  131 mmol/L (136-145)  L  12/05/19  13:13    


 


Potassium  4.6 mmol/L (3.5-5.1)   12/05/19  13:13    


 


Chloride  101 mmol/L ()   12/05/19  13:13    


 


Carbon Dioxide  20 mmol/L (22-29)  L  12/05/19  13:13    


 


BUN  17 mg/dL (9.8-20.1)   12/05/19  13:13    


 


Creatinine  0.94 mg/dL (0.6-1.1)   12/05/19  13:13    


 


Glucose  114 mg/dL ()  H  12/05/19  13:13    


 


Calcium  8.7 mg/dL (7.8-10.44)   12/05/19  13:13    


 


Total Bilirubin  0.5 mg/dL (0.2-1.2)   12/05/19  13:13    


 


AST  25 U/L (5-34)   12/05/19  13:13    


 


ALT  28 U/L (8-55)   12/05/19  13:13    


 


Alkaline Phosphatase  106 U/L ()   12/05/19  13:13    


 


C-Reactive Protein  3.32 mg/dL (= or < 0.5)  H  12/05/19  13:13    


 


Serum Total Protein  5.9 g/dL (6.0-8.3)  L  12/05/19  13:13    


 


Albumin  3.1 g/dL (3.5-5.0)  L  12/05/19  13:13    


 


Urine Ketones  Negative mg/dL (Negative)   12/05/19  17:27    


 


Urine Blood  Negative  (Negative)   12/05/19  17:27    


 


Urine Nitrite  Negative  (Negative)   12/05/19  17:27    


 


Ur Leukocyte Esterase  Negative Jama/uL (Negative)   12/05/19  17:27    














- Radiology Interpretation


  ** CT scan - head


Status: report reviewed by me (negative for intracranial process)





  ** Other


Status: report reviewed by me (Cervical spine CT: No evidence for hardware 

failure. No acute fracture or dislocation.  Cervical spine MRI: No abnormal 

signal intensity is identifid at the cervical cord)





  ** Chest x-ray


Status: report reviewed by me (No radiographic evidence of acute 

cardiopulmonary process)





FMR H&P: A/P





- Problem List


(1) Acute encephalopathy


Current Visit: Yes   Status: Acute   Code(s): G93.40 - ENCEPHALOPATHY, 

UNSPECIFIED   





(2) Bipolar disorder


Current Visit: No   Status: Chronic   Code(s): F31.9 - BIPOLAR DISORDER, 

UNSPECIFIED   


Qualifiers: 


   Active/Remission status: remission status unspecified   Qualified Code(s): 

F31.9 - Bipolar disorder, unspecified   





(3) CAD (coronary artery disease)


Current Visit: No   Status: Chronic   Code(s): I25.10 - ATHSCL HEART DISEASE OF 

NATIVE CORONARY ARTERY W/O ANG PCTRS   


Qualifiers: 


   Coronary Disease-Associated Artery/Lesion type: native artery   Native vs. 

transplanted heart: native heart   Associated angina: angina presence 

unspecified   Qualified Code(s): I25.10 - Atherosclerotic heart disease of 

native coronary artery without angina pectoris   





(4) CHF (congestive heart failure)


Current Visit: No   Status: Chronic   Code(s): I50.9 - HEART FAILURE, 

UNSPECIFIED   


Qualifiers: 


   Heart failure type: diastolic   Heart failure chronicity: chronic   

Qualified Code(s): I50.32 - Chronic diastolic (congestive) heart failure   





(5) Diabetes mellitus type 2 in obese


Current Visit: No   Status: Chronic   Code(s): E11.9 - TYPE 2 DIABETES MELLITUS 

WITHOUT COMPLICATIONS; E66.9 - OBESITY, UNSPECIFIED   





(6) Hyperlipidemia


Current Visit: No   Status: Chronic   Code(s): E78.5 - HYPERLIPIDEMIA, 

UNSPECIFIED   


Qualifiers: 


   Hyperlipidemia type: unspecified   Qualified Code(s): E78.5 - Hyperlipidemia

, unspecified   





(7) Hypertension


Current Visit: No   Status: Chronic   Code(s): I10 - ESSENTIAL (PRIMARY) 

HYPERTENSION   


Qualifiers: 


   Hypertension type: essential hypertension   Qualified Code(s): I10 - 

Essential (primary) hypertension   





(8) CKD (chronic kidney disease) stage 2, GFR 60-89 ml/min


Current Visit: Yes   Status: Acute   Code(s): N18.2 - CHRONIC KIDNEY DISEASE, 

STAGE 2 (MILD)   





- Plan





Patient is a 54F with PMHx of CHF, MI x 2, CKD2, CAD, DM2, HLD, bipolar disorder

, COPD, spinal stenosis, chronic pancreatitis with recent hospitalization and 

cervical decompression admitted to obs for acute encephalopathy





#Acute Encephalopathy


-CT head neg


-CT cervical spine neg


-MRI cervical spine neg


-neurosurgery examined patient and images from ED and, per ED doc report, 

stated that they would not do anything at this time


-CXR neg


-TSH wnl 0.7276


-troponin neg


-mag wnl 2.0


-glucose 114


-UA neg for leuks, nitrites, blood, glucose


-patient afebrile, vss


-patient confused and drowsy during exam


-Check ammonia, procal, B12, folate, RPR, HIV, UDS


-Consider MRI brain in am if AMS continues 





#Diarrhea


-stool studies pending, will follow





#Mild hyponatremia


-Sodium 131


-Gentle IVF and will monitor in am





#Elevated CRP


-CRP 3.32, unknown etiology


-ESR wnl at 28


-WBC wnl at 9.1


-will check procal 





#CHF


-Gentle IVF for hyponatremia and AMS, will monitor for fluid overload


-continue home meds





#CKD2


-will avoid renally toxic medications





#HLD


-continue home meds





#DM2


-continue home meds


-ISS





#Bipolar disorder


-continue home meds





#COPD


-continue home meds





#Spinal stenosis with recent cervical decompression


-neurosurgery not concerned at this time, CT/MRI cervical spine negative for 

acute changes





DVT ppx: lovenox


Dispo: strokes obs for AMS. Continued workup with ammonia, procal, folate/b12, 

RPR, HIV, UDS pending. VSS. Can consider further imaging in the morning if AMS 

continues. 


Code: Full





FMR H&P: Upper Level





- Pertinent history





55 yo female presents for confusion. In ED patient was A&Ox2 for ED provider 

and A&Ox1 on admitting team examination. This is a new finding for her. Please 

see intern note above for further information. 





- Plan


Date/Time: 12/05/19 2007








I, Alex León MD, have evaluated this patient and agree with findings/

plan as outlined by intern resident. Pertinent changes/additions are listed 

here.








1. Acute encephalopathy


- Will order Ammonia


- Will order UDS/Blood drug screen


- Consider further neuro imaging


- Check B12/Folate





2. Mild Hyponatremia


- Gentle IVF


- Recheck in AM





3. Elevated CRP


- Unknown etiology


- Checking Procalcitonin to help delineate infectious cause





All other chronic conditions reviewed and medications to be restarted when 

appropriate





PCP: Dr. Khan





CODE STATUS: FULL CODE





Disposition: Stable, will admit to Stroke Obs for further evaluation and 

monitoring. 





Addendum - Attending





- Attending Attestation


Date/Time: 12/06/19 0014





I personally evaluated the patient and discussed the management with Dr. Bean 

on 12/5/2019


I agree with the History, Examination, Assessment and Plan documented above 

with any addition or exceptions noted below- 54 year old female with h/o D<. 

HTN. Bipolar d/o, COPD, CAD, recent fall with right trimalleolar ankle fx s/p 

ORIF and recent c-spine surgery for spinal cord stenosis with cord compression 

presented with generalized weakness. Subjective chills at home. Denies any N/V/D

/abdominal pain. PMH/PSH/ALL/Meds reviewed and agree with resident's 

documentation. Afebrile VSS Exam repeated by me and agree with resident's 

findings. Labs: WBC=9.1, H/H=11.8/36.5, Uvs=034, On=226, K=4.6, Av=183, CO2=20, 

BUN/ Cr=17/0.44, Gluc=114, Procal=0.08, CRP=3.32, U/A negative; CT brain- no 

acute changes; C-spine MRI and CT scan- postoperative changes; no evidence of 

hardware failure. CXR- negative. A/P: 1) AMS - uncertain etiology- possible 

medication related; hold any sedating medications; will check B12, ammonia to 

complete evaluation; consider MRI brain in AM if symptoms not improved, 2) DM- 

monitor accuchecks, 3) HTN- stable; continue home meds

## 2019-12-05 NOTE — RAD
XR Chest 1 View Portable



HISTORY: Dyspnea



COMPARISON: 11/10/2019



FINDINGS: The heart size is normal. The lungs are well expanded without focal areas of consolidation,
 pneumothorax or pleural effusions.

Postop changes are again seen in the cervical spine



IMPRESSION: No radiographic evidence of acute cardiopulmonary process.



Reported By: Reza Gomez 

Electronically Signed:  12/5/2019 1:44 PM

## 2019-12-05 NOTE — CT
Cervical spine CT without contrast:

12/5/2019



COMPARISON: 11/13/2019



HISTORY: Cervical spine pain, prior cervical spine surgery



TECHNIQUE: Axial CT imaging at 2.5 mm intervals from skull base through lung apices without contrast.
 Coronal and sagittal reformatted imaging obtained.



FINDINGS: There is posterior fusion hardware at the C2, C3, C4, and C5 levels with vertically oriente
d interlocking rods, new when compared to the prior exam. There are new bilateral laminectomy

changes present at the C3, C4, and C5 levels. There is no evidence for hardware failure associated wi
th the new posterior fusion hardware spanning C2-C5. Anterior discectomy and fusion hardware is

again noted at C4-5, C5-6, and C6-7.



Evaluation for central canal and/or neural foraminal stenosis is limited on routine CT. The craniocer
vical junction and cervicothoracic junction appears intact. There is mild degenerative change at

the atlantoaxial interspace. There is posterior element metallic cerclage wires at C5-6.



There is a ill-defined poorly visualized anterior epidural mass ventral to the thecal sac at C3-4, be
tter evaluated on recent MRI.



C2-3: No osseous cause of significant central canal or neural foraminal stenosis.



C3-4: No osseous cause of significant central canal or neural foraminal stenosis



C4-5: No osseous cause of significant central canal or neural foraminal stenosis



C5-6: Small central osteophyte. No osseous cause of significant central canal or neural foraminal daily
nosis. Mild central canal stenosis suspected.



C6-7: Mild posterior osteophyte. No osseous cause of significant central canal or neural foraminal st
enosis.



C7-T1: Bilateral facet hypertrophy. No osseous cause of significant central canal or neural foraminal
 stenosis.



No prevertebral soft tissue swelling is evident.



The imaged lung apices are unremarkable. No acute fracture or evidence of dislocation is seen.



IMPRESSION: Extensive postoperative hardware which includes multilevel anterior and posterior cervica
l spine fusion. Posterior fusion hardware at C2, C3, C4, and C5 is new with new bilateral

laminectomy at the C3, C4, and C5 levels. There is a faintly visualized large disc extrusion ventral 
to the thecal sac at C3-4, better assessed on recent cervical spine MRI. No evidence for hardware

failure. No acute fracture or dislocation.



Reported By: Shiva Galicia 

Electronically Signed:  12/5/2019 4:58 PM

## 2019-12-06 NOTE — PDOC.FM
- Subjective


Subjective: 


Patient is unwell this morning. She feels nauseated. She is complaining of 

generalized weakness as well. 





- Objective


MAR Reviewed: Yes


Vital Signs & Weight: 


 Vital Signs (12 hours)











  Temp Pulse Resp BP Pulse Ox


 


 12/06/19 04:00  98.1 F  70  18  133/62  96


 


 12/05/19 23:15  97.8 F  91  18  121/74 








 Weight











Weight                         100.953 kg














Result Diagrams: 


 12/06/19 05:17





 12/06/19 05:17





Phys Exam





- Physical Examination


Constitutional: NAD


appears extremely fatigued, appears ill 


HEENT: moist MMs, sclera anicteric


Neck: supple


limited ROM 2/2 recent surgery and pain 


Respiratory: no wheezing, no rales, no rhonchi, clear to auscultation bilateral


Cardiovascular: RRR, no significant murmur, no rub


Gastrointestinal: soft


slightly tender to palpation


Musculoskeletal: no edema, pulses present


Strength 2/5 upper and lower extremities. Sensation not intact. 


Cranial nerves do not appear intact. 


Deviation from normal: A&O to person & place. Not time. 


Skin: no rash, normal turgor, cap refill <2 seconds





Dx/Plan


(1) Acute encephalopathy


Code(s): G93.40 - ENCEPHALOPATHY, UNSPECIFIED   Status: Acute   





(2) CKD (chronic kidney disease) stage 2, GFR 60-89 ml/min


Code(s): N18.2 - CHRONIC KIDNEY DISEASE, STAGE 2 (MILD)   Status: Acute   





(3) Cervical stenosis of spinal canal


Code(s): M48.02 - SPINAL STENOSIS, CERVICAL REGION   Status: Acute   





(4) Generalized weakness


Code(s): R53.1 - WEAKNESS   Status: Acute   





(5) Nausea & vomiting


Code(s): R11.2 - NAUSEA WITH VOMITING, UNSPECIFIED   Status: Acute   





(6) Neurological symptoms


Code(s): R29.90 - UNSPECIFIED SYMPTOMS AND SIGNS INVOLVING THE NERVOUS SYSTEM   

Status: Acute   





(7) Paresthesia


Code(s): R20.2 - PARESTHESIA OF SKIN   Status: Acute   





- Plan


Plan: 





Patient is a 54F with PMHx of CHF, MI x 2, CKD2, CAD, DM2, HLD, bipolar disorder

, COPD, spinal stenosis, chronic pancreatitis with recent hospitalization and 

cervical decompression admitted to obs for acute encephalopathy





Acute Encephalopathy


- CT head, CT cervical spine, MRI cervical spine, UA, and CXR negative. MRI 11/ 5 showed no mass, small lacunar infarct. 


- Neurosurgery examined patient and images from ED and, per ED doc report, 

stated that they had no recommendations at this time. 


- Patient afebrile, vss


- Diffuse neurological symptoms. It is unclear her encephalopathy is metabolic, 

psychogenic, a result of polypharmacy, or infectious. We discussed getting an 

IR guided lumbar puncture, however at this time she cannot consent for a 

procedure and the phone number for her next of kin is out of order. Contacted 

family friend and she is trying to get ahold of the sister. Will wait for 

family input. 


- Folate, RPR, HIV, Flu pending 


- Plan of care today is to contact family, reconcile and meticulously go over 

the patient's home medications for interactions, and monitor her. 








Diarrhea


- Fecal white count elevated 


- C. Diff antigen positive, toxin pending


- Shiga toxin and campylobacter negative 








Mild hyponatremia


-Sodium 131 > 133


-Gentle IVF and will monitor 








Elevated CRP


- CRP 3.32, unknown etiology


- ESR wnl at 28 








CHF


-Gentle IVF for hyponatremia and AMS, will monitor fluid status closely


-continue home meds


 





CKD2


-will avoid renally toxic medications





HLD


-continue home meds





DM2


-continue home meds


-ISS





Bipolar disorder


-continue home meds





COPD


-continue home meds





Spinal stenosis with recent cervical decompression


-neurosurgery not concerned at this time, CT/MRI cervical spine negative for 

acute changes








PCP - Dr. Khan 


DVT ppx: lovenox


Dispo: Stable, observation status. Likely LOS < 48 hours. 


Code: Full





Addendum - Attending





- Attending Attestation


Date/Time: 12/06/19 5797





I personally evaluated the patient and discussed the management with Dr. Sifuentes.


I agree with the History, Examination, Assessment and Plan documented above 

with any addition or exceptions noted below.

## 2019-12-06 NOTE — PRG
DATE OF SERVICE:  12/06/2019



Ms. Nguyen was admitted.  She is a few weeks out from multilevel cervical

laminectomy.  Her MRI demonstrates motion artifact and evidence of metallic

artifact.  There is no evidence of worrisome stenosis.  Her canal diameter is

improved.  She appears better today.  There was a concern of weakness, this appears

to have resolved.  She does have a normal sedimentation rate with an elevated CRP.

A few weeks out from surgery, the CRP should start to come down.  I should note,

however, she was found to be hyponatremic.  This is improved to a degree today.  She

needs continued medical monitoring and likely transfer to inpatient rehab when

stable.  Her CT and MRI are acceptable from a neurosurgical standpoint, as is her

wound. 







Job ID:  [id number]

## 2019-12-06 NOTE — PDOC.BPN
- Brief Progress Note





Re-evaluated patient. A&O x2, reports she still feels confused. Is still drowsy 

on exam. 


UDS + opiates, benzos. Received ativan in ED


Ammonia wnl at 18


Procal neg at 0.08


Will continue to monitor and see if she is more alert and oriented after the 

opiates and benzos are out of her system. Can consider brain MRI in am to 

evaluate for any neurological abnormalities. Afebrile with negative procal, 

less suspicious for infection at this time.

## 2019-12-07 NOTE — RAD
XR Lumbar Punct Only W/Fluoro



HISTORY: Altered mental status. Request for lumbar puncture.



COMPARISON: None.



FINDINGS: After informed consent was obtained the patient was prepped and draped in normal sterile fa
shion. Local anesthesia was obtained 1% Xylocaine. A 20-gauge spinal needle was used. As I began to

insert the needle the patient states she was in too much pain to continue the procedure and it was te
rminated.

Sandro, the patient's nurse was informed of patient refusal.



IMPRESSION: Lumbar puncture was terminated at the request the patient.



Reported By: Monster Tellez 

Electronically Signed:  12/7/2019 9:52 AM

## 2019-12-07 NOTE — PDOC.FM
- Subjective


Subjective: 


Doing well this morning. No more episodes of confusion since early yesterday. 

Describes episodes as 30mon-1hr episodes where she is aware the entire time but 

has generalized weakness, confusion, and "inability to say or do what she wants

" These episodes self-resolve and is at baseline in between episodes. No LOC at 

any time during, before, or after episodes. This morning denies any CP, SOB, n/v

, d/c, fever/chills. Has modereate post-op pain from recent surgery. Eager for 

inpt rehab for continued strengthening prior to discharge home.








- Objective


MAR Reviewed: Yes


Vital Signs & Weight: 


 Vital Signs (12 hours)











  Temp Pulse Resp BP BP Pulse Ox


 


 12/07/19 00:41   89  16    95


 


 12/06/19 23:59  98.1 F  96  20   135/79  99


 


 12/06/19 23:52   96   135/79  


 


 12/06/19 20:00  98.2 F  90  20   115/66  98


 


 12/06/19 19:00   96  16    95








 Weight











Admit Weight                   100.953 kg


 


Weight                         100.953 kg














I&O: 


 











 12/05/19 12/06/19 12/07/19





 06:59 06:59 06:59


 


Intake Total   867


 


Balance   867











Result Diagrams: 


 12/07/19 10:22





 12/07/19 10:22


EKG Reviewed by me: Yes (Tele: NSR)





Phys Exam





- Physical Examination


Constitutional: NAD


HEENT: PERRLA, moist MMs


Neck: supple


Respiratory: no wheezing, no rales, no rhonchi, clear to auscultation bilateral


Cardiovascular: RRR, no rub


2/6 WILL best heard left sternal border. No radiation. No carotid bruits.


Gastrointestinal: soft, non-tender, no distention, positive bowel sounds


Musculoskeletal: no edema


right ankle wrapped from recent surgery, dressing c/d/i.


Neurological: non-focal, normal sensation, moves all 4 limbs


Psychiatric: normal affect, A&O x 3





Dx/Plan


(1) Acute encephalopathy


Code(s): G93.40 - ENCEPHALOPATHY, UNSPECIFIED   Status: Resolved   





(2) CKD (chronic kidney disease) stage 2, GFR 60-89 ml/min


Code(s): N18.2 - CHRONIC KIDNEY DISEASE, STAGE 2 (MILD)   Status: Chronic   





(3) Cervical stenosis of spinal canal


Code(s): M48.02 - SPINAL STENOSIS, CERVICAL REGION   Status: Chronic   





(4) Generalized weakness


Code(s): R53.1 - WEAKNESS   Status: Acute   





(5) CAD (coronary artery disease)


Code(s): I25.10 - ATHSCL HEART DISEASE OF NATIVE CORONARY ARTERY W/O ANG PCTRS 

  Status: Chronic   


Qualifiers: 


   Coronary Disease-Associated Artery/Lesion type: native artery   Native vs. 

transplanted heart: native heart   Associated angina: angina presence 

unspecified   Qualified Code(s): I25.10 - Atherosclerotic heart disease of 

native coronary artery without angina pectoris   





(6) CHF (congestive heart failure)


Code(s): I50.9 - HEART FAILURE, UNSPECIFIED   Status: Chronic   


Qualifiers: 


   Heart failure type: diastolic   Heart failure chronicity: chronic   

Qualified Code(s): I50.32 - Chronic diastolic (congestive) heart failure   





(7) Chronic pancreatitis


Code(s): K86.1 - OTHER CHRONIC PANCREATITIS   Status: Chronic   





(8) Diabetes mellitus type 2 in obese


Code(s): E11.9 - TYPE 2 DIABETES MELLITUS WITHOUT COMPLICATIONS; E66.9 - OBESITY

, UNSPECIFIED   Status: Chronic   





- Plan


Plan: 


Patient is a 54F with PMHx of CHF, MI x 2, CKD2, CAD, DM2, HLD, bipolar disorder

, COPD, spinal stenosis, chronic pancreatitis with recent hospitalization and 

cervical decompression admitted to obs for acute encephalopathy





#Acute Encephalopathy, resolved


- CT head, CT cervical spine, MRI cervical spine, UA, and CXR negative. MRI 11/ 5 showed no mass, small lacunar infarct. 


- Neurosurgery examined patient and images from ED, stated pt doing well, no 

further recommendations at this time, apprec assistance. 


- Patient afebrile, vss


- Pt sxs have since resolved and she appears at baseline mentation, A/Ox3. 

Suspected 2/2 polypharmacy, will adjust home meds as tolerated to still control 

pain


- LP this morning was stopped during procedure 2/2 pain, no s/s of acute 

meningitis/encephalitis, low suspicion at this time


- RPR, HIV, flu negative


- Pt appears at baseline and no return of sxs, medically stable, would benefit 

from inpt rehab for continued therapy.





#Diarrhea, chronic


- Fecal white count elevated 


- C. Diff antigen positive, toxin pending


- Shiga toxin and campylobacter negative


- likely 2/2 chronic pancreatic insufficiency, cont Creon





#Decondition


- 2/2 recent surgeries on top of chronic medical conditions


- PT/OT to eval and tx


- Would benefit from inpt rehab, rehab screen placed, apprec assistance





#Mild hyponatremia


-Sodium 131 > 133. Gently IVF, will recheck BMP today, d/c IVF





#Elevated CRP


- CRP 3.32, ESR wnl at 28


- likely 2/2 acute stress of recent surgery 





#CHF


- monitor fluid status closely, no s/s acute exacerbation


- continue home meds





#CKD2


- will avoid renally toxic medications





#HLD


-continue home meds





#DM2


-continue home meds


-ISS





#Bipolar disorder


-continue home meds





#COPD


-continue home meds





#Spinal stenosis with recent cervical decompression


-neurosurgery not concerned at this time, CT/MRI cervical spine negative for 

acute changes


-rec inpt rehab








PCP - Dr. Khan 


DVT ppx: lovenox


Code: Full


IVF: SL





Dispo: Admitted to obs for acute encephalopathy, suspected polypharmacy, now 

resolved. Pt at baseline mentation this morning. Unable to obtain LP but low 

suspicion for meningitis at this time. PT/OT/rehab/CM consulted for placement. 

Medically stable at this time. Will cont to monitor pending placement.








Addendum - Attending





- Attending Attestation


Date/Time: 12/07/19 3948





I personally evaluated the patient and discussed the management with Dr. Murray


I agree with the History, Examination, Assessment and Plan documented above 

with any addition or exceptions noted below. Patient relates Auditory non 

persecutory hallucination may need to adjust antipsychotics dosage rx  

otherwise ready for rehab placement.

## 2019-12-08 NOTE — PDOC.FM
- Subjective


Subjective: 


Pt doing well this morning. Slept well overnight. Denies any CP, SOB, Fever/

chills, n/v. Does state she takes Metoprolol at home that has not been 

restarted. Endorses auditory hallucinations but describes them as an "inner 

voice" that "talks to my dead family." No SI/HI and voices do not tell her to 

do anything in particular. She is eager for discharge to rehab.








- Objective


MAR Reviewed: Yes


Vital Signs & Weight: 


 Vital Signs (12 hours)











  Temp Pulse Resp BP BP Pulse Ox


 


 12/08/19 04:00  98.3 F  94  18  94/57 L   98


 


 12/08/19 00:31   115 H    


 


 12/07/19 21:51  98.1 F  115 H  18   107/73  95


 


 12/07/19 20:00  99.3 F  114 H  16   143/79 H  96


 


 12/07/19 19:14   112 H  16    97








 Weight











Admit Weight                   100.953 kg


 


Weight                         100.953 kg














I&O: 


 











 12/06/19 12/07/19 12/08/19





 06:59 06:59 06:59


 


Intake Total  867 960


 


Balance  867 960











Result Diagrams: 


 12/07/19 10:22





 12/07/19 10:22





Phys Exam





- Physical Examination


Constitutional: NAD


HEENT: PERRLA, moist MMs


Neck: supple


Respiratory: no wheezing, no rales, no rhonchi, clear to auscultation bilateral


Cardiovascular: RRR, no rub


2/6 WILL, no radiation


Gastrointestinal: soft, non-tender, no distention, positive bowel sounds


Musculoskeletal: no edema


RLE ORIF incision c/d/i, dissolvable sutures reminants easily removed


1+, non pitting edema of RLE as compared to left


Neurological: non-focal, normal sensation, moves all 4 limbs


Psychiatric: normal affect, A&O x 3


Deviation from normal: Inner auditory hallucinations, no SI/HI, thougth 

congruent with mood





Dx/Plan


(1) Acute encephalopathy


Code(s): G93.40 - ENCEPHALOPATHY, UNSPECIFIED   Status: Resolved   





(2) CKD (chronic kidney disease) stage 2, GFR 60-89 ml/min


Code(s): N18.2 - CHRONIC KIDNEY DISEASE, STAGE 2 (MILD)   Status: Chronic   





(3) Cervical stenosis of spinal canal


Code(s): M48.02 - SPINAL STENOSIS, CERVICAL REGION   Status: Chronic   





(4) Generalized weakness


Code(s): R53.1 - WEAKNESS   Status: Acute   





(5) CAD (coronary artery disease)


Code(s): I25.10 - ATHSCL HEART DISEASE OF NATIVE CORONARY ARTERY W/O ANG PCTRS 

  Status: Chronic   


Qualifiers: 


   Coronary Disease-Associated Artery/Lesion type: native artery   Native vs. 

transplanted heart: native heart   Associated angina: angina presence 

unspecified   Qualified Code(s): I25.10 - Atherosclerotic heart disease of 

native coronary artery without angina pectoris   





(6) CHF (congestive heart failure)


Code(s): I50.9 - HEART FAILURE, UNSPECIFIED   Status: Chronic   


Qualifiers: 


   Heart failure type: diastolic   Heart failure chronicity: chronic   

Qualified Code(s): I50.32 - Chronic diastolic (congestive) heart failure   





(7) Chronic pancreatitis


Code(s): K86.1 - OTHER CHRONIC PANCREATITIS   Status: Chronic   





(8) Diabetes mellitus type 2 in obese


Code(s): E11.9 - TYPE 2 DIABETES MELLITUS WITHOUT COMPLICATIONS; E66.9 - OBESITY

, UNSPECIFIED   Status: Chronic   





- Plan


Plan: 


Patient is a 54F with PMHx of CHF, MI x 2, CKD2, CAD, DM2, HLD, bipolar disorder

, COPD, spinal stenosis, chronic pancreatitis with recent hospitalization and 

cervical decompression admitted to obs for acute encephalopathy





#Acute Encephalopathy, resolved


- CT head, CT cervical spine, MRI cervical spine, UA, and CXR negative. MRI 11/ 5 showed no mass, small lacunar infarct. 


- Neurosurgery examined patient and images from ED, stated pt doing well, no 

further recommendations at this time, apprec assistance. 


- Patient afebrile, vss with mild tachycardia


- Pt sxs have since resolved and she appears at baseline mentation, A/Ox3. 

Suspected 2/2 polypharmacy, will adjust home meds as tolerated to still control 

pain


- LP stopped during procedure 2/2 pain, no s/s of acute meningitis, low 

suspicion at this time


- RPR, HIV, flu negative


- Pt appears at baseline and no return of sxs, medically stable, would benefit 

from inpt rehab for continued therapy.





#Diarrhea, chronic


- Fecal white count elevated 


- C. Diff antigen positive, toxin pending


- Shiga toxin and campylobacter negative


- likely 2/2 chronic pancreatic insufficiency, cont Creon





#Decondition


- 2/2 recent surgeries on top of chronic medical conditions


- PT/OT to eval and tx


- Would benefit from inpt rehab, rehab screen placed, apprec assistance





#Mild hyponatremia


-Sodium 131 > 133. Stable, no IVF at this time.





#Elevated CRP


- CRP 3.32, ESR wnl at 28


- likely 2/2 acute stress of recent surgery 





#CHF


- monitor fluid status closely, no s/s acute exacerbation


- continue home meds, restarted metoprolol





#CKD2


- will avoid renally toxic medications





#HLD


-continue home meds





#DM2


-continue home meds


-ISS





#Bipolar disorder


- continue home meds


- endorses non-persecutory auditory hallucinations, increased dose of 

Risperdone to 2mg QHS, no SI/HI, will need continued OP f/u and adjustment of 

medications





#COPD


-continue home meds





#Spinal stenosis with recent cervical decompression


-neurosurgery not concerned at this time, CT/MRI cervical spine negative for 

acute changes


-rec inpt rehab





#Recent ORIF


- Incision c/d/i, cont PT/OT. Sutures are dissolvable and remnants easily moved

, mild edema. 


- would benefit from OP ortho post-op f/u








PCP - Dr. Khan 


DVT ppx: lovenox


Code: Full


IVF: SL





Dispo: Admitted for acute encephalopathy, suspected polypharmacy, now resolved. 

Pt at baseline mentation. PT/OT/rehab/CM consulted for placement. Medically 

stable at this time. Will cont to monitor pending placement.








Addendum - Attending





- Attending Attestation


Date/Time: 12/08/19 9537





I personally evaluated the patient and discussed the management with Dr. Murray


I agree with the History, Examination, Assessment and Plan documented above 

with any addition or exceptions noted below.


Hyponatremia resolved will increase resperidone and looking to transfer to 

rehab.

## 2019-12-09 NOTE — CON
DATE OF CONSULTATION:  



This is Evelio Krueger PA-C dictating a report for Shiva Dodson MD.



This is a 50-minute initial patient evaluation of which greater than 50% of the exam

was spent in counseling and coordinating the patient's care.  Remainder of the exam

was spent in review of the patient's medical records and review of appropriate

imaging studies. 



CHIEF COMPLAINT:  Generalized extremity weakness.



HISTORY OF PRESENT ILLNESS:  Ms Nguyen is a 54-year-old female who is well known

to our group as she previously underwent C2-5 laminectomies and posterior fusion on

11/14/2019 for cervical radiculopathy and cervical myelopathy.  The patient has a

history of undergoing C4-T1 ACDF in the 1990s also for myelopathy.  Nonetheless, the

patient apparently had a 2-hour history of overall generalized weakness and feeling

like she was going to faint.  She currently denies fever or chills.  Apparently, her

mother called EMS.  She is difficult to get a history from at this time, as she

recently received Ativan for sedation for her cervical spine MRI.  Review of this

MRI shows good decompression posteriorly at C2 to C5.  Cervical spine CT was also

obtained that shows no malfunction of hardware, good screw placement, no fracturing

of the john and stable fusion C4-T1.  Her white blood cell count is normal at 9.1.

She does have normal sedimentation rate.  Her sodium is low at 131, and her

C-reactive protein is elevated at 3.32.  Her urinalysis is negative for infection.

The patient has neck pain and bilateral upper extremity pain in a nondermatomal

distribution.  She complained of this around the time of surgery as well. 



PHYSICAL EXAMINATION:

The patient is extremely sleepy.  She awakens with noxious stimulus, though when

questioned, she is oriented to person, place, and time.  She requires significant

redirection, but I believe this is secondary to recent administration of Ativan.

The patient does have some right-sided weakness.  This appears to be moderate and

stable compared to her previous exam.  She has mild weakness into the left upper

extremity, but has a very mild generalized weakness in the bilateral lower

extremities.  She remains in a Cam boot secondary to her right ankle fracture.  Her

incision is healing very well.  There is a very small area of scabbing at the top,

bottom and middle aspect of the incisions, but there is absolutely no dehiscence, no

swelling, no drainage, and no surrounding erythema.  The patient is wearing a

well-fitting [place] J collar. 



IMPRESSION/DIAGNOSIS:  

1. Status post C2-5 laminectomies with fusion on 11/14/2019, for cervical myelopathy

and cervical stenosis. 

2. Tobacco abuse.

3. Generalized weakness.



PLAN:  At this time, there is no role for neurosurgical intervention as her imaging

is stable.  We will ask that our medical colleagues evaluate the patient, but at

this time, no surgical intervention is warranted.  The patient will follow up with

us on an outpatient basis and will defer to our medical colleagues should the

patient need admission to the hospital.  Please call with any changes in patient's

neurologic status. 







Job ID:  [id number]

## 2019-12-09 NOTE — PQF
DISHA WILKS KATLYN *R

[id number]                                                             [id number]

[id number]                             

                                   

CLINICAL DOCUMENTATION IMPROVEMENT CLARIFICATION FORM:  ICD-10 Updated



PLEASE DO AN ADDENDUM TO THE PROGRESS NOTE WITH ANY DOCUMENTATION UPDATES OR 
ADDITIONS AND CARRY THROUGH TO DC SUMMARY.   THANK YOU.



DATE:                 12/9/2019                          ATTN:DR. VIJAYA CARTER



Please exercise your independent, professional judgment in responding to the 
clarification form. Clinical indicators are provided on the bottom of this form 
for your review.



Please check appropriate box(s):



                           Acute  Encephalopathy:





Etiology: [  ] Hypertensive              [  ] Metabolic      [  ] Toxic      [  
] Hepatic with Coma       

                            [  ] Hepatic w/o Coma      [  ] Hypoxic         [  
] Septic  [  ] Wernickes 

  [ X ] Drug induced: Suspected Polypharmacy     

                            [  ] Unspecified ______________ 

                            [  ] in the setting of underlying dementia 

                            [  ] Other (please specify) 



[  ] Other diagnosis ___________

[  ] Unable to determine



In addition, please specify:

Present on Admission (POA):  [X] Yes             [  ] No             [  ] 
Unable to determine



For continuity of documentation, please document condition throughout progress 
notes and discharge summary.  Thank You.



CLINICAL INDICATORS - SIGNS / SYMPTOMS / LABS   / RESULTS AND LOCATION IN EMR



12/5  H&P ([facility]YEN[facility]) SHE WAS UNABLE TO ANSWER ANY QUESTIONS AND SAID SHE WAS 
CONFUSED, A/P: 1). ACUTE ENCEPHALOPATHY



12/6 ED PHYSICIAN DX: ALTERED MENTAL STATUS



12/7 -12/8 PN ( [facility]) MORE EPISODES OF CONFUSION SINCE EARLY YESTERDAY;  PLAN: 
ACUTE ENCEPHALOPATHY: SUSPECTED 2/2 POLYPHARMACY



RISK:

RECENT SURGERY ( CERVICAL DECOMPRESSION) (QUINN/ H&P) 12/6

HYPONATREMIA (PN/PABLO) 12/8



TREATMENTS:

NEUROSURGERY CONSULT 12/5

CT  HEAD 12/5



THANK YOU! SIERRA







(This form is maintained as a part of the permanent medical record)

 2015 [email or web address]  All Rights Reserved

[email or web address]    [phone]

                                                              

 

MTDD

## 2019-12-09 NOTE — PRG
DATE OF SERVICE:  12/09/2019



ADDENDUM:  Ms. Nguyen was initially admitted with mental confusion and possible

mild encephalopathy.  She had an extensive imaging and blood work.  She is on many,

many medications and it was felt her AMS is likely related to polypharmacy.  In the

event, this morning, she is completely awake and alert x3, in no distress whenever.

We will curtail some of her medications in anticipation of discharge later today or

tomorrow.  Please add this note as an addendum to the note of Dr. Estefani Sifuentes, with

a notation.  I did discuss the case with her and agree with her assessment and plan. 







Job ID:  [id number]

## 2019-12-09 NOTE — PDOC.FM
- Subjective


Subjective: 


Patient is doing much better today than when I last saw her. She is alert and 

oriented. She has no new complaints. 





- Objective


MAR Reviewed: Yes


Vital Signs & Weight: 


 Vital Signs (12 hours)











  Temp Pulse Resp BP Pulse Ox


 


 12/09/19 00:05   102 H  16  


 


 12/08/19 20:00  98.4 F  102 H  20  110/72  100


 


 12/08/19 19:41      100








 Weight











Admit Weight                   100.953 kg


 


Weight                         100.953 kg














I&O: 


 











 12/07/19 12/08/19 12/09/19





 06:59 06:59 06:59


 


Intake Total 867 1220 384


 


Balance 867 1220 384











Result Diagrams: 


 12/07/19 10:22





 12/09/19 07:00





Phys Exam





- Physical Examination


Constitutional: NAD


HEENT: moist MMs, sclera anicteric


Neck: supple, full ROM


Respiratory: no wheezing, no rales, no rhonchi, clear to auscultation bilateral


Currently receiving neb


Cardiovascular: RRR, no significant murmur, no rub


Gastrointestinal: soft, non-tender, positive bowel sounds


Musculoskeletal: no edema, pulses present


Neurological: non-focal, normal sensation, moves all 4 limbs


Psychiatric: normal affect, A&O x 3


Skin: no rash, normal turgor, cap refill <2 seconds





Dx/Plan


(1) Acute encephalopathy


Code(s): G93.40 - ENCEPHALOPATHY, UNSPECIFIED   Status: Resolved   





(2) CKD (chronic kidney disease) stage 2, GFR 60-89 ml/min


Code(s): N18.2 - CHRONIC KIDNEY DISEASE, STAGE 2 (MILD)   Status: Chronic   





(3) Cervical stenosis of spinal canal


Code(s): M48.02 - SPINAL STENOSIS, CERVICAL REGION   Status: Chronic   





(4) Generalized weakness


Code(s): R53.1 - WEAKNESS   Status: Acute   





(5) Nausea & vomiting


Code(s): R11.2 - NAUSEA WITH VOMITING, UNSPECIFIED   Status: Acute   





(6) Neurological symptoms


Code(s): R29.90 - UNSPECIFIED SYMPTOMS AND SIGNS INVOLVING THE NERVOUS SYSTEM   

Status: Acute   





(7) Paresthesia


Code(s): R20.2 - PARESTHESIA OF SKIN   Status: Acute   





- Plan


Plan: 





Patient is a 54F with PMHx of CHF, MI x 2, CKD2, CAD, DM2, HLD, bipolar disorder

, COPD, spinal stenosis, chronic pancreatitis with recent hospitalization and 

cervical decompression admitted to obs for acute encephalopathy





Acute Encephalopathy, resolved


- CT head, CT cervical spine, MRI cervical spine, UA, and CXR negative. MRI 11/ 5 showed no mass, small lacunar infarct. 


- Neurosurgery examined patient and images from ED, stated pt doing well, no 

further recommendations at this time, appreciate assistance. 


- Patient afebrile, vss with mild tachycardia


- Pt sxs have since resolved and she appears at baseline mentation, A/Ox3. 

Suspected 2/2 polypharmacy, will adjust home meds as tolerated to still control 

pain


- RPR, HIV, flu negative


- Pt appears at baseline and no return of sxs, medically stable. Would benefit 

from SNF stay prior to returning home. 





Diarrhea, chronic


- Fecal white count elevated 


- C. Diff antigen positive, toxinnegative


- Shiga toxin and campylobacter negative


- likely 2/2 chronic pancreatic insufficiency, cont Creon





Deconditioning


- 2/2 recent surgeries on top of chronic medical conditions


- PT/OT to eval and tx


- Patient does not desire to go to inpatient rehab, screening for SNF 

placement. 





Mild hyponatremia, stable 


-Sodium 131 > 133. Stable





Elevated CRP


- CRP 3.32, ESR wnl at 28


- likely 2/2 acute stress of recent surgery 





CHF


- monitor fluid status closely, no s/s acute exacerbation


- continue home meds, restarted metoprolol





CKD2


- will avoid renally toxic medications





HLD


-continue home meds





DM2


-continue home meds


-SSI





Bipolar disorder


- continue home meds


- endorses non-persecutory auditory hallucinations, increased dose of 

Risperdone to 2mg QHS, no SI/HI, will need continued OP f/u and adjustment of 

medications





COPD


-continue home meds





Spinal stenosis with recent cervical decompression


-neurosurgery not concerned at this time, CT/MRI cervical spine negative for 

acute changes


-rec inpt rehab





Recent ORIF


- Incision c/d/i, cont PT/OT. Sutures are dissolvable and remnants easily moved

, mild edema. 


- would benefit from OP ortho post-op f/u





Chronic pain


- patient has MS Contin prescription. 


- calling Dr. Khan, her PCP to verify medications before discharge. 








PCP - Dr. Khan 


DVT ppx: lovenox


Code: Full


IVF: SL





Dispo: Admitted for acute encephalopathy, suspected polypharmacy, now resolved. 

Pt at baseline mentation. PT/OT/rehab/CM consulted for placement. Medically 

stable at this time. Will cont to monitor pending placement.

## 2019-12-10 ENCOUNTER — HOSPITAL ENCOUNTER (INPATIENT)
Dept: HOSPITAL 18 - NAV ACUTE | Age: 54
LOS: 50 days | Discharge: HOME HEALTH | DRG: 559 | End: 2020-01-29
Attending: FAMILY MEDICINE | Admitting: FAMILY MEDICINE
Payer: COMMERCIAL

## 2019-12-10 VITALS — BODY MASS INDEX: 39.6 KG/M2

## 2019-12-10 DIAGNOSIS — R09.81: ICD-10-CM

## 2019-12-10 DIAGNOSIS — Z90.710: ICD-10-CM

## 2019-12-10 DIAGNOSIS — Z86.73: ICD-10-CM

## 2019-12-10 DIAGNOSIS — E78.5: ICD-10-CM

## 2019-12-10 DIAGNOSIS — E11.22: ICD-10-CM

## 2019-12-10 DIAGNOSIS — K86.1: ICD-10-CM

## 2019-12-10 DIAGNOSIS — R53.81: ICD-10-CM

## 2019-12-10 DIAGNOSIS — Z79.4: ICD-10-CM

## 2019-12-10 DIAGNOSIS — G93.40: ICD-10-CM

## 2019-12-10 DIAGNOSIS — F17.200: ICD-10-CM

## 2019-12-10 DIAGNOSIS — F31.9: ICD-10-CM

## 2019-12-10 DIAGNOSIS — E87.6: ICD-10-CM

## 2019-12-10 DIAGNOSIS — I50.32: ICD-10-CM

## 2019-12-10 DIAGNOSIS — F17.210: ICD-10-CM

## 2019-12-10 DIAGNOSIS — Z88.8: ICD-10-CM

## 2019-12-10 DIAGNOSIS — M48.02: ICD-10-CM

## 2019-12-10 DIAGNOSIS — E87.5: ICD-10-CM
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
## 2019-12-10 DIAGNOSIS — J44.9: ICD-10-CM

## 2019-12-10 DIAGNOSIS — I25.10: ICD-10-CM

## 2019-12-10 DIAGNOSIS — I25.2: ICD-10-CM

## 2019-12-10 DIAGNOSIS — N18.2: ICD-10-CM

## 2019-12-10 DIAGNOSIS — E66.9: ICD-10-CM

## 2019-12-10 DIAGNOSIS — I13.0: ICD-10-CM

## 2019-12-10 DIAGNOSIS — R53.1: ICD-10-CM

## 2019-12-10 DIAGNOSIS — D64.9: ICD-10-CM

## 2019-12-10 DIAGNOSIS — R11.2: ICD-10-CM

## 2019-12-10 DIAGNOSIS — L89.894: ICD-10-CM

## 2019-12-10 DIAGNOSIS — Z90.49: ICD-10-CM

## 2019-12-10 DIAGNOSIS — Z98.890: ICD-10-CM

## 2019-12-10 DIAGNOSIS — B33.8: ICD-10-CM

## 2019-12-10 DIAGNOSIS — S82.851D: ICD-10-CM

## 2019-12-10 DIAGNOSIS — K59.00: ICD-10-CM

## 2019-12-10 DIAGNOSIS — Z47.89: Primary | ICD-10-CM

## 2019-12-10 PROCEDURE — 80048 BASIC METABOLIC PNL TOTAL CA: CPT

## 2019-12-10 PROCEDURE — 83880 ASSAY OF NATRIURETIC PEPTIDE: CPT

## 2019-12-10 PROCEDURE — 74177 CT ABD & PELVIS W/CONTRAST: CPT

## 2019-12-10 PROCEDURE — 80053 COMPREHEN METABOLIC PANEL: CPT

## 2019-12-10 PROCEDURE — 36415 COLL VENOUS BLD VENIPUNCTURE: CPT

## 2019-12-10 PROCEDURE — 85652 RBC SED RATE AUTOMATED: CPT

## 2019-12-10 PROCEDURE — 87205 SMEAR GRAM STAIN: CPT

## 2019-12-10 PROCEDURE — 86140 C-REACTIVE PROTEIN: CPT

## 2019-12-10 PROCEDURE — 82150 ASSAY OF AMYLASE: CPT

## 2019-12-10 PROCEDURE — L0120 CERV FLEX N/ADJ FOAM PRE OTS: HCPCS

## 2019-12-10 PROCEDURE — 81001 URINALYSIS AUTO W/SCOPE: CPT

## 2019-12-10 PROCEDURE — 36416 COLLJ CAPILLARY BLOOD SPEC: CPT

## 2019-12-10 PROCEDURE — 87070 CULTURE OTHR SPECIMN AEROBIC: CPT

## 2019-12-10 PROCEDURE — 94640 AIRWAY INHALATION TREATMENT: CPT

## 2019-12-10 PROCEDURE — 85025 COMPLETE CBC W/AUTO DIFF WBC: CPT

## 2019-12-10 PROCEDURE — 84134 ASSAY OF PREALBUMIN: CPT

## 2019-12-10 PROCEDURE — 97602 WOUND(S) CARE NON-SELECTIVE: CPT

## 2019-12-10 RX ADMIN — Medication SCH MG: at 21:27

## 2019-12-10 NOTE — PDOC.FM
- Subjective


Subjective: 


Patient is doing well this morning, complains of constipation. 





- Objective


MAR Reviewed: Yes


Vital Signs & Weight: 


 Vital Signs (12 hours)











  Temp Pulse Resp BP Pulse Ox


 


 12/09/19 23:32   90  16   100


 


 12/09/19 20:00  98.4 F  77  18  107/73  96


 


 12/09/19 19:41      96


 


 12/09/19 18:49   82  16  


 


 12/09/19 18:48   82  16   98








 Weight











Admit Weight                   100.953 kg


 


Weight                         100.953 kg














I&O: 


 











 12/08/19 12/09/19 12/10/19





 06:59 06:59 06:59


 


Intake Total [phone]


 


Balance [phone]











Result Diagrams: 


 12/07/19 10:22





 12/10/19 08:32





Phys Exam





- Physical Examination


Constitutional: NAD


HEENT: moist MMs, sclera anicteric


Neck: supple, full ROM


Respiratory: no wheezing, no rales, no rhonchi, clear to auscultation bilateral


Cardiovascular: RRR, no significant murmur, no rub


Gastrointestinal: soft, non-tender, no distention, positive bowel sounds


Musculoskeletal: no edema, pulses present


Neurological: non-focal, normal sensation, moves all 4 limbs


Psychiatric: normal affect, A&O x 3


Skin: no rash, cap refill <2 seconds





Dx/Plan


(1) Acute encephalopathy


Code(s): G93.40 - ENCEPHALOPATHY, UNSPECIFIED   Status: Resolved   





(2) CKD (chronic kidney disease) stage 2, GFR 60-89 ml/min


Code(s): N18.2 - CHRONIC KIDNEY DISEASE, STAGE 2 (MILD)   Status: Chronic   





(3) Cervical stenosis of spinal canal


Code(s): M48.02 - SPINAL STENOSIS, CERVICAL REGION   Status: Chronic   





(4) Generalized weakness


Code(s): R53.1 - WEAKNESS   Status: Acute   





(5) Nausea & vomiting


Code(s): R11.2 - NAUSEA WITH VOMITING, UNSPECIFIED   Status: Acute   





(6) Neurological symptoms


Code(s): R29.90 - UNSPECIFIED SYMPTOMS AND SIGNS INVOLVING THE NERVOUS SYSTEM   

Status: Acute   





(7) Paresthesia


Code(s): R20.2 - PARESTHESIA OF SKIN   Status: Acute   





- Plan


Plan: 





Patient is a 54F with PMHx of CHF, MI x 2, CKD2, CAD, DM2, HLD, bipolar disorder

, COPD, spinal stenosis, chronic pancreatitis with recent hospitalization and 

cervical decompression admitted to obs for acute encephalopathy





Acute Encephalopathy, resolved


- CT head, CT cervical spine, MRI cervical spine, UA, and CXR negative. MRI 11/ 5 showed no mass, small lacunar infarct. 


- Pt symptoms have since resolved and she appears at baseline mentation, A/Ox3. 

Suspected 2/2 polypharmacy, will adjust home meds as tolerated to still control 

pain


- Pt appears at baseline and no return of sxs, medically stable. Would benefit 

from SNF stay prior to returning home. 





Diarrhea, chronic, stable 


- C. Diff antigen positive, toxin negative


- likely 2/2 chronic pancreatic insufficiency, cont Creon





Deconditioning


- 2/2 recent surgeries on top of chronic medical conditions


- PT/OT to eval and tx


- Patient does not desire to go to inpatient rehab, screening for SNF 

placement. 





Mild hyponatremia, stable 


-Sodium 131 > 133. Stable





Elevated CRP


- CRP 3.32, ESR wnl at 28


- likely 2/2 acute stress of recent surgery 





CHF


- monitor fluid status closely, no s/s acute exacerbation


- continue home meds, restarted metoprolol





CKD2


- will avoid renally toxic medications





HLD


-continue home meds





DM2


-continue home meds


-SSI





Bipolar disorder


- Patient has not been on risperdone for several days and stable. 


- will send to SNF with small dose of nightly Abilify. Recommend close follow 

up with PCP/SNF facility for medication management. 


- this patient is at high risk of polypharmacy and needs close follow up. 





COPD


-continue home meds





Spinal stenosis with recent cervical decompression


-neurosurgery not concerned at this time, CT/MRI cervical spine negative for 

acute changes





Recent ORIF


- Incision c/d/i, cont PT/OT. Sutures are dissolvable and remnants easily moved

, mild edema. 


- would benefit from OP ortho post-op f/u





Chronic pain


- patient has MS Contin prescription. 


- Strongly recommend discontinuing outpatient 














PCP - Dr. Khan 


DVT ppx: lovenox


Code: Full


IVF: SL





Dispo: Admitted for acute encephalopathy, suspected polypharmacy, now resolved. 

Pt at baseline mentation. PT/OT/rehab/CM consulted for placement. Medically 

stable at this time. Will cont to monitor pending placement.

## 2019-12-11 LAB
ALBUMIN SERPL BCG-MCNC: 3.2 G/DL (ref 3.5–5)
ALP SERPL-CCNC: 90 U/L (ref 40–110)
ALT SERPL W P-5'-P-CCNC: 25 U/L (ref 8–55)
ANION GAP SERPL CALC-SCNC: 11 MMOL/L (ref 10–20)
AST SERPL-CCNC: 21 U/L (ref 5–34)
BACTERIA UR QL AUTO: (no result) HPF
BASOPHILS # BLD AUTO: 0.1 THOU/UL (ref 0–0.2)
BASOPHILS NFR BLD AUTO: 1 % (ref 0–1)
BILIRUB SERPL-MCNC: 0.4 MG/DL (ref 0.2–1.2)
BUN SERPL-MCNC: 19 MG/DL (ref 9.8–20.1)
CALCIUM SERPL-MCNC: 8.7 MG/DL (ref 7.8–10.44)
CHLORIDE SERPL-SCNC: 101 MMOL/L (ref 98–107)
CO2 SERPL-SCNC: 30 MMOL/L (ref 22–29)
CREAT CL PREDICTED SERPL C-G-VRATE: 107 ML/MIN (ref 70–130)
EOSINOPHIL # BLD AUTO: 0.1 THOU/UL (ref 0–0.7)
EOSINOPHIL NFR BLD AUTO: 1.8 % (ref 0–10)
GLOBULIN SER CALC-MCNC: 2.4 G/DL (ref 2.4–3.5)
GLUCOSE SERPL-MCNC: 155 MG/DL (ref 70–105)
HGB BLD-MCNC: 10.7 G/DL (ref 12–16)
LYMPHOCYTES # BLD AUTO: 2.5 THOU/UL (ref 1.2–3.4)
LYMPHOCYTES NFR BLD AUTO: 29.2 % (ref 21–51)
MCH RBC QN AUTO: 27.3 PG (ref 27–31)
MCV RBC AUTO: 85.4 FL (ref 78–98)
MONOCYTES # BLD AUTO: 0.7 THOU/UL (ref 0.11–0.59)
MONOCYTES NFR BLD AUTO: 8.2 % (ref 0–10)
MUCOUS THREADS UR QL AUTO: (no result) LPF
NEUTROPHILS # BLD AUTO: 5.1 THOU/UL (ref 1.4–6.5)
NEUTROPHILS NFR BLD AUTO: 59.9 % (ref 42–75)
PLATELET # BLD AUTO: 186 THOU/UL (ref 130–400)
POTASSIUM SERPL-SCNC: 3.2 MMOL/L (ref 3.5–5.1)
RBC # BLD AUTO: 3.92 MILL/UL (ref 4.2–5.4)
RBC UR QL AUTO: (no result) HPF (ref 0–3)
SODIUM SERPL-SCNC: 139 MMOL/L (ref 136–145)
WBC # BLD AUTO: 8.5 THOU/UL (ref 4.8–10.8)

## 2019-12-11 RX ADMIN — ALOGLIPTIN SCH MG: 6.25 TABLET, FILM COATED ORAL at 08:44

## 2019-12-11 RX ADMIN — PANCRELIPASE SCH CAP: 60000; 12000; 38000 CAPSULE, DELAYED RELEASE PELLETS ORAL at 08:43

## 2019-12-11 RX ADMIN — BUPROPION HYDROCHLORIDE SCH MG: 150 TABLET, FILM COATED, EXTENDED RELEASE ORAL at 08:44

## 2019-12-11 RX ADMIN — PANCRELIPASE SCH CAP: 60000; 12000; 38000 CAPSULE, DELAYED RELEASE PELLETS ORAL at 18:04

## 2019-12-11 RX ADMIN — MULTIPLE VITAMINS W/ MINERALS TAB SCH TAB: TAB at 08:44

## 2019-12-11 RX ADMIN — ERGOCALCIFEROL SCH MG: 1.25 CAPSULE, LIQUID FILLED ORAL at 08:44

## 2019-12-11 RX ADMIN — PANCRELIPASE SCH CAP: 60000; 12000; 38000 CAPSULE, DELAYED RELEASE PELLETS ORAL at 14:35

## 2019-12-11 RX ADMIN — Medication SCH MG: at 08:44

## 2019-12-11 RX ADMIN — Medication SCH MG: at 20:38

## 2019-12-11 NOTE — HP
HISTORY OF PRESENT ILLNESS:  Ms. Nguyen is a 54-year-old white female, who

presented to [facility] with multiple problems on .  She was

confused, had CHF, chronic kidney disease, diabetes out of order, bipolar disease,

COPD, and spinal stenosis.  She also has a history of chronic pancreatitis. 



The patient was evaluated and thought to have altered mental status.  She is

admitted to the hospital for further care. 



History reveals that the patient back in November fell, had a trimalleolar fracture.

 She had a history of multiple falls and after further evaluation, was noted to have

severe cervical spine stenosis.  Her trimalleolar fracture was repaired on Monday

and a cervical spine surgery was done that Thursday.  She has been in therapy since,

and actually done fairly well, but now is transferred to inpatient swing bed at

[facility] for continued physical therapy and occupational therapy. 



PAST MEDICAL HISTORY:  Positive for;

1. CHF.

2. MI x2.

3. Chronic kidney disease, stage 2.

4. CAD.

5. Diabetes mellitus, type 2.

6. Hyperlipidemia.

7. COPD.

8. Spinal stenosis.

9. Chronic pancreatitis.

10. Bipolar disease.

11. Generalized weakness.



PAST SURGICAL HISTORY:  

1. Appendectomy.

2. Cholecystectomy.

3. Hysterectomy.

4. Bilateral oophorectomy.

5. Left knee surgery.

6. Bilateral breast lumpectomy.

7. ORIF, right ankle.

8. Recent cervical decompression for C2-C7 with fusion.



FAMILY HISTORY:  Reveals the patient's dad  of a brain aneurysm, but he was away

from the family and unknown how well he was or any other health history. 



The patient's mother  at age 72 of coronary artery disease.  Maternal

grandparents had alcohol problems and  in their 70s. 



SOCIAL HISTORY:  Reveals the patient still smokes 1 or 1-1/2 packs per day.  She

does not do any alcohol or drug use.  She lives by herself in [place], [place]. 



REVIEW OF SYSTEMS:  The patient denies fever, chills, nausea, or vomiting. 



The patient denies change in vision. 



The patient denies change in hearing. 



The patient denies ear, nose or throat signs or symptoms of upper respiratory

problems. 



The patient denies any chest pain, palpitations, rapid irregular or slow heartbeat. 



The patient denies significant shortness of breath, although she does smoke.  Denies

dyspnea on exertion.  Denies cough, cold, or congestion. 



The patient denies any recent gastroesophageal reflux, nausea, vomiting, diarrhea,

or constipation. 



The patient denies urinary symptoms including dysuria, hematuria, or frequency. 



The patient does complain of right leg pain and neck pain status post surgical

incisions. 



PHYSICAL EXAMINATION:

GENERAL:  This is a well-developed, well-nourished, very pleasant 54-year-old white

female, in no apparent distress at this time. 

HEENT:  Reveals normocephalic and nontraumatic cranium.  Pupils are equally round

and reactive.  Extraocular movements are intact.  Nose and throat are slightly dry. 

NECK:  Supple without masses, nodes, or bruits.  Cervical collar is not in place

since she is in bed. 

CHEST:  Clear to auscultation.  No rales, rhonchi, wheezes, or cough is noted. 

HEART:  Reveals a regular rate and rhythm without murmurs, gallops, or rubs. 

ABDOMEN:  Soft, obese, nontender without organomegaly.  Normal bowel sounds are

noted.  No rebound or guarding is noted. 

:  Deferred. 

EXTREMITIES:  Reveal no clubbing, cyanosis, or edema.  The patient does have an

immobilization boot on her right ankle.  She is not wearing her C-collar at this

time. 

SKIN:  Reveals no rashes or lesions. 

The patient has no significant bruising, bleeding, or purpura.   

PSYCHIATRIC:  The patient does have a history of bipolar is not psychotic at this

time. 



ASSESSMENT:  

1. Acute encephalopathy, resolved.

2. Bipolar disease, stable.

3. Coronary artery disease, stable.

4. History of congestive heart failure, diastolic, stable.

5. Diabetes type 2, presently stable.

6. Hyperlipidemia, stable.

7. Hypertension, stable.

8. Chronic kidney disease, stage 2, stable.



PLAN:  

1. The patient is admitted to inpatient skilled for continued physical therapy and

occupational therapy. 

2. We will continue her present medications.

3. Continue to monitor diabetes with Accu-Cheks before meals and at bedtime.

4. Continue to monitor blood pressure closely and adjust medications as needed.

5. Monitor her bipolar disease.

6. Continue present hospital medications.

7. Continue physical therapy and occupational therapy.

8. Stress ulcer prophylaxis.

9. Decubitus precautions.

10. Deep venous thrombosis prophylaxis.





Job ID:  [id number]

## 2019-12-11 NOTE — PQF
DISHA WILKS GRADY    

[id number]                                                             [id number]
[id number]

[id number]                             

                                   

CLINICAL DOCUMENTATION CLARIFICATION FORM:  POST DISCHARGE



Addendum to original discharge summary date:  __________________________________
____



Late entry note date:  _________________________________________________________
__











DATE: 12/11/19                                        ATTN: Davi Pereira





Please exercise your independent, professional judgment in responding to the 
clarification form. 

Clinical indicators are provided on the bottom of this form for your review



In your clinical opinion based on clinical findings below, can you please 
further clarfify diagnosis of Encephalopathy due to Polypharmacy if :



Please check appropriate box(s):

[  x] Adverse effect of medication

[  ] Poisoning effect of drug

[  ] Toxic Effect of drug

[  ] Other diagnosis ___________

[  ] Unable to determine

In addition, please specify:

Present on Admission (POA):  [  x] Yes             [  ] No             [  ] 
Unable to determine



For continuity of documentation, please document condition throughout progress 
notes and discharge summary.  Thank You.



CLINICAL INDICATORS - SIGNS / SYMPTOMS / LABS

Hospitalist p1 12/6 Dr Bean presented with generalized weakness and confusion

Hospitalist p1 12/6 Dr Bean She was unable to answer any questions and when 
asked what was wrong she said she was confused

Hospitalist p9 12/6 Dr Bean AMS uncertain etiology, possible medication 
related: hold any sedating medications

PN p1 12/6 Dr Bean UDS +opiates benzos, received ativan in ED

PN p1 12/6 Dr Bean will continue monitor and see if she is more aleart and 
oriented after the opiates and benzos are out of her system





RISK FACTORS

Hospitalist p6 12/6 - Acute Encephalopathy

Hospitalist p8 12/6  Hyponatremia





TREATMENTS:

Hospitalist p8 12/6  Will order Ammonia

Hospitalist p8 12/6  Will order UDS/Blood drug screen

Hospitalist p8 12/6  Consider furtehr neuro imaging

Hospitalist p8 12/6  Check B12/Folate

Hospitalist p9 12/6- hold any sedating medications











(This form is maintained as a part of the permanent medical record)

2015 [employer]  All Rights Reserved

[email or web address]    [not provided]

                                                              



 

MTDD

## 2019-12-11 NOTE — PRG
DATE OF SERVICE:  12/10/2019



I agree with the assessment and plan of Dr. Estefani Sifuentes.







Job ID:  [id number]

## 2019-12-11 NOTE — DIS
DATE OF ADMISSION:  12/06/2019



DATE OF DISCHARGE:  12/10/2019



CONSULTS:  Neurosurgery.



PROCEDURES:  None.



PRIMARY DIAGNOSIS:  Acute encephalopathy, presumed to be from polypharmacy.



SECONDARY DIAGNOSES:  Diarrhea, hyponatremia, elevated CRP, congestive heart

failure, chronic kidney disease, hyperlipidemia, diabetes, bipolar disorder, spinal

stenosis with recent cervical decompression. 



DISCHARGE MEDICATIONS:  

1. Tylenol.

2. Albuterol.

3. __________.

4. Atorvastatin.

5. Bupropion.

6. Lovenox.

7. Fenofibrate.

8. Fluticasone.

9. Folic acid.

10. Insulin.

11. Lisinopril.

12. Melatonin.

13. Metoprolol.

14. Mometasone inhaler.

15. Morphine ER, MS Contin.

16. Multivitamin.

17. Pancrelipase.

18. Paroxetine.

19. Potassium chloride.

20. Torsemide.

21. Abilify. 

Discontinued medications:  Risperdal.



HISTORY OF PRESENT ILLNESS/HOSPITAL COURSE:  Ms. Nguyen is a 54-year-old female

who recently had cervical decompression surgery, who presented to the ER via EMS

after being, per family, weak and with altered mental status in her home.  Upon

arrival, she was really unable to give much history.  She was confused and her

alertness and orientation was waxing and waning.  This was her neurological state

for the first several days of admission.  Medications that could induce drowsiness

or encephalopathy were discontinued or held, and she slowly improved over the course

of her admission.  She states that she has been taking medications as prescribed

upon discharge from her last hospital stay.  A full workup for her encephalopathy

was done including laboratory studies, which revealed no significant abnormalities.

CT of her cervical spine and brain, which revealed no abnormalities other than

postoperative changes.  No acute complications.  We attempted a lumbar puncture.

However, the patient was unable to tolerate the procedure and it was not done.

Neurosurgery was consulted and had no recommendation.  They were not concerned about

any of the surgical hardware.  The patient improved after management of her

medications at that time, which led us to believe that her initial presentation was

probably due to polypharmacy and we recommend discontinuing sedating medications

such as MS Contin.  The patient was deconditioned as a result of her recent

surgeries and hospital stay, and therefore it was thought that she would be best

qualified for rehab. 



DISPOSITION:  Stable.



DISCHARGE INSTRUCTIONS:  

1. Location:  Inpatient rehab.

2. Diet:  Heart healthy, diabetic.

3. Activity:  Ad german.

4. Followup:  With primary care physician after discharge from rehab.







Job ID:  [id number]

## 2019-12-12 RX ADMIN — Medication SCH MG: at 20:40

## 2019-12-12 RX ADMIN — ALOGLIPTIN SCH MG: 6.25 TABLET, FILM COATED ORAL at 09:14

## 2019-12-12 RX ADMIN — BUPROPION HYDROCHLORIDE SCH MG: 150 TABLET, FILM COATED, EXTENDED RELEASE ORAL at 09:15

## 2019-12-12 RX ADMIN — PANCRELIPASE SCH CAP: 60000; 12000; 38000 CAPSULE, DELAYED RELEASE PELLETS ORAL at 13:14

## 2019-12-12 RX ADMIN — Medication SCH MG: at 09:16

## 2019-12-12 RX ADMIN — PANCRELIPASE SCH CAP: 60000; 12000; 38000 CAPSULE, DELAYED RELEASE PELLETS ORAL at 09:14

## 2019-12-12 RX ADMIN — PANCRELIPASE SCH CAP: 60000; 12000; 38000 CAPSULE, DELAYED RELEASE PELLETS ORAL at 17:18

## 2019-12-12 RX ADMIN — ERGOCALCIFEROL SCH MG: 1.25 CAPSULE, LIQUID FILLED ORAL at 09:16

## 2019-12-12 RX ADMIN — MULTIPLE VITAMINS W/ MINERALS TAB SCH TAB: TAB at 09:14

## 2019-12-12 NOTE — PRG
DATE OF SERVICE:  12/12/2019



SUBJECTIVE:  Ms. Nguyen is a 54-year-old female, who presented to [facility]

with multiple problems.  On December 6, she was confused, had CHF, chronic kidney

disease, diabetes out of control, bipolar disease, COPD, and spinal stenosis.  She

also has a history of chronic pancreatitis.  The patient was evaluated and had

altered mental status.  She has been to the hospital for significant care.

Eventually, she stabilized and felt the patient has reached maximum medical benefit,

but was extremely weak.  She had had a trimalleolar fracture and had multiple falls.

 She was noted to have some cervical spinal stenosis.  Her trimalleolar fracture was

repaired and cervical spine surgery was done the following Thursday.  Since then,

she has been in therapy and is now transferred to inpatient swing bed at [facility] for continued physical therapy and occupational therapy. 



The patient states she had a good day yesterday and is already getting little

stronger.  She states she is eating well.  She states she is not constipated.  She

has no concerns or complaints today. 



OBJECTIVE:  VITAL SIGNS:  Today reveal blood pressure this morning 111/53, pulse 89

to 95, respirations 18 to 20, O2 saturation 95% to 98% on room air, T-max 97.2. 

GENERAL:  This is a well-developed, well-nourished, very pleasant 54-year-old white

female, in no apparent distress at this time. 

HEENT:  Normocephalic, nontraumatic cranium.  Pupils are equal, round, and reactive.

 Extraocular movements are intact.  Nose and throat are slightly dry. 

NECK:  Supple without masses, nodes, or bruits. 

CHEST:  Clear to auscultation.  No rales, rhonchi, or wheezes are heard. 

HEART:  Reveals a regular rate and rhythm without murmurs, gallops, or rubs. 

ABDOMEN:  Obese, soft, nontender without organomegaly.  Normal bowel sounds are

noted.  No rebound or guarding is noted. 

:  Deferred. 

EXTREMITIES:  Reveal no clubbing, cyanosis, or edema.  The patient does continue to

have immobilization boot on her right ankle.  She is wearing her C-collar.  She was

found outside smoking a cigarette.  SKIN:  Reveals no rashes or lesions. 

PSYCHIATRIC:  The patient does have a history of bipolar, but is not psychotic at

this time. 



ASSESSMENT:  

1. Acute encephalopathy, resolved.

2. Bipolar disease, stable.

3. Coronary artery disease, stable.

4. History of congestive heart failure, diastolic, stable.

5. Diabetes type 2, presently stable.

6. Hyperlipidemia, stable.

7. Hypertension, stable.

8. Chronic kidney disease, stage 2.

9. Generalized weakness.



PLAN:  

1. The patient is admitted for continued physical therapy and occupational therapy.

2. Continue present medication.

3. Continue to monitor the patient's diabetes with Accu-Cheks a.c. and h.s.

4. Monitor the patient's bipolar disease.

5. Continue present hospital medications.

6. Monitor the patient's blood pressure closely.  Adjust medications if needed.

7. Stress ulcer prophylaxis.

8. Decubitus precautions.

9. DVT prophylaxis.

10. Continue physical therapy and occupational therapy.







Job ID:  [id number]

## 2019-12-13 RX ADMIN — PANCRELIPASE SCH CAP: 60000; 12000; 38000 CAPSULE, DELAYED RELEASE PELLETS ORAL at 17:44

## 2019-12-13 RX ADMIN — Medication SCH MG: at 20:34

## 2019-12-13 RX ADMIN — Medication SCH MG: at 08:54

## 2019-12-13 RX ADMIN — BUPROPION HYDROCHLORIDE SCH MG: 150 TABLET, FILM COATED, EXTENDED RELEASE ORAL at 08:53

## 2019-12-13 RX ADMIN — PANCRELIPASE SCH CAP: 60000; 12000; 38000 CAPSULE, DELAYED RELEASE PELLETS ORAL at 12:45

## 2019-12-13 RX ADMIN — ALOGLIPTIN SCH MG: 6.25 TABLET, FILM COATED ORAL at 08:56

## 2019-12-13 RX ADMIN — PANCRELIPASE SCH CAP: 60000; 12000; 38000 CAPSULE, DELAYED RELEASE PELLETS ORAL at 09:20

## 2019-12-13 RX ADMIN — MULTIPLE VITAMINS W/ MINERALS TAB SCH TAB: TAB at 08:55

## 2019-12-13 RX ADMIN — ERGOCALCIFEROL SCH MG: 1.25 CAPSULE, LIQUID FILLED ORAL at 08:58

## 2019-12-13 NOTE — PRG
DATE OF SERVICE:  12/13/2019



SUBJECTIVE:  Ms. Nguyen is a 54-year-old female, who presented to the ER with

confusion; CHF; chronic kidney disease; diabetes, out of control; bipolar; COPD;

spinal stenosis; and chronic pancreatitis.  She was evaluated and had altered mental

status and admitted to the hospital.  She eventually was stabilized, reached maximum

medical benefit, but she had a trimalleolar fracture and had multiple falls.  It was

repaired and the patient was transferred to [facility] for swing

bed, physical therapy and occupational therapy. 



The patient states today she is doing very well and doing well in her therapy. 



The physical therapist states she is almost independent with most things.  Her

problem is that she has multiple stairs to get into her house and she cannot go up

stairs. 



Hopefully, family members or [employer] members will build her a ramp soon. 



Otherwise, the patient was outside, smoking, and I did caution her to try to wean

off her smoking, so she can heal better. 



PHYSICAL EXAMINATION:

VITAL SIGNS:  Reveal blood pressure 127/59, pulse 87, respirations 18, O2 saturation

95% to 97% on room air, T-max 98.4. 

GENERAL:  Reveals a well-developed, somewhat obese white female, in no apparent

distress at this time. 

HEENT:  Reveals normocephalic and nontraumatic cranium.  Pupils are equally round

and reactive.  Extraocular movements are intact.  Nose and throat are slightly dry. 

NECK:  Supple without masses, nodes, or bruits. 

CHEST:  Clear to auscultation.  No rales, rhonchi, wheezes, or cough is heard. 

HEART:  Reveals a regular rate and rhythm without murmurs, gallops, or rubs. 

ABDOMEN:  Obese, soft, nontender without organomegaly.  No rebound or guarding is

noted. 

:  Deferred. 

EXTREMITIES:  Reveal no clubbing or cyanosis.  The patient does have immobilization

boot on her right ankle.  She also is wearing her C-collar. 

SKIN:  Reveals no rashes or lesions. 

PSYCHIATRIC:  The patient does have a history of bipolar, but is not psychotic.



ASSESSMENT:  

1. Acute encephalopathy, much improved, resolved.

2. Bipolar disease, stable.

3. Coronary artery disease, stable.

4. Congestive heart failure, diastolic, stable.

5. Diabetes type 2, actually her sugars are very good.

6. Hyperlipidemia.

7. Hypertension, stable.

8. Chronic kidney disease, stage 2.

9. Generalized weakness.

10. Trimalleolar fracture, status post open reduction and internal fixation, right

side. 



PLAN:  

1. Continue present medications.

2. Continue to monitor the patient's diabetes with Accu-Cheks before meals and at

bedtime. 

3. Continue to monitor the patient's blood pressure closely.

4. Stress ulcer prophylaxis.

5. Decubitus precautions.

6. Continue physical therapy and occupational therapy.

7. Continue to encourage the patient not to smoke.







Job ID:  [id number]

## 2019-12-14 RX ADMIN — BUPROPION HYDROCHLORIDE SCH MG: 150 TABLET, FILM COATED, EXTENDED RELEASE ORAL at 09:00

## 2019-12-14 RX ADMIN — ALOGLIPTIN SCH MG: 6.25 TABLET, FILM COATED ORAL at 08:59

## 2019-12-14 RX ADMIN — MULTIPLE VITAMINS W/ MINERALS TAB SCH TAB: TAB at 08:58

## 2019-12-14 RX ADMIN — PANCRELIPASE SCH CAP: 60000; 12000; 38000 CAPSULE, DELAYED RELEASE PELLETS ORAL at 08:58

## 2019-12-14 RX ADMIN — Medication SCH MG: at 09:00

## 2019-12-14 RX ADMIN — PANCRELIPASE SCH CAP: 60000; 12000; 38000 CAPSULE, DELAYED RELEASE PELLETS ORAL at 17:57

## 2019-12-14 RX ADMIN — ERGOCALCIFEROL SCH MG: 1.25 CAPSULE, LIQUID FILLED ORAL at 08:59

## 2019-12-14 RX ADMIN — Medication SCH MG: at 20:44

## 2019-12-14 RX ADMIN — PANCRELIPASE SCH CAP: 60000; 12000; 38000 CAPSULE, DELAYED RELEASE PELLETS ORAL at 12:53

## 2019-12-14 NOTE — PRG
DATE OF SERVICE:  12/14/2019



SUBJECTIVE:  Ms. Nguyen is doing well.  She states that her wound had some

significant slough, so it was cleaned and culture apparently sent.  I re-examined

the wound and there are no obvious signs of infection.  The bed looks good.  Minimal

erythema.  I advised her that I will just wait on the cultures and not start her on

any empiric antibiotics.  She states that she is constipated and wants some senna. 



OBJECTIVE:  VITAL SIGNS:  She is afebrile.  Heart rate 98, respirations 18, oxygen

saturation 97% on room air, and blood pressure 102/51. 

CARDIOVASCULAR SYSTEM:  S1 and S2 plus. 

RESPIRATORY SYSTEM:  Normal vesicular breath sounds. 

ABDOMEN:  Soft and nontender.  Bowel sounds heard in all quadrants. 

EXTREMITIES:  Without cyanosis or clubbing.  Right leg and foot in a walking boot. 

CENTRAL NERVOUS SYSTEM:  Awake and responsive.  Generalized weakness, otherwise

nonfocal. 



IMPRESSION:  

1. Right ankle fracture, status post surgical fixation.

2. Cervical myelopathy, status post decompression.

3. Dyslipidemia.

4. Allergic rhinitis.

5. Diabetes mellitus, type 2.

6. Hypertension.

7. Constipation.



PLAN:  

1. Continue current medications.

2. 1800 calorie heart healthy ADA diet.

3. Accu-Cheks with sliding scale coverage.

4. Monitor blood pressure and adjust medications as needed.

5. Orthopedic precautions.

6. Incision care.

7. Await cultures.

8. Senna b.i.d.

9. Routine laboratory values.

10. Continue therapy.

11. Reinforced the risks of smoking.







Job ID:  [id number]

## 2019-12-15 RX ADMIN — INSULIN LISPRO PRN UNIT: 100 INJECTION, SOLUTION INTRAVENOUS; SUBCUTANEOUS at 17:20

## 2019-12-15 RX ADMIN — Medication SCH MG: at 21:33

## 2019-12-15 RX ADMIN — ALOGLIPTIN SCH MG: 6.25 TABLET, FILM COATED ORAL at 08:57

## 2019-12-15 RX ADMIN — PANCRELIPASE SCH CAP: 60000; 12000; 38000 CAPSULE, DELAYED RELEASE PELLETS ORAL at 12:34

## 2019-12-15 RX ADMIN — PANCRELIPASE SCH CAP: 60000; 12000; 38000 CAPSULE, DELAYED RELEASE PELLETS ORAL at 08:58

## 2019-12-15 RX ADMIN — MULTIPLE VITAMINS W/ MINERALS TAB SCH TAB: TAB at 08:59

## 2019-12-15 RX ADMIN — PANCRELIPASE SCH CAP: 60000; 12000; 38000 CAPSULE, DELAYED RELEASE PELLETS ORAL at 17:23

## 2019-12-15 RX ADMIN — DOCUSATE SODIUM 50 MG AND SENNOSIDES 8.6 MG PRN TAB: 8.6; 5 TABLET, FILM COATED ORAL at 08:59

## 2019-12-15 RX ADMIN — DOCUSATE SODIUM 50 MG AND SENNOSIDES 8.6 MG PRN TAB: 8.6; 5 TABLET, FILM COATED ORAL at 21:35

## 2019-12-15 RX ADMIN — Medication SCH MG: at 08:59

## 2019-12-15 RX ADMIN — BUPROPION HYDROCHLORIDE SCH MG: 150 TABLET, FILM COATED, EXTENDED RELEASE ORAL at 08:57

## 2019-12-15 NOTE — PRG
DATE OF SERVICE:  12/15/2019



SUBJECTIVE:  Ms. Nguyen is tolerating her doxycycline.  She complains of pain.

She also apparently brought in her Trilogy from home and she wants to use that.  She

also states that the famotidine really does not help and ranitidine is what she

takes at home.  I advised her that there have been some issues with the Zantac, so I

will just switch her to Protonix.  Nursing stated that they already put the order in

for her Trilogy. 



OBJECTIVE:  VITAL SIGNS:  She is afebrile.  Heart rate 93, respirations 18, oxygen

saturation 100% on room air, blood pressure 120/60. 

CARDIOVASCULAR SYSTEM:  S1 and S2 plus. 

RESPIRATORY SYSTEM:  Normal vesicular breath sounds. 

ABDOMEN:  Soft and nontender.  Bowel sounds heard in all quadrants. 

EXTREMITIES:  Without cyanosis or clubbing.  Right foot in a walking boot.



IMPRESSION:  

1. Right ankle fracture, status post open reduction and internal fixation.

2. Superficial ulcer with some surrounding erythema.  Started her on doxycycline.

3. Cervical myelopathy, status post surgery.

4. Possible chronic obstructive pulmonary disease.

5. Gastroesophageal reflux disease.

6. Deconditioning.



PLAN:  

1. Switch Pepcid to Protonix.

2. Okay to use Trilogy from home, informed nursing.

3. Await cultures.

4. Continue doxycycline.

5. Orthopedic precautions.

6. Wound care.

7. Dr. Robert Thao back tonight.







Job ID:  [id number]

## 2019-12-16 RX ADMIN — INSULIN LISPRO PRN UNIT: 100 INJECTION, SOLUTION INTRAVENOUS; SUBCUTANEOUS at 17:20

## 2019-12-16 RX ADMIN — PANCRELIPASE SCH CAP: 60000; 12000; 38000 CAPSULE, DELAYED RELEASE PELLETS ORAL at 08:41

## 2019-12-16 RX ADMIN — Medication SCH MG: at 08:34

## 2019-12-16 RX ADMIN — BUPROPION HYDROCHLORIDE SCH MG: 150 TABLET, FILM COATED, EXTENDED RELEASE ORAL at 08:32

## 2019-12-16 RX ADMIN — Medication SCH MG: at 21:12

## 2019-12-16 RX ADMIN — ALOGLIPTIN SCH MG: 6.25 TABLET, FILM COATED ORAL at 08:35

## 2019-12-16 RX ADMIN — INSULIN LISPRO PRN UNIT: 100 INJECTION, SOLUTION INTRAVENOUS; SUBCUTANEOUS at 21:07

## 2019-12-16 RX ADMIN — PANCRELIPASE SCH CAP: 60000; 12000; 38000 CAPSULE, DELAYED RELEASE PELLETS ORAL at 17:23

## 2019-12-16 RX ADMIN — MULTIPLE VITAMINS W/ MINERALS TAB SCH TAB: TAB at 08:33

## 2019-12-16 RX ADMIN — PANCRELIPASE SCH CAP: 60000; 12000; 38000 CAPSULE, DELAYED RELEASE PELLETS ORAL at 12:55

## 2019-12-16 NOTE — PRG
DATE OF SERVICE:  12/16/2019



SUBJECTIVE:  Ms. Nguyen is a 54-year-old white female, presented to the ER 
with

confusion, congestive heart failure, chronic kidney disease, diabetes, out of

control, bipolar, COPD, spinal stenosis, chronic pancreatitis.  She was found to

have altered mental status.  She was admitted to the hospital.  She eventually

stabilized.  She had also a trimalleolar fracture and has had multiple falls.  
It

was then repaired, and the patient was transferred to [facility] for

swing bed, physical therapy, and occupational therapy. 



The patient states she has had a great day and a great weekend. 



She has no concerns or complaints. 



Her Physical Therapy states she has pretty much independent except for most 
things

that she has multiple stairs to get into her house.  She cannot go upstairs. 



She really needs someone to build a ramp for her.



OBJECTIVE:  VITAL SIGNS:  Today revel blood pressure this morning was 122/78, 
pulse

88 to 98, respirations 18, O2 saturation 95% on room air, and T-max 98.2. 

GENERAL:  This is a well-developed, well-nourished, very pleasant 54-year-old 
white

female, and in no apparent distress at this time. 

HEENT:  Normocephalic and nontraumatic cranium.  Pupils are equal, round, and

reactive.  Extraocular movements are intact.  Nose and throat are dry. 

NECK:  Supple without masses, nodes, or bruits. 

CHEST:  Clear to auscultation.  No rales, rhonchi, wheezes, or cough is heard. 

HEART:  Reveals a regular rate and rhythm without murmurs, gallops, or rubs. 

ABDOMEN:  Obese, soft, nontender without organomegaly.  No rebound or guarding 
is

noted. 

:  Deferred. 

EXTREMITIES:  No clubbing, cyanosis, or edema.  The patient does have an

immobilization boot on her right ankle, and she continues to wear her C-collar 
any

time she is out of bed. 

SKIN:  Reveals no rashes or lesions. 

PSYCH:  The patient does have a history of bipolar, but is now psychotic at this

time. 



LABORATORY DATA:  Labs today reveal sugar this morning fasting 147, before lunch

117, before supper 201. 



ASSESSMENT:  

1. Acute encephalopathy, resolved.

2. Bipolar, stable.

3. Coronary artery disease, stable.

4. Diastolic congestive heart failure, stable.

5. Diabetes, type 2 with good sugars.

6. Hyperlipidemia.

7. Hypertension, stable.

8. Chronic kidney disease, stage 2.

9. Trimalleolar fracture, status post open reduction and internal fixation, 
right

side. 

10. Generalized weakness.



PLAN:  

1. Continue present medication.

2. Continue to monitor the patient's diabetes with Accu-Cheks before meals and 
at

bedtime. 

3. Continue to monitor the patient's blood pressure closely.  Adjust 
medications as

needed. 

4. Stress ulcer prophylaxis.

5. Decubitus precautions.

6. Continue to encourage the patient not to smoke.

7. Continue physical therapy and occupational therapy.







Job ID:  [id number]



[facility]

## 2019-12-17 RX ADMIN — MULTIPLE VITAMINS W/ MINERALS TAB SCH TAB: TAB at 08:44

## 2019-12-17 RX ADMIN — PANCRELIPASE SCH CAP: 60000; 12000; 38000 CAPSULE, DELAYED RELEASE PELLETS ORAL at 17:01

## 2019-12-17 RX ADMIN — PANCRELIPASE SCH CAP: 60000; 12000; 38000 CAPSULE, DELAYED RELEASE PELLETS ORAL at 12:03

## 2019-12-17 RX ADMIN — ALOGLIPTIN SCH MG: 6.25 TABLET, FILM COATED ORAL at 08:43

## 2019-12-17 RX ADMIN — INSULIN LISPRO PRN UNIT: 100 INJECTION, SOLUTION INTRAVENOUS; SUBCUTANEOUS at 21:20

## 2019-12-17 RX ADMIN — Medication SCH MG: at 21:18

## 2019-12-17 RX ADMIN — INSULIN LISPRO PRN UNIT: 100 INJECTION, SOLUTION INTRAVENOUS; SUBCUTANEOUS at 05:55

## 2019-12-17 RX ADMIN — PANCRELIPASE SCH CAP: 60000; 12000; 38000 CAPSULE, DELAYED RELEASE PELLETS ORAL at 08:39

## 2019-12-17 RX ADMIN — INSULIN LISPRO PRN UNIT: 100 INJECTION, SOLUTION INTRAVENOUS; SUBCUTANEOUS at 17:02

## 2019-12-17 RX ADMIN — BUPROPION HYDROCHLORIDE SCH MG: 150 TABLET, FILM COATED, EXTENDED RELEASE ORAL at 08:42

## 2019-12-17 RX ADMIN — Medication SCH MG: at 08:42

## 2019-12-17 RX ADMIN — DOCUSATE SODIUM 50 MG AND SENNOSIDES 8.6 MG PRN TAB: 8.6; 5 TABLET, FILM COATED ORAL at 08:44

## 2019-12-17 NOTE — PRG
DATE OF SERVICE:  12/17/2019



SUBJECTIVE:  Ms. Nguyen is a very pleasant well-developed 54-year-old white

female, who was in the hospital and had a trimalleolar fracture, multiple falls.

She had ORIF done and was transferred to [facility] for swing bed,

physical therapy, and occupational therapy. 



The patient also has significant multiple diagnoses including congestive heart

failure; chronic kidney disease; diabetes, out of control; bipolar; COPD; tobacco

abuse; spinal stenosis; chronic pancreatitis; and on admission had altered mental

status. 



She was stabilized and transferred to [facility] for PT and OT. 



I had a long discussion about the patient's discharge.  The patient lives in a

trailer house and has to have a ramp built to get into her home.  She states also

she has difficulty getting into her bathroom because her wheelchair will not fit

through the door. 



OBJECTIVE:  VITAL SIGNS:  Today reveal blood pressure this morning 110/56, pulse 80,

respirations 18, O2 saturation 97% on room air, and T-max 98.1.  No labs were done. 

GENERAL:  This is a well-developed, well-nourished, very pleasant, somewhat obese

white female, in no apparent distress at this time. 

HEENT:  Reveals normocephalic and nontraumatic cranium.  Pupils are equal, round,

and reactive.  Extraocular movements are intact.  Nose and throat are slightly dry. 

NECK:  Supple without masses, nodes, or bruits. 

CHEST:  Clear to auscultation.  No rales, rhonchi, wheezes are heard. 

HEART:  Reveals a regular rate and rhythm without murmurs, gallops, or rubs. 

ABDOMEN:  Soft, nontender without organomegaly.  Normal bowel sounds are noted.  No

rebound or guarding is noted. 

:  Deferred. 

EXTREMITIES:  Reveal no clubbing, cyanosis, or edema.  The patient does have a right

ankle wrapped with the Ace and her C-collar on. 

PSYCH:  The patient has no psychotic episodes at this time.



ASSESSMENT:  

1. Acute encephalopathy, resolved.

2. Bipolar, stable.

3. CAD, stable.

4. Diastolic congestive heart failure, stable.

5. Diabetes, type 2 with good blood sugars at this time.

6. Hypertension, stable.

7. Chronic kidney disease, stage 2.

8. Hyperlipidemia.

9. Trimalleolar fracture, status post open reduction and internal fixation and

nonweightbearing. 

10. Generalized weakness.



PLAN:  

1. Continue to monitor the patient's diabetes with Accu-Cheks a.cCastillo and h.s.

2. Continue to monitor the patient's blood pressure closely and adjust medications

as needed. 

3. Stress ulcer prophylaxis.

4. Decubitus precautions.

5. Encourage the patient not to smoke and she is cutting down, but unable to stop.

6. Continue present medications.

7. Continue physical therapy and occupational therapy.

8. Encourage the patient to see if she can get her ramps built.







Job ID:  [id number]

## 2019-12-18 RX ADMIN — PANCRELIPASE SCH CAP: 60000; 12000; 38000 CAPSULE, DELAYED RELEASE PELLETS ORAL at 16:30

## 2019-12-18 RX ADMIN — PANCRELIPASE SCH CAP: 60000; 12000; 38000 CAPSULE, DELAYED RELEASE PELLETS ORAL at 08:25

## 2019-12-18 RX ADMIN — Medication SCH MG: at 21:03

## 2019-12-18 RX ADMIN — BUPROPION HYDROCHLORIDE SCH MG: 150 TABLET, FILM COATED, EXTENDED RELEASE ORAL at 08:26

## 2019-12-18 RX ADMIN — Medication SCH MG: at 08:26

## 2019-12-18 RX ADMIN — ALOGLIPTIN SCH MG: 6.25 TABLET, FILM COATED ORAL at 08:24

## 2019-12-18 RX ADMIN — MULTIPLE VITAMINS W/ MINERALS TAB SCH TAB: TAB at 08:26

## 2019-12-18 RX ADMIN — PANCRELIPASE SCH CAP: 60000; 12000; 38000 CAPSULE, DELAYED RELEASE PELLETS ORAL at 12:29

## 2019-12-18 RX ADMIN — INSULIN LISPRO PRN UNIT: 100 INJECTION, SOLUTION INTRAVENOUS; SUBCUTANEOUS at 16:30

## 2019-12-18 RX ADMIN — DOCUSATE SODIUM 50 MG AND SENNOSIDES 8.6 MG PRN TAB: 8.6; 5 TABLET, FILM COATED ORAL at 08:26

## 2019-12-18 NOTE — PRG
DATE OF SERVICE:  12/18/2019



SUBJECTIVE:  Ms. Nguyen is a pleasant 54-year-old white female, who had a

trimalleolar fracture after having multiple falls.  She had an ORIF done and was

transferred to [facility] for swing bed, physical therapy, and

occupational therapy. 



The patient states she is doing fairly well in therapy.  She said that she used her

walker yesterday to hop from her bed to the bathroom, to toilet, and then back.  She

was very pleased with herself, and gradually, she continues to get stronger. 



OBJECTIVE:  VITAL SIGNS:  Today reveal blood pressure this morning is not in, but

last night it was 97/52. 

GENERAL:  Reveals a well-developed, well-nourished, slightly obese white female, in

no apparent distress at this time. 

HEENT:  Reveals normocephalic and nontraumatic cranium.  Pupils are equal, round,

and reactive.  Extraocular movements are intact.  Nose and throat are moist. 

NECK:  Supple without masses, nodes, or bruits. 

CHEST:  Clear to auscultation.  No rales, rhonchi, wheezes, or cough is heard. 

HEART:  Reveals a regular rate and rhythm without murmurs, gallops, or rubs. 

ABDOMEN:  Obese, soft, and nontender without organomegaly.  Normal bowel sounds are

noted.  No rebound or guarding is noted. 

:  Deferred. 

EXTREMITIES:  Reveal no clubbing, cyanosis, or edema.  The patient continues to have

an Ace wrap on her right ankle and a C-collar around her neck. 

PSYCHIATRIC:  The patient has had no psychotic episodes.



ASSESSMENT:  

1. Acute encephalopathy, resolved.

2. Bipolar, stable.

3. Coronary artery disease, stable.

4. Diastolic congestive heart failure, stable.

5. Diabetes, type 2, with fairly good blood sugars.

6. Hypertension, stable.

7. Chronic kidney disease, stage 2.

8. Hyperlipidemia.

9. Trimalleolar fracture, status post open reduction and internal fixation.

10. Generalized weakness.



PLAN:  

1. Continue to monitor the patient's Accu-Cheks with a.c. and h.s., which are good.

2. Continue monitor the patient's blood pressure and adjust medications if needed.

3. Stress ulcer prophylaxis.

4. Decubitus precautions.

5. The patient has only had three cigarettes this morning.

6. Continue present medication.

7. Continue to encourage the patient to get a ramp built.

8. Continue physical therapy and occupational therapy.

9. The patient has an appointment scheduled with Dr. Guzman on Friday.

10. The patient has an appointment with Dr. Dodson on 12/27/2019.







Job ID:  [id number]

## 2019-12-19 RX ADMIN — BUPROPION HYDROCHLORIDE SCH MG: 150 TABLET, FILM COATED, EXTENDED RELEASE ORAL at 09:51

## 2019-12-19 RX ADMIN — Medication SCH MG: at 20:30

## 2019-12-19 RX ADMIN — Medication SCH MG: at 09:51

## 2019-12-19 RX ADMIN — INSULIN LISPRO PRN UNIT: 100 INJECTION, SOLUTION INTRAVENOUS; SUBCUTANEOUS at 17:58

## 2019-12-19 RX ADMIN — MULTIPLE VITAMINS W/ MINERALS TAB SCH TAB: TAB at 09:52

## 2019-12-19 RX ADMIN — PANCRELIPASE SCH CAP: 60000; 12000; 38000 CAPSULE, DELAYED RELEASE PELLETS ORAL at 17:58

## 2019-12-19 RX ADMIN — INSULIN LISPRO PRN UNIT: 100 INJECTION, SOLUTION INTRAVENOUS; SUBCUTANEOUS at 05:39

## 2019-12-19 RX ADMIN — ALOGLIPTIN SCH MG: 6.25 TABLET, FILM COATED ORAL at 09:51

## 2019-12-19 RX ADMIN — PANCRELIPASE SCH CAP: 60000; 12000; 38000 CAPSULE, DELAYED RELEASE PELLETS ORAL at 12:26

## 2019-12-19 RX ADMIN — DOCUSATE SODIUM 50 MG AND SENNOSIDES 8.6 MG PRN TAB: 8.6; 5 TABLET, FILM COATED ORAL at 09:52

## 2019-12-19 RX ADMIN — PANCRELIPASE SCH CAP: 60000; 12000; 38000 CAPSULE, DELAYED RELEASE PELLETS ORAL at 09:49

## 2019-12-19 NOTE — PRG
DATE OF SERVICE:  12/19/2019



SUBJECTIVE:  Ms. Nguyen states she is doing fairly well today.  She was taking a

shower this morning and was wondering about her pain medication.  We told her that

we would seem to cut back her OxyContin since she is so much more lethargic and

sleepy when she takes her OxyContin and her Lyrica.  She states that will be fine.

We just have to watch her closely. 



PHYSICAL EXAMINATION:

VITAL SIGNS:  Today reveal blood pressure this morning was 129/87, pulse 74 to 82,

respirations 18 to 20, O2 saturation 95% to 98% on room air, T-max 98.5. 

GENERAL:  This is a well-developed, well-nourished, very pleasant 54-year-old white

female, in no apparent distress at this time. 

HEENT:  Normocephalic and nontraumatic cranium.  Pupils are equal, round, and

reactive.  Extraocular movements are intact.  Nose and throat are slightly dry, but

clear. 

NECK:  Supple without masses, nodes, or bruits. 

CHEST:  Clear to auscultation.  No rales, rhonchi, wheezes, or cough is heard. 

HEART:  Reveals a regular rate and rhythm without murmurs, gallops, or rubs. 

ABDOMEN:  Obese, soft, nontender without organomegaly.  Normal bowel sounds are

noted in all 4 quadrants.  No rebound or guarding is noted. 

:  Deferred. 

EXTREMITIES:  Reveal no clubbing, cyanosis, or edema.  The patient continues to have

Ace wrap over her right ankle and C-collar around her neck. 

PSYCHIATRIC:  The patient has no psychotic episodes here.



ASSESSMENT:  

1. Diastolic congestive heart failure, stable.

2. Diabetes type 2 with stable blood sugars.

3. Hypertension, stable.

4. Chronic kidney disease, stage 2.

5. Hyperlipidemia.

6. Trimalleolar fracture, status post open reduction and internal fixation.

7. Pain management.

8. Acute encephalopathy, resolved.

9. Bipolar, stable.

10. Generalized weakness.



PLAN:  

1. Continue to monitor the patient's blood pressure closely and adjust medications

as needed. 

2. Continue to monitor the patient's diabetes with Accu-Cheks a.c. and h.s.

3. Stress ulcer prophylaxis.

4. Decubitus precautions.

5. Continue to encourage the patient stop smoking.

6. Continue present medications.

7. The patient has an appointment with Dr. Guzman on Friday.

8. The patient has an appointment with Dr. Dodson on 12/27/2019.

9. Continue physical therapy and occupational therapy.







Job ID:  [id number]

## 2019-12-20 RX ADMIN — PANCRELIPASE SCH CAP: 60000; 12000; 38000 CAPSULE, DELAYED RELEASE PELLETS ORAL at 09:20

## 2019-12-20 RX ADMIN — Medication SCH MG: at 20:04

## 2019-12-20 RX ADMIN — PANCRELIPASE SCH CAP: 60000; 12000; 38000 CAPSULE, DELAYED RELEASE PELLETS ORAL at 13:01

## 2019-12-20 RX ADMIN — PANCRELIPASE SCH CAP: 60000; 12000; 38000 CAPSULE, DELAYED RELEASE PELLETS ORAL at 17:14

## 2019-12-20 RX ADMIN — INSULIN LISPRO PRN UNIT: 100 INJECTION, SOLUTION INTRAVENOUS; SUBCUTANEOUS at 05:29

## 2019-12-20 RX ADMIN — MULTIPLE VITAMINS W/ MINERALS TAB SCH TAB: TAB at 09:05

## 2019-12-20 RX ADMIN — Medication SCH MG: at 09:08

## 2019-12-20 RX ADMIN — BUPROPION HYDROCHLORIDE SCH MG: 150 TABLET, FILM COATED, EXTENDED RELEASE ORAL at 09:07

## 2019-12-20 RX ADMIN — ALOGLIPTIN SCH MG: 6.25 TABLET, FILM COATED ORAL at 09:07

## 2019-12-21 RX ADMIN — PANCRELIPASE SCH CAP: 60000; 12000; 38000 CAPSULE, DELAYED RELEASE PELLETS ORAL at 08:11

## 2019-12-21 RX ADMIN — ALOGLIPTIN SCH MG: 6.25 TABLET, FILM COATED ORAL at 08:12

## 2019-12-21 RX ADMIN — Medication SCH MG: at 20:36

## 2019-12-21 RX ADMIN — INSULIN LISPRO PRN UNIT: 100 INJECTION, SOLUTION INTRAVENOUS; SUBCUTANEOUS at 11:26

## 2019-12-21 RX ADMIN — PANCRELIPASE SCH CAP: 60000; 12000; 38000 CAPSULE, DELAYED RELEASE PELLETS ORAL at 11:42

## 2019-12-21 RX ADMIN — PANCRELIPASE SCH CAP: 60000; 12000; 38000 CAPSULE, DELAYED RELEASE PELLETS ORAL at 16:31

## 2019-12-21 RX ADMIN — INSULIN LISPRO PRN UNIT: 100 INJECTION, SOLUTION INTRAVENOUS; SUBCUTANEOUS at 05:13

## 2019-12-21 RX ADMIN — Medication SCH MG: at 08:13

## 2019-12-21 RX ADMIN — BUPROPION HYDROCHLORIDE SCH MG: 150 TABLET, FILM COATED, EXTENDED RELEASE ORAL at 08:13

## 2019-12-21 RX ADMIN — MULTIPLE VITAMINS W/ MINERALS TAB SCH TAB: TAB at 08:16

## 2019-12-21 RX ADMIN — INSULIN LISPRO PRN UNIT: 100 INJECTION, SOLUTION INTRAVENOUS; SUBCUTANEOUS at 16:33

## 2019-12-22 LAB
ALBUMIN SERPL BCG-MCNC: 3.6 G/DL (ref 3.5–5)
ALP SERPL-CCNC: 82 U/L (ref 40–110)
ALT SERPL W P-5'-P-CCNC: 30 U/L (ref 8–55)
ANION GAP SERPL CALC-SCNC: 15 MMOL/L (ref 10–20)
AST SERPL-CCNC: 31 U/L (ref 5–34)
BASOPHILS # BLD AUTO: 0.1 THOU/UL (ref 0–0.2)
BASOPHILS NFR BLD AUTO: 0.8 % (ref 0–1)
BILIRUB SERPL-MCNC: 0.4 MG/DL (ref 0.2–1.2)
BUN SERPL-MCNC: 26 MG/DL (ref 9.8–20.1)
CALCIUM SERPL-MCNC: 9.2 MG/DL (ref 7.8–10.44)
CHLORIDE SERPL-SCNC: 100 MMOL/L (ref 98–107)
CO2 SERPL-SCNC: 28 MMOL/L (ref 22–29)
CREAT CL PREDICTED SERPL C-G-VRATE: 78 ML/MIN (ref 70–130)
EOSINOPHIL # BLD AUTO: 0.1 THOU/UL (ref 0–0.7)
EOSINOPHIL NFR BLD AUTO: 1.5 % (ref 0–10)
GLOBULIN SER CALC-MCNC: 2.7 G/DL (ref 2.4–3.5)
GLUCOSE SERPL-MCNC: 168 MG/DL (ref 70–105)
HGB BLD-MCNC: 10.8 G/DL (ref 12–16)
LYMPHOCYTES # BLD AUTO: 1.7 THOU/UL (ref 1.2–3.4)
LYMPHOCYTES NFR BLD AUTO: 25.3 % (ref 21–51)
MCH RBC QN AUTO: 27.6 PG (ref 27–31)
MCV RBC AUTO: 87.3 FL (ref 78–98)
MONOCYTES # BLD AUTO: 0.5 THOU/UL (ref 0.11–0.59)
MONOCYTES NFR BLD AUTO: 7.7 % (ref 0–10)
NEUTROPHILS # BLD AUTO: 4.4 THOU/UL (ref 1.4–6.5)
NEUTROPHILS NFR BLD AUTO: 64.8 % (ref 42–75)
PLATELET # BLD AUTO: 194 THOU/UL (ref 130–400)
POTASSIUM SERPL-SCNC: 4.1 MMOL/L (ref 3.5–5.1)
RBC # BLD AUTO: 3.93 MILL/UL (ref 4.2–5.4)
SODIUM SERPL-SCNC: 139 MMOL/L (ref 136–145)
WBC # BLD AUTO: 6.9 THOU/UL (ref 4.8–10.8)

## 2019-12-22 RX ADMIN — PANCRELIPASE SCH CAP: 60000; 12000; 38000 CAPSULE, DELAYED RELEASE PELLETS ORAL at 16:54

## 2019-12-22 RX ADMIN — MULTIPLE VITAMINS W/ MINERALS TAB SCH TAB: TAB at 09:03

## 2019-12-22 RX ADMIN — PANCRELIPASE SCH CAP: 60000; 12000; 38000 CAPSULE, DELAYED RELEASE PELLETS ORAL at 11:55

## 2019-12-22 RX ADMIN — INSULIN LISPRO PRN UNIT: 100 INJECTION, SOLUTION INTRAVENOUS; SUBCUTANEOUS at 05:26

## 2019-12-22 RX ADMIN — INSULIN LISPRO PRN UNIT: 100 INJECTION, SOLUTION INTRAVENOUS; SUBCUTANEOUS at 16:56

## 2019-12-22 RX ADMIN — Medication SCH MG: at 20:52

## 2019-12-22 RX ADMIN — ALOGLIPTIN SCH MG: 6.25 TABLET, FILM COATED ORAL at 09:00

## 2019-12-22 RX ADMIN — Medication SCH MG: at 09:01

## 2019-12-22 RX ADMIN — PANCRELIPASE SCH CAP: 60000; 12000; 38000 CAPSULE, DELAYED RELEASE PELLETS ORAL at 08:59

## 2019-12-22 RX ADMIN — BUPROPION HYDROCHLORIDE SCH MG: 150 TABLET, FILM COATED, EXTENDED RELEASE ORAL at 09:01

## 2019-12-22 RX ADMIN — INSULIN LISPRO PRN UNIT: 100 INJECTION, SOLUTION INTRAVENOUS; SUBCUTANEOUS at 11:56

## 2019-12-23 RX ADMIN — PANCRELIPASE SCH CAP: 60000; 12000; 38000 CAPSULE, DELAYED RELEASE PELLETS ORAL at 09:30

## 2019-12-23 RX ADMIN — INSULIN LISPRO PRN UNIT: 100 INJECTION, SOLUTION INTRAVENOUS; SUBCUTANEOUS at 18:50

## 2019-12-23 RX ADMIN — MULTIPLE VITAMINS W/ MINERALS TAB SCH TAB: TAB at 09:30

## 2019-12-23 RX ADMIN — INSULIN LISPRO PRN UNIT: 100 INJECTION, SOLUTION INTRAVENOUS; SUBCUTANEOUS at 11:10

## 2019-12-23 RX ADMIN — ALOGLIPTIN SCH MG: 6.25 TABLET, FILM COATED ORAL at 09:32

## 2019-12-23 RX ADMIN — BUPROPION HYDROCHLORIDE SCH MG: 150 TABLET, FILM COATED, EXTENDED RELEASE ORAL at 09:30

## 2019-12-23 RX ADMIN — Medication SCH MG: at 21:02

## 2019-12-23 RX ADMIN — DOCUSATE SODIUM 50 MG AND SENNOSIDES 8.6 MG PRN TAB: 8.6; 5 TABLET, FILM COATED ORAL at 21:10

## 2019-12-23 RX ADMIN — PANCRELIPASE SCH CAP: 60000; 12000; 38000 CAPSULE, DELAYED RELEASE PELLETS ORAL at 16:26

## 2019-12-23 RX ADMIN — PANCRELIPASE SCH CAP: 60000; 12000; 38000 CAPSULE, DELAYED RELEASE PELLETS ORAL at 12:12

## 2019-12-23 RX ADMIN — INSULIN LISPRO PRN UNIT: 100 INJECTION, SOLUTION INTRAVENOUS; SUBCUTANEOUS at 05:22

## 2019-12-23 RX ADMIN — Medication SCH MG: at 09:29

## 2019-12-23 NOTE — PRG
DATE OF SERVICE:  12/21/2019



SUBJECTIVE:  The patient is up in the wheelchair outside, ambulating herself with

her chair, is having control of her pain on her pain medications.  Denying any

shortness of breath. 



OBJECTIVE:  VITAL SIGNS:  Shows blood pressure is 135/63, temperature is 98, pulse

89, respirations 20, and O2 saturations 97% on room air. 

LUNGS:  Clear. 

CARDIAC:  Shows regular rhythm. 

ABDOMEN:  Soft and nontender.  She is wearing a C-collar and has her right ankle in

a splint. 



LABORATORY DATA:  Accu-Cheks ranged 147 to 189.



ASSESSMENT:  

1. Resolving trimalleolar fracture, status post open reduction and internal fixation.

2. Stable diastolic heart failure.

3. Stable type 2 diabetes.

4. Chronic kidney disease, stage 2.

5. Bipolar disease, stable and compliant with therapy.



PLAN:  

1. Continue PT/OT.

2. Continue Accu-Cheks to monitor and titrate and control diabetes.

3. Continue to monitor vital signs with therapy.



ADDENDUM:  Repeat CBC and comprehensive metabolic panel tomorrow.







Job ID:  [id number]

## 2019-12-23 NOTE — PRG
DATE OF SERVICE:  12/23/2019



SUBJECTIVE:  This patient is a well-developed, well-nourished 54-year-old white

female, who for some reason fell and received a trimalleolar fracture of her right

ankle.  She was admitted to the hospital and had her surgery done.  She was

transferred to inpatient rehab at [facility] for physical therapy

and occupational therapy. 



The patient states she is doing well.  She is still smoking 5 to 8 cigarettes a day.



OBJECTIVE:  VITAL SIGNS:  Reveal blood pressure today 132/88, pulse 76, respirations

18, O2 saturation 95% to 98% on room air, T-max 97.8. 

GENERAL:  This is a well-developed, obese white female, in no apparent distress at

this time. 

HEENT:  Reveals normocephalic and nontraumatic cranium.  Pupils equal, round, and

reactive.  Extraocular movements are intact.  Nose and throat are slightly dry. 

NECK:  Supple without masses, nodes, or bruits. 

CHEST:  Clear to auscultation.  No rales, rhonchi, or wheezes are noted. 

HEART:  Reveals a regular rate and rhythm without murmurs, gallops, or rubs. 

ABDOMEN:  Soft, nontender without organomegaly.  Normal bowel sounds noted in all 4

quadrants.  No rebound or guarding is noted. 

:  Deferred. 

EXTREMITIES:  Reveal no clubbing or cyanosis.  The patient has a strap over right

ankle and C-collar around her neck. 

PSYCHIATRIC:  The patient has had no psychotic episodes, but she is bipolar.



ASSESSMENT:  

1. Diastolic congestive heart failure, stable.

2. Diabetes type 2 with stable blood sugars.

3. Hypertension, stable.

4. Chronic kidney disease, stage 2.

5. Hyperlipidemia.

6. Trimalleolar fracture, status post open reduction and internal fixation.

7. Acute encephalopathy, resolved.

8. Pain management.

9. Bipolar.

10. Generalized weakness.



PLAN:  

1. Continue to monitor the patient's blood pressure closely and adjust medications

as needed. 

2. Continue to monitor the patient's diabetes with Accu-Cheks a.c. and h.s.

3. Stress ulcer prophylaxis.

4. Decubitus precautions.

5. Continue to encourage the patient to stop smoking.

6. Continue present medications.

7. Appointment with Dr. Dodson on 12/27/2019.

8. Continue physical therapy and occupational therapy.







Job ID:  [id number]

## 2019-12-24 RX ADMIN — PANCRELIPASE SCH CAP: 60000; 12000; 38000 CAPSULE, DELAYED RELEASE PELLETS ORAL at 12:05

## 2019-12-24 RX ADMIN — PANCRELIPASE SCH CAP: 60000; 12000; 38000 CAPSULE, DELAYED RELEASE PELLETS ORAL at 08:59

## 2019-12-24 RX ADMIN — ALOGLIPTIN SCH MG: 6.25 TABLET, FILM COATED ORAL at 09:00

## 2019-12-24 RX ADMIN — BUPROPION HYDROCHLORIDE SCH MG: 150 TABLET, FILM COATED, EXTENDED RELEASE ORAL at 09:04

## 2019-12-24 RX ADMIN — INSULIN LISPRO PRN UNIT: 100 INJECTION, SOLUTION INTRAVENOUS; SUBCUTANEOUS at 17:46

## 2019-12-24 RX ADMIN — INSULIN LISPRO PRN UNIT: 100 INJECTION, SOLUTION INTRAVENOUS; SUBCUTANEOUS at 12:04

## 2019-12-24 RX ADMIN — Medication SCH MG: at 09:04

## 2019-12-24 RX ADMIN — PANCRELIPASE SCH CAP: 60000; 12000; 38000 CAPSULE, DELAYED RELEASE PELLETS ORAL at 17:37

## 2019-12-24 RX ADMIN — DOCUSATE SODIUM 50 MG AND SENNOSIDES 8.6 MG PRN TAB: 8.6; 5 TABLET, FILM COATED ORAL at 20:37

## 2019-12-24 RX ADMIN — MULTIPLE VITAMINS W/ MINERALS TAB SCH TAB: TAB at 09:04

## 2019-12-24 RX ADMIN — DOCUSATE SODIUM 50 MG AND SENNOSIDES 8.6 MG PRN TAB: 8.6; 5 TABLET, FILM COATED ORAL at 12:05

## 2019-12-24 RX ADMIN — Medication SCH MG: at 20:35

## 2019-12-24 NOTE — PRG
DATE OF SERVICE:  12/24/2019



SUBJECTIVE:  Ms. Nguyen is doing well.  Denies any complaints except she states

she is eating better and would like to get back on her long-acting insulin.  I

advised her that her blood sugars are pretty good, but she stated she will normally

keep it around 120, so I will start her back on Lantus 5 units daily. 



OBJECTIVE:  VITAL SIGNS:  She is afebrile.  Heart rate is 89, respirations 18,

oxygen saturation 97% on room air, blood pressure 129/59. 

CARDIOVASCULAR:  S1 and S2 plus. 

RESPIRATORY:  Normal vesicular breath sounds. 

ABDOMEN:  Soft and nontender.  Bowel sounds heard in all quadrants. 

EXTREMITIES:  Without cyanosis or clubbing.  Right foot with a walking boot. 

NECK: She still has her cervical collar on. 

CENTRAL NERVOUS SYSTEM:  Awake and responsive.  Generalized weakness.  Otherwise,

nonfocal. 



IMPRESSION:  

1. Diabetes mellitus, type 2.

2. Hypertension.

3. Chronic obstructive pulmonary disease.

4. Cervical myelopathy, status post surgery.

5. Dyslipidemia.

6. Right ankle fracture, status post open reduction and internal fixation.



PLAN:  

1. Continue current medications.

2. Heart-healthy diet.

3. Monitor respiratory status.

4. Start Lantus 5 units at bedtime per the patient's request.

5. Accu-Cheks with sliding scale coverage.

6. Orthopedic precautions.

7. Discontinue doxycycline after total of 10 days, which will be today.





Job ID:  [id number]

## 2019-12-25 RX ADMIN — PANCRELIPASE SCH CAP: 60000; 12000; 38000 CAPSULE, DELAYED RELEASE PELLETS ORAL at 08:08

## 2019-12-25 RX ADMIN — INSULIN LISPRO PRN UNIT: 100 INJECTION, SOLUTION INTRAVENOUS; SUBCUTANEOUS at 17:46

## 2019-12-25 RX ADMIN — MULTIPLE VITAMINS W/ MINERALS TAB SCH TAB: TAB at 08:11

## 2019-12-25 RX ADMIN — DOCUSATE SODIUM 50 MG AND SENNOSIDES 8.6 MG PRN TAB: 8.6; 5 TABLET, FILM COATED ORAL at 08:21

## 2019-12-25 RX ADMIN — INSULIN LISPRO PRN UNIT: 100 INJECTION, SOLUTION INTRAVENOUS; SUBCUTANEOUS at 11:25

## 2019-12-25 RX ADMIN — PANCRELIPASE SCH CAP: 60000; 12000; 38000 CAPSULE, DELAYED RELEASE PELLETS ORAL at 11:06

## 2019-12-25 RX ADMIN — PANCRELIPASE SCH CAP: 60000; 12000; 38000 CAPSULE, DELAYED RELEASE PELLETS ORAL at 17:45

## 2019-12-25 RX ADMIN — INSULIN LISPRO PRN UNIT: 100 INJECTION, SOLUTION INTRAVENOUS; SUBCUTANEOUS at 05:38

## 2019-12-25 RX ADMIN — ALOGLIPTIN SCH MG: 6.25 TABLET, FILM COATED ORAL at 08:08

## 2019-12-25 RX ADMIN — BUPROPION HYDROCHLORIDE SCH MG: 150 TABLET, FILM COATED, EXTENDED RELEASE ORAL at 08:09

## 2019-12-25 RX ADMIN — DOCUSATE SODIUM 50 MG AND SENNOSIDES 8.6 MG PRN TAB: 8.6; 5 TABLET, FILM COATED ORAL at 21:24

## 2019-12-25 RX ADMIN — Medication SCH MG: at 21:19

## 2019-12-25 RX ADMIN — Medication SCH MG: at 08:09

## 2019-12-25 NOTE — PRG
DATE OF SERVICE:  12/25/2019



SUBJECTIVE:  Ms. Nguyen having significant constipation requiring MiraLAX and

Dulcolax suppository.  She thinks it is her iron and she wants to stop her iron.

She is aware of the risks. 



OBJECTIVE:  VITAL SIGNS:  She is afebrile, heart rate 90, respirations 18, oxygen

saturation 95% on room air, blood pressure 122/59. 

CARDIOVASCULAR SYSTEM:  S1 and S2 plus. 

RESPIRATORY SYSTEM:  Normal vesicular breath sounds. 

ABDOMEN:  Soft and nontender.  Bowel sounds heard in all quadrants. 

EXTREMITIES:  Without cyanosis or clubbing. 

CENTRAL NERVOUS SYSTEM:  Grossly nonfocal.



LABORATORY DATA:  Blood sugars are 154, 142, 159, and 181.



IMPRESSION:  

1. Constipation, better with MiraLAX and Dulcolax suppository.

2. Cervical myelopathy status post decompression.

3. Right ankle fracture, status post open reduction and internal fixation.

4. Dyslipidemia.

5. Hypertension.

6. Diabetes mellitus, type 2.

7. Anemia likely postoperative.



PLAN:  

1. Stop iron per the patient's request.

2. Continue current other medications.

3. 1800 calorie heart healthy ADA diet.

4. Accu-Cheks with sliding scale coverage.

5. Bowel regimen.

6. Continue spinal precautions and orthopedic precautions.

7. Physical therapy.

8. Routine laboratory values.





Job ID:  [id number]

## 2019-12-26 RX ADMIN — Medication SCH MG: at 08:12

## 2019-12-26 RX ADMIN — ALOGLIPTIN SCH MG: 6.25 TABLET, FILM COATED ORAL at 08:12

## 2019-12-26 RX ADMIN — MULTIPLE VITAMINS W/ MINERALS TAB SCH TAB: TAB at 08:16

## 2019-12-26 RX ADMIN — PANCRELIPASE SCH CAP: 60000; 12000; 38000 CAPSULE, DELAYED RELEASE PELLETS ORAL at 08:12

## 2019-12-26 RX ADMIN — PANCRELIPASE SCH CAP: 60000; 12000; 38000 CAPSULE, DELAYED RELEASE PELLETS ORAL at 17:14

## 2019-12-26 RX ADMIN — PANCRELIPASE SCH CAP: 60000; 12000; 38000 CAPSULE, DELAYED RELEASE PELLETS ORAL at 12:05

## 2019-12-26 RX ADMIN — Medication SCH MG: at 20:20

## 2019-12-26 RX ADMIN — INSULIN GLARGINE SCH UNITS: 100 INJECTION, SOLUTION SUBCUTANEOUS at 20:27

## 2019-12-26 RX ADMIN — BUPROPION HYDROCHLORIDE SCH MG: 150 TABLET, FILM COATED, EXTENDED RELEASE ORAL at 08:13

## 2019-12-26 RX ADMIN — INSULIN LISPRO PRN UNIT: 100 INJECTION, SOLUTION INTRAVENOUS; SUBCUTANEOUS at 17:15

## 2019-12-26 RX ADMIN — DOCUSATE SODIUM 50 MG AND SENNOSIDES 8.6 MG PRN TAB: 8.6; 5 TABLET, FILM COATED ORAL at 23:12

## 2019-12-26 RX ADMIN — INSULIN LISPRO PRN UNIT: 100 INJECTION, SOLUTION INTRAVENOUS; SUBCUTANEOUS at 12:06

## 2019-12-26 RX ADMIN — DOCUSATE SODIUM 50 MG AND SENNOSIDES 8.6 MG PRN TAB: 8.6; 5 TABLET, FILM COATED ORAL at 08:22

## 2019-12-26 NOTE — PRG
DATE OF SERVICE:  12/26/2019



SUBJECTIVE:  Ms. Nguyen is outside, enjoying the beautiful weather and she denies

any questions or concerns except her blood sugars are still high.  For her, it was

almost 200 prelunch.  She apparently at home takes 87 units of long-acting insulin.

I advised her that I will increase her Lantus to 10 units b.i.d.  No other concerns

or questions. 



OBJECTIVE:  VITAL SIGNS:  She is afebrile, heart rate 81, respirations 18, oxygen

saturation 93% on room air, and blood pressure 131/60. 

CARDIOVASCULAR SYSTEM:  S1 and S2 plus. 

RESPIRATORY SYSTEM:  Normal vesicular breath sounds. 

ABDOMEN:  Soft and nontender.  Bowel sounds heard in all quadrants. 

EXTREMITIES:  Without cyanosis or clubbing.  Right leg and foot with a walking boot.

 She continues to have a hard cervical collar. 

CENTRAL NERVOUS SYSTEM:  Awake and responsive.  Cranial nerves 2 through 12 intact.

Improving deconditioning. 



IMPRESSION:  

1. Diabetes mellitus, type 2.

2. Hypertension.

3. Dyslipidemia.

4. Cervical myelopathy, status post surgery.

5. Right ankle fracture, status post surgical fixation.

6. Improving deconditioning.

7. Depression and anxiety.



PLAN:  

1. Continue current medications except increase Lantus to 10 units twice daily.

2. Orthopedic precautions.

3. Spinal precautions.

4. Physical therapy.

5. 1800-calorie heart healthy ADA diet.

6. Accu-Cheks with sliding scale coverage.

7. Continue therapy.

8. Discussed with the patient and family in detail.  All questions answered.







Job ID:  [id number]

## 2019-12-27 RX ADMIN — PANCRELIPASE SCH CAP: 60000; 12000; 38000 CAPSULE, DELAYED RELEASE PELLETS ORAL at 08:08

## 2019-12-27 RX ADMIN — PANCRELIPASE SCH CAP: 60000; 12000; 38000 CAPSULE, DELAYED RELEASE PELLETS ORAL at 12:32

## 2019-12-27 RX ADMIN — INSULIN GLARGINE SCH UNITS: 100 INJECTION, SOLUTION SUBCUTANEOUS at 08:04

## 2019-12-27 RX ADMIN — DOCUSATE SODIUM 50 MG AND SENNOSIDES 8.6 MG PRN TAB: 8.6; 5 TABLET, FILM COATED ORAL at 21:28

## 2019-12-27 RX ADMIN — INSULIN GLARGINE SCH UNITS: 100 INJECTION, SOLUTION SUBCUTANEOUS at 21:29

## 2019-12-27 RX ADMIN — BUPROPION HYDROCHLORIDE SCH MG: 150 TABLET, FILM COATED, EXTENDED RELEASE ORAL at 08:08

## 2019-12-27 RX ADMIN — MULTIPLE VITAMINS W/ MINERALS TAB SCH TAB: TAB at 08:07

## 2019-12-27 RX ADMIN — Medication SCH MG: at 21:22

## 2019-12-27 RX ADMIN — INSULIN LISPRO PRN UNIT: 100 INJECTION, SOLUTION INTRAVENOUS; SUBCUTANEOUS at 12:31

## 2019-12-27 RX ADMIN — Medication SCH MG: at 08:06

## 2019-12-27 RX ADMIN — ALOGLIPTIN SCH MG: 6.25 TABLET, FILM COATED ORAL at 08:05

## 2019-12-27 RX ADMIN — INSULIN LISPRO PRN UNIT: 100 INJECTION, SOLUTION INTRAVENOUS; SUBCUTANEOUS at 17:15

## 2019-12-27 RX ADMIN — PANCRELIPASE SCH CAP: 60000; 12000; 38000 CAPSULE, DELAYED RELEASE PELLETS ORAL at 17:14

## 2019-12-27 NOTE — PRG
DATE OF SERVICE:  12/27/2019



SUBJECTIVE:  Ms. Nguyen is doing the same.  Denies any complaints.  Tolerating

her medications. 



OBJECTIVE:  VITAL SIGNS:  She is afebrile.  Heart rate 93, respirations 18, oxygen

saturation 94% on room air, and blood pressure 111/54. 

CARDIOVASCULAR:  S1 and S2 plus. 

RESPIRATORY:  Normal vesicular breath sounds. 

ABDOMEN:  Soft and nontender.  Bowel sounds heard in all quadrants. 

EXTREMITIES:  Without cyanosis or clubbing.  Right foot in a walking boot.



LABORATORY DATA:  Blood sugars are still 146, 196, 202, 200, and 144.



IMPRESSION:  

1. Diabetes mellitus, type 2, not well controlled.

2. Right ankle fracture, status post surgical fixation.

3. Cervical myelopathy, status post decompression.

4. Hypertension.

5. Dyslipidemia.



PLAN:  

1. Increase Lantus to 20 units b.i.d.

2. 1800 calorie heart healthy ADA diet.

3. DVT prophylaxis with Lovenox.

4. Spinal precautions.

5. Orthopedic precautions.

6. Physical therapy.

7. Accu-Cheks with sliding scale coverage.

8. Routine laboratory values.







Job ID:  [id number]

## 2019-12-28 RX ADMIN — INSULIN GLARGINE SCH UNITS: 100 INJECTION, SOLUTION SUBCUTANEOUS at 20:16

## 2019-12-28 RX ADMIN — INSULIN LISPRO PRN UNIT: 100 INJECTION, SOLUTION INTRAVENOUS; SUBCUTANEOUS at 12:01

## 2019-12-28 RX ADMIN — Medication SCH MG: at 08:29

## 2019-12-28 RX ADMIN — INSULIN GLARGINE SCH UNITS: 100 INJECTION, SOLUTION SUBCUTANEOUS at 08:30

## 2019-12-28 RX ADMIN — ALOGLIPTIN SCH MG: 6.25 TABLET, FILM COATED ORAL at 08:28

## 2019-12-28 RX ADMIN — INSULIN LISPRO PRN UNIT: 100 INJECTION, SOLUTION INTRAVENOUS; SUBCUTANEOUS at 05:13

## 2019-12-28 RX ADMIN — Medication SCH MG: at 20:13

## 2019-12-28 RX ADMIN — DOCUSATE SODIUM 50 MG AND SENNOSIDES 8.6 MG SCH TAB: 8.6; 5 TABLET, FILM COATED ORAL at 20:14

## 2019-12-28 RX ADMIN — INSULIN LISPRO PRN UNIT: 100 INJECTION, SOLUTION INTRAVENOUS; SUBCUTANEOUS at 16:43

## 2019-12-28 RX ADMIN — PANCRELIPASE SCH CAP: 60000; 12000; 38000 CAPSULE, DELAYED RELEASE PELLETS ORAL at 16:43

## 2019-12-28 RX ADMIN — PANCRELIPASE SCH CAP: 60000; 12000; 38000 CAPSULE, DELAYED RELEASE PELLETS ORAL at 08:29

## 2019-12-28 RX ADMIN — MULTIPLE VITAMINS W/ MINERALS TAB SCH TAB: TAB at 08:29

## 2019-12-28 RX ADMIN — BUPROPION HYDROCHLORIDE SCH MG: 150 TABLET, FILM COATED, EXTENDED RELEASE ORAL at 08:30

## 2019-12-28 RX ADMIN — DOCUSATE SODIUM 50 MG AND SENNOSIDES 8.6 MG PRN TAB: 8.6; 5 TABLET, FILM COATED ORAL at 08:29

## 2019-12-28 RX ADMIN — PANCRELIPASE SCH CAP: 60000; 12000; 38000 CAPSULE, DELAYED RELEASE PELLETS ORAL at 12:01

## 2019-12-28 NOTE — PRG
DATE OF SERVICE:  12/28/2019



SUBJECTIVE:  Ms. Nguyen have active disimpaction done.  She apparently takes

laxatives daily.  Plan is to increase her MiraLAX to b.i.d. and her Senokot to two

tablets b.i.d. 



OBJECTIVE:  VITAL SIGNS:  She is afebrile.  Heart rate is 89, respirations 18,

oxygen saturation 96% on room air, blood pressure 119/59. 

CARDIOVASCULAR:  S1-S2 plus. 

RESPIRATORY:  Normal vesicular breath sounds. 

ABDOMEN:  Soft, nontender, bowel sounds heard in all quadrants. 

EXTREMITIES:  Without cyanosis or clubbing.  Right foot with walking boot. 

CENTRAL NERVOUS SYSTEM:  Grossly nonfocal.



DIAGNOSTIC DATA:  Blood sugars are 144, 200, 177, 220, 176 and 245.



IMPRESSION:  

1. Constipation.

2. Right ankle fracture, status post surgical fixation.

3. Cervical myelopathy, status post decompression.

4. Diabetes mellitus type 2.

5. Hypertension.

6. Dyslipidemia.



PLAN:  

1. Continue current medications.

2. 1800 calorie heart healthy ADA diet.

3. Bowel regimen.

4. Increase Senokot to two tablets b.i.d.

5. Change to aggressive sliding scale.

6. Change MiraLAX to b.i.d.

7. Orthopedic precautions.

8. Routine laboratory values.





Job ID:  [id number]

## 2019-12-29 RX ADMIN — MULTIPLE VITAMINS W/ MINERALS TAB SCH TAB: TAB at 08:32

## 2019-12-29 RX ADMIN — INSULIN GLARGINE SCH UNITS: 100 INJECTION, SOLUTION SUBCUTANEOUS at 08:36

## 2019-12-29 RX ADMIN — Medication SCH MG: at 08:31

## 2019-12-29 RX ADMIN — INSULIN LISPRO PRN UNIT: 100 INJECTION, SOLUTION INTRAVENOUS; SUBCUTANEOUS at 05:10

## 2019-12-29 RX ADMIN — PANCRELIPASE SCH CAP: 60000; 12000; 38000 CAPSULE, DELAYED RELEASE PELLETS ORAL at 11:50

## 2019-12-29 RX ADMIN — BUPROPION HYDROCHLORIDE SCH MG: 150 TABLET, FILM COATED, EXTENDED RELEASE ORAL at 08:32

## 2019-12-29 RX ADMIN — INSULIN GLARGINE SCH UNITS: 100 INJECTION, SOLUTION SUBCUTANEOUS at 20:22

## 2019-12-29 RX ADMIN — ALOGLIPTIN SCH MG: 6.25 TABLET, FILM COATED ORAL at 08:31

## 2019-12-29 RX ADMIN — DOCUSATE SODIUM 50 MG AND SENNOSIDES 8.6 MG SCH TAB: 8.6; 5 TABLET, FILM COATED ORAL at 20:24

## 2019-12-29 RX ADMIN — Medication SCH MG: at 20:26

## 2019-12-29 RX ADMIN — PANCRELIPASE SCH CAP: 60000; 12000; 38000 CAPSULE, DELAYED RELEASE PELLETS ORAL at 17:58

## 2019-12-29 RX ADMIN — DOCUSATE SODIUM 50 MG AND SENNOSIDES 8.6 MG SCH TAB: 8.6; 5 TABLET, FILM COATED ORAL at 08:35

## 2019-12-29 RX ADMIN — PANCRELIPASE SCH CAP: 60000; 12000; 38000 CAPSULE, DELAYED RELEASE PELLETS ORAL at 08:35

## 2019-12-29 NOTE — PRG
DATE OF SERVICE:  12/29/2019



SUBJECTIVE:  Ms. Nguyen is doing the same.  Denies any complaints.



OBJECTIVE:  VITAL SIGNS:  She is afebrile, heart rate 91, respirations 18, oxygen

saturation 97% on room air, blood pressure 124/59. 

CARDIOVASCULAR:  S1 and S2 plus. 

RESPIRATORY:  Normal vesicular breath sounds. 

ABDOMEN:  Soft and nontender.  Bowel sounds heard in all quadrants. 

EXTREMITIES:  Without cyanosis or clubbing.  Right foot with walking boot. 

NECK:  Cervical collar in place.



LABORATORY DATA:  Blood sugars; 245, 152, 169, 158, and 136.



IMPRESSION:  

1. Diabetes mellitus, type 2.

2. Hypertension.

3. Dyslipidemia.

4. Cervical myelopathy, status post decompression.

5. Right ankle fracture, status post surgery.

6. Constipation.



PLAN:  

1. Continue current medications.

2. 1800-calorie heart-healthy ADA diet.

3. Accu-Cheks with sliding scale coverage.

4. Orthopedic precautions.

5. Spinal precautions.

6. Bowel regimen.

7. Physical therapy.

8. Routine laboratory values.

9. [facility]







Job ID:  [id number]

## 2019-12-30 RX ADMIN — DOCUSATE SODIUM 50 MG AND SENNOSIDES 8.6 MG SCH TAB: 8.6; 5 TABLET, FILM COATED ORAL at 20:23

## 2019-12-30 RX ADMIN — PANCRELIPASE SCH CAP: 60000; 12000; 38000 CAPSULE, DELAYED RELEASE PELLETS ORAL at 12:38

## 2019-12-30 RX ADMIN — INSULIN GLARGINE SCH UNITS: 100 INJECTION, SOLUTION SUBCUTANEOUS at 08:32

## 2019-12-30 RX ADMIN — DOCUSATE SODIUM 50 MG AND SENNOSIDES 8.6 MG SCH TAB: 8.6; 5 TABLET, FILM COATED ORAL at 08:26

## 2019-12-30 RX ADMIN — PANCRELIPASE SCH CAP: 60000; 12000; 38000 CAPSULE, DELAYED RELEASE PELLETS ORAL at 17:35

## 2019-12-30 RX ADMIN — Medication SCH MG: at 20:23

## 2019-12-30 RX ADMIN — BUPROPION HYDROCHLORIDE SCH MG: 150 TABLET, FILM COATED, EXTENDED RELEASE ORAL at 08:27

## 2019-12-30 RX ADMIN — INSULIN GLARGINE SCH UNITS: 100 INJECTION, SOLUTION SUBCUTANEOUS at 20:25

## 2019-12-30 RX ADMIN — Medication SCH MG: at 08:27

## 2019-12-30 RX ADMIN — PANCRELIPASE SCH CAP: 60000; 12000; 38000 CAPSULE, DELAYED RELEASE PELLETS ORAL at 08:23

## 2019-12-30 RX ADMIN — MULTIPLE VITAMINS W/ MINERALS TAB SCH TAB: TAB at 08:26

## 2019-12-30 RX ADMIN — ALOGLIPTIN SCH MG: 6.25 TABLET, FILM COATED ORAL at 08:25

## 2019-12-30 NOTE — PRG
DATE OF SERVICE:  12/30/2019



SUBJECTIVE:  Ms. Nguyen is a 54-year-old white female, who fell and had a

trimalleolar fracture of the right ankle.  She is admitted to the hospital and had

surgery done.  She was transferred to inpatient rehab for [facility]

for physical therapy and occupational therapy. 



The patient states she had a good Kajal and has been hopping to do her therapy.

Therapy states she is actually doing fairly well, but was reported that she will be

nonweightbearing on that right ankle for another 4 weeks. 



OBJECTIVE:  VITAL SIGNS:  Today reveal blood pressure 121/59, pulse 71 to 84,

respirations 18 to 20, O2 saturations 93% to 95% on room air, T-max 98.2. 

GENERAL:  This is a well-developed, well-nourished, slightly obese white female, in

no apparent distress at this time. 

HEENT:  Reveals normocephalic and nontraumatic cranium.  Pupils equally round and

reactive.  Extraocular movements are intact.  Nose and throat are slightly dry. 

NECK:  Supple without masses, nodes, or bruits. 

CHEST:  Clear to auscultation.  No rales, rhonchi, wheezes, or cough is noted. 

HEART:  Reveals a regular rate and rhythm without murmurs, gallops, or rubs. 

ABDOMEN:  Obese, soft, nontender without organomegaly.  Normal bowel sounds are

noted in all 4 quadrants.  No rebound or guarding is noted. 

:  Deferred. 

EXTREMITIES:  Reveal the patient continues to have a right ankle walking boot, but

is not allowed any weightbearing at this time.  She also has a C-collar around her

neck.  She has no clubbing or cyanosis. 

PSYCHIATRIC:  The patient has no psychotic episodes at this time, although, she is

noted to be bipolar. 



ASSESSMENT:  

1. Diastolic congestive heart failure, stable.

2. Diabetes type 2, but stable blood sugars.

3. Hypertension, stable.

4. Chronic kidney disease, stage II.

5. Hyperlipidemia.

6. Trimalleolar fracture, status post open reduction and internal fixation.

7. Pain management.

8. Bipolar.

9. Generalized weakness.



PLAN:  

1. Continue to monitor the patient's blood pressure closely.  Adjust medications as

needed. 

2. Continue to monitor the patient's diabetes with Accu-Cheks a.c. and at bedtime.

3. Decubitus precautions.

4. Stress ulcer prophylaxis.

5. Continue to encourage the patient to stop smoking.

6. Nonweightbearing for another 4 weeks.

7. Continue physical therapy and occupational therapy.







Job ID:  [id number]

## 2019-12-31 LAB
ALBUMIN SERPL BCG-MCNC: 3.6 G/DL (ref 3.5–5)
ALP SERPL-CCNC: 76 U/L (ref 40–110)
ALT SERPL W P-5'-P-CCNC: 25 U/L (ref 8–55)
ANION GAP SERPL CALC-SCNC: 17 MMOL/L (ref 10–20)
AST SERPL-CCNC: 24 U/L (ref 5–34)
BASOPHILS # BLD AUTO: 0.1 THOU/UL (ref 0–0.2)
BASOPHILS NFR BLD AUTO: 1.1 % (ref 0–1)
BILIRUB SERPL-MCNC: 0.5 MG/DL (ref 0.2–1.2)
BUN SERPL-MCNC: 25 MG/DL (ref 9.8–20.1)
CALCIUM SERPL-MCNC: 9.1 MG/DL (ref 7.8–10.44)
CHLORIDE SERPL-SCNC: 101 MMOL/L (ref 98–107)
CO2 SERPL-SCNC: 26 MMOL/L (ref 22–29)
CREAT CL PREDICTED SERPL C-G-VRATE: 80 ML/MIN (ref 70–130)
EOSINOPHIL # BLD AUTO: 0.1 THOU/UL (ref 0–0.7)
EOSINOPHIL NFR BLD AUTO: 1.4 % (ref 0–10)
GLOBULIN SER CALC-MCNC: 2.6 G/DL (ref 2.4–3.5)
GLUCOSE SERPL-MCNC: 130 MG/DL (ref 70–105)
HGB BLD-MCNC: 11.1 G/DL (ref 12–16)
LYMPHOCYTES # BLD AUTO: 2 THOU/UL (ref 1.2–3.4)
LYMPHOCYTES NFR BLD AUTO: 23.6 % (ref 21–51)
MCH RBC QN AUTO: 27.8 PG (ref 27–31)
MCV RBC AUTO: 87.8 FL (ref 78–98)
MONOCYTES # BLD AUTO: 0.6 THOU/UL (ref 0.11–0.59)
MONOCYTES NFR BLD AUTO: 7.7 % (ref 0–10)
NEUTROPHILS # BLD AUTO: 5.5 THOU/UL (ref 1.4–6.5)
NEUTROPHILS NFR BLD AUTO: 66.1 % (ref 42–75)
PLATELET # BLD AUTO: 160 THOU/UL (ref 130–400)
POTASSIUM SERPL-SCNC: 3.9 MMOL/L (ref 3.5–5.1)
RBC # BLD AUTO: 4 MILL/UL (ref 4.2–5.4)
SODIUM SERPL-SCNC: 140 MMOL/L (ref 136–145)
WBC # BLD AUTO: 8.3 THOU/UL (ref 4.8–10.8)

## 2019-12-31 RX ADMIN — PANCRELIPASE SCH CAP: 60000; 12000; 38000 CAPSULE, DELAYED RELEASE PELLETS ORAL at 11:28

## 2019-12-31 RX ADMIN — DOCUSATE SODIUM 50 MG AND SENNOSIDES 8.6 MG SCH TAB: 8.6; 5 TABLET, FILM COATED ORAL at 21:28

## 2019-12-31 RX ADMIN — MULTIPLE VITAMINS W/ MINERALS TAB SCH TAB: TAB at 08:45

## 2019-12-31 RX ADMIN — Medication SCH MG: at 08:42

## 2019-12-31 RX ADMIN — INSULIN GLARGINE SCH UNITS: 100 INJECTION, SOLUTION SUBCUTANEOUS at 21:29

## 2019-12-31 RX ADMIN — BUPROPION HYDROCHLORIDE SCH MG: 150 TABLET, FILM COATED, EXTENDED RELEASE ORAL at 08:42

## 2019-12-31 RX ADMIN — DOCUSATE SODIUM 50 MG AND SENNOSIDES 8.6 MG SCH TAB: 8.6; 5 TABLET, FILM COATED ORAL at 08:46

## 2019-12-31 RX ADMIN — PANCRELIPASE SCH CAP: 60000; 12000; 38000 CAPSULE, DELAYED RELEASE PELLETS ORAL at 08:41

## 2019-12-31 RX ADMIN — ALOGLIPTIN SCH MG: 6.25 TABLET, FILM COATED ORAL at 08:42

## 2019-12-31 RX ADMIN — PANCRELIPASE SCH CAP: 60000; 12000; 38000 CAPSULE, DELAYED RELEASE PELLETS ORAL at 17:04

## 2019-12-31 RX ADMIN — INSULIN GLARGINE SCH UNITS: 100 INJECTION, SOLUTION SUBCUTANEOUS at 08:44

## 2019-12-31 RX ADMIN — INSULIN LISPRO PRN UNIT: 100 INJECTION, SOLUTION INTRAVENOUS; SUBCUTANEOUS at 11:29

## 2019-12-31 RX ADMIN — Medication SCH MG: at 21:25

## 2019-12-31 NOTE — PRG
DATE OF SERVICE:  12/31/2019



SUBJECTIVE:  Ms. Nguyen is a 54-year-old white female who fell and had a

trimalleolar fracture of right ankle.  She also was noted to have a cervical

myelopathy, status post decompression, and presently in a cervical brace. 



The patient was transferred to [facility] for physical therapy and

occupational therapy to increase her strength and stamina.  She was noted to be

nonweightbearing on the right ankle for another 3-1/2 weeks. 



OBJECTIVE:  VITAL SIGNS:  Today reveal blood pressure 120/72, pulse 72, respirations

18, O2 saturation 96% on room air, and T-max 98.3. 

GENERAL:  On physical exam, this is a well-developed, well-nourished, very pleasant,

slightly obese white female, in no apparent distress at this time. 

HEENT:  Reveals normocephalic and nontraumatic cranium.  Pupils are equal, round,

and reactive.  Extraocular movements are intact.  Nose and throat are slightly dry,

but clear. 

NECK:  Supple without masses, nodes, or bruits. 

CHEST:  Clear to auscultation.  No rales, rhonchi, wheezes are heard. 

HEART:  Reveals a regular rate and rhythm without murmurs, gallops, or rubs. 

ABDOMEN:  Soft, nontender without organomegaly.  Normal bowel sounds are noted.  No

rebound or guarding is noted. 

:  Deferred. 

EXTREMITIES:  Reveal no clubbing, cyanosis, or edema.  The patient does have her

C-collar around her neck at this time.  She also is in a right ankle walking boot,

but is not walking and not any weightbearing on that right ankle for another 3-1/2

weeks. 

PSYCHIATRIC:  The patient does have a history of bipolar, is stable at this time.



ASSESSMENT:  

1. Diastolic congestive heart failure, stable.

2. Diabetes type 2, stable blood sugars.

3. Hypertension, stable.

4. Chronic kidney disease, stage 2.

5. Hyperlipidemia.

6. Cervical myelopathy, status post decompression.

7. Trimalleolar fracture, status post open reduction and internal fixation.

8. Pain management.

9. Bipolar.

10. Generalized weakness.



PLAN:  

1. Continue present medications.

2. Continue to monitor the patient's blood pressure and adjust medications if needed.

3. Monitor the patient's diabetes with Accu-Cheks a.c. and at bedtime.

4. Decubitus precautions.

5. Stress ulcer prophylaxis.

6. Continue to encourage the patient to not smoke.

7. Nonweightbearing for another 3-1/2 weeks.

8. Continue physical therapy and occupational therapy.







Job ID:  [id number]

## 2020-01-01 RX ADMIN — INSULIN GLARGINE SCH UNITS: 100 INJECTION, SOLUTION SUBCUTANEOUS at 09:39

## 2020-01-01 RX ADMIN — INSULIN LISPRO PRN UNIT: 100 INJECTION, SOLUTION INTRAVENOUS; SUBCUTANEOUS at 11:41

## 2020-01-01 RX ADMIN — PANCRELIPASE SCH CAP: 60000; 12000; 38000 CAPSULE, DELAYED RELEASE PELLETS ORAL at 17:04

## 2020-01-01 RX ADMIN — Medication SCH MG: at 09:39

## 2020-01-01 RX ADMIN — PANCRELIPASE SCH CAP: 60000; 12000; 38000 CAPSULE, DELAYED RELEASE PELLETS ORAL at 11:40

## 2020-01-01 RX ADMIN — DOCUSATE SODIUM 50 MG AND SENNOSIDES 8.6 MG SCH TAB: 8.6; 5 TABLET, FILM COATED ORAL at 09:38

## 2020-01-01 RX ADMIN — INSULIN GLARGINE SCH UNITS: 100 INJECTION, SOLUTION SUBCUTANEOUS at 20:29

## 2020-01-01 RX ADMIN — Medication SCH MG: at 20:29

## 2020-01-01 RX ADMIN — MULTIPLE VITAMINS W/ MINERALS TAB SCH TAB: TAB at 09:39

## 2020-01-01 RX ADMIN — BUPROPION HYDROCHLORIDE SCH MG: 150 TABLET, FILM COATED, EXTENDED RELEASE ORAL at 09:39

## 2020-01-01 RX ADMIN — ALOGLIPTIN SCH MG: 6.25 TABLET, FILM COATED ORAL at 09:37

## 2020-01-01 RX ADMIN — DOCUSATE SODIUM 50 MG AND SENNOSIDES 8.6 MG SCH TAB: 8.6; 5 TABLET, FILM COATED ORAL at 20:31

## 2020-01-01 RX ADMIN — PANCRELIPASE SCH CAP: 60000; 12000; 38000 CAPSULE, DELAYED RELEASE PELLETS ORAL at 09:38

## 2020-01-01 NOTE — PRG
DATE OF SERVICE:  01/01/2020



SUBJECTIVE:  The patient is a 54-year-old white female with a history of

trimalleolar fracture of her ankle, status post open reduction and internal

fixation, who is nonweightbearing for another 3-1/2 weeks.  She also has a history

of cervical myelopathy, status post decompression, improving with PT, but still in a

cervical brace. 



OBJECTIVE:  VITAL SIGNS:  Shows temperature is 96.8, pulse 70, respirations 18, O2

sats 96% on room air, blood pressure 128/70. 

NECK:  The patient is wearing her cervical collar with no tenderness. 

LUNGS:  Clear. 

CARDIAC:  Regular rhythm. 

EXTREMITIES:  Right ankle in a splint, status post open reduction and internal

fixation. 



LABORATORY DATA:  Laboratory yesterday showed a white count __________, hematocrit

35, hemoglobin 11.  Accu-Cheks from __________.  BUN 25, creatinine 1.27, sodium

140, potassium 3.9, chloride 101.  Protein 6.2, albumin 3.6. 



ASSESSMENT:  

1. Resolving trimalleolar fracture, status post open reduction and internal fixation.

2. Improving cervical myelopathy, status post decompression.

3. Chronic kidney disease, stage 2, stable.

4. Type 2 diabetes, controlled to goal.

5. Diastolic heart failure, compensated.



PLAN:  

1. Continue PT/OT.

2. Continue Accu-Cheks to monitor and titrate and control diabetes.

3. Continue nonweightbearing.

4. Continue cervical collar.

5. Continue to monitor vital signs closely.

6. Continue chronic pain medications and bipolar medications.







Job ID:  [id number] Verified Results  (1) RHEUMATOID FACTOR SCREEN [id number] 06:26PM [facility] Order Number: [id number]     Test Name Result Flag Reference   RHEUMATOID FACTOR Negative  Negative

## 2020-01-02 RX ADMIN — DOCUSATE SODIUM 50 MG AND SENNOSIDES 8.6 MG SCH TAB: 8.6; 5 TABLET, FILM COATED ORAL at 08:31

## 2020-01-02 RX ADMIN — PANCRELIPASE SCH CAP: 60000; 12000; 38000 CAPSULE, DELAYED RELEASE PELLETS ORAL at 17:50

## 2020-01-02 RX ADMIN — MULTIPLE VITAMINS W/ MINERALS TAB SCH TAB: TAB at 08:31

## 2020-01-02 RX ADMIN — Medication SCH MG: at 21:45

## 2020-01-02 RX ADMIN — ALOGLIPTIN SCH MG: 6.25 TABLET, FILM COATED ORAL at 08:32

## 2020-01-02 RX ADMIN — PANCRELIPASE SCH: 60000; 12000; 38000 CAPSULE, DELAYED RELEASE PELLETS ORAL at 11:38

## 2020-01-02 RX ADMIN — INSULIN GLARGINE SCH UNITS: 100 INJECTION, SOLUTION SUBCUTANEOUS at 08:30

## 2020-01-02 RX ADMIN — DOCUSATE SODIUM 50 MG AND SENNOSIDES 8.6 MG SCH TAB: 8.6; 5 TABLET, FILM COATED ORAL at 21:53

## 2020-01-02 RX ADMIN — Medication SCH MG: at 08:31

## 2020-01-02 RX ADMIN — INSULIN GLARGINE SCH UNITS: 100 INJECTION, SOLUTION SUBCUTANEOUS at 21:53

## 2020-01-02 RX ADMIN — BUPROPION HYDROCHLORIDE SCH MG: 150 TABLET, FILM COATED, EXTENDED RELEASE ORAL at 08:32

## 2020-01-02 RX ADMIN — PANCRELIPASE SCH CAP: 60000; 12000; 38000 CAPSULE, DELAYED RELEASE PELLETS ORAL at 08:33

## 2020-01-02 NOTE — PRG
DATE OF SERVICE:  01/02/2020



The patient of Dr. Robert Thao.



SUBJECTIVE:  The patient did have a nausea today.  States she feels better and felt

that there was just too much gas from a black piece.  She is feeling better with no

abdominal pain, no nausea, no shortness of breath, and no cough. 



OBJECTIVE:  VITAL SIGNS:  Blood pressure is 115/57, temperature 98.9, pulse 88,

respirations 20, O2 saturations 96% on room air. 

LUNGS:  Clear. 

CARDIAC:  Showed regular rhythm. 

ABDOMEN:  Soft and nontender.



ASSESSMENT:  

1. Resolving cervical myelopathy with cervical collar in place after cervical

laminectomy. 

2. Resolving trimalleolar fracture of the right ankle in a splint, status post open

reduction and internal fixation, still nonweightbearing. 

3. Chronic kidney disease stage 2, stable.

4. New onset of nausea and vomiting, with no evidence of recurrence.  We will

monitor. 

5. Type 2 diabetes, controlled to goal.



PLAN:  

1. Continue PT/OT.

2. Continue Accu-Cheks to monitor and titrate and control diabetes.

3. Continue nonweightbearing of trimalleolar fracture.

4. Continue to monitor nausea and vomiting.

5. Continue cervical collar.







Job ID:  [id number]

## 2020-01-03 RX ADMIN — DOCUSATE SODIUM 50 MG AND SENNOSIDES 8.6 MG SCH TAB: 8.6; 5 TABLET, FILM COATED ORAL at 10:09

## 2020-01-03 RX ADMIN — INSULIN GLARGINE SCH UNITS: 100 INJECTION, SOLUTION SUBCUTANEOUS at 20:48

## 2020-01-03 RX ADMIN — MULTIPLE VITAMINS W/ MINERALS TAB SCH TAB: TAB at 10:09

## 2020-01-03 RX ADMIN — PANCRELIPASE SCH CAP: 60000; 12000; 38000 CAPSULE, DELAYED RELEASE PELLETS ORAL at 12:19

## 2020-01-03 RX ADMIN — INSULIN GLARGINE SCH UNITS: 100 INJECTION, SOLUTION SUBCUTANEOUS at 10:10

## 2020-01-03 RX ADMIN — BUPROPION HYDROCHLORIDE SCH MG: 150 TABLET, FILM COATED, EXTENDED RELEASE ORAL at 10:07

## 2020-01-03 RX ADMIN — Medication SCH MG: at 10:07

## 2020-01-03 RX ADMIN — PANCRELIPASE SCH CAP: 60000; 12000; 38000 CAPSULE, DELAYED RELEASE PELLETS ORAL at 10:08

## 2020-01-03 RX ADMIN — DOCUSATE SODIUM 50 MG AND SENNOSIDES 8.6 MG SCH: 8.6; 5 TABLET, FILM COATED ORAL at 20:42

## 2020-01-03 RX ADMIN — PANCRELIPASE SCH CAP: 60000; 12000; 38000 CAPSULE, DELAYED RELEASE PELLETS ORAL at 16:01

## 2020-01-03 RX ADMIN — ALOGLIPTIN SCH MG: 6.25 TABLET, FILM COATED ORAL at 10:06

## 2020-01-03 RX ADMIN — Medication SCH MG: at 20:40

## 2020-01-04 RX ADMIN — PANCRELIPASE SCH CAP: 60000; 12000; 38000 CAPSULE, DELAYED RELEASE PELLETS ORAL at 12:01

## 2020-01-04 RX ADMIN — INSULIN GLARGINE SCH UNITS: 100 INJECTION, SOLUTION SUBCUTANEOUS at 09:14

## 2020-01-04 RX ADMIN — INSULIN GLARGINE SCH: 100 INJECTION, SOLUTION SUBCUTANEOUS at 21:01

## 2020-01-04 RX ADMIN — PANCRELIPASE SCH CAP: 60000; 12000; 38000 CAPSULE, DELAYED RELEASE PELLETS ORAL at 16:28

## 2020-01-04 RX ADMIN — Medication SCH MG: at 09:12

## 2020-01-04 RX ADMIN — DOCUSATE SODIUM 50 MG AND SENNOSIDES 8.6 MG SCH: 8.6; 5 TABLET, FILM COATED ORAL at 09:15

## 2020-01-04 RX ADMIN — BUPROPION HYDROCHLORIDE SCH MG: 150 TABLET, FILM COATED, EXTENDED RELEASE ORAL at 09:12

## 2020-01-04 RX ADMIN — PANCRELIPASE SCH CAP: 60000; 12000; 38000 CAPSULE, DELAYED RELEASE PELLETS ORAL at 09:10

## 2020-01-04 RX ADMIN — SIMETHICONE SCH MG: 80 TABLET, CHEWABLE ORAL at 20:58

## 2020-01-04 RX ADMIN — DOCUSATE SODIUM 50 MG AND SENNOSIDES 8.6 MG SCH: 8.6; 5 TABLET, FILM COATED ORAL at 21:00

## 2020-01-04 RX ADMIN — ALOGLIPTIN SCH MG: 6.25 TABLET, FILM COATED ORAL at 09:12

## 2020-01-04 RX ADMIN — MULTIPLE VITAMINS W/ MINERALS TAB SCH TAB: TAB at 09:15

## 2020-01-04 RX ADMIN — Medication SCH MG: at 20:58

## 2020-01-05 LAB
ALBUMIN SERPL BCG-MCNC: 3.8 G/DL (ref 3.5–5)
ALP SERPL-CCNC: 74 U/L (ref 40–110)
ALT SERPL W P-5'-P-CCNC: 22 U/L (ref 8–55)
ANION GAP SERPL CALC-SCNC: 16 MMOL/L (ref 10–20)
AST SERPL-CCNC: 24 U/L (ref 5–34)
BASOPHILS # BLD AUTO: 0.1 THOU/UL (ref 0–0.2)
BASOPHILS NFR BLD AUTO: 0.8 % (ref 0–1)
BILIRUB SERPL-MCNC: 0.6 MG/DL (ref 0.2–1.2)
BUN SERPL-MCNC: 23 MG/DL (ref 9.8–20.1)
CALCIUM SERPL-MCNC: 9.1 MG/DL (ref 7.8–10.44)
CHLORIDE SERPL-SCNC: 100 MMOL/L (ref 98–107)
CO2 SERPL-SCNC: 26 MMOL/L (ref 22–29)
CREAT CL PREDICTED SERPL C-G-VRATE: 80 ML/MIN (ref 70–130)
EOSINOPHIL # BLD AUTO: 0.1 THOU/UL (ref 0–0.7)
EOSINOPHIL NFR BLD AUTO: 0.8 % (ref 0–10)
GLOBULIN SER CALC-MCNC: 2.6 G/DL (ref 2.4–3.5)
GLUCOSE SERPL-MCNC: 151 MG/DL (ref 70–105)
HGB BLD-MCNC: 11.4 G/DL (ref 12–16)
LYMPHOCYTES # BLD AUTO: 1.9 THOU/UL (ref 1.2–3.4)
LYMPHOCYTES NFR BLD AUTO: 19.2 % (ref 21–51)
MCH RBC QN AUTO: 28.5 PG (ref 27–31)
MCV RBC AUTO: 87.3 FL (ref 78–98)
MONOCYTES # BLD AUTO: 0.6 THOU/UL (ref 0.11–0.59)
MONOCYTES NFR BLD AUTO: 5.7 % (ref 0–10)
NEUTROPHILS # BLD AUTO: 7.4 THOU/UL (ref 1.4–6.5)
NEUTROPHILS NFR BLD AUTO: 73.5 % (ref 42–75)
PLATELET # BLD AUTO: 179 THOU/UL (ref 130–400)
POTASSIUM SERPL-SCNC: 4.1 MMOL/L (ref 3.5–5.1)
RBC # BLD AUTO: 3.99 MILL/UL (ref 4.2–5.4)
SODIUM SERPL-SCNC: 138 MMOL/L (ref 136–145)
WBC # BLD AUTO: 10 THOU/UL (ref 4.8–10.8)

## 2020-01-05 RX ADMIN — BUPROPION HYDROCHLORIDE SCH MG: 150 TABLET, FILM COATED, EXTENDED RELEASE ORAL at 08:48

## 2020-01-05 RX ADMIN — MULTIPLE VITAMINS W/ MINERALS TAB SCH TAB: TAB at 08:43

## 2020-01-05 RX ADMIN — SIMETHICONE SCH MG: 80 TABLET, CHEWABLE ORAL at 08:44

## 2020-01-05 RX ADMIN — DOCUSATE SODIUM 50 MG AND SENNOSIDES 8.6 MG SCH TAB: 8.6; 5 TABLET, FILM COATED ORAL at 21:07

## 2020-01-05 RX ADMIN — SIMETHICONE SCH MG: 80 TABLET, CHEWABLE ORAL at 21:08

## 2020-01-05 RX ADMIN — SIMETHICONE SCH MG: 80 TABLET, CHEWABLE ORAL at 14:21

## 2020-01-05 RX ADMIN — Medication SCH MG: at 21:02

## 2020-01-05 RX ADMIN — PANCRELIPASE SCH CAP: 60000; 12000; 38000 CAPSULE, DELAYED RELEASE PELLETS ORAL at 08:43

## 2020-01-05 RX ADMIN — DOCUSATE SODIUM 50 MG AND SENNOSIDES 8.6 MG SCH: 8.6; 5 TABLET, FILM COATED ORAL at 08:51

## 2020-01-05 RX ADMIN — PANCRELIPASE SCH CAP: 60000; 12000; 38000 CAPSULE, DELAYED RELEASE PELLETS ORAL at 12:33

## 2020-01-05 RX ADMIN — PANCRELIPASE SCH: 60000; 12000; 38000 CAPSULE, DELAYED RELEASE PELLETS ORAL at 17:02

## 2020-01-05 RX ADMIN — Medication SCH MG: at 08:48

## 2020-01-05 RX ADMIN — ALOGLIPTIN SCH MG: 6.25 TABLET, FILM COATED ORAL at 08:46

## 2020-01-05 RX ADMIN — INSULIN GLARGINE SCH: 100 INJECTION, SOLUTION SUBCUTANEOUS at 23:01

## 2020-01-05 RX ADMIN — INSULIN GLARGINE SCH: 100 INJECTION, SOLUTION SUBCUTANEOUS at 08:52

## 2020-01-05 NOTE — CT
CT ABDOMEN AND PELVIS PERFORMED WITH IV CONTRAST ENHANCEMENT:

 

Date:  01/05/2020

 

HISTORY:  

Left lower quadrant pain. 

 

COMPARISON:  

05/23/19 study. 

 

FINDINGS:

The lung bases are clear. 

 

Liver shows some element of fatty change. The spleen is normal in appearance. There is dilatation of 
the pancreatic duct in the head and body region. Area of hypodensity within the body of the pancreas 
is more difficult to appreciate on this noncontrast study. Gallbladder has been removed. 

 

Right and left adrenal glands, and right and left kidneys are normal in size. There is no significant
 periaortic or mesenteric adenopathy. 

 

CT of pelvis was performed with contrast enhancement. There is no evidence of adenopathy, mass, or fr
ee fluid. There are minimal sigmoid diverticulosis changes. No inflammatory process or free fluid. Ap
pendix is difficult to definitely identify, but I see no inflammatory change. 

 

IMPRESSION: 

1.  Mild fatty change to the liver. 

2.  Sigmoid diverticulosis. 

 

 

POS: [facility]

## 2020-01-05 NOTE — PRG
DATE OF SERVICE:  01/05/2020



The patient of Dr. VAMSI Thao.



SUBJECTIVE:  The patient is having recurrent abdominal distention and bloating pain,

but with no vomiting, has not eaten much today.  She has had no diarrhea. 



OBJECTIVE:  VITAL SIGNS:  Show temperature of 98.1, pulse is 90, respirations 16, O2

sats 100% on room air, blood pressure 117/63. 

ABDOMEN:  Distended, minimally tender.  Good bowel sounds. 

LUNGS:  Clear. 

CARDIAC:  Regular rhythm.



ASSESSMENT:  

1. Persistent abdominal distention and bloating despite being started on simethicone.

2. Type 2 diabetes with adequate control.

3. Trimalleolar fracture, stable, nonweightbearing.

4. Cervical laminectomy, stable.



PLAN:  

1. CBC, comprehensive metabolic profile and abdominal x-rays in the a.m.

2. Start full liquid diet.

3. Continue simethicone 80 mg three times daily.

4. Continue nonweightbearing, right trimalleolar fracture.

5. Continue cervical collar.







Job ID:  [id number]

## 2020-01-05 NOTE — PRG
DATE OF SERVICE:  01/05/2020



SUBJECTIVE:  The patient is still having abdominal distention and fullness, but is

having no nausea, or vomiting, has not been eating.  No fever, chills, or diarrhea. 



OBJECTIVE:  VITAL SIGNS:  Shows temperature 98.3, pulse 89, respirations 18, O2

saturation 97% on room air, blood pressure 114/55. 

ABDOMEN:  Distended with good bowel sounds, __________.



LABORATORY DATA:  CT scan, however, only showed fatty liver with minimal sigmoid

diverticulosis.  White count 10,000, hematocrit 34, hemoglobin 11.  Sodium is 138,

potassium 4.1, chloride 100, bicarb 26, BUN 23, creatinine 1.27, glucose 151.  Liver

functions within normal limits. 



ASSESSMENT:  

1. Abdominal distention secondary to gastric distention.

2. Cervical laminectomy, healing well.

3. Trimalleolar fracture, healing well.



PLAN:  

1. Continue clear liquid diet.

2. Continue nonweightbearing.

3. Monitor Accu-Cheks.  Continue sliding scale.







Job ID:  [id number]

## 2020-01-05 NOTE — PRG
DATE OF SERVICE:  01/03/2020



SUBJECTIVE:  The patient feels much better today.  No nausea, vomiting, abdominal

pain, and is eating whatever she wants.  She is up in her chair.  She is still

outside smoking. 



OBJECTIVE:  VITAL SIGNS:  Show temperature of 97.8, pulse 97, respirations 20, O2

sats 97% on room air, blood pressure is 133/63. 

LUNGS:  Clear. 

CARDIAC:  Shows regular rhythm. 

ABDOMEN:  Soft and nontender.  Accu-Cheks range from 103 to 119.



ASSESSMENT:  

1. Resolved nausea and vomiting, possibly secondary to intolerance to certain food.

2. Type 2 diabetes, controlled to goal.

3. Cervical laminectomy with cervical myelopathy, improving cervical collar.

4. Resolving trimalleolar fracture right ankle, still nonweightbearing.

5. Chronic kidney disease, stage 2, stable, but we will repeat labs this weekend.

6. Deconditioning improving.



PLAN:  

1. Continue nonweightbearing of right ankle, but physical therapy otherwise.

2. Continue Accu-Cheks to monitor and titrate and control diabetes.

3. Continue her advised restriction of diet.

4. Continue cervical collar.







Job ID:  [id number]

## 2020-01-06 RX ADMIN — DOCUSATE SODIUM 50 MG AND SENNOSIDES 8.6 MG SCH TAB: 8.6; 5 TABLET, FILM COATED ORAL at 08:34

## 2020-01-06 RX ADMIN — PANCRELIPASE SCH CAP: 60000; 12000; 38000 CAPSULE, DELAYED RELEASE PELLETS ORAL at 08:43

## 2020-01-06 RX ADMIN — SIMETHICONE SCH MG: 80 TABLET, CHEWABLE ORAL at 16:35

## 2020-01-06 RX ADMIN — INSULIN GLARGINE SCH: 100 INJECTION, SOLUTION SUBCUTANEOUS at 21:07

## 2020-01-06 RX ADMIN — Medication SCH MG: at 21:00

## 2020-01-06 RX ADMIN — INSULIN GLARGINE SCH: 100 INJECTION, SOLUTION SUBCUTANEOUS at 08:44

## 2020-01-06 RX ADMIN — DOCUSATE SODIUM 50 MG AND SENNOSIDES 8.6 MG SCH: 8.6; 5 TABLET, FILM COATED ORAL at 21:05

## 2020-01-06 RX ADMIN — ALOGLIPTIN SCH MG: 6.25 TABLET, FILM COATED ORAL at 08:41

## 2020-01-06 RX ADMIN — SIMETHICONE SCH MG: 80 TABLET, CHEWABLE ORAL at 21:05

## 2020-01-06 RX ADMIN — MULTIPLE VITAMINS W/ MINERALS TAB SCH TAB: TAB at 08:41

## 2020-01-06 RX ADMIN — PANCRELIPASE SCH CAP: 60000; 12000; 38000 CAPSULE, DELAYED RELEASE PELLETS ORAL at 12:55

## 2020-01-06 RX ADMIN — Medication SCH MG: at 08:39

## 2020-01-06 RX ADMIN — BUPROPION HYDROCHLORIDE SCH MG: 150 TABLET, FILM COATED, EXTENDED RELEASE ORAL at 08:39

## 2020-01-06 RX ADMIN — PANCRELIPASE SCH CAP: 60000; 12000; 38000 CAPSULE, DELAYED RELEASE PELLETS ORAL at 16:35

## 2020-01-06 RX ADMIN — SIMETHICONE SCH MG: 80 TABLET, CHEWABLE ORAL at 08:47

## 2020-01-06 NOTE — PRG
DATE OF SERVICE:  01/06/2020



SUBJECTIVE:  Ms. Nguyen is a 54-year-old white female, who fell and had a

trimalleolar fracture of the right ankle.  She also had cervical myelopathy and was

placed in a cervical brace.  She was transferred to [facility] for

PT and OT, but is nonweightbearing on her trimalleolar fracture. 



The patient complains of continued abdominal pain.  She is able to eat some chicken

noodle soup this morning, but tells me that she continues to have some abdominal

pain.  She still feels somewhat bloated. 



Dr. Hobbs did do a CT scan of her abdomen yesterday and was reported as normal. 



She states she feels possibly slightly better, but it continues to ache. 



She has had her gallbladder removed. 



Labs were done yesterday, and we will repeat them tomorrow morning. 



At this time, she states she is drinking and eating fairly well.



OBJECTIVE:  VITAL SIGNS:  Today reveal blood pressure 108/51, pulse 85, respirations

18, O2 saturation 97% on room air, and T-max 98.6. 

GENERAL:  This is a well-developed, well-nourished, pleasant 54-year-old white

female, in no apparent distress at this time. 

HEENT:  Normocephalic and nontraumatic cranium.  Pupils are equally round and

reactive.  Extraocular movements are intact.  Nose and throat are slightly dry. 

NECK:  Supple without masses, nodes, or bruits. 

CHEST:  Clear to auscultation.  No rales, rhonchi, wheezes, or cough is noted. 

HEART:  Reveals a regular rate and rhythm without murmurs, gallops, or rubs. 

ABDOMEN:  Still slightly distended with good bowel sounds all over.  As mentioned

before, CT scan was done yesterday. 

GENITOURINARY:  Deferred. 

EXTREMITIES:  Reveal no clubbing, cyanosis, or edema.



LABORATORY DATA:  No labs were done this morning.



ASSESSMENT:  

1. Abdominal distension secondary to gastric distension.

2. Cervical laminectomy.

3. Trimalleolar fracture, still nonweightbearing.

4. Diabetes type 2, stable.

5. Diastolic congestive heart failure, stable.

6. Hypertension.

7. Chronic kidney disease, stage 2.

8. Pain management.

9. Bipolar.

10. Generalized weakness.



PLAN:  

1. Repeat labs tomorrow morning.

2. Continue present medication.

3. Continue clear to full liquids as needed.

4. Continue to monitor the patient's blood pressure.

5. Continue to monitor the patient's Accu-Cheks with a.c. and h.s.

6. Encourage the patient not to smoke.

7. Nonweightbearing for another two and a half weeks.

8. Continue physical therapy and occupational therapy.







Job ID:  [id number]

## 2020-01-07 LAB
ALBUMIN SERPL BCG-MCNC: 3.7 G/DL (ref 3.5–5)
ALP SERPL-CCNC: 68 U/L (ref 40–110)
ALT SERPL W P-5'-P-CCNC: 21 U/L (ref 8–55)
ANION GAP SERPL CALC-SCNC: 15 MMOL/L (ref 10–20)
AST SERPL-CCNC: 20 U/L (ref 5–34)
BASOPHILS # BLD AUTO: 0.1 THOU/UL (ref 0–0.2)
BASOPHILS NFR BLD AUTO: 0.7 % (ref 0–1)
BILIRUB SERPL-MCNC: 0.6 MG/DL (ref 0.2–1.2)
BUN SERPL-MCNC: 20 MG/DL (ref 9.8–20.1)
CALCIUM SERPL-MCNC: 8.9 MG/DL (ref 7.8–10.44)
CHLORIDE SERPL-SCNC: 100 MMOL/L (ref 98–107)
CO2 SERPL-SCNC: 27 MMOL/L (ref 22–29)
CREAT CL PREDICTED SERPL C-G-VRATE: 83 ML/MIN (ref 70–130)
EOSINOPHIL # BLD AUTO: 0.1 THOU/UL (ref 0–0.7)
EOSINOPHIL NFR BLD AUTO: 1.5 % (ref 0–10)
GLOBULIN SER CALC-MCNC: 2.4 G/DL (ref 2.4–3.5)
GLUCOSE SERPL-MCNC: 106 MG/DL (ref 70–105)
HGB BLD-MCNC: 11 G/DL (ref 12–16)
LYMPHOCYTES # BLD AUTO: 2.5 THOU/UL (ref 1.2–3.4)
LYMPHOCYTES NFR BLD AUTO: 26 % (ref 21–51)
MCH RBC QN AUTO: 27.9 PG (ref 27–31)
MCV RBC AUTO: 87.5 FL (ref 78–98)
MONOCYTES # BLD AUTO: 0.9 THOU/UL (ref 0.11–0.59)
MONOCYTES NFR BLD AUTO: 9.5 % (ref 0–10)
NEUTROPHILS # BLD AUTO: 6 THOU/UL (ref 1.4–6.5)
NEUTROPHILS NFR BLD AUTO: 62.3 % (ref 42–75)
PLATELET # BLD AUTO: 163 THOU/UL (ref 130–400)
POTASSIUM SERPL-SCNC: 3.8 MMOL/L (ref 3.5–5.1)
RBC # BLD AUTO: 3.95 MILL/UL (ref 4.2–5.4)
SODIUM SERPL-SCNC: 138 MMOL/L (ref 136–145)
WBC # BLD AUTO: 9.6 THOU/UL (ref 4.8–10.8)

## 2020-01-07 RX ADMIN — PANCRELIPASE SCH CAP: 60000; 12000; 38000 CAPSULE, DELAYED RELEASE PELLETS ORAL at 09:04

## 2020-01-07 RX ADMIN — PANCRELIPASE SCH CAP: 60000; 12000; 38000 CAPSULE, DELAYED RELEASE PELLETS ORAL at 18:09

## 2020-01-07 RX ADMIN — PANCRELIPASE SCH CAP: 60000; 12000; 38000 CAPSULE, DELAYED RELEASE PELLETS ORAL at 12:54

## 2020-01-07 RX ADMIN — ALOGLIPTIN SCH MG: 6.25 TABLET, FILM COATED ORAL at 09:03

## 2020-01-07 RX ADMIN — SIMETHICONE SCH MG: 80 TABLET, CHEWABLE ORAL at 15:03

## 2020-01-07 RX ADMIN — Medication SCH MG: at 09:04

## 2020-01-07 RX ADMIN — SIMETHICONE SCH MG: 80 TABLET, CHEWABLE ORAL at 09:04

## 2020-01-07 RX ADMIN — Medication SCH MG: at 20:13

## 2020-01-07 RX ADMIN — MULTIPLE VITAMINS W/ MINERALS TAB SCH TAB: TAB at 09:04

## 2020-01-07 RX ADMIN — DOCUSATE SODIUM 50 MG AND SENNOSIDES 8.6 MG SCH: 8.6; 5 TABLET, FILM COATED ORAL at 09:17

## 2020-01-07 RX ADMIN — BUPROPION HYDROCHLORIDE SCH MG: 150 TABLET, FILM COATED, EXTENDED RELEASE ORAL at 09:05

## 2020-01-07 RX ADMIN — INSULIN GLARGINE SCH: 100 INJECTION, SOLUTION SUBCUTANEOUS at 09:19

## 2020-01-07 RX ADMIN — DOCUSATE SODIUM 50 MG AND SENNOSIDES 8.6 MG SCH: 8.6; 5 TABLET, FILM COATED ORAL at 20:16

## 2020-01-07 RX ADMIN — SIMETHICONE SCH MG: 80 TABLET, CHEWABLE ORAL at 20:17

## 2020-01-07 RX ADMIN — INSULIN GLARGINE SCH UNITS: 100 INJECTION, SOLUTION SUBCUTANEOUS at 20:13

## 2020-01-08 RX ADMIN — INSULIN GLARGINE SCH: 100 INJECTION, SOLUTION SUBCUTANEOUS at 20:13

## 2020-01-08 RX ADMIN — SIMETHICONE SCH MG: 80 TABLET, CHEWABLE ORAL at 09:14

## 2020-01-08 RX ADMIN — BUPROPION HYDROCHLORIDE SCH MG: 150 TABLET, FILM COATED, EXTENDED RELEASE ORAL at 09:15

## 2020-01-08 RX ADMIN — DOCUSATE SODIUM 50 MG AND SENNOSIDES 8.6 MG SCH: 8.6; 5 TABLET, FILM COATED ORAL at 09:22

## 2020-01-08 RX ADMIN — ALOGLIPTIN SCH MG: 6.25 TABLET, FILM COATED ORAL at 09:14

## 2020-01-08 RX ADMIN — SIMETHICONE SCH MG: 80 TABLET, CHEWABLE ORAL at 14:07

## 2020-01-08 RX ADMIN — PANCRELIPASE SCH CAP: 60000; 12000; 38000 CAPSULE, DELAYED RELEASE PELLETS ORAL at 09:12

## 2020-01-08 RX ADMIN — MULTIPLE VITAMINS W/ MINERALS TAB SCH TAB: TAB at 09:21

## 2020-01-08 RX ADMIN — PANCRELIPASE SCH CAP: 60000; 12000; 38000 CAPSULE, DELAYED RELEASE PELLETS ORAL at 17:03

## 2020-01-08 RX ADMIN — PANCRELIPASE SCH CAP: 60000; 12000; 38000 CAPSULE, DELAYED RELEASE PELLETS ORAL at 14:06

## 2020-01-08 RX ADMIN — DOCUSATE SODIUM 50 MG AND SENNOSIDES 8.6 MG SCH: 8.6; 5 TABLET, FILM COATED ORAL at 20:15

## 2020-01-08 RX ADMIN — Medication SCH MG: at 09:14

## 2020-01-08 RX ADMIN — Medication SCH MG: at 20:12

## 2020-01-08 RX ADMIN — SIMETHICONE SCH MG: 80 TABLET, CHEWABLE ORAL at 20:15

## 2020-01-08 RX ADMIN — INSULIN GLARGINE SCH UNITS: 100 INJECTION, SOLUTION SUBCUTANEOUS at 09:20

## 2020-01-08 NOTE — PRG
DATE OF SERVICE:  01/07/2020



SUBJECTIVE:  Ms. Nguyen is a  54-year-old white female, who fell, had a

trimalleolar fracture of the right ankle and a cervical myelopathy.  She was placed

in a cervical brace and had open reduction and internal fixation of her ankle.  She

is nonweightbearing. 



The patient today states she feels about the same as yesterday, but slightly better

and she would like to change her diet from a clear liquid diet to soft mechanical

diet.  We recommend a bland soft mechanical diet.  She will give that a trial today. 



She states she still has quite a bit of gas and bloating, but otherwise she feels

okay. 



OBJECTIVE:  VITAL SIGNS:  Today reveal blood pressure of 109/56, pulse 92,

respirations 18, O2 saturation 96% on room air, T-max 98.4. 

GENERAL:  This is a well-developed, well-nourished, obese white female, in no

apparent distress at this time. 

HEENT:  Reveals normocephalic and nontraumatic cranium.  Pupils are equal, round,

and reactive.  Extraocular movements are intact. Nose and throat are slightly dry. 

NECK:  Supple without masses, nodes, or bruits. 

CHEST:  Clear to auscultation.  No rales, rhonchi, or wheezes are noted.  The

patient continues to smoke outside daily. 

HEART:  Reveals a regular rate and rhythm without murmurs, gallops, or rubs. 

ABDOMEN: Seems slightly less distended and little softer today.  Good bowel sounds

are noted all over in all 4 quadrants.  The patient does complain of some mild

epigastric pain when she pushes.  CT scan done by Dr. Hobbs 2 days ago was

unremarkable. 

GENITOURINARY:  Exam is deferred. 

EXTREMITIES: Reveal no clubbing, cyanosis, or edema.



ASSESSMENT:  

1. Abdominal distention, probably secondary to gas.

2. Cervical laminectomy.

3. Trimalleolar fracture, still nonweightbearing.

4. Diabetes type 2.

5. Diastolic congestive heart failure, stable.

6. Hypertension.

7. Chronic kidney disease, stage 2.

8. Pain management.

9. Bipolar.

10. Generalized weakness.



PLAN:  

1. Gradually move the patient to bland mechanical soft diet.

2. Continue to encourage liquids.

3. Monitor the patient closely for increased abdominal pain.

4. Continue to monitor the patient's blood pressure.

5. Continue to monitor the patient's diabetes with Accu-Cheks a.c. and at bedtime.

6. Encourage the patient does not smoke or smoke a little as possible.

7. Nonweightbearing on her trimalleolar fracture.

8. Continue PT and OT.







Job ID:  [id number] Paroxysmal atrial fibrillation

## 2020-01-08 NOTE — PRG
DATE OF SERVICE:  01/08/2020



SUBJECTIVE:  Ms. Nguyen is a very pleasant 54-year-old white female, who fell and

sustained a right-sided trimalleolar ankle fracture.  She also had some cervical

myelopathy.  She was placed in cervical brace and had open reduction and internal

fixation of her ankle.  She is still nonweightbearing on that. 



The patient states she has had some abdominal distention, but states she feels a

little bit better today and she is eating a little bit better today.  She has no

concerns or complaints.  She was found today outside smoking her cigarette.  She has

no specific complaints about the food. 



LABORATORY DATA:  Did reveal white count still normal at 9.6 with hemoglobin 11.0,

hematocrit 34.5, platelet count 63, and no significant shift. 



Chemistry reveals sodium 138, potassium 3.8, carbon dioxide 27, chloride 100.

Creatinine is 1.22, which is improved.  Sugars are still very stable.  The enzymes

are unremarkable.  Pre-albumin was noted to be 16. 



PHYSICAL EXAMINATION:

GENERAL:  This is a well-developed, well-nourished, slightly obese white female, in

no apparent distress at this time. 

HEENT:  Normocephalic and nontraumatic cranium.  Pupils are equal, round, and

reactive.  Extraocular movements are intact.  Nose and throat are slightly dry. 

NECK:  Supple without masses, nodes, or bruits. 

CHEST:  Clear to auscultation.  No rales, rhonchi, wheezes, or cough is noted. 

HEART:  Reveals a regular rate and rhythm without murmurs, gallops, or rubs. 

ABDOMEN:  Soft, nontender without organomegaly.  She seems to be somewhat less

distended.  Still hyperactive bowel sounds are noted in all 4 quadrants. 

GENITOURINARY:  Exam is deferred. 

EXTREMITIES:  Reveal no clubbing, cyanosis, or edema.



ASSESSMENT:  

1. Abdominal distention, probably secondary to gas, but not acute.

2. Cervical laminectomy.

3. Trimalleolar fracture, still nonweightbearing.

4. Diabetes type 2, pretty stable.

5. Diastolic congestive heart failure, stable.

6. Hypertension.

7. Chronic kidney disease, stage 2.

8. Pain management.

9. Bipolar.

10. Generalized weakness.



PLAN:  

1. Continue to gradually progress the patient's bland mechanical soft diet as she

has tolerated. 

2. Encourage liquids.

3. Monitor the patient closely for increased abdominal pain.

4. Continue to monitor the patient's blood pressure.

5. Continue to monitor the patient's diabetes with Accu-Cheks a.c. and h.s.

6. Encourage the patient to not smoke at all if possible.

7. Nonweightbearing on her trimalleolar fracture.

8. Continue physical therapy and occupational therapy.







Job ID:  [id number]

## 2020-01-09 RX ADMIN — Medication SCH MG: at 08:29

## 2020-01-09 RX ADMIN — DOCUSATE SODIUM 50 MG AND SENNOSIDES 8.6 MG SCH: 8.6; 5 TABLET, FILM COATED ORAL at 08:32

## 2020-01-09 RX ADMIN — SIMETHICONE SCH: 80 TABLET, CHEWABLE ORAL at 14:05

## 2020-01-09 RX ADMIN — PANCRELIPASE SCH CAP: 60000; 12000; 38000 CAPSULE, DELAYED RELEASE PELLETS ORAL at 08:27

## 2020-01-09 RX ADMIN — SIMETHICONE SCH MG: 80 TABLET, CHEWABLE ORAL at 08:32

## 2020-01-09 RX ADMIN — DOCUSATE SODIUM 50 MG AND SENNOSIDES 8.6 MG SCH: 8.6; 5 TABLET, FILM COATED ORAL at 20:01

## 2020-01-09 RX ADMIN — PANCRELIPASE SCH CAP: 60000; 12000; 38000 CAPSULE, DELAYED RELEASE PELLETS ORAL at 17:08

## 2020-01-09 RX ADMIN — Medication SCH MG: at 20:06

## 2020-01-09 RX ADMIN — INSULIN GLARGINE SCH UNITS: 100 INJECTION, SOLUTION SUBCUTANEOUS at 08:31

## 2020-01-09 RX ADMIN — MULTIPLE VITAMINS W/ MINERALS TAB SCH TAB: TAB at 08:32

## 2020-01-09 RX ADMIN — BUPROPION HYDROCHLORIDE SCH MG: 150 TABLET, FILM COATED, EXTENDED RELEASE ORAL at 08:29

## 2020-01-09 RX ADMIN — SIMETHICONE SCH: 80 TABLET, CHEWABLE ORAL at 20:03

## 2020-01-09 RX ADMIN — INSULIN GLARGINE SCH UNITS: 100 INJECTION, SOLUTION SUBCUTANEOUS at 20:07

## 2020-01-09 RX ADMIN — ALOGLIPTIN SCH MG: 6.25 TABLET, FILM COATED ORAL at 08:28

## 2020-01-09 RX ADMIN — PANCRELIPASE SCH CAP: 60000; 12000; 38000 CAPSULE, DELAYED RELEASE PELLETS ORAL at 11:36

## 2020-01-09 NOTE — PRG
DATE OF SERVICE:  01/09/2020



Ms. Nguyen is a 54-year-old white female, who fell and sustained a right-sided

trimalleolar ankle fracture.  She also has cervical myelopathy.  She was placed in a

cervical brace and had open reduction and internal fixation of her ankle.  She was

transferred to [facility] for physical therapy and occupational

therapy. 



SUBJECTIVE:  The patient states she has been having some abdominal distention and

feels a little better again today, but still is not eating well. 



She does not like her regular diet, wants to go back to a more bland diet. 



She has no concerns or complaints. 



She was found to have outside again today be smoking.  She has no specific

complaints about the food, but she states she is not feeling well. 



It is noted that she is taking her MS Contin 3 times every day at this time and that

may be contributing to her abdominal problems. 



LABORATORY DATA:  Her last laboratories on the 7th revealed a white count of 9600

with hemoglobin 11.0, hematocrit 34.5, platelet count 163, with no significant

shift.  We will repeat labs tomorrow. 



PHYSICAL EXAMINATION:

GENERAL:  This is a well-developed, well-nourished, very pleasant 54-year-old white

female, in no apparent distress. 

HEENT:  Normocephalic and nontraumatic cranium.  Pupils are equal, round, and

reactive.  Extraocular movements are intact.  Nose and throat are dry. 

NECK:  Supple without masses, nodes, or bruits. 

CHEST:  Clear to auscultation.  No rales, rhonchi, wheezes, or cough is noted. 

HEART:  Reveals a regular rate and rhythm without murmurs, gallops, or rubs. 

ABDOMEN:  Soft, nontender without organomegaly.  Less distention is noted.  Still

some slightly hyperactive bowel sounds are noted in all 4 quadrants.  No rebound or

guarding is noted. 

:  Deferred. 

EXTREMITIES:  Reveal no clubbing, cyanosis, or edema.



ASSESSMENT:  

1. Abdominal distention, probably still gas, not an acute abdomen.

2. Cervical laminectomy.

3. Trimalleolar fracture, still nonweightbearing.

4. Diabetes type 2, which is stable.

5. Diastolic congestive heart failure, stable.

6. Hypertension.

7. Chronic kidney disease, stage 2.

8. Pain management.

9. Bipolar.

10. Generalized weakness.



PLAN:  

1. Encourage the patient to try to stay off her MS Contin because that most likely

is aggravating her stomach. 

2. Encourage liquids.

3. Monitor the patient closely for increased abdominal pain.

4. Continue to monitor the patient's blood pressures.

5. Continue to monitor the patient's diabetes with Accu-Cheks a.c. and h.s.

6. Continue to encourage the patient not to smoke.

7. Stress ulcer prophylaxis.

8. Decubitus precautions.

9. DVT prophylaxis.

10. Continue nonweightbearing on her trimalleolar fracture.

11. Continue physical therapy and occupational therapy.







Job ID:  [id number]

## 2020-01-10 LAB
ALBUMIN SERPL BCG-MCNC: 3.8 G/DL (ref 3.5–5)
ALP SERPL-CCNC: 80 U/L (ref 40–110)
ALT SERPL W P-5'-P-CCNC: 27 U/L (ref 8–55)
AMYLASE SERPL-CCNC: 44 U/L (ref 25–125)
ANION GAP SERPL CALC-SCNC: 17 MMOL/L (ref 10–20)
AST SERPL-CCNC: 30 U/L (ref 5–34)
BASOPHILS # BLD AUTO: 0.1 THOU/UL (ref 0–0.2)
BASOPHILS NFR BLD AUTO: 0.8 % (ref 0–1)
BILIRUB SERPL-MCNC: 0.7 MG/DL (ref 0.2–1.2)
BUN SERPL-MCNC: 27 MG/DL (ref 9.8–20.1)
CALCIUM SERPL-MCNC: 9.2 MG/DL (ref 7.8–10.44)
CHLORIDE SERPL-SCNC: 99 MMOL/L (ref 98–107)
CO2 SERPL-SCNC: 27 MMOL/L (ref 22–29)
CREAT CL PREDICTED SERPL C-G-VRATE: 73 ML/MIN (ref 70–130)
EOSINOPHIL # BLD AUTO: 0.1 THOU/UL (ref 0–0.7)
EOSINOPHIL NFR BLD AUTO: 1 % (ref 0–10)
GLOBULIN SER CALC-MCNC: 2.7 G/DL (ref 2.4–3.5)
GLUCOSE SERPL-MCNC: 99 MG/DL (ref 70–105)
HGB BLD-MCNC: 12 G/DL (ref 12–16)
LYMPHOCYTES # BLD AUTO: 1.9 THOU/UL (ref 1.2–3.4)
LYMPHOCYTES NFR BLD AUTO: 16.6 % (ref 21–51)
MCH RBC QN AUTO: 27.9 PG (ref 27–31)
MCV RBC AUTO: 87.6 FL (ref 78–98)
MONOCYTES # BLD AUTO: 0.9 THOU/UL (ref 0.11–0.59)
MONOCYTES NFR BLD AUTO: 7.9 % (ref 0–10)
NEUTROPHILS # BLD AUTO: 8.6 THOU/UL (ref 1.4–6.5)
NEUTROPHILS NFR BLD AUTO: 73.7 % (ref 42–75)
PLATELET # BLD AUTO: 202 THOU/UL (ref 130–400)
POTASSIUM SERPL-SCNC: 4.4 MMOL/L (ref 3.5–5.1)
RBC # BLD AUTO: 4.3 MILL/UL (ref 4.2–5.4)
SODIUM SERPL-SCNC: 139 MMOL/L (ref 136–145)
WBC # BLD AUTO: 11.7 THOU/UL (ref 4.8–10.8)

## 2020-01-10 RX ADMIN — MULTIPLE VITAMINS W/ MINERALS TAB SCH TAB: TAB at 09:21

## 2020-01-10 RX ADMIN — INSULIN GLARGINE SCH: 100 INJECTION, SOLUTION SUBCUTANEOUS at 21:03

## 2020-01-10 RX ADMIN — SIMETHICONE SCH MG: 80 TABLET, CHEWABLE ORAL at 15:11

## 2020-01-10 RX ADMIN — PANCRELIPASE SCH CAP: 60000; 12000; 38000 CAPSULE, DELAYED RELEASE PELLETS ORAL at 17:37

## 2020-01-10 RX ADMIN — BUPROPION HYDROCHLORIDE SCH MG: 150 TABLET, FILM COATED, EXTENDED RELEASE ORAL at 09:20

## 2020-01-10 RX ADMIN — DOCUSATE SODIUM 50 MG AND SENNOSIDES 8.6 MG SCH: 8.6; 5 TABLET, FILM COATED ORAL at 21:03

## 2020-01-10 RX ADMIN — DOCUSATE SODIUM 50 MG AND SENNOSIDES 8.6 MG SCH: 8.6; 5 TABLET, FILM COATED ORAL at 09:22

## 2020-01-10 RX ADMIN — PANCRELIPASE SCH CAP: 60000; 12000; 38000 CAPSULE, DELAYED RELEASE PELLETS ORAL at 09:21

## 2020-01-10 RX ADMIN — SIMETHICONE SCH MG: 80 TABLET, CHEWABLE ORAL at 21:01

## 2020-01-10 RX ADMIN — INSULIN GLARGINE SCH UNITS: 100 INJECTION, SOLUTION SUBCUTANEOUS at 09:26

## 2020-01-10 RX ADMIN — ALOGLIPTIN SCH MG: 6.25 TABLET, FILM COATED ORAL at 09:22

## 2020-01-10 RX ADMIN — SIMETHICONE SCH MG: 80 TABLET, CHEWABLE ORAL at 09:25

## 2020-01-10 RX ADMIN — PANCRELIPASE SCH CAP: 60000; 12000; 38000 CAPSULE, DELAYED RELEASE PELLETS ORAL at 12:36

## 2020-01-10 RX ADMIN — Medication SCH MG: at 09:21

## 2020-01-10 RX ADMIN — Medication SCH MG: at 21:01

## 2020-01-10 NOTE — PRG
DATE OF SERVICE:  01/10/2020



SUBJECTIVE:  Ms. Nguyen is a 54-year-old white female who sustained a right-sided

trimalleolar fracture and cervical myelopathy.  She required to wear a cervical

brace and had open reduction and internal fixation of her ankle.  She was

transferred to [facility] for physical therapy and occupational

therapy. 



Over the past 4-5 days, she continues to complain of some distention and gaseous

pain that she states the food seems to aggravate. 



She does not want to eat the food here anymore.  She is kind of what she wants.  She

states the only thing she has had today is Sprite.  We have done lab work and exam

and she does not have an acute abdomen and her labs are actually pretty much within

normal limits. 



She did state that she wanted to start eating more soups and I just asked to request

that from the kitchen and that would work fine. 



OBJECTIVE:  VITAL SIGNS:  Today reveal blood pressure 129/60, pulse 85, respirations

20, O2 saturation 98% on room air, T-max 97.5. 

GENERAL:  This is a well-developed, well-nourished, obese white female, in no

apparent distress at this time. 

HEENT:  Normocephalic and nontraumatic cranium.  The pupils are equal, round, and

reactive.  Extraocular movements are intact.  Nose and throat are slightly dry. 

NECK:  Supple without masses, nodes, or bruits. 

CHEST:  Clear to auscultation.  No rales, rhonchi, wheezes are heard. 

ABDOMEN:  Soft with very active bowel sounds still.  No rebound or guarding is

noted.  Bowel sounds are noted in all 4 quadrants. 

GENITOURINARY:  Deferred. 

EXTREMITIES:  Reveal no clubbing, cyanosis with trace edema.



LABORATORY DATA:  Labs were done this morning which revealed white count 11,700,

hemoglobin 12.0, hematocrit 37.7, which is little bit more hemoconcentrated than her

usual.  Platelet count 202,000. 



Sodium 139, potassium 4.4, chloride 99, carbon dioxide 27 with a BUN of 27,

creatinine slightly elevated at 1.4.  Sugars were within normal limits.  Amylase is

44. 



ASSESSMENT:  

1. Most likely abdominal virus.

2. Trimalleolar fracture.

3. Cervical laminectomy.

4. Diabetes type 2 which is stable.

5. Diastolic congestive heart failure which is stable.

6. Hypertension.

7. Chronic kidney disease, stage 2.

8. Pain management.

9. Bipolar.

10. Generalized weakness.



PLAN:  

1. Encourage the patient to increase her liquids, which she states is not bothering

her stomach at all. 

2. I did have Dietary talk with the patient about what she is able to eat and what

she is not able to eat. 

3. Continue to monitor the patient's MS Contin use.

4. Encourage liquids.

5. Monitor the patient closely for increasing abdominal pain.

6. Monitor the patient's blood pressure.

7. Continue to monitor the patient's diabetes with Accu-Chek a.c. and at bedtime.

8. Continue to encourage the patient not to smoke but she goes outside and smokes

all the time. 

9. Stress ulcer prophylaxis.

10. Discontinue her Protonix.

11. Start Pepcid 20 mg b.i.d.

12. Decubitus precautions.

13. DVT prophylaxis.

14. Continue nonweightbearing with trimalleolar fracture.

15. Continue PT and OT.





Job ID:  [id number]

## 2020-01-11 RX ADMIN — PANCRELIPASE SCH CAP: 60000; 12000; 38000 CAPSULE, DELAYED RELEASE PELLETS ORAL at 17:57

## 2020-01-11 RX ADMIN — ALOGLIPTIN SCH MG: 6.25 TABLET, FILM COATED ORAL at 08:24

## 2020-01-11 RX ADMIN — SIMETHICONE SCH MG: 80 TABLET, CHEWABLE ORAL at 08:25

## 2020-01-11 RX ADMIN — Medication SCH MG: at 08:25

## 2020-01-11 RX ADMIN — DOCUSATE SODIUM 50 MG AND SENNOSIDES 8.6 MG SCH: 8.6; 5 TABLET, FILM COATED ORAL at 21:21

## 2020-01-11 RX ADMIN — PANCRELIPASE SCH CAP: 60000; 12000; 38000 CAPSULE, DELAYED RELEASE PELLETS ORAL at 08:24

## 2020-01-11 RX ADMIN — PANCRELIPASE SCH CAP: 60000; 12000; 38000 CAPSULE, DELAYED RELEASE PELLETS ORAL at 12:17

## 2020-01-11 RX ADMIN — INSULIN GLARGINE SCH: 100 INJECTION, SOLUTION SUBCUTANEOUS at 21:25

## 2020-01-11 RX ADMIN — SIMETHICONE SCH MG: 80 TABLET, CHEWABLE ORAL at 21:20

## 2020-01-11 RX ADMIN — INSULIN GLARGINE SCH UNITS: 100 INJECTION, SOLUTION SUBCUTANEOUS at 08:26

## 2020-01-11 RX ADMIN — SIMETHICONE SCH MG: 80 TABLET, CHEWABLE ORAL at 15:37

## 2020-01-11 RX ADMIN — MULTIPLE VITAMINS W/ MINERALS TAB SCH TAB: TAB at 08:24

## 2020-01-11 RX ADMIN — DOCUSATE SODIUM 50 MG AND SENNOSIDES 8.6 MG SCH: 8.6; 5 TABLET, FILM COATED ORAL at 08:28

## 2020-01-11 RX ADMIN — Medication SCH MG: at 21:21

## 2020-01-11 RX ADMIN — BUPROPION HYDROCHLORIDE SCH MG: 150 TABLET, FILM COATED, EXTENDED RELEASE ORAL at 08:24

## 2020-01-11 NOTE — PRG
DATE OF SERVICE:  01/11/2020



SUBJECTIVE:  Ms. Nguyen is doing well.  She is having some loose stools, but it

is only once or twice a day.  She is still taking the Dulcolax p.r.n.  I am

wondering if we need to increase her Creon.  She is otherwise doing well.  She

states she is tolerating p.o. intake. 



OBJECTIVE:  VITAL SIGNS:  She is afebrile.  Heart rate 84, respirations 18, oxygen

saturation 99% on room air, and blood pressure 115/55. 

CARDIOVASCULAR SYSTEM:  S1-S2 plus. 

RESPIRATORY SYSTEM:  Normal vesicular breath sounds. 

ABDOMEN:  Soft, obese, and nontender.  Bowel sounds heard in all quadrants. 

EXTREMITIES:  Without cyanosis or clubbing.  Right foot in a walking boot.  Still

has the cervical collar on. 

CENTRAL NERVOUS SYSTEM:  Generalized weakness, otherwise nonfocal.



LABORATORY DATA:  Blood work from yesterday shows a white count of 11.7, H and H are

12.1 and 37.7.  Sodium 139, potassium 4.4, and BUN and creatinine are 27 and 1.4.

Blood sugars are 114, 99, 108, 76, and 133. 



IMPRESSION:  

1. Diabetes mellitus, type 2.

2. Hypertension.

3. Dyslipidemia.

4. Cervical myelopathy, status post decompression.

5. Right ankle fracture, status post surgery.

6. Resolved constipation.



PLAN:  

1. Continue current medications.

2. Nutritional support with heart healthy ADA diet.

3. Accu-Cheks with sliding scale coverage.

4. Orthopedic precautions.

5. Spinal precautions.

6. Routine laboratory values.

7. Physical Therapy.







Job ID:  [id number]

## 2020-01-12 RX ADMIN — INSULIN LISPRO PRN UNIT: 100 INJECTION, SOLUTION INTRAVENOUS; SUBCUTANEOUS at 12:40

## 2020-01-12 RX ADMIN — MULTIPLE VITAMINS W/ MINERALS TAB SCH TAB: TAB at 08:32

## 2020-01-12 RX ADMIN — PANCRELIPASE SCH CAP: 60000; 12000; 38000 CAPSULE, DELAYED RELEASE PELLETS ORAL at 08:31

## 2020-01-12 RX ADMIN — DOCUSATE SODIUM 50 MG AND SENNOSIDES 8.6 MG SCH TAB: 8.6; 5 TABLET, FILM COATED ORAL at 08:30

## 2020-01-12 RX ADMIN — INSULIN GLARGINE SCH UNITS: 100 INJECTION, SOLUTION SUBCUTANEOUS at 08:32

## 2020-01-12 RX ADMIN — SIMETHICONE SCH MG: 80 TABLET, CHEWABLE ORAL at 08:31

## 2020-01-12 RX ADMIN — Medication SCH MG: at 08:32

## 2020-01-12 RX ADMIN — ALOGLIPTIN SCH MG: 6.25 TABLET, FILM COATED ORAL at 08:31

## 2020-01-12 RX ADMIN — PANCRELIPASE SCH CAP: 60000; 12000; 38000 CAPSULE, DELAYED RELEASE PELLETS ORAL at 17:36

## 2020-01-12 RX ADMIN — SIMETHICONE SCH MG: 80 TABLET, CHEWABLE ORAL at 15:03

## 2020-01-12 RX ADMIN — BUPROPION HYDROCHLORIDE SCH MG: 150 TABLET, FILM COATED, EXTENDED RELEASE ORAL at 08:30

## 2020-01-12 RX ADMIN — PANCRELIPASE SCH CAP: 60000; 12000; 38000 CAPSULE, DELAYED RELEASE PELLETS ORAL at 12:40

## 2020-01-12 RX ADMIN — SIMETHICONE SCH MG: 80 TABLET, CHEWABLE ORAL at 20:29

## 2020-01-12 RX ADMIN — Medication SCH MG: at 20:23

## 2020-01-12 RX ADMIN — INSULIN GLARGINE SCH UNITS: 100 INJECTION, SOLUTION SUBCUTANEOUS at 20:34

## 2020-01-12 RX ADMIN — DOCUSATE SODIUM 50 MG AND SENNOSIDES 8.6 MG SCH TAB: 8.6; 5 TABLET, FILM COATED ORAL at 20:28

## 2020-01-12 NOTE — PRG
DATE OF SERVICE:  01/12/2020



SUBJECTIVE:  Ms. Nguyen is doing the same.  Denies any complaints.  Appetite

seems to be improving. 



OBJECTIVE:  VITAL SIGNS:  She is afebrile.  Heart rate 79, respirations 18, oxygen

saturation 99% on room air, blood pressure 121/59. 

CARDIOVASCULAR SYSTEM:  S1 and S2 plus. 

RESPIRATORY SYSTEM:  Normal vesicular breath sounds. 

ABDOMEN:  Soft, nontender.  Bowel sounds heard in all quadrants. 

EXTREMITIES:  Without cyanosis or clubbing.  Right leg with a walking boot, still

with a hard cervical collar. 

CENTRAL NERVOUS SYSTEM:  Improving deconditioning.



LABORATORY DATA:  Blood sugars are 133, 116, 98, 94 and 153.



IMPRESSION:  

1. Diabetes mellitus, type 2.

2. Cervical myelopathy status post decompression.

3. Right ankle fracture, status post surgery.

4. Hypertension.

5. Dyslipidemia.



PLAN:  

1. Continue current medications.

2. 1800-calorie heart-healthy ADA diet.

3. Accu-Cheks with sliding scale coverage.

4. Spinal precautions with cervical collar.

5. Orthopedic precautions with walking boot.

6. Physical therapy.

7. Routine laboratory values.

8. Dr. Keesha hernandez.







Job ID:  [id number]

## 2020-01-13 RX ADMIN — INSULIN GLARGINE SCH UNITS: 100 INJECTION, SOLUTION SUBCUTANEOUS at 20:22

## 2020-01-13 RX ADMIN — BUPROPION HYDROCHLORIDE SCH MG: 150 TABLET, FILM COATED, EXTENDED RELEASE ORAL at 09:02

## 2020-01-13 RX ADMIN — SIMETHICONE SCH MG: 80 TABLET, CHEWABLE ORAL at 16:07

## 2020-01-13 RX ADMIN — DOCUSATE SODIUM 50 MG AND SENNOSIDES 8.6 MG SCH TAB: 8.6; 5 TABLET, FILM COATED ORAL at 08:58

## 2020-01-13 RX ADMIN — ALOGLIPTIN SCH MG: 6.25 TABLET, FILM COATED ORAL at 09:00

## 2020-01-13 RX ADMIN — PANCRELIPASE SCH CAP: 60000; 12000; 38000 CAPSULE, DELAYED RELEASE PELLETS ORAL at 16:07

## 2020-01-13 RX ADMIN — MULTIPLE VITAMINS W/ MINERALS TAB SCH TAB: TAB at 09:00

## 2020-01-13 RX ADMIN — DOCUSATE SODIUM 50 MG AND SENNOSIDES 8.6 MG SCH TAB: 8.6; 5 TABLET, FILM COATED ORAL at 20:24

## 2020-01-13 RX ADMIN — Medication SCH MG: at 20:24

## 2020-01-13 RX ADMIN — SIMETHICONE SCH MG: 80 TABLET, CHEWABLE ORAL at 09:02

## 2020-01-13 RX ADMIN — SIMETHICONE SCH MG: 80 TABLET, CHEWABLE ORAL at 20:22

## 2020-01-13 RX ADMIN — PANCRELIPASE SCH CAP: 60000; 12000; 38000 CAPSULE, DELAYED RELEASE PELLETS ORAL at 12:11

## 2020-01-13 RX ADMIN — Medication SCH MG: at 09:02

## 2020-01-13 RX ADMIN — PANCRELIPASE SCH CAP: 60000; 12000; 38000 CAPSULE, DELAYED RELEASE PELLETS ORAL at 08:51

## 2020-01-13 RX ADMIN — INSULIN GLARGINE SCH: 100 INJECTION, SOLUTION SUBCUTANEOUS at 11:49

## 2020-01-13 NOTE — PRG
DATE OF SERVICE:  01/13/2020



SUBJECTIVE:  Ms. Nguyen is a 54-year-old white female, who unfortunately in

October had a TIA.  On November 12, she had a trimalleolar ankle fracture repair
, and 

on November 14, she has cervical neck repair done by Dr. Dodson.

 She eventually was stabilized and transferred to inpatient rehab at [facility] for PT and OT. 



Over the past week, she has had some distention and gaseous pain in her stomach
, has

not had a very good appetite.  Today, when I saw her,she states she is

feeling better and stomach is gradually feeling better also. 



X-rays have been unremarkable.



OBJECTIVE:  VITAL SIGNS:  Today reveal blood pressure 101/51 and 119/57.  Pulse 
77,

respirations 18, O2 saturation 96% to 99% on room air, T-max 98.1.  Sugars 
actually

been good ranging between 92 and 127 for the last 4 sugars. 

GENERAL:  This is a well-developed, well-nourished, slightly obese white female
, in

no apparent distress at this time. 

HEENT:  Normocephalic and nontraumatic cranium.  Pupils are equal, round, and

reactive.  Extraocular movements are intact.  Nose and throat are slightly dry. 

NECK:  Supple without masses, nodes, or bruits. 

CHEST:  Clear to auscultation.  No rales, rhonchi, wheezes, or cough is heard. 

HEART:  Reveals a regular rate and rhythm without murmurs, gallops, or rubs. 

ABDOMEN:  Soft with positive bowel sounds in all 4 quadrants.  No rebound or

guarding is noted. 

:  Deferred. 

EXTREMITIES:  Reveal no clubbing or cyanosis, with minimal edema.



ASSESSMENT:  

1. Stomach virus, much improved.

2. Trimalleolar fracture.

3. Cervical laminectomy.

4. Diabetes type 2.

5. Diastolic congestive heart failure.

6. Hypertension.

7. Chronic kidney disease, stage 2.

8. Pain management.

9. Bipolar.

10. Generalized weakness.



PLAN:  

1. The patient's abdomen is better.  She is eating better.

2. Continue the patient's immobilization with a walking boot.

3. Continue cervical collar.

4. Continue to monitor the patient's diabetes with Accu-Cheks a.c. and at 
bedtime.

5. Monitor the patient's signs and symptoms of congestive heart failure.

6. Monitor the patient's blood pressure closely and adjust medications as 
needed.

7. Encourage the patient to drink well.

8. Decubitus precautions.

9. DVT prophylaxis.

10. Continue nonweightbearing at this time.

11. Continue PT and OT.







Job ID:  [id number]



MTDD

## 2020-01-14 RX ADMIN — Medication SCH MG: at 20:17

## 2020-01-14 RX ADMIN — PANCRELIPASE SCH CAP: 60000; 12000; 38000 CAPSULE, DELAYED RELEASE PELLETS ORAL at 08:41

## 2020-01-14 RX ADMIN — SIMETHICONE SCH MG: 80 TABLET, CHEWABLE ORAL at 08:50

## 2020-01-14 RX ADMIN — DOCUSATE SODIUM 50 MG AND SENNOSIDES 8.6 MG SCH TAB: 8.6; 5 TABLET, FILM COATED ORAL at 20:17

## 2020-01-14 RX ADMIN — INSULIN GLARGINE SCH: 100 INJECTION, SOLUTION SUBCUTANEOUS at 20:17

## 2020-01-14 RX ADMIN — PANCRELIPASE SCH CAP: 60000; 12000; 38000 CAPSULE, DELAYED RELEASE PELLETS ORAL at 16:59

## 2020-01-14 RX ADMIN — SIMETHICONE SCH MG: 80 TABLET, CHEWABLE ORAL at 15:10

## 2020-01-14 RX ADMIN — PANCRELIPASE SCH CAP: 60000; 12000; 38000 CAPSULE, DELAYED RELEASE PELLETS ORAL at 11:55

## 2020-01-14 RX ADMIN — INSULIN GLARGINE SCH UNITS: 100 INJECTION, SOLUTION SUBCUTANEOUS at 08:46

## 2020-01-14 RX ADMIN — BUPROPION HYDROCHLORIDE SCH MG: 150 TABLET, FILM COATED, EXTENDED RELEASE ORAL at 08:44

## 2020-01-14 RX ADMIN — ALOGLIPTIN SCH MG: 6.25 TABLET, FILM COATED ORAL at 08:43

## 2020-01-14 RX ADMIN — Medication SCH MG: at 08:43

## 2020-01-14 RX ADMIN — DOCUSATE SODIUM 50 MG AND SENNOSIDES 8.6 MG SCH TAB: 8.6; 5 TABLET, FILM COATED ORAL at 08:49

## 2020-01-14 RX ADMIN — SIMETHICONE SCH MG: 80 TABLET, CHEWABLE ORAL at 20:14

## 2020-01-14 RX ADMIN — MULTIPLE VITAMINS W/ MINERALS TAB SCH TAB: TAB at 08:47

## 2020-01-14 NOTE — PRG
DATE OF SERVICE:  01/14/2020



SUBJECTIVE:  Ms. Nguyen is a very pleasant 54-year-old white female, who had a

TIA in October and on November 12th, she had a trimalleolar fracture repaired by

Orthopedics.  On November 14th, she had a cervical neck repair done by Dr. Dodson.

She eventually stabilized and was transferred down to [facility] for

physical therapy and occupational therapy to increase her strength and stamina. 



Over the past week, the patient has had significant GI distress.  She has had some

bloating and indigestion and is gradually getting better. 



She states she feels much better today.  Her x-rays remained unremarkable and her

white counts remained unremarkable.  She has not had an acute abdomen. 



OBJECTIVE:  VITAL SIGNS:  Reveal blood pressure this morning 109/59, pulse 87 to 89,

respirations 18, O2 saturations 97% to 98% on room air, T-max 98.1. 

GENERAL:  This is a well-developed, well-nourished, pleasant, somewhat obese white

female, in no apparent distress at this time. 

HEENT:  Reveals normocephalic and nontraumatic cranium.  Pupils are equal, round,

and reactive.  Extraocular movements are intact.  Nose and throat are slightly dry. 

NECK:  Supple without masses, nodes, or bruits. 

CHEST:  Clear to auscultation.  No rales, rhonchi, wheezes, or cough is noted. 

HEART:  Reveals a regular rate and rhythm without murmurs, gallops, or rubs. 

ABDOMEN:  Soft, nontender without organomegaly.  Normal bowel sounds are noted.  No

rebound or guarding is noted. 

:  Deferred. 

EXTREMITIES:  Reveal no clubbing, cyanosis, or edema.



LABORATORY DATA:  No labs were done today.



ASSESSMENT:  

1. Stomach virus, improved.

2. Trimalleolar fracture, stable.

3. Cervical laminectomy.

4. Diabetes type 2.

5. Diastolic congestive heart failure.

6. Hypertension.

7. Chronic kidney disease, stage 2.

8. Pain management.

9. Bipolar.

10. Generalized weakness.



PLAN:  

1. Continue bland diet and slowly progressed.

2. Continue the patient's immobilization with a walking boot.

3. Continue cervical collar.

4. Monitor the patient's Accu-Cheks closely.

5. Continue to monitor the patient's signs and symptoms of CHF.

6. Monitor the patient's blood pressure closely, adjust medications as needed.

7. Decubitus precautions.

8. DVT prophylaxis.

9. Continue nonweightbearing status at this time.

10. Encourage the patient to drink well.

11. Continue PT and OT.







Job ID:  [id number]

## 2020-01-15 LAB
ANION GAP SERPL CALC-SCNC: 18 MMOL/L (ref 10–20)
BUN SERPL-MCNC: 27 MG/DL (ref 9.8–20.1)
CALCIUM SERPL-MCNC: 8.6 MG/DL (ref 7.8–10.44)
CHLORIDE SERPL-SCNC: 99 MMOL/L (ref 98–107)
CO2 SERPL-SCNC: 25 MMOL/L (ref 22–29)
CREAT CL PREDICTED SERPL C-G-VRATE: 79 ML/MIN (ref 70–130)
GLUCOSE SERPL-MCNC: 137 MG/DL (ref 70–105)
POTASSIUM SERPL-SCNC: 4.5 MMOL/L (ref 3.5–5.1)
SODIUM SERPL-SCNC: 137 MMOL/L (ref 136–145)

## 2020-01-15 RX ADMIN — PANCRELIPASE SCH CAP: 60000; 12000; 38000 CAPSULE, DELAYED RELEASE PELLETS ORAL at 17:30

## 2020-01-15 RX ADMIN — PANCRELIPASE SCH CAP: 60000; 12000; 38000 CAPSULE, DELAYED RELEASE PELLETS ORAL at 08:51

## 2020-01-15 RX ADMIN — SIMETHICONE SCH MG: 80 TABLET, CHEWABLE ORAL at 15:42

## 2020-01-15 RX ADMIN — DOCUSATE SODIUM 50 MG AND SENNOSIDES 8.6 MG SCH TAB: 8.6; 5 TABLET, FILM COATED ORAL at 20:24

## 2020-01-15 RX ADMIN — MULTIPLE VITAMINS W/ MINERALS TAB SCH TAB: TAB at 08:58

## 2020-01-15 RX ADMIN — Medication SCH MG: at 08:59

## 2020-01-15 RX ADMIN — Medication SCH MG: at 20:20

## 2020-01-15 RX ADMIN — BUPROPION HYDROCHLORIDE SCH MG: 150 TABLET, FILM COATED, EXTENDED RELEASE ORAL at 08:55

## 2020-01-15 RX ADMIN — INSULIN GLARGINE SCH: 100 INJECTION, SOLUTION SUBCUTANEOUS at 20:21

## 2020-01-15 RX ADMIN — SIMETHICONE SCH MG: 80 TABLET, CHEWABLE ORAL at 20:24

## 2020-01-15 RX ADMIN — SIMETHICONE SCH MG: 80 TABLET, CHEWABLE ORAL at 09:03

## 2020-01-15 RX ADMIN — PANCRELIPASE SCH CAP: 60000; 12000; 38000 CAPSULE, DELAYED RELEASE PELLETS ORAL at 12:01

## 2020-01-15 RX ADMIN — ALOGLIPTIN SCH MG: 6.25 TABLET, FILM COATED ORAL at 08:57

## 2020-01-15 RX ADMIN — INSULIN GLARGINE SCH UNITS: 100 INJECTION, SOLUTION SUBCUTANEOUS at 09:05

## 2020-01-15 RX ADMIN — DOCUSATE SODIUM 50 MG AND SENNOSIDES 8.6 MG SCH TAB: 8.6; 5 TABLET, FILM COATED ORAL at 08:57

## 2020-01-16 RX ADMIN — PANCRELIPASE SCH CAP: 60000; 12000; 38000 CAPSULE, DELAYED RELEASE PELLETS ORAL at 08:27

## 2020-01-16 RX ADMIN — SIMETHICONE SCH MG: 80 TABLET, CHEWABLE ORAL at 20:48

## 2020-01-16 RX ADMIN — DOCUSATE SODIUM 50 MG AND SENNOSIDES 8.6 MG SCH: 8.6; 5 TABLET, FILM COATED ORAL at 20:49

## 2020-01-16 RX ADMIN — Medication SCH MG: at 08:29

## 2020-01-16 RX ADMIN — SIMETHICONE SCH MG: 80 TABLET, CHEWABLE ORAL at 08:28

## 2020-01-16 RX ADMIN — MULTIPLE VITAMINS W/ MINERALS TAB SCH TAB: TAB at 08:28

## 2020-01-16 RX ADMIN — BUPROPION HYDROCHLORIDE SCH MG: 150 TABLET, FILM COATED, EXTENDED RELEASE ORAL at 08:28

## 2020-01-16 RX ADMIN — Medication SCH MG: at 20:48

## 2020-01-16 RX ADMIN — ALOGLIPTIN SCH MG: 6.25 TABLET, FILM COATED ORAL at 08:28

## 2020-01-16 RX ADMIN — INSULIN GLARGINE SCH UNITS: 100 INJECTION, SOLUTION SUBCUTANEOUS at 20:49

## 2020-01-16 RX ADMIN — PANCRELIPASE SCH CAP: 60000; 12000; 38000 CAPSULE, DELAYED RELEASE PELLETS ORAL at 12:10

## 2020-01-16 RX ADMIN — SIMETHICONE SCH MG: 80 TABLET, CHEWABLE ORAL at 15:11

## 2020-01-16 RX ADMIN — INSULIN GLARGINE SCH UNITS: 100 INJECTION, SOLUTION SUBCUTANEOUS at 08:27

## 2020-01-16 RX ADMIN — DOCUSATE SODIUM 50 MG AND SENNOSIDES 8.6 MG SCH TAB: 8.6; 5 TABLET, FILM COATED ORAL at 08:28

## 2020-01-16 RX ADMIN — COLLAGENASE SANTYL SCH APPLIC: 250 OINTMENT TOPICAL at 08:27

## 2020-01-16 RX ADMIN — PANCRELIPASE SCH CAP: 60000; 12000; 38000 CAPSULE, DELAYED RELEASE PELLETS ORAL at 17:29

## 2020-01-16 NOTE — PRG
DATE OF SERVICE:  01/16/2020



SUBJECTIVE:  Ms. Nguyen is a well-developed, well-nourished 54-year-old white

female.  In October unfortunately, she had a TIA.  After that, she became very weak

and fell, and had a trimalleolar fracture.  This was repaired on November 12th by

Orthopedics. 



On November 14th, the patient went back to surgical suite for cervical neck repair

done by Dr. Dodson.  Postoperatively, the patient was stabilized and transferred to

[facility] for physical therapy and occupational therapy to increase

her strength and her stamina. 



The patient today was doing well. 



We did go over her paperwork that she had to follow up for continued checks for

disability.  I did finish my part completely and gave those to the director of

nurses assess her, get that sign by the patient and fax it off. 



PHYSICAL EXAMINATION:

VITAL SIGNS:  Reveal blood pressure this morning 126/58, pulse 85 to 83,

respirations 18 to 20, O2 saturation 95% to 97% on room air, T-max 98.5. 

GENERAL:  This is a well-developed, well-nourished, very pleasant, slightly obese

white female, in no apparent distress at this time. 

HEENT:  Normocephalic and nontraumatic cranium.  Pupils are equal, round, and

reactive.  Extraocular movements are intact.  Nose and throat are slightly clear. 

NECK:  Supple, but the patient remains in a C-collar. 

CHEST:  Clear to auscultation.  No rales, rhonchi, or wheezes are heard. 

HEART:  Reveals a regular rate and rhythm without murmurs, gallops, or rubs. 

ABDOMEN:  Soft, nontender without organomegaly.  Normal bowel sounds are noted.  No

rebound or guarding is noted. 

:  Exam is deferred. 

EXTREMITIES:  Reveal no clubbing, cyanosis, or edema.  The patient remains in a boot

immobilizer in right ankle. 



ASSESSMENT:  

1. Stomach virus, almost resolved.

2. Cervical laminectomy, still continues to wear C-collar.

3. Trimalleolar fracture, continues to wear ankle immobilizer.

4. Diabetes type 2.

5. Diastolic congestive heart failure, stable.

6. Hypertension.

7. Chronic kidney disease, stage 2.

8. Pain management.

9. History of hyperkalemia with potassium of 4.5.

10. Bipolar.

11. Generalized weakness.



PLAN:  

1. Continue to slowly progress her bland diet.

2. Continue cervical collar.

3. Continue immobilization work boot.

4. Continue to monitor the patient's diabetes with Accu-Cheks before meals and at

bedtime. 

5. Continue to monitor the patient's blood pressure closely and adjust medications

as needed. 

6. Monitor the patient for signs and symptoms of CHF.

7. Decubitus precautions.

8. DVT prophylaxis.

9. Continue nonweightbearing status.

10. Continue physical therapy and occupational therapy.







Job ID:  [id number]

## 2020-01-16 NOTE — PRG
DATE OF SERVICE:  01/15/2020



SUBJECTIVE:  Ms. Nguyen is a well-developed, well-nourished, 54-year-old white

female, who unfortunately had a TIA in October.  After that, she fell and had a

trimalleolar fracture.  That was repaired by Orthopedics on November 12th and on

November 14, she had to have a cervical neck repair done by Dr. Dodson.  She

eventually was stabilized and transferred to [facility] for physical

therapy and occupational therapy to increase her strength and stamina. 



The patient is actually doing much better and states her stomach is better and she

is feeling much better.  She has much less bloating and indigestion, but her stomach

is smaller since she has not been eating much.  She has no concerns or complaints

today. 



PHYSICAL EXAMINATION:

VITAL SIGNS:  Today reveal blood pressure 148/68, pulse 96, respirations 18, O2

saturation 96% on room air, T-max 97.7. 

GENERAL:  This is a well-developed, well-nourished, slightly obese white female, in

no apparent distress at this time. 

HEENT:  Reveals normocephalic and nontraumatic cranium.  Pupils are equally round

and reactive.  Extraocular movements are intact.  Nose and throat, slightly dry. 

NECK:  Supple without masses, nodes, or bruits. 

CHEST:  Clear to auscultation.  No rales, rhonchi, wheezes, or cough.  The patient

still continues to smoke outside. 

HEART:  Reveals a regular rate and rhythm without murmurs, gallops, or rubs. 

ABDOMEN:  Soft, obese, nontender without organomegaly.  Normal bowel sounds are

noted in all 4 quadrants.  No rebound or guarding is noted.  Still is softer than

usual and not distended. 

GENITOURINARY:  Deferred. 

EXTREMITIES:  Reveal no clubbing, cyanosis, or edema.  The patient still continues

to have her boot immobilizer on her right leg. 



ASSESSMENT:  

1. Stomach virus, much improved.

2. Cervical laminectomy, stable.

3. Trimalleolar fracture, stable.

4. Diabetes type 2, stable.

5. Diastolic congestive heart failure, stable.

6. Hypertension.

7. Chronic kidney disease, stage 2.

8. Pain management.

9. History of hyperkalemia with a potassium of 4.5, checked today.

10. Bipolar.

11. Generalized weakness.



PLAN:  

1. Continue to slowly progress her bland diet.

2. Continue the patient mobilization with walking boot.

3. Continue cervical collar.

4. We will continue to monitor the patient's diabetes with Accu-Cheks before meals

and at bedtime. 

5. Continue to monitor the patient's blood pressure closely and adjust medications

as needed. 

6. Monitor the patient for signs and symptoms of CHF.

7. Decubitus precautions.

8. DVT prophylaxis.

9. Continue nonweightbearing status.

10. Encourage the patient to drink well.

11. Continue PT and OT.







Job ID:  [id number]

## 2020-01-17 RX ADMIN — PANCRELIPASE SCH CAP: 60000; 12000; 38000 CAPSULE, DELAYED RELEASE PELLETS ORAL at 12:03

## 2020-01-17 RX ADMIN — INSULIN GLARGINE SCH UNITS: 100 INJECTION, SOLUTION SUBCUTANEOUS at 08:06

## 2020-01-17 RX ADMIN — COLLAGENASE SANTYL SCH APPLIC: 250 OINTMENT TOPICAL at 08:04

## 2020-01-17 RX ADMIN — SIMETHICONE SCH MG: 80 TABLET, CHEWABLE ORAL at 15:21

## 2020-01-17 RX ADMIN — PANCRELIPASE SCH CAP: 60000; 12000; 38000 CAPSULE, DELAYED RELEASE PELLETS ORAL at 08:01

## 2020-01-17 RX ADMIN — PANCRELIPASE SCH CAP: 60000; 12000; 38000 CAPSULE, DELAYED RELEASE PELLETS ORAL at 17:13

## 2020-01-17 RX ADMIN — ALOGLIPTIN SCH MG: 6.25 TABLET, FILM COATED ORAL at 08:01

## 2020-01-17 RX ADMIN — SIMETHICONE SCH MG: 80 TABLET, CHEWABLE ORAL at 08:01

## 2020-01-17 RX ADMIN — SIMETHICONE SCH MG: 80 TABLET, CHEWABLE ORAL at 20:42

## 2020-01-17 RX ADMIN — DOCUSATE SODIUM 50 MG AND SENNOSIDES 8.6 MG SCH TAB: 8.6; 5 TABLET, FILM COATED ORAL at 20:42

## 2020-01-17 RX ADMIN — BUPROPION HYDROCHLORIDE SCH MG: 150 TABLET, FILM COATED, EXTENDED RELEASE ORAL at 08:02

## 2020-01-17 RX ADMIN — DOCUSATE SODIUM 50 MG AND SENNOSIDES 8.6 MG SCH TAB: 8.6; 5 TABLET, FILM COATED ORAL at 08:02

## 2020-01-17 RX ADMIN — Medication SCH MG: at 08:01

## 2020-01-17 RX ADMIN — INSULIN GLARGINE SCH UNITS: 100 INJECTION, SOLUTION SUBCUTANEOUS at 20:40

## 2020-01-17 RX ADMIN — Medication SCH MG: at 20:39

## 2020-01-17 RX ADMIN — MULTIPLE VITAMINS W/ MINERALS TAB SCH TAB: TAB at 08:02

## 2020-01-17 NOTE — RAD
XR Foot Rt 3 View STANDARD



HISTORY: Injury, right foot pain



FINDINGS:

No fracture or dislocation is identified. Degenerative changes are present. Posterior and plantar stephen
caneal spurs are present. There are postop changes and metallic hardware in the distal tibia and

fibula.



Reported By: Reza Gomez 

Electronically Signed:  1/17/2020 11:07 AM

## 2020-01-17 NOTE — PRG
DATE OF SERVICE:  01/17/2020



SUBJECTIVE:  Ms. Nguyen is a 54-year-old white female, who unfortunately had 
a

TIA in October.  She was very weak and had a fall and then sustained a 
trimalleolar

fracture, that was repaired on November 12 by Orthopedics.  Two days later, the

patient went to the surgical suite with Dr. Dodson, and at that time, had a 
cervical

neck repair done.  Postoperatively, she was stabilized and eventually 
transferred to

[facility] for physical therapy and occupational therapy to 
increase

her strength and stamina. 



The patient has been actually doing very well, but somehow today, she fell 
during

therapy.  She had x-rays done of the tibia, fibula, knee, foot, and ankle, which

were all unremarkable. 



OBJECTIVE:  VITAL SIGNS:  Today reveal blood pressure 117/58, pulse 77 to 85,

respirations 16 to 18, O2 saturation 98% to 99%, and T-max 98.1. 

GENERAL:  This is a well-developed, well-nourished, very pleasant 54-year-old 
white

female, states she is actually doing very well, but leg aches a little bit.  We 
will

have her propped up in bed, elevated, and put some ice on it. 

HEENT:  Reveals normocephalic and nontraumatic cranium.  Pupils are equally 
round

and reactive.  Extraocular movements are intact.  Nose and throat are slightly

clear. 

NECK:  Supple without masses, nodes, or bruits.  C-collar in place. 

CHEST:  Clear to auscultation.  No rales, rhonchi, wheezes, or cough are heard.
  The

patient continues to smoke daily. 

HEART:  Reveals a regular rate and rhythm without murmurs, gallops, or rubs. 

ABDOMEN:  Soft, nontender without organomegaly.  Normal bowel sounds are noted 
in

all 4 quadrants.  No rebound or guarding is noted. 

:  Deferred. 

EXTREMITIES:  Reveal no clubbing, cyanosis, or edema.  The patient's right leg

remains in the boot.  We will elevate it and ice it at this time. 



ASSESSMENT:  

1. Stomach virus, which is resolved.

2. Cervical laminectomy, continues to wear C-collar.

3. Trimalleolar fracture she is wearing her ankle immobilizer with 4 sets of

x-rays, which were all unremarkable. 

4. Diabetes type 2.

5. Diastolic congestive heart failure, stable.

6. Hypertension.

7. Chronic kidney disease, stage 2.

8. Pain management.

9. History of hyperkalemia.

10. Bipolar.

11. Generalized weakness.



PLAN:  

1. Since the patient's knee and ankle ache,  and the x-rays are unremarkable, 
we will place

her in bed elevated and put some ice on it to bring some of the swelling down. 

2. Continue wearing cervical collar.

3. The patient will continue to wear her immobilization work boot at all times 
she

is out of bed. 

4. Continue to monitor the patient's diabetes with Accu-Cheks before meals and 
at

bedtime. 

5. Continue to monitor the patient's blood pressure closely and adjust 
medications

as needed. 

6. Monitor the patient for signs and symptoms of congestive heart failure.

7. Continue decubitus precautions.

8. Continue nonweightbearing status.

9. Continue PT and OT.







Job ID:  [id number]



MTDD

## 2020-01-17 NOTE — RAD
XR Knee Rt 4 View STANDARD



HISTORY: Fall, right knee pain



FINDINGS:

No fracture or dislocation is identified. Mild degenerative changes are present.



Reported By: Reza Gomez 

Electronically Signed:  1/17/2020 11:16 AM

## 2020-01-17 NOTE — RAD
Right ankle 3 views:



HISTORY: Injury, right ankle pain



COMPARISON: 11/10/2019



FINDINGS:

There are postop changes with plate and screws internally fixing the fracture of the distal fibula no
joyce on the previous exam in good position and alignment. Fracture line is visualized. There are 2

screws internally fixing the medial malleolar fracture which appears to have healed. The alignment of
 the bones is anatomic. The ankle mortise is maintained.



Reported By: Reza Gomez 

Electronically Signed:  1/17/2020 11:13 AM

## 2020-01-17 NOTE — RAD
XR Tib Fib Rt Leg 2 View



HISTORY: Fall, right leg pain



FINDINGS:

There are postop changes and metallic hardware in the distal tibia and fibula. No acute fracture is s
een.



Reported By: Reza Gomez 

Electronically Signed:  1/17/2020 11:06 AM

## 2020-01-18 RX ADMIN — SIMETHICONE SCH MG: 80 TABLET, CHEWABLE ORAL at 14:22

## 2020-01-18 RX ADMIN — BUPROPION HYDROCHLORIDE SCH MG: 150 TABLET, FILM COATED, EXTENDED RELEASE ORAL at 08:37

## 2020-01-18 RX ADMIN — INSULIN GLARGINE SCH UNITS: 100 INJECTION, SOLUTION SUBCUTANEOUS at 20:29

## 2020-01-18 RX ADMIN — DOCUSATE SODIUM 50 MG AND SENNOSIDES 8.6 MG SCH TAB: 8.6; 5 TABLET, FILM COATED ORAL at 20:25

## 2020-01-18 RX ADMIN — ALOGLIPTIN SCH MG: 6.25 TABLET, FILM COATED ORAL at 08:36

## 2020-01-18 RX ADMIN — INSULIN GLARGINE SCH UNITS: 100 INJECTION, SOLUTION SUBCUTANEOUS at 08:37

## 2020-01-18 RX ADMIN — Medication SCH MG: at 20:25

## 2020-01-18 RX ADMIN — PANCRELIPASE SCH CAP: 60000; 12000; 38000 CAPSULE, DELAYED RELEASE PELLETS ORAL at 17:29

## 2020-01-18 RX ADMIN — COLLAGENASE SANTYL SCH APPLIC: 250 OINTMENT TOPICAL at 08:39

## 2020-01-18 RX ADMIN — Medication SCH MG: at 08:36

## 2020-01-18 RX ADMIN — SIMETHICONE SCH MG: 80 TABLET, CHEWABLE ORAL at 20:24

## 2020-01-18 RX ADMIN — MULTIPLE VITAMINS W/ MINERALS TAB SCH TAB: TAB at 08:38

## 2020-01-18 RX ADMIN — DOCUSATE SODIUM 50 MG AND SENNOSIDES 8.6 MG SCH TAB: 8.6; 5 TABLET, FILM COATED ORAL at 08:40

## 2020-01-18 RX ADMIN — PANCRELIPASE SCH CAP: 60000; 12000; 38000 CAPSULE, DELAYED RELEASE PELLETS ORAL at 11:30

## 2020-01-18 RX ADMIN — PANCRELIPASE SCH CAP: 60000; 12000; 38000 CAPSULE, DELAYED RELEASE PELLETS ORAL at 08:35

## 2020-01-18 RX ADMIN — SIMETHICONE SCH MG: 80 TABLET, CHEWABLE ORAL at 08:40

## 2020-01-18 NOTE — PRG
DATE OF SERVICE:  01/18/2020



SUBJECTIVE:  Ms. Nguyen had a fall yesterday while working with therapy.  She had

x-rays done, which were unremarkable.  She states that her pain is better, but it is

still there.  She is tolerating her walking boot to her right leg and foot.  She is

still having the cervical brace, mostly compliant. 



OBJECTIVE:  VITAL SIGNS:  She is afebrile, heart rate 84, respirations 20, oxygen

saturation 99% on room air, blood pressure 110/52. 

CARDIOVASCULAR:  S1, S2 plus. 

RESPIRATORY:  Normal vesicular breath sounds. 

ABDOMEN:  Soft, nontender.  Bowel sounds heard in all quadrants. 

EXTREMITIES:  Without cyanosis or clubbing.  Right leg in a walking boot. 

CENTRAL NERVOUS SYSTEM:  Generalized weakness.



IMPRESSION:  

1. Cervical myelopathy status post decompression.

2. Right ankle fracture, status post surgery.

3. Diabetes mellitus type 2.

4. Hypertension.

5. Dyslipidemia.



PLAN:  

1. Continue current medications.

2. 1800-calorie heart healthy ADA diet.

3. Accu-Cheks with sliding scale coverage.

4. Monitor blood pressure and adjust medications as needed.

5. DVT prophylaxis per primary service.

6. Decubitus precaution.

7. Stress ulcer prophylaxis.

8. Orthopedic precautions.

9. Spinal precautions.

10. Physical therapy.





Job ID:  [id number]

## 2020-01-19 RX ADMIN — Medication SCH MG: at 20:40

## 2020-01-19 RX ADMIN — DOCUSATE SODIUM 50 MG AND SENNOSIDES 8.6 MG SCH TAB: 8.6; 5 TABLET, FILM COATED ORAL at 20:48

## 2020-01-19 RX ADMIN — PANCRELIPASE SCH CAP: 60000; 12000; 38000 CAPSULE, DELAYED RELEASE PELLETS ORAL at 12:10

## 2020-01-19 RX ADMIN — MULTIPLE VITAMINS W/ MINERALS TAB SCH TAB: TAB at 08:43

## 2020-01-19 RX ADMIN — SIMETHICONE SCH MG: 80 TABLET, CHEWABLE ORAL at 08:43

## 2020-01-19 RX ADMIN — COLLAGENASE SANTYL SCH APPLIC: 250 OINTMENT TOPICAL at 08:44

## 2020-01-19 RX ADMIN — PANCRELIPASE SCH CAP: 60000; 12000; 38000 CAPSULE, DELAYED RELEASE PELLETS ORAL at 08:39

## 2020-01-19 RX ADMIN — INSULIN GLARGINE SCH UNITS: 100 INJECTION, SOLUTION SUBCUTANEOUS at 20:41

## 2020-01-19 RX ADMIN — Medication SCH MG: at 08:41

## 2020-01-19 RX ADMIN — DOCUSATE SODIUM 50 MG AND SENNOSIDES 8.6 MG SCH TAB: 8.6; 5 TABLET, FILM COATED ORAL at 08:43

## 2020-01-19 RX ADMIN — ALOGLIPTIN SCH MG: 6.25 TABLET, FILM COATED ORAL at 08:40

## 2020-01-19 RX ADMIN — SIMETHICONE SCH MG: 80 TABLET, CHEWABLE ORAL at 20:52

## 2020-01-19 RX ADMIN — PANCRELIPASE SCH CAP: 60000; 12000; 38000 CAPSULE, DELAYED RELEASE PELLETS ORAL at 17:13

## 2020-01-19 RX ADMIN — GUAIFENESIN AND DEXTROMETHORPHAN PRN ML: 100; 10 SYRUP ORAL at 17:13

## 2020-01-19 RX ADMIN — INSULIN GLARGINE SCH UNITS: 100 INJECTION, SOLUTION SUBCUTANEOUS at 08:39

## 2020-01-19 RX ADMIN — BUPROPION HYDROCHLORIDE SCH MG: 150 TABLET, FILM COATED, EXTENDED RELEASE ORAL at 08:41

## 2020-01-19 RX ADMIN — SIMETHICONE SCH MG: 80 TABLET, CHEWABLE ORAL at 14:42

## 2020-01-19 NOTE — PRG
DATE OF SERVICE:  01/19/2020



SUBJECTIVE:  Ms. Nguyen is doing well.  Denies any complaints.  Checked her ankle

incision site and the lateral incision dehiscence is healing nicely.  She is

complaining of occasional cough and congestion and apparently normally takes

Robitussin at home and would like some.  No family at bedside.  Discussed with

nursing. 



OBJECTIVE:  VITAL SIGNS:  She is afebrile.  Heart rate 92, respirations 18, oxygen

saturation 98% on room air, blood pressure 130/62. 

CARDIOVASCULAR SYSTEM:  S1 and S2 plus. 

RESPIRATORY SYSTEM:  Normal vesicular breath sounds. 

ABDOMEN:  Soft and nontender.  Bowel sounds heard in all quadrants. 

EXTREMITIES:  Without cyanosis or clubbing.  1 to 2+ pitting edema, chronic. 

CENTRAL NERVOUS SYSTEM:  Grossly nonfocal.



IMPRESSION:  

1. Right ankle fracture, status post surgical fixation.

2. Cervical myelopathy, status post decompression.

3. Diabetes mellitus, type 2.

4. Hypertension.

5. Dyslipidemia.

6. Sinus congestion.



PLAN:  

1. Continue current medications.

2. 1800-calorie heart healthy ADA diet.

3. Accu-Cheks with sliding scale coverage.

4. Robitussin DM 5 mL q.i.d. p.r.n.

5. DVT and stress ulcer prophylaxis.

6. Decubitus precaution.

7. Orthopedic precautions.

8. Dr. Thao back tonight.





Job ID:  [id number]

## 2020-01-20 RX ADMIN — SIMETHICONE SCH MG: 80 TABLET, CHEWABLE ORAL at 08:48

## 2020-01-20 RX ADMIN — SIMETHICONE SCH MG: 80 TABLET, CHEWABLE ORAL at 20:31

## 2020-01-20 RX ADMIN — PANCRELIPASE SCH CAP: 60000; 12000; 38000 CAPSULE, DELAYED RELEASE PELLETS ORAL at 14:00

## 2020-01-20 RX ADMIN — INSULIN GLARGINE SCH UNITS: 100 INJECTION, SOLUTION SUBCUTANEOUS at 08:49

## 2020-01-20 RX ADMIN — SIMETHICONE SCH MG: 80 TABLET, CHEWABLE ORAL at 14:00

## 2020-01-20 RX ADMIN — Medication SCH MG: at 20:28

## 2020-01-20 RX ADMIN — PANCRELIPASE SCH CAP: 60000; 12000; 38000 CAPSULE, DELAYED RELEASE PELLETS ORAL at 08:46

## 2020-01-20 RX ADMIN — ALOGLIPTIN SCH MG: 6.25 TABLET, FILM COATED ORAL at 08:48

## 2020-01-20 RX ADMIN — PANCRELIPASE SCH CAP: 60000; 12000; 38000 CAPSULE, DELAYED RELEASE PELLETS ORAL at 17:23

## 2020-01-20 RX ADMIN — COLLAGENASE SANTYL SCH APPLIC: 250 OINTMENT TOPICAL at 08:50

## 2020-01-20 RX ADMIN — DOCUSATE SODIUM 50 MG AND SENNOSIDES 8.6 MG SCH TAB: 8.6; 5 TABLET, FILM COATED ORAL at 08:49

## 2020-01-20 RX ADMIN — BUPROPION HYDROCHLORIDE SCH MG: 150 TABLET, FILM COATED, EXTENDED RELEASE ORAL at 08:49

## 2020-01-20 RX ADMIN — MULTIPLE VITAMINS W/ MINERALS TAB SCH TAB: TAB at 08:46

## 2020-01-20 RX ADMIN — DOCUSATE SODIUM 50 MG AND SENNOSIDES 8.6 MG SCH TAB: 8.6; 5 TABLET, FILM COATED ORAL at 20:31

## 2020-01-20 RX ADMIN — GUAIFENESIN AND DEXTROMETHORPHAN PRN ML: 100; 10 SYRUP ORAL at 08:45

## 2020-01-20 RX ADMIN — Medication SCH MG: at 08:49

## 2020-01-20 RX ADMIN — INSULIN GLARGINE SCH UNITS: 100 INJECTION, SOLUTION SUBCUTANEOUS at 20:29

## 2020-01-20 NOTE — PRG
DATE OF SERVICE:  01/20/2020



SUBJECTIVE:  Ms. Nguyen is a well-developed, well-nourished 54-year-old white

female, who had a TIA back in October.  She was doing well, but then she fell and

had a trimalleolar fracture that was repaired on November 12 by Orthopedics.  Two

days later, the patient went to surgical suite by Dr. Dodson and had a cervical neck

repair done.  Postoperatively, she was stabilized and eventually transferred to

[facility] for PT and OT to increase her strength and stamina. 



The patient is still doing well, but she is still nonweightbearing.  Unfortunately,

she fell last week, which had x-rays of the fibula, tibia, knee, foot, and ankle,

which were all unremarkable. 



OBJECTIVE:  VITAL SIGNS:  Today reveal blood pressure 125/59, pulse 74 to 81,

respirations 16 to 18, O2 saturation 95% to 98% on room air, and T-max 98.1. 

GENERAL:  This is a well-developed, well-nourished, very pleasant 54-year-old white

female, slightly obese, in no apparent distress at this time. 

HEENT:  Reveals normocephalic and nontraumatic cranium.  Pupils are equal, round,

and reactive.  Extraocular movements are intact.  Nose and throat are slightly dry. 

NECK:  Supple without masses, nodes, or bruits. 

CHEST:  Clear to auscultation.  No rales, rhonchi, or wheezes are heard. 

HEART:  Reveals a regular rate and rhythm without murmurs, gallops, or rubs. 

ABDOMEN:  Soft and nontender without organomegaly.  Normal bowel sounds are noted in

all 4 quadrants.  No rebound or guarding is noted. 

:  Deferred. 

EXTREMITIES:  Reveal no clubbing, cyanosis, or edema.  The patient's right leg is in

a walking boot, but is nonweightbearing. 



ASSESSMENT:  

1. Cervical laminectomy, continues to wear C-collar.

2. Trimalleolar fracture, continues to wear ankle immobilizer with no toe-touch.

3. Diabetes type 2.

4. Diastolic congestive heart failure.

5. Hypertension.

6. Chronic kidney disease, stage 2.

7. Pain management.

8. Hyperkalemia.

9. Bipolar.

10. Generalized weakness.



PLAN:  

1. Continue to wear cervical collar.

2. Continue to wear immobilization boot at all times when she is out of bed.

3. Continue to monitor the patient's diabetes with Accu-Cheks before meals and at

bedtime. 

4. Continue to monitor the patient's blood pressure closely and adjust medications

as needed. 

5. Continue to monitor the patient for signs and symptoms of congestive heart

failure. 

6. Continue decubitus precautions.

7. Nonweightbearing status.

8. Continue physical therapy and occupational therapy.







Job ID:  [id number]

## 2020-01-21 RX ADMIN — Medication SCH MG: at 08:18

## 2020-01-21 RX ADMIN — ALOGLIPTIN SCH MG: 6.25 TABLET, FILM COATED ORAL at 08:17

## 2020-01-21 RX ADMIN — INSULIN GLARGINE SCH UNITS: 100 INJECTION, SOLUTION SUBCUTANEOUS at 20:36

## 2020-01-21 RX ADMIN — SIMETHICONE SCH MG: 80 TABLET, CHEWABLE ORAL at 20:34

## 2020-01-21 RX ADMIN — SIMETHICONE SCH MG: 80 TABLET, CHEWABLE ORAL at 08:17

## 2020-01-21 RX ADMIN — PANCRELIPASE SCH CAP: 60000; 12000; 38000 CAPSULE, DELAYED RELEASE PELLETS ORAL at 08:16

## 2020-01-21 RX ADMIN — DOCUSATE SODIUM 50 MG AND SENNOSIDES 8.6 MG SCH TAB: 8.6; 5 TABLET, FILM COATED ORAL at 08:17

## 2020-01-21 RX ADMIN — GUAIFENESIN AND DEXTROMETHORPHAN PRN ML: 100; 10 SYRUP ORAL at 20:38

## 2020-01-21 RX ADMIN — INSULIN GLARGINE SCH UNITS: 100 INJECTION, SOLUTION SUBCUTANEOUS at 08:15

## 2020-01-21 RX ADMIN — COLLAGENASE SANTYL SCH APPLIC: 250 OINTMENT TOPICAL at 08:19

## 2020-01-21 RX ADMIN — SIMETHICONE SCH MG: 80 TABLET, CHEWABLE ORAL at 16:12

## 2020-01-21 RX ADMIN — DOCUSATE SODIUM 50 MG AND SENNOSIDES 8.6 MG SCH TAB: 8.6; 5 TABLET, FILM COATED ORAL at 20:38

## 2020-01-21 RX ADMIN — PANCRELIPASE SCH CAP: 60000; 12000; 38000 CAPSULE, DELAYED RELEASE PELLETS ORAL at 16:12

## 2020-01-21 RX ADMIN — Medication SCH MG: at 20:35

## 2020-01-21 RX ADMIN — BUPROPION HYDROCHLORIDE SCH MG: 150 TABLET, FILM COATED, EXTENDED RELEASE ORAL at 08:18

## 2020-01-21 RX ADMIN — PANCRELIPASE SCH CAP: 60000; 12000; 38000 CAPSULE, DELAYED RELEASE PELLETS ORAL at 12:12

## 2020-01-21 RX ADMIN — GUAIFENESIN AND DEXTROMETHORPHAN PRN ML: 100; 10 SYRUP ORAL at 00:22

## 2020-01-21 RX ADMIN — MULTIPLE VITAMINS W/ MINERALS TAB SCH TAB: TAB at 08:18

## 2020-01-21 NOTE — PRG
DATE OF SERVICE:  01/21/2020



SUBJECTIVE:  Ms. Nguyen is a very pleasant 54-year-old white female, who has had

multiple unfortunate episodes.  She had a TIA back in October.  She then fell and

had a trimalleolar fracture, which was repaired on November 12 by Orthopedics.  Two

days later, the patient had a cervical neck repair done by Dr. Dodson.

Postoperatively, she was eventually stabilized and transferred to [facility] for PT and OT to increase her strength and stamina. 



The patient continues to be nonweightbearing yesterday.  She hopped on 1 foot about

20 feet.  Unfortunately, she fell last week, which made her more achy and a little

weaker.  All of her x-rays were unremarkable. 



OBJECTIVE:  VITAL SIGNS:  Reveal blood pressure 126/58, pulse 78 to 81, respirations

18, O2 saturation 95% to 100% on room air, and T-max 98.1. 

GENERAL:  This is a well-developed, well-nourished, very pleasant, slightly obese

54-year-old white female, in no apparent distress at this time. 

HEENT:  Reveals normocephalic and nontraumatic cranium.  The pupils are equally

round and reactive.  Extraocular movements are intact.  Nose and throat are slightly

dry. 

NECK:  Supple without masses, nodes, or bruits. 

CHEST:  Clear to auscultation.  No rales, rhonchi, or wheezes are heard. 

HEART:  Reveals a regular rate and rhythm without murmurs, gallops, or rubs. 

ABDOMEN:  Soft and nontender without organomegaly.  Normal bowel sounds noted in all

4 quadrants.  No rebound or guarding is noted.  Her abdominal exam is completely

normal.  She states her diet is back to normal. 

:  Deferred. 

EXTREMITIES:  Reveal walking boot on her right lower extremity.  Otherwise, no

clubbing, cyanosis, or edema. 



ASSESSMENT:  

1. Trimalleolar fracture, continues to wear ankle immobilizer with no toe-touching

weight at all. 

2. Cervical laminectomy, continues to were C-collar.

3. Diabetes type 2, fairly stable.

4. Diastolic congestive heart failure, stable.

5. Hypertension.

6. Chronic kidney disease, stage 2.

7. Pain management.

8. Hyperkalemia.

9. Bipolar.

10. Generalized weakness.



PLAN:  

1. Continue to wear cervical collar.

2. Continue to wear immobilization boot at all times when she is out of town.

3. Continue to monitor the patient's diabetes with Accu-Cheks before and after meals.

4. Continue to monitor the patient's blood pressure closely and adjust medications

as needed. 

5. Continue to monitor the patient for signs and symptoms of CHF.

6. Continue decubitus precautions.

7. Nonweightbearing status.

8. Continue physical therapy and occupational therapy.







Job ID:  [id number]

## 2020-01-22 RX ADMIN — GUAIFENESIN AND DEXTROMETHORPHAN PRN ML: 100; 10 SYRUP ORAL at 01:48

## 2020-01-22 RX ADMIN — MULTIPLE VITAMINS W/ MINERALS TAB SCH TAB: TAB at 08:13

## 2020-01-22 RX ADMIN — DOCUSATE SODIUM 50 MG AND SENNOSIDES 8.6 MG SCH TAB: 8.6; 5 TABLET, FILM COATED ORAL at 08:14

## 2020-01-22 RX ADMIN — DOCUSATE SODIUM 50 MG AND SENNOSIDES 8.6 MG SCH TAB: 8.6; 5 TABLET, FILM COATED ORAL at 20:57

## 2020-01-22 RX ADMIN — INSULIN LISPRO PRN UNIT: 100 INJECTION, SOLUTION INTRAVENOUS; SUBCUTANEOUS at 11:55

## 2020-01-22 RX ADMIN — BUPROPION HYDROCHLORIDE SCH MG: 150 TABLET, FILM COATED, EXTENDED RELEASE ORAL at 08:11

## 2020-01-22 RX ADMIN — PANCRELIPASE SCH CAP: 60000; 12000; 38000 CAPSULE, DELAYED RELEASE PELLETS ORAL at 11:54

## 2020-01-22 RX ADMIN — PANCRELIPASE SCH CAP: 60000; 12000; 38000 CAPSULE, DELAYED RELEASE PELLETS ORAL at 08:08

## 2020-01-22 RX ADMIN — Medication SCH MG: at 20:52

## 2020-01-22 RX ADMIN — SIMETHICONE SCH MG: 80 TABLET, CHEWABLE ORAL at 20:58

## 2020-01-22 RX ADMIN — INSULIN GLARGINE SCH UNITS: 100 INJECTION, SOLUTION SUBCUTANEOUS at 20:53

## 2020-01-22 RX ADMIN — ALOGLIPTIN SCH MG: 6.25 TABLET, FILM COATED ORAL at 08:09

## 2020-01-22 RX ADMIN — INSULIN GLARGINE SCH UNITS: 100 INJECTION, SOLUTION SUBCUTANEOUS at 08:12

## 2020-01-22 RX ADMIN — PANCRELIPASE SCH CAP: 60000; 12000; 38000 CAPSULE, DELAYED RELEASE PELLETS ORAL at 17:23

## 2020-01-22 RX ADMIN — GUAIFENESIN AND DEXTROMETHORPHAN PRN ML: 100; 10 SYRUP ORAL at 08:15

## 2020-01-22 RX ADMIN — GUAIFENESIN AND DEXTROMETHORPHAN PRN ML: 100; 10 SYRUP ORAL at 20:58

## 2020-01-22 RX ADMIN — SIMETHICONE SCH MG: 80 TABLET, CHEWABLE ORAL at 14:55

## 2020-01-22 RX ADMIN — COLLAGENASE SANTYL SCH APPLIC: 250 OINTMENT TOPICAL at 09:52

## 2020-01-22 RX ADMIN — SIMETHICONE SCH MG: 80 TABLET, CHEWABLE ORAL at 08:14

## 2020-01-22 RX ADMIN — Medication SCH MG: at 08:11

## 2020-01-22 NOTE — PRG
DATE OF SERVICE:  01/22/2020



SUBJECTIVE:  Ms. Nguyen is a 54-year-old white female, who unfortunately had a

TIA back in October.  While she was bending from bed, she fell and had a

trimalleolar fracture.  It was repaired on 11/12/2019, by Orthopedics.  Two days

later, the patient had a cervical neck repair done by Dr. Dodson.  Postoperatively,

she was eventually stabilized and was transferred to [facility] for

physical therapy and occupational therapy to increase her strength and stamina. 



The patient continues to be very active in her therapy.  She is hopping and trying

to get stronger on other leg.  She is still nonweightbearing.  Unfortunately, last

week, her good knee gave way, and she fell.  She did hit her fractured leg, which is

on the right side.  X-rays were done, which were all reported as negative.  She has

begun to feel better about it, but she still worries that she is weak and that leg

may give way again and she may have another problem. 



OBJECTIVE:  VITAL SIGNS:  Today reveal blood pressure 118/56, pulse 78, respirations

18, O2 saturation 97% on room air, T-max 98.0. 

GENERAL:  This is a well-developed, well-nourished, very pleasant, 54-year-old,

white female.  She was found outside sitting underneath the cullen of the hospital,

where it is cool and misting.  She has no complaints or concerns today. 

HEENT:  Reveals normocephalic and nontraumatic cranium.  Pupils are equally round

and reactive.  Extraocular movements are intact.  Nose and throat are slightly dry,

but clear. 

NECK:  Supple without masses, nodes, or bruits. 

CHEST:  Clear to auscultation.  No rales, rhonchi, wheezes, or cough is heard. 

HEART:  Reveals a regular rate and rhythm without murmurs, gallops, or rubs. 

ABDOMEN:  Soft and nontender without organomegaly.  Normal bowel sounds are noted in

all 4 quadrants.  No rebound or guarding is noted.  She states her abdominal

sensitivity is all back to her normal.  She states she is back to her normal diet

also. 

GENITOURINARY:  Deferred. 

EXTREMITIES:  Reveal no clubbing, cyanosis, or edema.  She does have her walking

boot immobilizer still on her right lower extremity. 



ASSESSMENT:  

1. Trimalleolar fracture, right side.

2. Cervical laminectomy.

3. Diabetes, type 2.

4. Diastolic congestive heart failure.

5. Hypertension.

6. Chronic kidney disease, stage 2.

7. Pain management.

8. Hyperkalemia.

9. Bipolar.

10. Generalized weakness.



PLAN:  

1. Continue to wear boot immobilizer at all times when she is out of bed.

2. Continue to wear cervical collar.

3. Continue to monitor the patient's diabetes with Accu-Cheks a.c. and at bedtime.

4. Continue to monitor the patient's blood pressure closely.

5. Continue to monitor the patient's electrolytes.

6. Monitor the patient for signs and symptoms of congestive heart failure.

7. Decubitus precautions.

8. Stress ulcer prophylaxis.

9. Nonweightbearing status.

10. Continue PT and OT.







Job ID:  [id number]

## 2020-01-23 LAB
ALBUMIN SERPL BCG-MCNC: 3.3 G/DL (ref 3.5–5)
ALP SERPL-CCNC: 61 U/L (ref 40–110)
ALT SERPL W P-5'-P-CCNC: 30 U/L (ref 8–55)
ANION GAP SERPL CALC-SCNC: 14 MMOL/L (ref 10–20)
AST SERPL-CCNC: 27 U/L (ref 5–34)
BASOPHILS # BLD AUTO: 0.1 THOU/UL (ref 0–0.2)
BASOPHILS NFR BLD AUTO: 1.1 % (ref 0–1)
BILIRUB SERPL-MCNC: 0.4 MG/DL (ref 0.2–1.2)
BUN SERPL-MCNC: 27 MG/DL (ref 9.8–20.1)
CALCIUM SERPL-MCNC: 9.4 MG/DL (ref 7.8–10.44)
CHLORIDE SERPL-SCNC: 102 MMOL/L (ref 98–107)
CO2 SERPL-SCNC: 28 MMOL/L (ref 22–29)
CREAT CL PREDICTED SERPL C-G-VRATE: 81 ML/MIN (ref 70–130)
EOSINOPHIL # BLD AUTO: 0.1 THOU/UL (ref 0–0.7)
EOSINOPHIL NFR BLD AUTO: 1.9 % (ref 0–10)
GLOBULIN SER CALC-MCNC: 2.5 G/DL (ref 2.4–3.5)
GLUCOSE SERPL-MCNC: 101 MG/DL (ref 70–105)
HGB BLD-MCNC: 10.8 G/DL (ref 12–16)
LYMPHOCYTES # BLD AUTO: 2.2 THOU/UL (ref 1.2–3.4)
LYMPHOCYTES NFR BLD AUTO: 30.7 % (ref 21–51)
MCH RBC QN AUTO: 28 PG (ref 27–31)
MCV RBC AUTO: 88.1 FL (ref 78–98)
MONOCYTES # BLD AUTO: 0.7 THOU/UL (ref 0.11–0.59)
MONOCYTES NFR BLD AUTO: 9.3 % (ref 0–10)
NEUTROPHILS # BLD AUTO: 4 THOU/UL (ref 1.4–6.5)
NEUTROPHILS NFR BLD AUTO: 57.1 % (ref 42–75)
PLATELET # BLD AUTO: 142 THOU/UL (ref 130–400)
POTASSIUM SERPL-SCNC: 4.1 MMOL/L (ref 3.5–5.1)
RBC # BLD AUTO: 3.84 MILL/UL (ref 4.2–5.4)
SODIUM SERPL-SCNC: 140 MMOL/L (ref 136–145)
WBC # BLD AUTO: 7.1 THOU/UL (ref 4.8–10.8)

## 2020-01-23 RX ADMIN — PANCRELIPASE SCH CAP: 60000; 12000; 38000 CAPSULE, DELAYED RELEASE PELLETS ORAL at 12:02

## 2020-01-23 RX ADMIN — PANCRELIPASE SCH CAP: 60000; 12000; 38000 CAPSULE, DELAYED RELEASE PELLETS ORAL at 17:29

## 2020-01-23 RX ADMIN — Medication SCH MG: at 20:55

## 2020-01-23 RX ADMIN — INSULIN GLARGINE SCH UNITS: 100 INJECTION, SOLUTION SUBCUTANEOUS at 08:04

## 2020-01-23 RX ADMIN — Medication SCH MG: at 08:01

## 2020-01-23 RX ADMIN — SIMETHICONE SCH MG: 80 TABLET, CHEWABLE ORAL at 21:06

## 2020-01-23 RX ADMIN — DOCUSATE SODIUM 50 MG AND SENNOSIDES 8.6 MG SCH TAB: 8.6; 5 TABLET, FILM COATED ORAL at 08:01

## 2020-01-23 RX ADMIN — GUAIFENESIN AND DEXTROMETHORPHAN PRN ML: 100; 10 SYRUP ORAL at 07:57

## 2020-01-23 RX ADMIN — MULTIPLE VITAMINS W/ MINERALS TAB SCH TAB: TAB at 08:02

## 2020-01-23 RX ADMIN — ALOGLIPTIN SCH MG: 6.25 TABLET, FILM COATED ORAL at 08:01

## 2020-01-23 RX ADMIN — SIMETHICONE SCH MG: 80 TABLET, CHEWABLE ORAL at 08:00

## 2020-01-23 RX ADMIN — DOCUSATE SODIUM 50 MG AND SENNOSIDES 8.6 MG SCH TAB: 8.6; 5 TABLET, FILM COATED ORAL at 20:55

## 2020-01-23 RX ADMIN — PANCRELIPASE SCH CAP: 60000; 12000; 38000 CAPSULE, DELAYED RELEASE PELLETS ORAL at 08:01

## 2020-01-23 RX ADMIN — SIMETHICONE SCH MG: 80 TABLET, CHEWABLE ORAL at 14:36

## 2020-01-23 RX ADMIN — INSULIN GLARGINE SCH UNITS: 100 INJECTION, SOLUTION SUBCUTANEOUS at 20:53

## 2020-01-23 RX ADMIN — GUAIFENESIN AND DEXTROMETHORPHAN PRN ML: 100; 10 SYRUP ORAL at 21:11

## 2020-01-23 RX ADMIN — COLLAGENASE SANTYL SCH APPLIC: 250 OINTMENT TOPICAL at 08:15

## 2020-01-23 RX ADMIN — BUPROPION HYDROCHLORIDE SCH MG: 150 TABLET, FILM COATED, EXTENDED RELEASE ORAL at 08:00

## 2020-01-23 NOTE — PRG
DATE OF SERVICE:  01/23/2020



SUBJECTIVE:  Ms. Nguyen is a very pleasant 54-year-old white female, who had TIA

back in October.  Unfortunately several days later, she fell and had a trimalleolar

fracture.  It was repaired on 11/12/2019 by Orthopedics.  Two days later, she had

cervical neck repair done by Dr. Dodson.  Postoperatively, she was stabilized and

was transferred to [facility] for physical therapy and occupational

therapy and pain management to increase her strength and stamina. 



She states she is feeling much better and feeling more active.  She just kind of

scared about being nonweightbearing and her other good knee giving out.  She has no

concerns or complaints today. 



OBJECTIVE:  VITAL SIGNS:  Blood pressure this morning 108/55, pulse 79 to 81,

respirations 18, O2 saturation 93% to 95% on room air, and T-max 98.4. 

GENERAL:  This is a well-developed, well-nourished, very pleasant 54-year-old white

female, in no apparent distress at this time. 

HEENT:  Normocephalic and nontraumatic cranium.  Pupils are equal, round, and

reactive.  Extraocular movements are intact.  Nose and throat are slightly dry. 

NECK: Supple without masses, nodes or bruits. 

CHEST:  Clear to auscultation.  No rales, rhonchi, wheezes are heard. 

HEART:  Regular rate and rhythm without murmurs, gallops or rubs. 

ABDOMEN:  Obese, soft, nontender without organomegaly.  Normal bowel sounds are

noted in all 4 quadrants.  No rebound or guarding is noted. 

GENITOURINARY:  Deferred. 

EXTREMITIES:  No clubbing, cyanosis or edema.  She does have a walking boot

immobilizer on her right ankle. 



LABORATORY DATA:  White count 7100, hemoglobin 10.8, hematocrit 33.8, platelet count

of 142,000.  Sodium 140, potassium 4.1, chloride 102, carbon dioxide 28.  BUN 27 and

creatinine 1.27, which is excellent for her.  GFR is 44. 



ASSESSMENT:  

1. Trimalleolar fracture, right side.

2. Cervical laminectomy.

3. Diabetes type 2.

4. Diastolic congestive heart failure.

5. Chronic kidney disease, stage 2.

6. Hyperkalemia.

7. Hypertension.

8. Bipolar.

9. Pain management.

10. Generalized weakness.



PLAN:  

1. Continue to monitor the patient's diabetes with Accu-Cheks a.c. and at bedtime.

2. Continue to monitor the patient's blood pressure closely, adjust medications as

needed. 

3. Continue to wear boot immobilizer.

4. Continue to wear cervical collar.

5. Continue monitor the patient for signs and symptoms of congestive heart failure.

6. Decubitus precautions.

7. Stress ulcer.

8. Nonweightbearing status.

9. Continue physical therapy and occupational therapy.







Job ID:  [id number]

## 2020-01-24 RX ADMIN — ALOGLIPTIN SCH MG: 6.25 TABLET, FILM COATED ORAL at 08:12

## 2020-01-24 RX ADMIN — Medication SCH MG: at 08:12

## 2020-01-24 RX ADMIN — DOCUSATE SODIUM 50 MG AND SENNOSIDES 8.6 MG SCH TAB: 8.6; 5 TABLET, FILM COATED ORAL at 21:00

## 2020-01-24 RX ADMIN — INSULIN GLARGINE SCH UNITS: 100 INJECTION, SOLUTION SUBCUTANEOUS at 21:03

## 2020-01-24 RX ADMIN — INSULIN LISPRO PRN UNIT: 100 INJECTION, SOLUTION INTRAVENOUS; SUBCUTANEOUS at 12:28

## 2020-01-24 RX ADMIN — BUPROPION HYDROCHLORIDE SCH MG: 150 TABLET, FILM COATED, EXTENDED RELEASE ORAL at 08:12

## 2020-01-24 RX ADMIN — SIMETHICONE SCH MG: 80 TABLET, CHEWABLE ORAL at 20:58

## 2020-01-24 RX ADMIN — PANCRELIPASE SCH CAP: 60000; 12000; 38000 CAPSULE, DELAYED RELEASE PELLETS ORAL at 16:17

## 2020-01-24 RX ADMIN — Medication SCH MG: at 21:00

## 2020-01-24 RX ADMIN — COLLAGENASE SANTYL SCH APPLIC: 250 OINTMENT TOPICAL at 08:14

## 2020-01-24 RX ADMIN — GUAIFENESIN AND DEXTROMETHORPHAN PRN ML: 100; 10 SYRUP ORAL at 08:12

## 2020-01-24 RX ADMIN — PANCRELIPASE SCH CAP: 60000; 12000; 38000 CAPSULE, DELAYED RELEASE PELLETS ORAL at 08:11

## 2020-01-24 RX ADMIN — DOCUSATE SODIUM 50 MG AND SENNOSIDES 8.6 MG SCH TAB: 8.6; 5 TABLET, FILM COATED ORAL at 08:12

## 2020-01-24 RX ADMIN — MULTIPLE VITAMINS W/ MINERALS TAB SCH TAB: TAB at 08:12

## 2020-01-24 RX ADMIN — INSULIN GLARGINE SCH UNITS: 100 INJECTION, SOLUTION SUBCUTANEOUS at 08:13

## 2020-01-24 RX ADMIN — SIMETHICONE SCH MG: 80 TABLET, CHEWABLE ORAL at 14:15

## 2020-01-24 RX ADMIN — PANCRELIPASE SCH CAP: 60000; 12000; 38000 CAPSULE, DELAYED RELEASE PELLETS ORAL at 12:30

## 2020-01-24 RX ADMIN — SIMETHICONE SCH MG: 80 TABLET, CHEWABLE ORAL at 08:12

## 2020-01-24 NOTE — PRG
DATE OF SERVICE:  01/24/2020



The patient of Dr. Robert Thao.



SUBJECTIVE:  The patient is up out of the room in her wheelchair, feels well, but

still concerned about nonweightbearing on her fracture and inability to ambulate.

She is out of her neck brace now and has been deemed safe by Neurosurgery.

Orthopedic Surgery, however, states that she is still nonweightbearing. 



OBJECTIVE:  VITAL SIGNS:  Have shown her to have a blood pressure 128/59, O2

saturations 98% on room air, respirations 18, pulse 80, afebrile. 

LUNGS:  Clear. 

CARDIAC:  Shows regular rhythm. 

ABDOMEN:  Soft and nontender. 

SKIN/EXTREMITIES:  Show walking boot immobilizer on her right ankle.



ASSESSMENT:  

1. Trimalleolar fracture, right ankle, status post open reduction and internal

fixation, still nonweightbearing, in walking boot immobilizer. 

2. Cervical laminectomy, resolving with removal of neck brace.

3. Type 2 diabetes, controlled to goal.

4. Diastolic heart failure, compensated.

5. Chronic kidney disease, stage 3.



PLAN:  Continue PT and OT.  Continue Accu-Cheks to monitor and titrate and control

diabetes.  Continue to monitor vital signs closely.  Continue nonweightbearing

status on right ankle. 







Job ID:  [id number]

## 2020-01-25 RX ADMIN — Medication SCH MG: at 08:56

## 2020-01-25 RX ADMIN — BUPROPION HYDROCHLORIDE SCH MG: 150 TABLET, FILM COATED, EXTENDED RELEASE ORAL at 08:56

## 2020-01-25 RX ADMIN — PANCRELIPASE SCH CAP: 60000; 12000; 38000 CAPSULE, DELAYED RELEASE PELLETS ORAL at 12:46

## 2020-01-25 RX ADMIN — DOCUSATE SODIUM 50 MG AND SENNOSIDES 8.6 MG SCH TAB: 8.6; 5 TABLET, FILM COATED ORAL at 20:26

## 2020-01-25 RX ADMIN — SIMETHICONE SCH MG: 80 TABLET, CHEWABLE ORAL at 15:17

## 2020-01-25 RX ADMIN — SIMETHICONE SCH MG: 80 TABLET, CHEWABLE ORAL at 08:57

## 2020-01-25 RX ADMIN — ALOGLIPTIN SCH MG: 6.25 TABLET, FILM COATED ORAL at 08:56

## 2020-01-25 RX ADMIN — DOCUSATE SODIUM 50 MG AND SENNOSIDES 8.6 MG SCH TAB: 8.6; 5 TABLET, FILM COATED ORAL at 08:56

## 2020-01-25 RX ADMIN — INSULIN GLARGINE SCH UNITS: 100 INJECTION, SOLUTION SUBCUTANEOUS at 20:33

## 2020-01-25 RX ADMIN — MULTIPLE VITAMINS W/ MINERALS TAB SCH TAB: TAB at 08:56

## 2020-01-25 RX ADMIN — GUAIFENESIN AND DEXTROMETHORPHAN PRN ML: 100; 10 SYRUP ORAL at 08:55

## 2020-01-25 RX ADMIN — SIMETHICONE SCH MG: 80 TABLET, CHEWABLE ORAL at 20:29

## 2020-01-25 RX ADMIN — COLLAGENASE SANTYL SCH APPLIC: 250 OINTMENT TOPICAL at 08:59

## 2020-01-25 RX ADMIN — Medication SCH MG: at 20:26

## 2020-01-25 RX ADMIN — INSULIN GLARGINE SCH UNITS: 100 INJECTION, SOLUTION SUBCUTANEOUS at 08:55

## 2020-01-25 RX ADMIN — PANCRELIPASE SCH CAP: 60000; 12000; 38000 CAPSULE, DELAYED RELEASE PELLETS ORAL at 08:57

## 2020-01-25 RX ADMIN — PANCRELIPASE SCH CAP: 60000; 12000; 38000 CAPSULE, DELAYED RELEASE PELLETS ORAL at 17:55

## 2020-01-25 RX ADMIN — INSULIN LISPRO PRN UNIT: 100 INJECTION, SOLUTION INTRAVENOUS; SUBCUTANEOUS at 12:46

## 2020-01-26 RX ADMIN — SIMETHICONE SCH MG: 80 TABLET, CHEWABLE ORAL at 20:52

## 2020-01-26 RX ADMIN — SIMETHICONE SCH MG: 80 TABLET, CHEWABLE ORAL at 16:28

## 2020-01-26 RX ADMIN — INSULIN GLARGINE SCH UNITS: 100 INJECTION, SOLUTION SUBCUTANEOUS at 08:55

## 2020-01-26 RX ADMIN — SIMETHICONE SCH MG: 80 TABLET, CHEWABLE ORAL at 08:54

## 2020-01-26 RX ADMIN — DOCUSATE SODIUM 50 MG AND SENNOSIDES 8.6 MG SCH TAB: 8.6; 5 TABLET, FILM COATED ORAL at 20:53

## 2020-01-26 RX ADMIN — INSULIN GLARGINE SCH UNITS: 100 INJECTION, SOLUTION SUBCUTANEOUS at 20:56

## 2020-01-26 RX ADMIN — PANCRELIPASE SCH CAP: 60000; 12000; 38000 CAPSULE, DELAYED RELEASE PELLETS ORAL at 08:53

## 2020-01-26 RX ADMIN — PANCRELIPASE SCH CAP: 60000; 12000; 38000 CAPSULE, DELAYED RELEASE PELLETS ORAL at 12:35

## 2020-01-26 RX ADMIN — Medication SCH MG: at 08:55

## 2020-01-26 RX ADMIN — DOCUSATE SODIUM 50 MG AND SENNOSIDES 8.6 MG SCH TAB: 8.6; 5 TABLET, FILM COATED ORAL at 08:54

## 2020-01-26 RX ADMIN — PANCRELIPASE SCH CAP: 60000; 12000; 38000 CAPSULE, DELAYED RELEASE PELLETS ORAL at 16:27

## 2020-01-26 RX ADMIN — MULTIPLE VITAMINS W/ MINERALS TAB SCH TAB: TAB at 08:54

## 2020-01-26 RX ADMIN — GUAIFENESIN AND DEXTROMETHORPHAN PRN ML: 100; 10 SYRUP ORAL at 20:50

## 2020-01-26 RX ADMIN — Medication SCH MG: at 20:53

## 2020-01-26 RX ADMIN — ALOGLIPTIN SCH MG: 6.25 TABLET, FILM COATED ORAL at 08:54

## 2020-01-26 RX ADMIN — GUAIFENESIN AND DEXTROMETHORPHAN PRN ML: 100; 10 SYRUP ORAL at 08:56

## 2020-01-26 RX ADMIN — COLLAGENASE SANTYL SCH APPLIC: 250 OINTMENT TOPICAL at 08:56

## 2020-01-26 RX ADMIN — BUPROPION HYDROCHLORIDE SCH MG: 150 TABLET, FILM COATED, EXTENDED RELEASE ORAL at 08:54

## 2020-01-26 NOTE — PRG
DATE OF SERVICE:  01/26/2020



SUBJECTIVE:  The patient feels the same, is outside smoking, worrying about her

ankle. 



OBJECTIVE:  EXTREMITIES:  Shows right foot is in orthotic boot. 

LUNGS:  Clear. 

CARDIAC:  Showed regular rhythm. 

VITAL SIGNS:  Blood pressure shows 107/54, temperature 97, pulse 81, respirations

18, O2 saturations 98% on room air. 



ASSESSMENT:  

1. Type 2 diabetes, controlled to goal.

2. Nicotine abuse __________.

3. Trimalleolar fracture, status post open reduction and internal fixation, healing

slowly in orthotic boot. 

4. Pressure ulcer over trimalleolar fracture, does not appear to be infected, but is

very deep and may need wound VAC. 



PLAN:  

1. Follow up with Orthopedic surgeon, Dr. Thao.

2. Continue orthotic boot.

3. Continue to stress.  Discontinue smoking.

4. Continue to monitor vital signs closely and control blood pressure.

5. Continue Accu-Cheks to monitor and titrate and control diabetes.







Job ID:  [id number]

## 2020-01-27 LAB
ALBUMIN SERPL BCG-MCNC: 3.6 G/DL (ref 3.5–5)
ALP SERPL-CCNC: 67 U/L (ref 40–110)
ALT SERPL W P-5'-P-CCNC: 32 U/L (ref 8–55)
ANION GAP SERPL CALC-SCNC: 17 MMOL/L (ref 10–20)
AST SERPL-CCNC: 29 U/L (ref 5–34)
BASOPHILS # BLD AUTO: 0.1 THOU/UL (ref 0–0.2)
BASOPHILS NFR BLD AUTO: 1.6 % (ref 0–1)
BILIRUB SERPL-MCNC: 0.4 MG/DL (ref 0.2–1.2)
BUN SERPL-MCNC: 27 MG/DL (ref 9.8–20.1)
CALCIUM SERPL-MCNC: 9 MG/DL (ref 7.8–10.44)
CHLORIDE SERPL-SCNC: 103 MMOL/L (ref 98–107)
CO2 SERPL-SCNC: 26 MMOL/L (ref 22–29)
CREAT CL PREDICTED SERPL C-G-VRATE: 77 ML/MIN (ref 70–130)
CRP SERPL-MCNC: (no result) MG/DL
EOSINOPHIL # BLD AUTO: 0.1 THOU/UL (ref 0–0.7)
EOSINOPHIL NFR BLD AUTO: 0.9 % (ref 0–10)
GLOBULIN SER CALC-MCNC: 2.2 G/DL (ref 2.4–3.5)
GLUCOSE SERPL-MCNC: 136 MG/DL (ref 70–105)
HGB BLD-MCNC: 11.1 G/DL (ref 12–16)
LYMPHOCYTES # BLD AUTO: 1.9 THOU/UL (ref 1.2–3.4)
LYMPHOCYTES NFR BLD AUTO: 25.6 % (ref 21–51)
MCH RBC QN AUTO: 28.9 PG (ref 27–31)
MCV RBC AUTO: 87.6 FL (ref 78–98)
MONOCYTES # BLD AUTO: 0.6 THOU/UL (ref 0.11–0.59)
MONOCYTES NFR BLD AUTO: 8.4 % (ref 0–10)
NEUTROPHILS # BLD AUTO: 4.8 THOU/UL (ref 1.4–6.5)
NEUTROPHILS NFR BLD AUTO: 63.5 % (ref 42–75)
PLATELET # BLD AUTO: 169 THOU/UL (ref 130–400)
POTASSIUM SERPL-SCNC: 4.5 MMOL/L (ref 3.5–5.1)
RBC # BLD AUTO: 3.84 MILL/UL (ref 4.2–5.4)
SODIUM SERPL-SCNC: 141 MMOL/L (ref 136–145)
WBC # BLD AUTO: 7.6 THOU/UL (ref 4.8–10.8)

## 2020-01-27 RX ADMIN — INSULIN LISPRO PRN UNIT: 100 INJECTION, SOLUTION INTRAVENOUS; SUBCUTANEOUS at 11:49

## 2020-01-27 RX ADMIN — Medication SCH MG: at 08:23

## 2020-01-27 RX ADMIN — BUPROPION HYDROCHLORIDE SCH MG: 150 TABLET, FILM COATED, EXTENDED RELEASE ORAL at 08:23

## 2020-01-27 RX ADMIN — Medication SCH MG: at 20:55

## 2020-01-27 RX ADMIN — INSULIN GLARGINE SCH UNITS: 100 INJECTION, SOLUTION SUBCUTANEOUS at 08:24

## 2020-01-27 RX ADMIN — SIMETHICONE SCH MG: 80 TABLET, CHEWABLE ORAL at 08:26

## 2020-01-27 RX ADMIN — PANCRELIPASE SCH CAP: 60000; 12000; 38000 CAPSULE, DELAYED RELEASE PELLETS ORAL at 17:36

## 2020-01-27 RX ADMIN — DOCUSATE SODIUM 50 MG AND SENNOSIDES 8.6 MG SCH TAB: 8.6; 5 TABLET, FILM COATED ORAL at 20:54

## 2020-01-27 RX ADMIN — INSULIN GLARGINE SCH UNITS: 100 INJECTION, SOLUTION SUBCUTANEOUS at 20:50

## 2020-01-27 RX ADMIN — SIMETHICONE SCH MG: 80 TABLET, CHEWABLE ORAL at 16:06

## 2020-01-27 RX ADMIN — MULTIPLE VITAMINS W/ MINERALS TAB SCH TAB: TAB at 08:25

## 2020-01-27 RX ADMIN — PANCRELIPASE SCH CAP: 60000; 12000; 38000 CAPSULE, DELAYED RELEASE PELLETS ORAL at 08:22

## 2020-01-27 RX ADMIN — ALOGLIPTIN SCH MG: 6.25 TABLET, FILM COATED ORAL at 08:23

## 2020-01-27 RX ADMIN — COLLAGENASE SANTYL SCH APPLIC: 250 OINTMENT TOPICAL at 10:20

## 2020-01-27 RX ADMIN — PANCRELIPASE SCH CAP: 60000; 12000; 38000 CAPSULE, DELAYED RELEASE PELLETS ORAL at 11:49

## 2020-01-27 RX ADMIN — DOCUSATE SODIUM 50 MG AND SENNOSIDES 8.6 MG SCH TAB: 8.6; 5 TABLET, FILM COATED ORAL at 08:26

## 2020-01-27 RX ADMIN — SIMETHICONE SCH MG: 80 TABLET, CHEWABLE ORAL at 20:48

## 2020-01-27 NOTE — PRG
DATE OF SERVICE:  01/25/2020



Patient of Dr. DANAY Thao.



SUBJECTIVE:  The patient is very concerned about the wound on her ankle and on

evaluating dressing off, it appears to be a deep clean wound after debridement with

no evidence of infection but possible need for wound VAC. 



OBJECTIVE:  VITAL SIGNS:  Shows temperature is 98, pulse 80, respirations 18, O2

saturation 94% on room air, blood pressure 141/65. 

LUNGS:  Clear. 

CARDIAC:  Showed regular rhythm. 

ABDOMEN:  Soft and nontender. 

SKIN:  Wound is as above.



ASSESSMENT:  

1. Trimalleolar fracture of right ankle, still nonweightbearing in walking boot

immobilized, but now has significant open wound in lateral malleolus, appears not be

infected, but is very deep. 

2. Cervical laminectomy, resolved.  Removal of neck brace.

3. Type 2 diabetes, controlled to goal with Accu-Cheks ranging from .

4. Diastolic heart failure compensated.

5. Chronic kidney disease, stage 3, stable.



PLAN:  

1. Discussed followup with Dr. Thao and orthopedic surgeon and possible further

wound care. 

2. Continue Accu-Cheks to monitor and titrate and control diabetes.

3. Continue to stress discontinue smoking as this is somewhat limiting her healing.

4. Cervical laminectomy, stable.

5. Type 2 diabetes, stable.

6. Chronic kidney disease, stable.  

7. Follow up with orthopedic surgeon, Dr. Thao.

8. Continue nonweightbearing.

9. Continue Accu-Cheks to monitor and titrate and control diabetes.







Job ID:  [id number]

## 2020-01-28 RX ADMIN — INSULIN GLARGINE SCH UNITS: 100 INJECTION, SOLUTION SUBCUTANEOUS at 21:13

## 2020-01-28 RX ADMIN — INSULIN GLARGINE SCH UNITS: 100 INJECTION, SOLUTION SUBCUTANEOUS at 08:37

## 2020-01-28 RX ADMIN — ALOGLIPTIN SCH MG: 6.25 TABLET, FILM COATED ORAL at 08:35

## 2020-01-28 RX ADMIN — DOCUSATE SODIUM 50 MG AND SENNOSIDES 8.6 MG SCH TAB: 8.6; 5 TABLET, FILM COATED ORAL at 08:39

## 2020-01-28 RX ADMIN — PANCRELIPASE SCH CAP: 60000; 12000; 38000 CAPSULE, DELAYED RELEASE PELLETS ORAL at 08:34

## 2020-01-28 RX ADMIN — MULTIPLE VITAMINS W/ MINERALS TAB SCH TAB: TAB at 08:38

## 2020-01-28 RX ADMIN — SIMETHICONE SCH MG: 80 TABLET, CHEWABLE ORAL at 08:39

## 2020-01-28 RX ADMIN — Medication SCH MG: at 21:11

## 2020-01-28 RX ADMIN — SIMETHICONE SCH MG: 80 TABLET, CHEWABLE ORAL at 15:09

## 2020-01-28 RX ADMIN — SIMETHICONE SCH MG: 80 TABLET, CHEWABLE ORAL at 21:10

## 2020-01-28 RX ADMIN — DOCUSATE SODIUM 50 MG AND SENNOSIDES 8.6 MG SCH TAB: 8.6; 5 TABLET, FILM COATED ORAL at 21:10

## 2020-01-28 RX ADMIN — BUPROPION HYDROCHLORIDE SCH MG: 150 TABLET, FILM COATED, EXTENDED RELEASE ORAL at 08:35

## 2020-01-28 RX ADMIN — PANCRELIPASE SCH CAP: 60000; 12000; 38000 CAPSULE, DELAYED RELEASE PELLETS ORAL at 12:11

## 2020-01-28 RX ADMIN — Medication SCH MG: at 08:35

## 2020-01-28 RX ADMIN — COLLAGENASE SANTYL SCH APPLIC: 250 OINTMENT TOPICAL at 08:46

## 2020-01-28 RX ADMIN — PANCRELIPASE SCH CAP: 60000; 12000; 38000 CAPSULE, DELAYED RELEASE PELLETS ORAL at 16:43

## 2020-01-28 NOTE — PRG
DATE OF SERVICE:  01/27/2020



SUBJECTIVE:  Ms. Nguyen is a 54-year-old white female, who had a TIA back in

October.  Several days later, she fell and had a trimalleolar fracture.  It was

repaired on 11/12/2019 by Orthopedics.  Two days later, she had a cervical neck

repaired, done by Dr. Dodson.  Postoperatively, she had several problems and was

eventually stabilized and transferred to [facility].  She was here

for inpatient physical therapy, occupational therapy, and pain management. 



While she has been here, she has actually done very well except for episodes of

gastroenteritis.  She also was noted to have an ulcer on her foot, which was

debrided last Friday by Wound Care.  She is doing much better and we have been

awaiting her ramp, so she can be discharged home.  Her ramp was completed this

weekend and she is ready to go home. 



We are working with Case Management to get her stabilized. 



Vital signs today reveal blood pressure 127/59, pulse 78 to 81, respirations 18 to

20, O2 saturation 95% to 96% on room air, T-max 98.9. 



LABORATORY DATA:  Today reveal white count 7600 with a hemoglobin of 11.1,

hematocrit 33.7, platelet count 169,000.  Sodium 141, potassium 4.5, chloride 103,

carbon dioxide 26, with a BUN of 27, creatinine 1.32.  Sugar is 115, fasting this

morning.  Liver enzymes are unremarkable.  C-reactive protein was less than 0.50.

Sedimentation rate is pending because it had to be a send out. 



PHYSICAL EXAMINATION:

GENERAL:  This is a well-developed, well-nourished, slightly obese, white female,

who complains of more pain today than she has had.  She is already taking 15 mg of

morphine three times a day and wants to have that increased.  We will not be able to

do that because she has no indication for increased pain.  Generally, this is a

well-developed, well-nourished, 54-year-old white female. 

HEENT:  Reveals normocephalic and nontraumatic cranium.  Pupils are equally, round,

and reactive.  Extraocular movements are intact. Nose and throat are slightly dry. 

NECK:  Supple without masses, nodes, or bruits. 

CHEST:  Clear to auscultation.  No rales, rhonchi, or wheezes are heard. 

HEART:  Reveals a regular rate and rhythm without murmurs, gallops, or rubs. 

ABDOMEN:  Obese, soft, nontender without organomegaly.  Normal bowel sounds are

noted in all 4 quadrants.  No rebound or guarding is noted. 

:  Deferred. 

EXTREMITIES: Reveal no clubbing, cyanosis, or edema.  The patient continues to have

a walking boot on and was not able to see her lesion this morning, but I did talk

with Wound Care  and Monster debrided it on Friday and states he looked at it  today,

it has much improved, but  he did do a culture at my request. 



ASSESSMENT:  

1. Trimalleolar fracture, right side.

2. Cervical laminectomy.

3. Diabetes, type 2.

4. Diastolic congestive heart failure.

5. Chronic kidney disease, stage 2.

6. Hyperkalemia.

7. Hypertension.

8. Bipolar.

9. Pain management.

10. Stage IV small ulcer underneath her walking boot.



PLAN:  

1. Discharge planning for discharge over the next couple of days.

2. Continue to monitor the patient's diabetes with Accu-Cheks before meals and at

bedtime. 

3. Continue to monitor the patient's blood pressure closely, adjust medications as

needed. 

4. Continue to wear the boot immobilizer.

5. Continue wear cervical collar.

6. Continue to monitor the patient for signs and symptoms of congestive heart

failure. 

7. Decubitus precautions.

8. Stress ulcer prophylaxis.

9. Nonweightbearing status.

10. Continue physical therapy and occupational therapy.

11. Anticipate discharge in the next couple of days.







Job ID:  [id number]

## 2020-01-28 NOTE — PRG
DATE OF SERVICE:  01/28/2020



SUBJECTIVE:  Ms. Nguyen is a 54-year-old white female, who sustained a TIA back

in October.  Several days later, she fell and had a trimalleolar fracture of her

right ankle.  It was repaired on 11/12/2019, by Orthopedics.  Two days later, she

had to have a cervical neck repair done by Dr. Dodson.  Postoperatively, she had

several medical problems, but eventually was stabilized and now was transferred to

[facility] for physical therapy and occupational therapy. 



She has been waiting for completion of her ramp to get into her home and that was

done this weekend.  She is ready now to be discharged. 



Prescription for her rolling walker was done.  She will be discharged with home

health. 



Case Management is working on discharge for tomorrow.



OBJECTIVE:  VITAL SIGNS:  Today reveal blood pressure this morning 105/52, pulse 69

to 74, respirations 18 to 20, O2 saturation 95% to 97% on room air. 

GENERAL:  This is a well-developed, well-nourished, very pleasant, 54-year-old,

obese female, states she is doing well, but continues to have pain.  She does have a

small ulcer, and Wound Care to take a look at it.  States it was much improved

yesterday.  Does not need a wound VAC. 

HEENT:  Reveals normocephalic and nontraumatic cranium.  Pupils are equal, round,

and reactive.  Extraocular movements are intact.  Nose and throat are dry, but

clear. 

NECK:  Supple without masses, nodes, or bruits. 

CHEST:  Clear to auscultation.  No rales, rhonchi, or wheezes are heard. 

HEART:  Reveals a regular rate and rhythm without murmurs, gallops, or rubs. 

ABDOMEN:  Obese, soft, and nontender without organomegaly.  Normal bowel sounds are

noted in all 4 quadrants.  No rebound or guarding is noted. 

:  Deferred. 

EXTREMITIES:  Reveal no clubbing, cyanosis, or edema.  The patient continues to have

walking boot on.  She does have a lesion that was reviewed by Wound Care yesterday.

Monster states he debrided it on Friday.  It has much improved and does not need a

wound VAC. 



ASSESSMENT:  

1. Trimalleolar fracture, right side.

2. Cervical laminectomy.

3. Diabetes, type 2.

4. Diastolic congestive heart failure.

5. Chronic kidney disease, stage 2.

6. Hypertension.

7. Hyperkalemia.

8. Bipolar.

9. Pain management.

10. Stage IV ulcer underneath her walking boot.

11. Generalized weakness.

12. Nonweightbearing on her right ankle.



PLAN:  

1. The patient is to be discharged tomorrow.

2. The patient will continue her present medications.

3. Continue Accu-Cheks a.c. and at bedtime at home.

4. The patient will follow up with Dr. Khan as soon as she can get appointment.

She already has appointment for February 05, but we will try to get her appointment

Thursday or Friday. 

5. Decubitus precautions.

6. Stress ulcer prophylaxis.

7. Nonweightbearing status on the right side until approved by Orthopedics.

8. Continue physical therapy and occupational therapy as outpatient.

9. Anticipate discharge tomorrow.







Job ID:  [id number]

## 2020-01-29 VITALS — DIASTOLIC BLOOD PRESSURE: 51 MMHG | TEMPERATURE: 97.4 F | SYSTOLIC BLOOD PRESSURE: 100 MMHG

## 2020-01-29 RX ADMIN — INSULIN GLARGINE SCH UNITS: 100 INJECTION, SOLUTION SUBCUTANEOUS at 08:44

## 2020-01-29 RX ADMIN — DOCUSATE SODIUM 50 MG AND SENNOSIDES 8.6 MG SCH TAB: 8.6; 5 TABLET, FILM COATED ORAL at 08:43

## 2020-01-29 RX ADMIN — SIMETHICONE SCH MG: 80 TABLET, CHEWABLE ORAL at 08:43

## 2020-01-29 RX ADMIN — BUPROPION HYDROCHLORIDE SCH MG: 150 TABLET, FILM COATED, EXTENDED RELEASE ORAL at 08:41

## 2020-01-29 RX ADMIN — PANCRELIPASE SCH CAP: 60000; 12000; 38000 CAPSULE, DELAYED RELEASE PELLETS ORAL at 12:14

## 2020-01-29 RX ADMIN — COLLAGENASE SANTYL SCH APPLIC: 250 OINTMENT TOPICAL at 08:43

## 2020-01-29 RX ADMIN — ALOGLIPTIN SCH MG: 6.25 TABLET, FILM COATED ORAL at 08:41

## 2020-01-29 RX ADMIN — SIMETHICONE SCH MG: 80 TABLET, CHEWABLE ORAL at 14:43

## 2020-01-29 RX ADMIN — MULTIPLE VITAMINS W/ MINERALS TAB SCH TAB: TAB at 08:42

## 2020-01-29 RX ADMIN — PANCRELIPASE SCH CAP: 60000; 12000; 38000 CAPSULE, DELAYED RELEASE PELLETS ORAL at 08:41

## 2020-01-29 RX ADMIN — Medication SCH MG: at 08:42

## 2020-01-29 NOTE — DIS
DATE OF ADMISSION:  12/10/2019



DATE OF DISCHARGE:  01/29/2020



HISTORY OF PRESENT ILLNESS:  Ms. Nguyen is a 54-year-old white female, who

sustained a TIA back in October.  Several days later, she fell and had a

trimalleolar fracture of her right ankle.  It was repaired on 11/12/2019 by

Orthopedics.  Two days later, she had to have a cervical neck repair done by Dr. Dodson.  Postoperatively, she had several medical problems, but eventually was

stabilized and transferred to [facility] for physical therapy and

occupational therapy. 



The patient has actually finally progressed to the point where she can return home.

Her discharge was delayed because some volunteers had to build a ramp, so she can

get into her home.  She is ready for discharge this afternoon. 



DISCHARGE MEDICATIONS:  

1. Tylenol 500 one or two q.6 p.r.n., not to exceed 3000 mg a day.

2. Albuterol sulfate inhaler one or two puffs q.4 hours p.r.n.

3. DuoNeb 3 mL nebs q.6 hours p.r.n.

4. Januvia 12.5 mg daily.

5. Atorvastatin 40 mg at bedtime.

6. Zyrtec 10 mg q.p.m.

7. Ergocalciferol, which is vitamin D2 of 1.25 mg every month.

8. Pepcid 20 mg b.i.d.

9. Tricor 145 mg daily.

10. Fish oil 2000 mg once or twice a day.

11. Flonase nasal spray 1 spray each nostril daily.

12. Folic acid 1 mg daily.

13. Gabapentin 400 mg b.i.d.

14. Robitussin DM 15 mL q.6 p.r.n. cough.

15. __________ at home eating her diet.

16. Humalog sliding scale aggressive.

17. Creon  12,000 four caps t.i.d. with meals.

18. Lisinopril 5 mg at home, usually b.i.d., here she has been on it once a day.

19. Mag-Ox 400 mg b.i.d.

20. Lopressor 12.5 mg b.i.d.

21. MS Contin typically takes 15 mg q.8 hours here.  At home, she takes up to 30 mg

t.i.d. 

22. Zofran 4 mg q.6.

23. Paxil 20 mg at bedtime.

24. MiraLAX 17 g p.r.n.

25. Potassium chloride __________ a day with meals.

26. Risperdal 2 mg at bedtime.

27. Senokot-S 2 tabs p.r.n.

28. Mylicon 80 mg three times a day p.r.n.

29. Demadex 40 mg every morning and 20 mg every evening.

30. Fluticasone or Trelegy Ellipta 1 puff every day.



PHYSICAL EXAMINATION:

VITAL SIGNS:  Blood pressure last night was 118/56, this morning is 100/51;

respirations 18 to 20; O2 saturation 96% to 97%; pulse 80 to 81; T-max 97.8. 

GENERAL:  This is a well-developed, well-nourished, white female, in no apparent

distress at this time. 

HEENT:  Normocephalic and nontraumatic cranium.  Pupils are equal, round, and

reactive.  Extraocular movements are intact.  Nose and throat are slightly dry. 

NECK:  Supple without masses, nodes, or bruits. 

CHEST:  Clear to auscultation.  No rales, rhonchi, or wheezes are heard. 

HEART:  Regular rate and rhythm without murmurs, gallops, or rubs. 

ABDOMEN:  Obese, soft, nontender without organomegaly.  Normal bowel sounds are

noted in all 4 quadrants.  No rebound or guarding is noted. 

:  Deferred. 

EXTREMITIES:  No clubbing, cyanosis, or edema.  The patient continues to have a

walking boot on.  She does have a small lesion, which have been reviewed by Wound

Care and was debrided last Friday.  It has much improved and does not need a wound

VAC. 



ASSESSMENT:  

1. Trimalleolar fracture, right side.

2. Cervical laminectomy by Dr. Dodson.

3. Diabetes type 2, stable.

4. Diastolic congestive heart failure.

5. Chronic kidney disease, stage 2.

6. Hypertension.

7. Hypokalemia.

8. Bipolar.

9. Pain management.

10. Stage 4 ulcer, needs a walking boot, which is healing.

11. Generalized weakness.

12. Nonweightbearing on the right ankle.

13. Wear C-collar at all times.



PLAN:  

1. The patient is ready to be discharged today.

2. She tells me that her sister is going to pick her up within the next hour.

3. Continue present medications as delineated above.

4. Continue Accu-Cheks before meals and at bedtime.

5. The patient is to follow up with Dr. Khan and she has an appointment with Dr. Khan tomorrow. 

6. Decubitus precautions.

7. Stress ulcer prophylaxis.

8. Nonweightbearing on the right side until approved by Orthopedics.

9. Continue physical therapy and occupational therapy at home.

10. The patient is ready for discharge.







Job ID:  [id number]

## 2020-01-30 NOTE — PQF
[employer] Crystal Reports Winform ViewerDISHA WILKS          
                                  DANAY HERRON MD

[id number]                                                             

[id number]                             

                                   

CLINICAL DOCUMENTATION CLARIFICATION FORM:  POST DISCHARGE



Addendum to original discharge summary date:  __________________________________
____



Late entry note date:  _________________________________________________________
__











DATE:01/30/2020                                                       ATTN:
DANAY HERRON MD



Please exercise your independent, professional judgment in responding to the 
clarification form. 

Clinical indicators are provided on the bottom of this form for your review



Diagnosis: ______Pressure ulcer 4_______________



Present on Admission (POA):  [  ] Yes             [  ] No             [  ] 
Unable to determine



 





Coding guidelines require hospitals to identify whether a diagnosis was present 
on admission (POA) or not. To accurately assign the appropriate POA indicator, 
this information must be clearly documented within the medical record. 







CLINICAL INDICATORS - SIGNS / SYMPTOMS / LABS

Superficial ulcer with some surrounding erythema - Documented in PNs on 12/15 
by Lexus Back

Reveals no rashes or lesion in skin - Documented in H&P on 12/10 by  DANAY HERRON MD

Stage 4 ulcer - Documented in DS on 01/29 by  DANAY HERRON MD

Pressure ulcer over trimalleolar fracture does nor appears to be infected but 
is very deep and may need wound VAC - Documented in PNs on 01/26 by Faby Butler

        

RISK FACTORS:

CAD

CKD 2

CHF diastolic



TREATMENT:

Started her Doxycycline - Documented in PNs on 12/15 by Lexus Back

Given stress ulcer prophylaxis - Documented in H&P on 12/10 by  DANAY HERRON

Needs walking boot which is healing - Documented in DS on 01/29 by  DANAY HERRON MD

Decubitus precaution - Documented in DS on 01/29 by  DANAY HERRON MD



[employer] Crystal Reports Winform Viewer

(This form is maintained as a part of the permanent medical record)

2015 [employer]  All Rights Reserved

[email or web address]    [phone]
[phone]

                                                              



 











[email or web address]

## 2020-02-01 ENCOUNTER — HOSPITAL ENCOUNTER (EMERGENCY)
Dept: HOSPITAL 18 - NAV ERS | Age: 55
Discharge: HOME | End: 2020-02-01
Payer: COMMERCIAL

## 2020-02-01 DIAGNOSIS — E11.22: ICD-10-CM

## 2020-02-01 DIAGNOSIS — J44.9: ICD-10-CM

## 2020-02-01 DIAGNOSIS — I25.2: ICD-10-CM

## 2020-02-01 DIAGNOSIS — N18.2: ICD-10-CM

## 2020-02-01 DIAGNOSIS — F31.9: ICD-10-CM

## 2020-02-01 DIAGNOSIS — I25.10: ICD-10-CM

## 2020-02-01 DIAGNOSIS — Z79.4: ICD-10-CM

## 2020-02-01 DIAGNOSIS — I13.0: ICD-10-CM

## 2020-02-01 DIAGNOSIS — E11.622: Primary | ICD-10-CM

## 2020-02-01 DIAGNOSIS — E78.1: ICD-10-CM

## 2020-02-01 DIAGNOSIS — E78.5: ICD-10-CM

## 2020-02-01 DIAGNOSIS — F17.210: ICD-10-CM

## 2020-02-01 DIAGNOSIS — E66.9: ICD-10-CM

## 2020-02-01 DIAGNOSIS — L97.319: ICD-10-CM

## 2020-02-01 DIAGNOSIS — I50.9: ICD-10-CM

## 2020-02-01 DIAGNOSIS — Z79.899: ICD-10-CM

## 2020-02-01 PROCEDURE — 87205 SMEAR GRAM STAIN: CPT

## 2020-02-01 PROCEDURE — 96372 THER/PROPH/DIAG INJ SC/IM: CPT

## 2020-02-01 PROCEDURE — 99283 EMERGENCY DEPT VISIT LOW MDM: CPT

## 2020-02-01 PROCEDURE — 87070 CULTURE OTHR SPECIMN AEROBIC: CPT

## 2020-06-18 NOTE — OP
DATE OF PROCEDURE:  06/18/2020



PREOPERATIVE DIAGNOSIS:  Chronic open wound on the lateral aspect of the right

ankle.  The patient is status post open reduction and internal fixation of the right

distal fibula. 



POSTOPERATIVE DIAGNOSES:  Chronic open wound on the lateral aspect of the right

ankle.  The patient is status post open reduction and internal fixation of the right

distal fibula with presumptive osteomyelitis. 



PROCEDURES PERFORMED:  Removal of plate and screws and obtaining deep cultures, and

irrigation and debridement of the right distal fibula and lateral ankle. 



ANESTHESIA:  General.



DESCRIPTION OF PROCEDURE:  The patient was taken to the operating room, placed in

supine position.  Satisfactory general anesthesia was performed.  The right lower

extremity was sterilely prepped and draped in usual fashion.  The open wound on the

lateral aspect of the ankle was at about the mid portion of the scar and was also 1

cm anterior to the scar.  An incision was made through the scar, but also took out

this open area in an elliptical fashion, and the plate and 7 screws were identified

and were removed.  This was verified with the C-arm that all the hardware was

removed.  The tissue under the plate as well as tissue from the holes left from the

screws were sent for culture and sensitivity.  The holes were then over-reamed, and

the lateral aspect of the fibula was also debrided, and then the wound was copiously

irrigated using high-speed .  The wound was then closed using 3-0 Rapide,

Vicryl and interrupted vertical mattress sutures.  Sterile dressing was applied.

Tourniquet was released.  The patient was awakened, extubated, and transferred to

recovery room in stable condition. 



ESTIMATED BLOOD LOSS:  None.



COMPLICATIONS:  None.



TOURNIQUET TIME:  30 minutes.





Job ID:  [id number]

## 2020-06-18 NOTE — RAD
EXAM:  XR Ankle Rt 2 View



DATE:  6/18/2020 12:00 AM



INDICATION:  Removal of hardware



COMPARISON:  Right ankle radiograph dated January 17, 2020



FINDING:  Since the comparison examination, there has been interval removal of the sideplate and scre
w construct fixating the healed lateral malleolar fracture. The partially threaded screws

transfixing the medial malleolus are unchanged in position. There has been interval healing of the me
dial malleolus fracture. Total fluoroscopic time was 2.2 seconds. Total exposure was 0.10 mGy.





IMPRESSION:Removal of hardware from the right ankle as above



Reported By: Carlos eVras 

Electronically Signed:  6/18/2020 4:12 PM

## 2020-06-18 NOTE — PDOC.FPRHP
- History of Present Illness


Chief Complaint: Consult for medical management


History of Present Illness: 





Consulting Provider: Dr. Guzman





Carmen Nguyen is a 55 year old F, pt of Dr. Khan, with a PMH of CHF, CKD3b, 

Spinal stenosis, DM2 with neuropathy, Bipolar disorder, HTN, HLD.  Dr. Guzman 

performed I&D of distal fibula with removal of deep hardware today and 

consulted family medicine team for medical management of patient's chronic 

conditions.  Patient states that the procedure went well and has she is already 

able to ambulate better than she could prior to the surgery.  She denies any 

other complaints, recent illnesses.  She had a pre-op COVID test done that was 

negative.  She denies any fever, chills, chest pain, dyspnea, n/v, abdominal 

pain, dysuria, numbness/tingling, weakness.  States that she keep regular 

appointments with Dr. Khan and her chronic medical conditions are under good 

control.  Last A1c was 6.2 per patient. 





- Allergies/Adverse Reactions


 Allergies











Allergy/AdvReac Type Severity Reaction Status Date / Time


 


adhesive Allergy Severe  Verified 06/12/20 09:39


 


aspirin Allergy Severe Anaphylaxis Verified 06/12/20 09:39


 


levofloxacin [From Levaquin] Allergy Intermediate  Verified 06/12/20 09:39


 


NSAIDS (Non-Steroidal Allergy Unknown  Verified 06/12/20 09:39





Anti-Inflamma     


 


codeine Allergy   Verified 06/12/20 09:39


 


hydrocodone Allergy   Verified 06/12/20 09:39


 


Sulfa (Sulfonamide Allergy   Verified 06/12/20 09:39





Antibiotics)     


 


trimethoprim [From Bactrim] Allergy   Verified 06/12/20 09:39


 


tramadol AdvReac Mild ITCHING Verified 06/12/20 09:39














- Home Medications


 











 Medication  Instructions  Recorded  Confirmed  Type


 


Acetaminophen [Tylenol] 600 mg PO Q4H PRN 12/10/19 12/10/19 History


 


Albuterol Sulfate [Albuterol 2 puff INH Q4H PRN 12/10/19 12/10/19 History





Sulfate Hfa]    


 


Alogliptin Benzoate [Alogliptin] 12.5 mg PO DAILY 12/10/19 12/10/19 History


 


Atorvastatin Calcium [Lipitor] 40 mg PO HS 12/10/19 12/10/19 History


 


Fenofibrate Nanocrystallized 145 mg PO HS 12/10/19 12/10/19 History





[Tricor]    


 


Fluticasone Propionate [Flonase 0 gm NASAL DAILY 12/10/19 12/10/19 History





Nasal Spray]    


 


Folic Acid [Folvite] 1 mg PO DAILY 12/10/19 12/10/19 History


 


Insulin Glulisine [Apidra Vial] 80 units SC QAM 12/10/19 06/12/20 History


 


Ipratropium/Albuterol Sulfate 3 ml NEB Q6H PRN 12/10/19 12/10/19 History





[DuoNeb]    


 


Lisinopril [Zestril] 5 mg PO QPM 12/10/19 12/10/19 History


 


Metoprolol Tartrate [Lopressor] 12.5 mg PO BID 12/10/19 12/10/19 History


 


Mometasone 100 MCG [Asmanex HFA 1 puff INH BID PRN 12/10/19 12/10/19 History





100 mcg]    


 


Morphine ER [MS Contin] 30 mg PO Q8H PRN 12/10/19 06/12/20 History


 


Multivitamin W/ Minerals 1 tab PO DAILY 12/10/19 12/10/19 History





[Theragran M]    


 


Omega-3 Fatty Acids/Fish Oil [Fish 2,000 mg PO DAILY 12/10/19 12/10/19 History





Oil 1,000 mg Capsule]    


 


PARoxetine HCl [Paxil] 20 mg PO HS 12/10/19 12/10/19 History


 


Pancrelipase  81902 [Creon DR 4 cap PO TID- 12/10/19 12/10/19 History





12,000 Units]    


 


Potassium Chloride [K-Dur] 20 meq PO BID- 12/10/19 12/10/19 History


 


Torsemide [Demadex] 20 mg PO QPM- 12/10/19 12/10/19 History


 


Torsemide [Demadex] 40 mg PO QAM 12/10/19 12/10/19 History


 


risperiDONE 2 mg PO HS 12/10/19 12/10/19 History


 


Cetirizine HCl [Zyrtec] 10 mg PO DAILY 12/22/19 12/22/19 History


 


Ranitidine HCl [Zantac] 150 mg PO BID 12/22/19 12/22/19 History


 


Ergocalciferol [Drisdol] 1.25 mg PO Q30D  cap 01/29/20  Rx


 


Gabapentin [Neurontin] 400 mg PO TID  cap 01/29/20  Rx


 


Guaifenesin -10 [Robitussin 15 ml PO Q6H PRN  ml 01/29/20  Rx





DM]    


 


HumaLOG [HumaLOG Vial] 2 - 5 units SC PRN PRN  vial 01/29/20  Rx


 


Magnesium Oxide 400 mg PO BID  tab 01/29/20  Rx


 


Simethicone [Mylicon Chewable] 80 mg PO TID  tab 01/29/20  Rx


 


Clopidogrel Bisulfate [Plavix] 75 mg PO DAILY 06/12/20 06/12/20 History


 


Empagliflozin [Jardiance] 10 mg PO DAILY 06/12/20 06/12/20 History


 


Ondansetron [Zofran ODT] 4 mg PO Q6H PRN 06/12/20  History


 


Polyethylene Glycol 3350 [Miralax] 17 gm PO BID PRN 06/12/20  History


 


sitaGLIPtin Phosphate [Januvia] 25 mg PO DAILY 06/12/20 06/12/20 History














- History


PMHx: CHF (EF of 70% in Nov 2019), DM2 with neuropathy, CKD3b, Spinal stenosis, 

HTN, HLD, Bipolar disorder


 


PSHx: Appendectomy, cholecystectomy, hysterectomy with bilateral oophorectomy, 

left knee surgery, bilateral breast lumpectomy, cervical decompression, right 

ankle surgery





FHx: hx of stroke and lung cancer


 


Social: 1 ppd smoker for many years, denies drinking or drug use


 








- Review of Systems


General: denies: fever/chills, weight/appetite/sleep changes, fatigue


Eyes: denies: eye pain, vision changes


ENT: denies: nasal congestion, rhinorrhea


Respiratory: denies: cough, congestion, shortness of breath, exercise 

intolerance


Cardiovascular: denies: chest pain, palpitation, edema, orthopnea


Gastrointestinal: denies: nausea, vomiting, diarrhea, abdominal pain


Genitourinary: denies: incontinence, dysuria


Skin: denies: rashes, itching


Musculoskeletal: reports: pain, tenderness, arthritis/arthralgias


Neurological: denies: numbness, syncope, weakness


Psychological: denies: anxiety, depression





- Vital signs


BP: 142/84  HR: 75 RR: 18 Tmax: 97.6 Pox: 95% on RA  Wt: 104 kg   








- Physical Exam


Constitutional: NAD, awake, alert and oriented, well developed


HEENT: normocephalic and atraumatic, PERRLA, EOMI, conjunctiva clear, grossly 

normal vision, grossly normal hearing


Neck: supple, FROM, no JVD


Heart: RRR, normal S1/S2, no murmurs/rubs/gallops, no edema


Lungs: CTAB, no respiratory distress, good air movement, no rales/rhonchi, no 

wheezing


Abdomen: soft, non-tender, bowel sounds present


Musculoskeletal: normal structure, normal tone, ROM grossly normal


Neurological: no focal deficit, CN II-XII intact


-Skin: 





dressing on right ankle intact and dry, no surrounding erythema


Heme/Lymphatic: no unusual bruising or bleeding, no purpura


Psychiatric: normal mood and affect, good judgment and insight, intact recent 

and remote memory





FMR H&P: Results





- EKG Interpretation


EKG: 





Pre-op EKG showed NSR, no ST changes





- Radiology Interpretation


  ** Other


Status: image reviewed by me, report reviewed by me (removal of side plate and 

screw construct fixating the healed lateral malleolar fracture)





FMR H&P: A/P





- Problem List


(1) Trimalleolar fracture


Current Visit: No   Status: Chronic   Code(s): S82.853A - DISPLACED 

TRIMALLEOLAR FRACTURE OF UNSP LOWER LEG, INIT   


Qualifiers: 


   Laterality: right 





(2) CAD (coronary artery disease)


Current Visit: No   Status: Chronic   Code(s): I25.10 - ATHSCL HEART DISEASE OF 

NATIVE CORONARY ARTERY W/O ANG PCTRS   


Qualifiers: 


   Coronary Disease-Associated Artery/Lesion type: native artery   Native vs. 

transplanted heart: native heart   Associated angina: angina presence 

unspecified   Qualified Code(s): I25.10 - Atherosclerotic heart disease of 

native coronary artery without angina pectoris   





(3) CHF (congestive heart failure)


Current Visit: No   Status: Chronic   Code(s): I50.9 - HEART FAILURE, 

UNSPECIFIED   


Qualifiers: 


   Heart failure type: diastolic   Heart failure chronicity: chronic   

Qualified Code(s): I50.32 - Chronic diastolic (congestive) heart failure   





(4) CKD (chronic kidney disease) stage 2, GFR 60-89 ml/min


Current Visit: No   Status: Chronic   Code(s): N18.2 - CHRONIC KIDNEY DISEASE, 

STAGE 2 (MILD)   





(5) Cervical stenosis of spinal canal


Current Visit: No   Status: Chronic   Code(s): M48.02 - SPINAL STENOSIS, 

CERVICAL REGION   





(6) Diabetes mellitus type 2 in obese


Current Visit: No   Status: Chronic   Code(s): E11.9 - TYPE 2 DIABETES MELLITUS 

WITHOUT COMPLICATIONS; E66.9 - OBESITY, UNSPECIFIED   





(7) Hyperlipidemia


Current Visit: No   Status: Chronic   Code(s): E78.5 - HYPERLIPIDEMIA, 

UNSPECIFIED   


Qualifiers: 


   Hyperlipidemia type: unspecified   Qualified Code(s): E78.5 - Hyperlipidemia

, unspecified   





(8) Hypertension


Current Visit: No   Status: Chronic   Code(s): I10 - ESSENTIAL (PRIMARY) 

HYPERTENSION   


Qualifiers: 


   Hypertension type: essential hypertension   Qualified Code(s): I10 - 

Essential (primary) hypertension   





(9) Peripheral neuropathy


Current Visit: No   Status: Chronic   Code(s): G62.9 - POLYNEUROPATHY, 

UNSPECIFIED   


Qualifiers: 


   Peripheral neuropathy type: polyneuropathy, unspecified   Qualified Code(s): 

G62.9 - Polyneuropathy, unspecified   





- Plan





1) s/p I&D of distal fibula with removal of deep hardware


-  hx of trimalleolar fracture


-  evidence of infection developed with painful hardware


-  POD #0


-  management and pain control per primary team





2) DM2 with neuropathy


-  continue patients home insulin regimen


-  reports good control of diabetes


-  ACHS accuchecks


-  mod SSI


-  continue gabapentin 





3) CHF 


-  last EF of 70% in 2019


-  daily weight


-  I/Os





4) HTN


-  continue home regimen





5) Bipolar disorder


-  continue home regimen





6) CKD3b


-  eGFR of 35 on pre-op labwork


-  avoiding nephrotoxic agents


-  repeat AM labs





7) HLD


-  continue statin





8) CAD


-  continue home regimen





Code status: FULL CODE


PCP: Ormberg


VTE PPx: Lovenox


Diet: CC

## 2020-06-19 NOTE — PDOC.FM
- Subjective


Subjective: 


Patient was pacing around her room at the time of evaluation. She denied any 

acute overnight events, to include breakthrough pain in her right ankle, chest 

pain, SOB, N/V or ABD pain.





- Objective


Vital Signs & Weight: 


 Vital Signs (12 hours)











  Temp Pulse Resp BP Pulse Ox


 


 06/19/20 02:23  97.8 F  84  18  121/76  95


 


 06/18/20 21:57      95


 


 06/18/20 19:35  97.6 F  75  18  142/84 H  95








 Weight











Weight                         107.161 kg














Result Diagrams: 


 06/19/20 05:13





 06/19/20 05:13





Phys Exam





- Physical Examination


Constitutional: NAD


HEENT: moist MMs, sclera anicteric, oral pharynx no lesions


Neck: supple, full ROM


Respiratory: no wheezing, no rales, no rhonchi, clear to auscultation bilateral


Cardiovascular: RRR, no significant murmur, no rub


Gastrointestinal: soft, non-tender, no distention, positive bowel sounds


Musculoskeletal: no edema, pulses present


Neurological: non-focal, moves all 4 limbs


Psychiatric: normal affect


Skin: no rash


Deviation from normal: Post-Op bandage in place, no streaking or drainage





Dx/Plan


(1) Paresthesia


Code(s): R20.2 - PARESTHESIA OF SKIN   Status: Acute   





(2) Status post open reduction with internal fixation (ORIF) of fracture of 

ankle


Code(s): Z98.890 - OTHER SPECIFIED POSTPROCEDURAL STATES; Z87.81 - PERSONAL 

HISTORY OF (HEALED) TRAUMATIC FRACTURE   Status: Acute   





(3) Bipolar disorder


Code(s): F31.9 - BIPOLAR DISORDER, UNSPECIFIED   Status: Chronic   


Qualifiers: 


   Active/Remission status: remission status unspecified   Qualified Code(s): 

F31.9 - Bipolar disorder, unspecified   





(4) CAD (coronary artery disease)


Code(s): I25.10 - ATHSCL HEART DISEASE OF NATIVE CORONARY ARTERY W/O ANG PCTRS 

  Status: Chronic   


Qualifiers: 


   Coronary Disease-Associated Artery/Lesion type: native artery   Native vs. 

transplanted heart: native heart   Associated angina: angina presence 

unspecified   Qualified Code(s): I25.10 - Atherosclerotic heart disease of 

native coronary artery without angina pectoris   





(5) CHF (congestive heart failure)


Code(s): I50.9 - HEART FAILURE, UNSPECIFIED   Status: Chronic   


Qualifiers: 


   Heart failure type: diastolic   Heart failure chronicity: chronic   

Qualified Code(s): I50.32 - Chronic diastolic (congestive) heart failure   





(6) CKD (chronic kidney disease) stage 2, GFR 60-89 ml/min


Code(s): N18.2 - CHRONIC KIDNEY DISEASE, STAGE 2 (MILD)   Status: Chronic   





(7) Cervical stenosis of spinal canal


Code(s): M48.02 - SPINAL STENOSIS, CERVICAL REGION   Status: Chronic   





(8) Chronic pancreatitis


Code(s): K86.1 - OTHER CHRONIC PANCREATITIS   Status: Chronic   





(9) Diabetes mellitus type 2 in obese


Code(s): E11.9 - TYPE 2 DIABETES MELLITUS WITHOUT COMPLICATIONS; E66.9 - OBESITY

, UNSPECIFIED   Status: Chronic   





(10) Hyperlipidemia


Code(s): E78.5 - HYPERLIPIDEMIA, UNSPECIFIED   Status: Chronic   


Qualifiers: 


   Hyperlipidemia type: unspecified   Qualified Code(s): E78.5 - Hyperlipidemia

, unspecified   





(11) Hypertension


Code(s): I10 - ESSENTIAL (PRIMARY) HYPERTENSION   Status: Chronic   


Qualifiers: 


   Hypertension type: essential hypertension   Qualified Code(s): I10 - 

Essential (primary) hypertension   





(12) Peripheral neuropathy


Code(s): G62.9 - POLYNEUROPATHY, UNSPECIFIED   Status: Chronic   


Qualifiers: 


   Peripheral neuropathy type: polyneuropathy, unspecified   Qualified Code(s): 

G62.9 - Polyneuropathy, unspecified   





(13) Tobacco abuse


Code(s): Z72.0 - TOBACCO USE   Status: Chronic   





(14) Trimalleolar fracture


Code(s): S82.853A - DISPLACED TRIMALLEOLAR FRACTURE OF UNSP LOWER LEG, INIT   

Status: Chronic   


Qualifiers: 


   Laterality: right 





(15) Complication involving orthopedic internal fixation device


Code(s): T84.9XXA - [facility] COMP OF INTERNAL ORTHOPEDIC PROSTH DEV/GRFT, INIT   

Status: Acute   





- Plan


Plan: 


Patient is a 54 y/o female who recently underwent I&D and hardware removal 

following an ORIF due to Trimalleolar Fx.





1) Complications of Internal Orthopedic Prosthetic Devices


-Following ORIF Trimalleolar Fx of Right Ankle, patient developed pain and 

difficulty w/ ambulation


-I&D and Hardware Removal performed on 6/18


-Cx: Pending


-Currently receiving Vancomycin 1 g Q12H


-Pain managed with Morphine 4 mg IV PRN - will augment as needed, patient 

denies current pain


-PT/OT: Consulted, recs appreciated





2) DM2 with Neuropathy


-Will continue patient's home regimen of Lantus, Alogliptin, Jardiance


-Last HgA1c was reportedly at goal


-ACHS Accuchecks


-Moderate SSI


-Will continue home Gabapentin regimen 





3) CHF 


-Echo (2019): EF (70%) - will not repeat


-Continue home medication regimen


-Strict I&Os, daily Weights





4) HTN


-BP currently at goal 


-Will continue home medication regimen





5) Bipolar Disorder


-Well controlled - no evidence of briana or depression, although patient has 

reportedly been walking all night / morning


-Will continue home medication regimen





6) CKD3b


-eGFR: 35 


-Will avoid nephrotoxic agents


-Will trend with AM Labs





7) HLD


-Will continue home medication regimen





8) CAD


-Will continue home medication regimen





PCP: Dr. Khan





Code: Full


Diet: CC


Activity: Ad german


VTE PPx: Lovenox





Dispo: Patient is currently stable following I&D and hardware removal/

replacement. Will await Ortho recs and Cx results and tailor plan as needed. 

Continue to monitor POC Glucose readings, pain, and mental status. Expected LOS 

< 48H.





Addendum - Attending





- Attending Attestation


Date/Time: 06/19/20 7302





I personally evaluated the patient and discussed the management with Dr. Guan


I agree with the History, Examination, Assessment and Plan documented above 

with any addition or exceptions noted below - Patient without complaints; 

walking well. Afebrile VSS. A/P: 1) Chronic nonhealing wound- hardware removed 

by ortho with cultures sent for evaluation; continue IV abx. 2) DM- continue 

home meds with sliding scale coverage.

## 2020-06-19 NOTE — PRG
DATE OF SERVICE:  06/19/2020



SUBJECTIVE:  Ms. Nguyen is 1 day status post removal of plate and screws from the

distal right fibula with obtaining of deep cultures and irrigation and debridement

of the lateral aspect of the right ankle.  The patient has been doing well.  She has

been fully weightbearing with very little pain. 



OBJECTIVE:  VITAL SIGNS:  Temperature 97.8, pulse 84, respiratory rate 18, blood

pressure 121/76, and O2 saturation 95% on room air. 



LABORATORY DATA:  White count 10.9, hemoglobin 13.5.  Creatinine 1.66, BUN 19,

glucose 283. 



Microbiology, the Gram stain showed few epithelial cells, moderate WBCs, no

organisms seen and there has been no growth at 12 hours. 



Dressing was changed on the right ankle.  The incision on the lateral aspect of left

ankle looks very good.  She has huge amount of swelling.  There is bloody drainage

on the dressing, but no active drainage.  The wound is well approximated. 



PLAN:  The patient will continue with IV antibiotics.  We will see what the culture

results are tomorrow.  If they are negative and the patient is doing well, I will

probably be able to discharge her on p.o. antibiotics. 







Job ID:  [id number]

## 2020-06-20 NOTE — PDOC.FM
- Subjective


Subjective: 





Patient doing well this AM. No significant overnight events. Patient states she 

feels well. She has been walking around with minimal pain. Patient states she 

was in pain last night, but the pain has improved and she thinks it was because 

her ACE bandage was wrapped too tightly.





- Objective


MAR Reviewed: Yes


Vital Signs & Weight: 


 Vital Signs (12 hours)











  Temp Pulse Resp BP Pulse Ox


 


 06/20/20 04:08  97.9 F  62  17  165/88 H  97


 


 06/20/20 00:10  97.8 F  60  16  108/70  97


 


 06/19/20 21:31  97.9 F  74  16  126/77  98


 


 06/19/20 21:30   83   


 


 06/19/20 20:00      98








 Weight











Weight                         107.161 kg














I&O: 


 











 06/19/20 06/20/20 06/21/20





 06:59 06:59 06:59


 


Intake Total  2000 


 


Balance  2000 











Result Diagrams: 


 06/19/20 05:13





 06/19/20 05:13





Phys Exam





- Physical Examination


Constitutional: NAD


HEENT: moist MMs


Respiratory: no wheezing, clear to auscultation bilateral


Cardiovascular: RRR, no significant murmur


Gastrointestinal: soft, non-tender


Musculoskeletal: no edema


Right lower leg/foot wrapped in ACE wrap which appears clean and dry


Neurological: non-focal, moves all 4 limbs


Psychiatric: A&O x 3


Skin: no rash, cap refill <2 seconds





Dx/Plan


(1) Complication involving orthopedic internal fixation device


Code(s): T84.9XXA - UNSP COMP OF INTERNAL ORTHOPEDIC PROSTH DEV/GRFT, INIT   

Status: Acute   





(2) Bipolar disorder


Code(s): F31.9 - BIPOLAR DISORDER, UNSPECIFIED   Status: Chronic   


Qualifiers: 


   Active/Remission status: remission status unspecified   Qualified Code(s): 

F31.9 - Bipolar disorder, unspecified   





(3) CAD (coronary artery disease)


Code(s): I25.10 - ATHSCL HEART DISEASE OF NATIVE CORONARY ARTERY W/O ANG PCTRS 

  Status: Chronic   


Qualifiers: 


   Coronary Disease-Associated Artery/Lesion type: native artery   Native vs. 

transplanted heart: native heart   Associated angina: angina presence 

unspecified   Qualified Code(s): I25.10 - Atherosclerotic heart disease of 

native coronary artery without angina pectoris   





(4) CHF (congestive heart failure)


Code(s): I50.9 - HEART FAILURE, UNSPECIFIED   Status: Chronic   


Qualifiers: 


   Heart failure type: diastolic   Heart failure chronicity: chronic   

Qualified Code(s): I50.32 - Chronic diastolic (congestive) heart failure   





(5) CKD (chronic kidney disease) stage 2, GFR 60-89 ml/min


Code(s): N18.2 - CHRONIC KIDNEY DISEASE, STAGE 2 (MILD)   Status: Chronic   





(6) Cervical stenosis of spinal canal


Code(s): M48.02 - SPINAL STENOSIS, CERVICAL REGION   Status: Chronic   





(7) Diabetes mellitus type 2 in obese


Code(s): E11.9 - TYPE 2 DIABETES MELLITUS WITHOUT COMPLICATIONS; E66.9 - OBESITY

, UNSPECIFIED   Status: Chronic   





(8) History of breast cancer


Code(s): Z85.3 - PERSONAL HISTORY OF MALIGNANT NEOPLASM OF BREAST   Status: 

Chronic   





(9) Hyperlipidemia


Code(s): E78.5 - HYPERLIPIDEMIA, UNSPECIFIED   Status: Chronic   


Qualifiers: 


   Hyperlipidemia type: unspecified   Qualified Code(s): E78.5 - Hyperlipidemia

, unspecified   





(10) Hypertension


Code(s): I10 - ESSENTIAL (PRIMARY) HYPERTENSION   Status: Chronic   


Qualifiers: 


   Hypertension type: essential hypertension   Qualified Code(s): I10 - 

Essential (primary) hypertension   





(11) Peripheral neuropathy


Code(s): G62.9 - POLYNEUROPATHY, UNSPECIFIED   Status: Chronic   


Qualifiers: 


   Peripheral neuropathy type: polyneuropathy, unspecified   Qualified Code(s): 

G62.9 - Polyneuropathy, unspecified   





(12) Tobacco abuse


Code(s): Z72.0 - TOBACCO USE   Status: Chronic   





- Plan


Plan: 





Patient is a 54 y/o female who recently underwent I&D and hardware removal 

following an ORIF due to Trimalleolar Fx.





Complications of Internal Orthopedic Prosthetic Devices


-Following ORIF Trimalleolar Fx of Right Ankle, patient developed pain and 

difficulty w/ ambulation


-I&D and Hardware Removal performed on 6/18


-Cx: Pending; will transition to PO antibiotics once cultures have resulted


-Currently receiving Vancomycin 1 g Q12H


-Pain managed with Morphine 4 mg IV PRN - will augment as needed, patient 

denies current pain, but she is receiving morphine fairly regularly. Will 

transition to PO pain medications in anticipation of d/c home.


-PT/OT: Consulted, recs appreciated





DM2 with neuropathy


-Will continue patient's home regimen of Lantus, Alogliptin, Jardiance


-Last HgA1c was reportedly at goal


-ACHS Accuchecks; patient checking her own BG via device. I only see appx 4 BG 

documented in chart over last 24 hours. She has received SSI. It will be 

difficult to titrate insulin if patient not providing BG results


-Aggressive SSI


-Will continue home Gabapentin regimen 


-Patient will need to follow outpatient to ensure BG at goal, particularly for 

proper healing





CHF 


-Echo (2019): EF (70%)


-Continue home medication regimen


-Strict I&Os, daily Weights





HTN


-BP previously at goal, elevated to 165/88 this AM. Will repeat BP, but suspect 

outlier. Will not adjust medications at this time. 


-Will continue home medication regimen





Bipolar Disorder


-Well controlled - no evidence of briana or depression, although patient has 

reportedly been walking all night / morning


-Will continue home medication regimen





CKD3b


-eGFR: 35 


-Will avoid nephrotoxic agents


-Will trend with AM Labs





HLD


-Will continue home medication regimen





CAD


-Will continue home medication regimen





PCP: Dr. Khan





Code: Full


Diet: CC


Activity: Ad german


VTE PPx: Lovenox





Dispo: Pending abx cultures. Once cultures result, can transition to PO abx and 

d/c home.








Addendum - Attending





- Attending Attestation


Date/Time: 06/20/20 1316





I personally evaluated the patient and discussed the management with Dr. Lloyd


I agree with the History, Examination, Assessment and Plan documented above 

with any addition or exceptions noted below- Pateint without complaints. 

Afebrile VSS. A/P: 1) DM- continue home regimen. 2) S/P hardware removal with 

chronic non-healing ulcer- plans as per surgery; cultures negative to date.

## 2020-06-21 NOTE — DIS
DATE OF ADMISSION:  06/18/2020



DATE OF DISCHARGE:  06/20/2020



HISTORY OF PRESENT ILLNESS:  Please see admission history and physical.



HOSPITAL COURSE:  The patient was brought to the operating room and under general

anesthetic, removal of plate and screws and obtaining deep cultures and irrigation

and debridement of the right distal fibula and lateral ankle was performed.  The

patient was started on IV vancomycin.  She was able to independently get out of bed.

 She was very stable.  She remained afebrile.  Vital signs remained stable.  Her

culture results were negative.  The patient will be discharged on antibiotics, which

include doxycycline 100 mg twice a day #40.  She will follow up in my office in 2

weeks. 







Job ID:  [id number]

## 2020-06-24 NOTE — PQF
DISHA WILKS WADE W MD

[id number]                                                             SURG A-
[id number]

[id number]                             

                                   

CLINICAL DOCUMENTATION CLARIFICATION FORM:  POST DISCHARGE



Addendum to original discharge summary date:  __________________________________
____



Late entry note date:  _________________________________________________________
__











DATE:6/24/2020                                           ATTN: Ramsey Jackson



Please exercise your independent, professional judgment in responding to the 
clarification form. 

Clinical indicators are provided on the bottom of this form for your review



Please check appropriate box(s):

[  ] Excisional Debridement                                                    
                                                                               
                                                                               
                                                                           

[  ] Non-excisional Debridement: (Removal by flushing, brushing, chemical, or 
washing)

[  ] Other procedure diagnosis ___________ 

[  ] Unable to determine



For continuity of documentation, please document condition throughout progress 
notes and discharge summary.  Thank You.



CLINICAL INDICATORS - SIGNS / SYMPTOMS / LABS

Operative report 6/18 Dr Guzman The open wound on the lateral aspect of the 
ankle was at about the mid portion of the scar and was also 1 cm anterior to 
the scar. An incision was made through the scar, but also took out this open 
area in an elliptical fashion, and the plate and 7 screws were identified and 
were removed.

Operative report 6/18 Dr Guzman The holes were then over-reamed, and the 
lateral aspect of the fibula was also debrided, and thenthe wound was copiously 
irrigated using high-speed . The wound was then closed using 3-0 Rapide
, Vicryl and interrupted vertical mattress sutures.



RISK FACTORS

Operative report 6/18 Dr Guzman  Chronic open wound on lateral aspect of 
right ankle

Operative report 6/18 Dr Guzman  s/p ORIF for right distal fibula

H&P p1 6/18  DM with neuropathy

H&P p1 6/18  CHF

H&P p1 6/18  CKD

H&P p1 6/18  HTN

H&P p1 6/18 Smoker

H&P p1 6/18 Obesity

Operative report 6/18 Dr Guzman  Osteomyelitis



TREATMENTS:

Operative report 6/18 Dr Guzman Removal of plate and screws

Operative report 6/18 Dr Guzman  Irrigation and debridement of Righ distal 
fibula and lateral ankle

MAR 6/18  IV Vancomycin 1 gm

Collected 06/18  Xray of ankle



(This form is maintained as a part of the permanent medical record)

2015 [employer]  All Rights Reserved

[email or web address]    [phone]

                                                              



 



LEVI

## 2020-07-02 ENCOUNTER — HOSPITAL ENCOUNTER (EMERGENCY)
Dept: HOSPITAL 18 - NAV ERS | Age: 55
Discharge: HOME | End: 2020-07-02
Payer: COMMERCIAL

## 2020-07-02 DIAGNOSIS — I50.9: ICD-10-CM

## 2020-07-02 DIAGNOSIS — I25.10: ICD-10-CM

## 2020-07-02 DIAGNOSIS — E66.9: ICD-10-CM

## 2020-07-02 DIAGNOSIS — I25.2: ICD-10-CM

## 2020-07-02 DIAGNOSIS — I13.0: ICD-10-CM

## 2020-07-02 DIAGNOSIS — Z79.899: ICD-10-CM

## 2020-07-02 DIAGNOSIS — N18.2: ICD-10-CM

## 2020-07-02 DIAGNOSIS — G89.29: ICD-10-CM

## 2020-07-02 DIAGNOSIS — J44.9: ICD-10-CM

## 2020-07-02 DIAGNOSIS — F17.210: ICD-10-CM

## 2020-07-02 DIAGNOSIS — E11.22: ICD-10-CM

## 2020-07-02 DIAGNOSIS — F31.9: ICD-10-CM

## 2020-07-02 DIAGNOSIS — Z79.4: ICD-10-CM

## 2020-07-02 DIAGNOSIS — T81.31XA: Primary | ICD-10-CM

## 2020-07-02 DIAGNOSIS — E78.2: ICD-10-CM

## 2020-07-02 LAB
ALBUMIN SERPL BCG-MCNC: 4 G/DL (ref 3.5–5)
ALP SERPL-CCNC: 88 U/L (ref 40–110)
ALT SERPL W P-5'-P-CCNC: 28 U/L (ref 8–55)
ANION GAP SERPL CALC-SCNC: 17 MMOL/L (ref 10–20)
AST SERPL-CCNC: 26 U/L (ref 5–34)
BASOPHILS # BLD AUTO: 0.1 THOU/UL (ref 0–0.2)
BASOPHILS NFR BLD AUTO: 1.2 % (ref 0–1)
BILIRUB SERPL-MCNC: 0.4 MG/DL (ref 0.2–1.2)
BUN SERPL-MCNC: 18 MG/DL (ref 9.8–20.1)
CALCIUM SERPL-MCNC: 9.1 MG/DL (ref 7.8–10.44)
CHLORIDE SERPL-SCNC: 105 MMOL/L (ref 98–107)
CO2 SERPL-SCNC: 20 MMOL/L (ref 22–29)
CREAT CL PREDICTED SERPL C-G-VRATE: 0 ML/MIN (ref 70–130)
EOSINOPHIL # BLD AUTO: 0.2 THOU/UL (ref 0–0.7)
EOSINOPHIL NFR BLD AUTO: 1.9 % (ref 0–10)
GLOBULIN SER CALC-MCNC: 2.9 G/DL (ref 2.4–3.5)
GLUCOSE SERPL-MCNC: 132 MG/DL (ref 70–105)
HGB BLD-MCNC: 13.8 G/DL (ref 12–16)
LYMPHOCYTES # BLD AUTO: 3.8 THOU/UL (ref 1.2–3.4)
LYMPHOCYTES NFR BLD AUTO: 31 % (ref 21–51)
MCH RBC QN AUTO: 29.9 PG (ref 27–31)
MCV RBC AUTO: 91.8 FL (ref 78–98)
MONOCYTES # BLD AUTO: 1 THOU/UL (ref 0.11–0.59)
MONOCYTES NFR BLD AUTO: 8.4 % (ref 0–10)
NEUTROPHILS # BLD AUTO: 7 THOU/UL (ref 1.4–6.5)
NEUTROPHILS NFR BLD AUTO: 57.6 % (ref 42–75)
PLATELET # BLD AUTO: 184 THOU/UL (ref 130–400)
POTASSIUM SERPL-SCNC: 3.9 MMOL/L (ref 3.5–5.1)
RBC # BLD AUTO: 4.6 MILL/UL (ref 4.2–5.4)
SODIUM SERPL-SCNC: 138 MMOL/L (ref 136–145)
WBC # BLD AUTO: 12.1 THOU/UL (ref 4.8–10.8)

## 2020-07-02 PROCEDURE — 80053 COMPREHEN METABOLIC PANEL: CPT

## 2020-07-02 PROCEDURE — 85025 COMPLETE CBC W/AUTO DIFF WBC: CPT

## 2020-07-02 PROCEDURE — 96375 TX/PRO/DX INJ NEW DRUG ADDON: CPT

## 2020-07-02 PROCEDURE — 96374 THER/PROPH/DIAG INJ IV PUSH: CPT

## 2020-07-02 PROCEDURE — 86140 C-REACTIVE PROTEIN: CPT

## 2020-07-02 NOTE — RAD
XR Ankle Rt 3 View STANDARD



History: Ankle pain



Comparison: Intraoperative radiograph June 18, 2020



Findings: The lateral plate-screw fixation hardware is be removed. There are 2 partially threaded med
ial malleolar screws.



High-grade midfoot degenerative change. Extensive soft tissue swelling around ankle.



Impression: Soft tissue swelling may reflect underlying cellulitis. No acute osseous abnormality.



Reported By: Kai García 

Electronically Signed:  7/2/2020 8:41 PM

## 2020-08-02 ENCOUNTER — HOSPITAL ENCOUNTER (EMERGENCY)
Dept: HOSPITAL 18 - NAV ERS | Age: 55
Discharge: HOME | End: 2020-08-02
Payer: COMMERCIAL

## 2020-08-02 DIAGNOSIS — R53.1: Primary | ICD-10-CM

## 2020-08-02 DIAGNOSIS — Z79.899: ICD-10-CM

## 2020-08-02 DIAGNOSIS — J44.9: ICD-10-CM

## 2020-08-02 DIAGNOSIS — I25.10: ICD-10-CM

## 2020-08-02 DIAGNOSIS — E78.2: ICD-10-CM

## 2020-08-02 DIAGNOSIS — I25.2: ICD-10-CM

## 2020-08-02 DIAGNOSIS — F31.9: ICD-10-CM

## 2020-08-02 DIAGNOSIS — Z79.4: ICD-10-CM

## 2020-08-02 DIAGNOSIS — F17.210: ICD-10-CM

## 2020-08-02 DIAGNOSIS — E11.9: ICD-10-CM

## 2020-08-02 DIAGNOSIS — Z79.51: ICD-10-CM

## 2020-08-02 DIAGNOSIS — E66.9: ICD-10-CM

## 2020-08-02 DIAGNOSIS — I50.9: ICD-10-CM

## 2020-08-02 LAB
ANION GAP SERPL CALC-SCNC: 15 MMOL/L (ref 10–20)
BASOPHILS # BLD AUTO: 0.1 THOU/UL (ref 0–0.2)
BASOPHILS NFR BLD AUTO: 0.9 % (ref 0–1)
BUN SERPL-MCNC: 19 MG/DL (ref 9.8–20.1)
CALCIUM SERPL-MCNC: 9 MG/DL (ref 7.8–10.44)
CHLORIDE SERPL-SCNC: 97 MMOL/L (ref 98–107)
CO2 SERPL-SCNC: 26 MMOL/L (ref 22–29)
CREAT CL PREDICTED SERPL C-G-VRATE: 0 ML/MIN (ref 70–130)
EOSINOPHIL # BLD AUTO: 0.2 THOU/UL (ref 0–0.7)
EOSINOPHIL NFR BLD AUTO: 1.6 % (ref 0–10)
GLUCOSE SERPL-MCNC: 218 MG/DL (ref 70–105)
HGB BLD-MCNC: 13.3 G/DL (ref 12–16)
LYMPHOCYTES # BLD AUTO: 3.3 THOU/UL (ref 1.2–3.4)
LYMPHOCYTES NFR BLD AUTO: 33.3 % (ref 21–51)
MAGNESIUM SERPL-MCNC: 2.6 MG/DL (ref 1.6–2.6)
MCH RBC QN AUTO: 29.2 PG (ref 27–31)
MCV RBC AUTO: 93.1 FL (ref 78–98)
MONOCYTES # BLD AUTO: 0.7 THOU/UL (ref 0.11–0.59)
MONOCYTES NFR BLD AUTO: 7.2 % (ref 0–10)
NEUTROPHILS # BLD AUTO: 5.7 THOU/UL (ref 1.4–6.5)
NEUTROPHILS NFR BLD AUTO: 57 % (ref 42–75)
PLATELET # BLD AUTO: 167 THOU/UL (ref 130–400)
POTASSIUM SERPL-SCNC: 4.1 MMOL/L (ref 3.5–5.1)
RBC # BLD AUTO: 4.56 MILL/UL (ref 4.2–5.4)
SODIUM SERPL-SCNC: 134 MMOL/L (ref 136–145)
WBC # BLD AUTO: 9.9 THOU/UL (ref 4.8–10.8)

## 2020-08-02 PROCEDURE — 36416 COLLJ CAPILLARY BLOOD SPEC: CPT

## 2020-08-02 PROCEDURE — 80048 BASIC METABOLIC PNL TOTAL CA: CPT

## 2020-08-02 PROCEDURE — 83735 ASSAY OF MAGNESIUM: CPT

## 2020-08-02 PROCEDURE — 85025 COMPLETE CBC W/AUTO DIFF WBC: CPT

## 2020-08-02 PROCEDURE — 99284 EMERGENCY DEPT VISIT MOD MDM: CPT

## 2020-08-26 NOTE — RAD
CHEST PA AND LATERAL:

 

HISTORY:

A 53-year-old female with a history of cough and congestion for three days.

 

COMPARISON:

07/25/2018

 

FINDINGS:

Heart size is within normal limits.  Lungs are clear.  No confluent pneumonia, overt edema, or pleura
l effusion.

 

IMPRESSION:

1.  No acute intrathoracic disease.

 

2.  Post surgical changes of the lower cervical spine, stable from prior study.

 

3.  Atherosclerosis of the aorta.

 

POS: [facility]  [address], [place] [address]      Progress Note          Subjective   Patient ID: Eugene is a 82 year old male.    Chief Complaint   Patient presents with   • Follow-up Hypertension     Medication refill   • Leg Pain     both legs     HPI:   Patient is an 82-year-old -American male with a diagnosis of COPD, hypertension, chronic chest pain, BPH, and prediabetes.  Today he presents to the office for follow-up on his multiple medical problems.  No history of fever, chills, or body aches.  His appetite is good and he has been sleeping well.  The patient continues to experience intermittent episodes of chest pain that lasted approximately 30 seconds.  The chest pain is not associated with physical activity.  The patient states that most of the time he experiences the pain when he is reclined in bed.    Patient's medications, allergies, past medical, surgical, social and family histories were reviewed and updated as appropriate.    Review of Systems   Constitutional: Negative.    HENT: Negative.    Eyes: Positive for visual disturbance. Negative for pain, discharge, redness and itching.   Respiratory: Negative.    Cardiovascular: Positive for chest pain. Negative for palpitations and leg swelling.   Gastrointestinal: Negative.    Endocrine: Negative.    Genitourinary: Negative.    Musculoskeletal: Negative.    Skin:        Patient complains of hyperpigmentation and pruritus of the right forearm.  See HPI for details.   Allergic/Immunologic: Negative.    Neurological: Negative.    Hematological: Negative.    Psychiatric/Behavioral: Negative.        Objective   Physical Exam   Constitutional: He is oriented to person, place, and time.   Elderly -American male in no distress.  He is alert and oriented x3.  The patient presents to the office alone today.  He is a good historian.   HENT:   Head: Normocephalic and atraumatic.   Nose: No sinus tenderness.   Eyes: Pupils are  equal, round, and reactive to light. Conjunctivae and EOM are normal. No scleral icterus.   Neck: Normal range of motion. Neck supple. No JVD present. No thyromegaly present.   Cardiovascular: Normal rate, regular rhythm, normal heart sounds and intact distal pulses. Exam reveals no gallop.   No murmur heard.  Pulmonary/Chest: Effort normal and breath sounds normal. No respiratory distress. He has no wheezes. He has no rales. He exhibits no tenderness.   Abdominal: Soft. Bowel sounds are normal. He exhibits no distension and no mass. There is no abdominal tenderness. There is no guarding.   Musculoskeletal: Normal range of motion.         General: No edema.   Lymphadenopathy:     He has no cervical adenopathy.   Neurological: He is alert and oriented to person, place, and time. No cranial nerve deficit. He exhibits normal muscle tone.   Skin:   Warm dry skin hyperpigmentation of the right forearm.  Negative excoriations.  Skin is intact.   Psychiatric: He has a normal mood and affect. His behavior is normal. Judgment and thought content normal.   Nursing note and vitals reviewed.      No visits with results within 2 Week(s) from this visit.   Latest known visit with results is:   Lab Services on 03/10/2020   Component Date Value Ref Range Status   • FASTING STATUS 03/10/2020 FASTING  hrs Final   • CHOLESTEROL 03/10/2020 175  <200 mg/dL Final   • CALCULATED LDL 03/10/2020 105  <130 mg/dL Final   • HDL 03/10/2020 57  >39 mg/dL Final   • TRIGLYCERIDE 03/10/2020 64  <150 mg/dL Final   • CALCULATED NON HDL 03/10/2020 118  mg/dL Final   • CHOL/HDL 03/10/2020 3.1  <4.5 Final   • Hemoglobin A1C 03/10/2020 4.5  4.5 - 5.6 % Final   • Fasting Status 03/10/2020 FASTING  hrs Final   • Sodium 03/10/2020 141  135 - 145 mmol/L Final   • Potassium 03/10/2020 3.9  3.4 - 5.1 mmol/L Final   • Chloride 03/10/2020 107  98 - 107 mmol/L Final   • Carbon Dioxide 03/10/2020 26  21 - 32 mmol/L Final   • Anion Gap 03/10/2020 12  10 - 20 mmol/L  Final   • Glucose 03/10/2020 93  65 - 99 mg/dL Final   • BUN 03/10/2020 12  6 - 20 mg/dL Final   • Creatinine 03/10/2020 0.90  0.67 - 1.17 mg/dL Final   • GFR Estimate,  03/10/2020 >90   Final   • GFR Estimate, Non  03/10/2020 78   Final   • BUN/Creatinine Ratio 03/10/2020 13  7 - 25 Final   • CALCIUM 03/10/2020 9.3  8.4 - 10.2 mg/dL Final   • TOTAL BILIRUBIN 03/10/2020 0.3  0.2 - 1.0 mg/dL Final   • AST/SGOT 03/10/2020 9  <38 Units/L Final   • ALT/SGPT 03/10/2020 11  <64 Units/L Final   • ALK PHOSPHATASE 03/10/2020 73  45 - 117 Units/L Final   • TOTAL PROTEIN 03/10/2020 7.5  6.4 - 8.2 g/dL Final   • Albumin 03/10/2020 3.9  3.6 - 5.1 g/dL Final   • GLOBULIN 03/10/2020 3.6  2.0 - 4.0 g/dL Final   • A/G Ratio, Serum 03/10/2020 1.1  1.0 - 2.4 Final   • WBC 03/10/2020 5.2  4.2 - 11.0 K/mcL Final   • RBC 03/10/2020 4.78  4.50 - 5.90 mil/mcL Final   • HGB 03/10/2020 13.5  13.0 - 17.0 g/dL Final   • HCT 03/10/2020 45.1  39.0 - 51.0 % Final   • MCV 03/10/2020 94.4  78.0 - 100.0 fl Final   • MCH 03/10/2020 28.2  26.0 - 34.0 pg Final   • MCHC 03/10/2020 29.9* 32.0 - 36.5 g/dL Final   • RDW-CV 03/10/2020 13.4  11.0 - 15.0 % Final   • PLT 03/10/2020 274  140 - 450 K/mcL Final   • NRBC 03/10/2020 0  0 /100 WBC Final   • DIFF TYPE 03/10/2020 AUTOMATED DIFFERENTIAL   Final   • Neutrophil 03/10/2020 58  % Final   • LYMPH 03/10/2020 33  % Final   • MONO 03/10/2020 8  % Final   • EOSIN 03/10/2020 1  % Final   • BASO 03/10/2020 0  % Final   • Percent Immature Granuloctyes 03/10/2020 0  % Final   • Absolute Neutrophil 03/10/2020 3.0  1.8 - 7.7 K/mcL Final   • Absolute Lymph 03/10/2020 1.7  1.0 - 4.0 K/mcL Final   • Absolute Mono 03/10/2020 0.4  0.3 - 0.9 K/mcL Final   • Absolute Eos 03/10/2020 0.0* 0.1 - 0.5 K/mcL Final   • Absolute Baso 03/10/2020 0.0  0.0 - 0.3 K/mcL Final   • Absolute Immature Granulocytes 03/10/2020 0.0  0 - 0.2 K/mcl Final   • PSA, Total 03/10/2020 6.79* <4.01 ng/mL Final   • TSH  03/10/2020 1.651  0.350 - 5.000 mcUnits/mL Final         Assessment \  Problem List Items Addressed This Visit        Respiratory    Pulmonary emphysema (CMS/HCC)       The patient is asymptomatic.  Continue to monitor.              Circulatory    Essential hypertension - Primary     The patient's blood pressure is uncontrolled secondary to being out of medication.  I will restart valsartan and 160 mg p.o. daily.         Relevant Medications    valsartan (DIOVAN) 160 MG tablet    Precordial pain     As of this date, the patient did not receive a stress test.  I have reordered a stress test on the patient.         Relevant Orders    STRESS TEST WITH MYOCARDIAL PERFUSION       Urinary    BPH associated with nocturia     BPH is stable.  Continue tamsulosin 0.4 mg p.o. daily.         Relevant Medications    tamsulosin (FLOMAX) 0.4 MG Cap       Endocrine    Prediabetes     The patient's last hemoglobin A1c was normal.  Continue to monitor.            Other    Elevated PSA     The patient is 82 years old.  His PSA is mildly elevated at 6.2.  Treat the patient conservatively on his work-up for possible prostate cancer.  Continue tamsulosin 0.4 g p.o. daily.

## 2020-09-22 ENCOUNTER — HOSPITAL ENCOUNTER (OUTPATIENT)
Dept: HOSPITAL 18 - NAV CT | Age: 55
Discharge: HOME | End: 2020-09-22
Payer: COMMERCIAL

## 2020-09-22 DIAGNOSIS — R41.82: Primary | ICD-10-CM

## 2020-09-22 PROCEDURE — 70450 CT HEAD/BRAIN W/O DYE: CPT

## 2020-09-22 NOTE — CT
CT BRAIN WITHOUT CONTRAST:

 

HISTORY: 

Altered mental status.  Right-sided numbness and weakness.

 

COMPARISON: 

12/5/2019.

 

FINDINGS: 

No evidence of acute infarct, hemorrhage, midline shift, or abnormal extraaxial fluid collections are
 seen.  The ventricular size is normal and the basilar cisterns are patent.  The bony calvarium is in
tact.  The visualized paranasal sinuses and mastoid air cells are well aerated.

 

IMPRESSION: 

No CT evidence of acute intracranial process.

 

POS: [facility]

## 2021-04-21 ENCOUNTER — HOSPITAL ENCOUNTER (EMERGENCY)
Dept: HOSPITAL 18 - NAV ERS | Age: 56
Discharge: ANOTHER HOSPITAL | End: 2021-04-21
Payer: COMMERCIAL

## 2021-04-21 ENCOUNTER — HOSPITAL ENCOUNTER (OUTPATIENT)
Dept: HOSPITAL 92 - ERS | Age: 56
Setting detail: OBSERVATION
LOS: 2 days | Discharge: HOME | End: 2021-04-23
Attending: INTERNAL MEDICINE | Admitting: INTERNAL MEDICINE
Payer: COMMERCIAL

## 2021-04-21 VITALS — BODY MASS INDEX: 40.5 KG/M2

## 2021-04-21 DIAGNOSIS — J44.9: ICD-10-CM

## 2021-04-21 DIAGNOSIS — E66.9: ICD-10-CM

## 2021-04-21 DIAGNOSIS — N18.9: ICD-10-CM

## 2021-04-21 DIAGNOSIS — Z79.4: ICD-10-CM

## 2021-04-21 DIAGNOSIS — I50.32: ICD-10-CM

## 2021-04-21 DIAGNOSIS — I25.2: ICD-10-CM

## 2021-04-21 DIAGNOSIS — Z91.048: ICD-10-CM

## 2021-04-21 DIAGNOSIS — Z88.1: ICD-10-CM

## 2021-04-21 DIAGNOSIS — Z88.2: ICD-10-CM

## 2021-04-21 DIAGNOSIS — Z95.5: ICD-10-CM

## 2021-04-21 DIAGNOSIS — D75.1: ICD-10-CM

## 2021-04-21 DIAGNOSIS — E78.5: ICD-10-CM

## 2021-04-21 DIAGNOSIS — F17.210: ICD-10-CM

## 2021-04-21 DIAGNOSIS — G93.40: Primary | ICD-10-CM

## 2021-04-21 DIAGNOSIS — I13.0: ICD-10-CM

## 2021-04-21 DIAGNOSIS — N18.2: ICD-10-CM

## 2021-04-21 DIAGNOSIS — Z88.6: ICD-10-CM

## 2021-04-21 DIAGNOSIS — Z79.899: ICD-10-CM

## 2021-04-21 DIAGNOSIS — E78.1: ICD-10-CM

## 2021-04-21 DIAGNOSIS — R41.82: Primary | ICD-10-CM

## 2021-04-21 DIAGNOSIS — I25.10: ICD-10-CM

## 2021-04-21 DIAGNOSIS — E11.22: ICD-10-CM

## 2021-04-21 DIAGNOSIS — Z79.02: ICD-10-CM

## 2021-04-21 DIAGNOSIS — Z20.822: ICD-10-CM

## 2021-04-21 DIAGNOSIS — Z88.5: ICD-10-CM

## 2021-04-21 DIAGNOSIS — I25.119: ICD-10-CM

## 2021-04-21 DIAGNOSIS — I50.9: ICD-10-CM

## 2021-04-21 LAB
ALBUMIN SERPL BCG-MCNC: 4 G/DL (ref 3.5–5)
ALP SERPL-CCNC: 138 U/L (ref 40–110)
ALT SERPL W P-5'-P-CCNC: 25 U/L (ref 8–55)
ANION GAP SERPL CALC-SCNC: 16 MMOL/L (ref 10–20)
APAP SERPL-MCNC: (no result) MCG/ML (ref 10–30)
APTT PPP: 25.8 SEC (ref 22.9–36.1)
AST SERPL-CCNC: 15 U/L (ref 5–34)
BASOPHILS # BLD AUTO: 0.1 THOU/UL (ref 0–0.2)
BASOPHILS NFR BLD AUTO: 0.7 % (ref 0–1)
BILIRUB SERPL-MCNC: 0.5 MG/DL (ref 0.2–1.2)
BUN SERPL-MCNC: 16 MG/DL (ref 9.8–20.1)
CALCIUM SERPL-MCNC: 9.3 MG/DL (ref 7.8–10.44)
CHLORIDE SERPL-SCNC: 98 MMOL/L (ref 98–107)
CO2 SERPL-SCNC: 28 MMOL/L (ref 22–29)
CREAT CL PREDICTED SERPL C-G-VRATE: 0 ML/MIN (ref 70–130)
DRUG SCREEN CUTOFF: (no result)
EOSINOPHIL # BLD AUTO: 0.2 THOU/UL (ref 0–0.7)
EOSINOPHIL NFR BLD AUTO: 1.9 % (ref 0–10)
GLOBULIN SER CALC-MCNC: 3 G/DL (ref 2.4–3.5)
GLUCOSE SERPL-MCNC: 173 MG/DL (ref 70–105)
GLUCOSE UR STRIP-MCNC: >=1000 MG/DL
HGB BLD-MCNC: 16.9 G/DL (ref 12–16)
INR PPP: 0.9
LYMPHOCYTES # BLD AUTO: 2.1 THOU/UL (ref 1.2–3.4)
LYMPHOCYTES NFR BLD AUTO: 16.9 % (ref 21–51)
MCH RBC QN AUTO: 28.4 PG (ref 27–31)
MCV RBC AUTO: 95.1 FL (ref 78–98)
MEDTOX CONTROL LINE VALID?: (no result)
MONOCYTES # BLD AUTO: 0.9 THOU/UL (ref 0.11–0.59)
MONOCYTES NFR BLD AUTO: 7.5 % (ref 0–10)
NEUTROPHILS # BLD AUTO: 8.9 THOU/UL (ref 1.4–6.5)
NEUTROPHILS NFR BLD AUTO: 73.1 % (ref 42–75)
PLATELET # BLD AUTO: 150 THOU/UL (ref 130–400)
POTASSIUM SERPL-SCNC: 4.9 MMOL/L (ref 3.5–5.1)
PROTHROMBIN TIME: 12.5 SEC (ref 12–14.7)
RBC # BLD AUTO: 5.95 MILL/UL (ref 4.2–5.4)
SALICYLATES SERPL-MCNC: (no result) MG/DL (ref 15–30)
SODIUM SERPL-SCNC: 137 MMOL/L (ref 136–145)
SP GR UR STRIP: 1.01 (ref 1–1.03)
WBC # BLD AUTO: 12.2 THOU/UL (ref 4.8–10.8)

## 2021-04-21 PROCEDURE — 87389 HIV-1 AG W/HIV-1&-2 AB AG IA: CPT

## 2021-04-21 PROCEDURE — 83690 ASSAY OF LIPASE: CPT

## 2021-04-21 PROCEDURE — 51701 INSERT BLADDER CATHETER: CPT

## 2021-04-21 PROCEDURE — U0003 INFECTIOUS AGENT DETECTION BY NUCLEIC ACID (DNA OR RNA); SEVERE ACUTE RESPIRATORY SYNDROME CORONAVIRUS 2 (SARS-COV-2) (CORONAVIRUS DISEASE [COVID-19]), AMPLIFIED PROBE TECHNIQUE, MAKING USE OF HIGH THROUGHPUT TECHNOLOGIES AS DESCRIBED BY CMS-2020-01-R: HCPCS

## 2021-04-21 PROCEDURE — 36415 COLL VENOUS BLD VENIPUNCTURE: CPT

## 2021-04-21 PROCEDURE — G0378 HOSPITAL OBSERVATION PER HR: HCPCS

## 2021-04-21 PROCEDURE — 84425 ASSAY OF VITAMIN B-1: CPT

## 2021-04-21 PROCEDURE — 93005 ELECTROCARDIOGRAM TRACING: CPT

## 2021-04-21 PROCEDURE — 96372 THER/PROPH/DIAG INJ SC/IM: CPT

## 2021-04-21 PROCEDURE — 83036 HEMOGLOBIN GLYCOSYLATED A1C: CPT

## 2021-04-21 PROCEDURE — 82140 ASSAY OF AMMONIA: CPT

## 2021-04-21 PROCEDURE — 80061 LIPID PANEL: CPT

## 2021-04-21 PROCEDURE — 81003 URINALYSIS AUTO W/O SCOPE: CPT

## 2021-04-21 PROCEDURE — 70496 CT ANGIOGRAPHY HEAD: CPT

## 2021-04-21 PROCEDURE — 85025 COMPLETE CBC W/AUTO DIFF WBC: CPT

## 2021-04-21 PROCEDURE — 80307 DRUG TEST PRSMV CHEM ANLYZR: CPT

## 2021-04-21 PROCEDURE — 36416 COLLJ CAPILLARY BLOOD SPEC: CPT

## 2021-04-21 PROCEDURE — U0005 INFEC AGEN DETEC AMPLI PROBE: HCPCS

## 2021-04-21 PROCEDURE — 87635 SARS-COV-2 COVID-19 AMP PRB: CPT

## 2021-04-21 PROCEDURE — 84484 ASSAY OF TROPONIN QUANT: CPT

## 2021-04-21 PROCEDURE — 70450 CT HEAD/BRAIN W/O DYE: CPT

## 2021-04-21 PROCEDURE — 85610 PROTHROMBIN TIME: CPT

## 2021-04-21 PROCEDURE — 82607 VITAMIN B-12: CPT

## 2021-04-21 PROCEDURE — 80053 COMPREHEN METABOLIC PANEL: CPT

## 2021-04-21 PROCEDURE — 94640 AIRWAY INHALATION TREATMENT: CPT

## 2021-04-21 PROCEDURE — 87340 HEPATITIS B SURFACE AG IA: CPT

## 2021-04-21 PROCEDURE — 0042T: CPT

## 2021-04-21 PROCEDURE — 96374 THER/PROPH/DIAG INJ IV PUSH: CPT

## 2021-04-21 PROCEDURE — 84443 ASSAY THYROID STIM HORMONE: CPT

## 2021-04-21 PROCEDURE — 85730 THROMBOPLASTIN TIME PARTIAL: CPT

## 2021-04-21 PROCEDURE — 71045 X-RAY EXAM CHEST 1 VIEW: CPT

## 2021-04-21 PROCEDURE — 80306 DRUG TEST PRSMV INSTRMNT: CPT

## 2021-04-21 PROCEDURE — 86803 HEPATITIS C AB TEST: CPT

## 2021-04-22 LAB
ALBUMIN SERPL BCG-MCNC: 3.5 G/DL (ref 3.5–5)
ALP SERPL-CCNC: 120 U/L (ref 40–110)
ALT SERPL W P-5'-P-CCNC: 21 U/L (ref 8–55)
ANION GAP SERPL CALC-SCNC: 13 MMOL/L (ref 10–20)
AST SERPL-CCNC: 19 U/L (ref 5–34)
BASOPHILS # BLD AUTO: 0.1 THOU/UL (ref 0–0.2)
BASOPHILS NFR BLD AUTO: 0.5 % (ref 0–1)
BILIRUB SERPL-MCNC: 0.6 MG/DL (ref 0.2–1.2)
BUN SERPL-MCNC: 13 MG/DL (ref 9.8–20.1)
CALCIUM SERPL-MCNC: 8.9 MG/DL (ref 7.8–10.44)
CHD RISK SERPL-RTO: 6.5 (ref ?–4.5)
CHLORIDE SERPL-SCNC: 107 MMOL/L (ref 98–107)
CHOLEST SERPL-MCNC: 129 MG/DL
CO2 SERPL-SCNC: 25 MMOL/L (ref 22–29)
CREAT CL PREDICTED SERPL C-G-VRATE: 122 ML/MIN (ref 70–130)
EOSINOPHIL # BLD AUTO: 0.2 THOU/UL (ref 0–0.7)
EOSINOPHIL NFR BLD AUTO: 2 % (ref 0–10)
GLOBULIN SER CALC-MCNC: 2.7 G/DL (ref 2.4–3.5)
GLUCOSE SERPL-MCNC: 141 MG/DL (ref 70–105)
HBSAG INDEX: 0.19 S/CO (ref 0–0.99)
HDLC SERPL-MCNC: 20 MG/DL
HEP C INDEX: 0.04 S/CO (ref 0–0.79)
HGB BLD-MCNC: 14.7 G/DL (ref 12–16)
LDLC SERPL CALC-MCNC: 54 MG/DL
LIPASE SERPL-CCNC: 52 U/L (ref 8–78)
LYMPHOCYTES # BLD: 2.3 THOU/UL (ref 1.2–3.4)
LYMPHOCYTES NFR BLD AUTO: 19.8 % (ref 21–51)
MCH RBC QN AUTO: 30.5 PG (ref 27–31)
MCV RBC AUTO: 90.7 FL (ref 78–98)
MONOCYTES # BLD AUTO: 0.8 THOU/UL (ref 0.11–0.59)
MONOCYTES NFR BLD AUTO: 6.6 % (ref 0–10)
NEUTROPHILS # BLD AUTO: 8.2 THOU/UL (ref 1.4–6.5)
NEUTROPHILS NFR BLD AUTO: 71.1 % (ref 42–75)
PLATELET # BLD AUTO: 139 THOU/UL (ref 130–400)
POTASSIUM SERPL-SCNC: 4.1 MMOL/L (ref 3.5–5.1)
RBC # BLD AUTO: 4.82 MILL/UL (ref 4.2–5.4)
SODIUM SERPL-SCNC: 141 MMOL/L (ref 136–145)
TRIGL SERPL-MCNC: 275 MG/DL (ref ?–150)
TSH SERPL DL<=0.005 MIU/L-ACNC: 0.37 UIU/ML (ref 0.35–4.94)
VIT B12 SERPL-MCNC: 446 PG/ML (ref 211–911)
WBC # BLD AUTO: 11.6 THOU/UL (ref 4.8–10.8)

## 2021-04-22 RX ADMIN — CYANOCOBALAMIN TAB 1000 MCG SCH: 1000 TAB at 10:04

## 2021-04-22 RX ADMIN — PANCRELIPASE SCH: 60000; 12000; 38000 CAPSULE, DELAYED RELEASE PELLETS ORAL at 10:03

## 2021-04-22 RX ADMIN — MOMETASONE FUROATE SCH PUFF: 100 AEROSOL RESPIRATORY (INHALATION) at 18:51

## 2021-04-22 RX ADMIN — PANCRELIPASE SCH: 60000; 12000; 38000 CAPSULE, DELAYED RELEASE PELLETS ORAL at 16:25

## 2021-04-22 RX ADMIN — MOMETASONE FUROATE SCH PUFF: 100 AEROSOL RESPIRATORY (INHALATION) at 06:36

## 2021-04-22 RX ADMIN — PANCRELIPASE SCH: 60000; 12000; 38000 CAPSULE, DELAYED RELEASE PELLETS ORAL at 11:52

## 2021-04-22 RX ADMIN — ALOGLIPTIN SCH: 6.25 TABLET, FILM COATED ORAL at 10:03

## 2021-04-23 VITALS — SYSTOLIC BLOOD PRESSURE: 143 MMHG | DIASTOLIC BLOOD PRESSURE: 77 MMHG | TEMPERATURE: 98.4 F

## 2021-04-23 LAB — HIV 1/2 INDEX: 0.08 S/CO (ref ?–1)

## 2021-04-23 RX ADMIN — ALOGLIPTIN SCH MG: 6.25 TABLET, FILM COATED ORAL at 09:02

## 2021-04-23 RX ADMIN — CYANOCOBALAMIN TAB 1000 MCG SCH MCG: 1000 TAB at 09:02

## 2021-04-23 RX ADMIN — PANCRELIPASE SCH CAP: 60000; 12000; 38000 CAPSULE, DELAYED RELEASE PELLETS ORAL at 11:26

## 2021-04-23 RX ADMIN — MOMETASONE FUROATE SCH: 100 AEROSOL RESPIRATORY (INHALATION) at 12:42

## 2021-04-23 RX ADMIN — PANCRELIPASE SCH CAP: 60000; 12000; 38000 CAPSULE, DELAYED RELEASE PELLETS ORAL at 16:42

## 2021-04-23 RX ADMIN — MOMETASONE FUROATE SCH: 100 AEROSOL RESPIRATORY (INHALATION) at 18:55

## 2021-04-23 RX ADMIN — PANCRELIPASE SCH CAP: 60000; 12000; 38000 CAPSULE, DELAYED RELEASE PELLETS ORAL at 09:00

## 2021-05-01 ENCOUNTER — HOSPITAL ENCOUNTER (EMERGENCY)
Dept: HOSPITAL 18 - NAV ERS | Age: 56
Discharge: HOME | End: 2021-05-01
Payer: COMMERCIAL

## 2021-05-01 DIAGNOSIS — N18.2: ICD-10-CM

## 2021-05-01 DIAGNOSIS — Z79.899: ICD-10-CM

## 2021-05-01 DIAGNOSIS — I50.9: ICD-10-CM

## 2021-05-01 DIAGNOSIS — R53.1: Primary | ICD-10-CM

## 2021-05-01 DIAGNOSIS — J44.9: ICD-10-CM

## 2021-05-01 DIAGNOSIS — E11.22: ICD-10-CM

## 2021-05-01 DIAGNOSIS — F17.210: ICD-10-CM

## 2021-05-01 DIAGNOSIS — E78.2: ICD-10-CM

## 2021-05-01 DIAGNOSIS — Z79.51: ICD-10-CM

## 2021-05-01 DIAGNOSIS — I25.2: ICD-10-CM

## 2021-05-01 DIAGNOSIS — Z79.4: ICD-10-CM

## 2021-05-01 DIAGNOSIS — E66.9: ICD-10-CM

## 2021-05-01 LAB
ALBUMIN SERPL BCG-MCNC: 3.6 G/DL (ref 3.5–5)
ALP SERPL-CCNC: 107 U/L (ref 40–110)
ALT SERPL W P-5'-P-CCNC: 26 U/L (ref 8–55)
ANION GAP SERPL CALC-SCNC: 16 MMOL/L (ref 10–20)
APAP SERPL-MCNC: (no result) MCG/ML (ref 10–30)
AST SERPL-CCNC: 18 U/L (ref 5–34)
BASOPHILS # BLD AUTO: 0.1 THOU/UL (ref 0–0.2)
BASOPHILS NFR BLD AUTO: 1.2 % (ref 0–1)
BILIRUB SERPL-MCNC: 0.6 MG/DL (ref 0.2–1.2)
BUN SERPL-MCNC: 18 MG/DL (ref 9.8–20.1)
CALCIUM SERPL-MCNC: 8.6 MG/DL (ref 7.8–10.44)
CHLORIDE SERPL-SCNC: 98 MMOL/L (ref 98–107)
CO2 SERPL-SCNC: 28 MMOL/L (ref 22–29)
CREAT CL PREDICTED SERPL C-G-VRATE: 0 ML/MIN (ref 70–130)
DRUG SCREEN CUTOFF: (no result)
EOSINOPHIL # BLD AUTO: 0.2 THOU/UL (ref 0–0.7)
EOSINOPHIL NFR BLD AUTO: 1.8 % (ref 0–10)
GLOBULIN SER CALC-MCNC: 2.5 G/DL (ref 2.4–3.5)
GLUCOSE SERPL-MCNC: 194 MG/DL (ref 70–105)
GLUCOSE UR STRIP-MCNC: >=1000 MG/DL
HGB BLD-MCNC: 15.1 G/DL (ref 12–16)
LYMPHOCYTES # BLD AUTO: 2.4 THOU/UL (ref 1.2–3.4)
LYMPHOCYTES NFR BLD AUTO: 22.3 % (ref 21–51)
MCH RBC QN AUTO: 29 PG (ref 27–31)
MCV RBC AUTO: 93.4 FL (ref 78–98)
MEDTOX CONTROL LINE VALID?: (no result)
MONOCYTES # BLD AUTO: 0.8 THOU/UL (ref 0.11–0.59)
MONOCYTES NFR BLD AUTO: 7.4 % (ref 0–10)
NEUTROPHILS # BLD AUTO: 7.2 THOU/UL (ref 1.4–6.5)
NEUTROPHILS NFR BLD AUTO: 67.3 % (ref 42–75)
PLATELET # BLD AUTO: 142 THOU/UL (ref 130–400)
POTASSIUM SERPL-SCNC: 4.6 MMOL/L (ref 3.5–5.1)
RBC # BLD AUTO: 5.21 MILL/UL (ref 4.2–5.4)
SALICYLATES SERPL-MCNC: (no result) MG/DL (ref 15–30)
SODIUM SERPL-SCNC: 137 MMOL/L (ref 136–145)
SP GR UR STRIP: 1.01 (ref 1–1.03)
WBC # BLD AUTO: 10.7 THOU/UL (ref 4.8–10.8)

## 2021-05-01 PROCEDURE — 70450 CT HEAD/BRAIN W/O DYE: CPT

## 2021-05-01 PROCEDURE — 83880 ASSAY OF NATRIURETIC PEPTIDE: CPT

## 2021-05-01 PROCEDURE — 36416 COLLJ CAPILLARY BLOOD SPEC: CPT

## 2021-05-01 PROCEDURE — 81003 URINALYSIS AUTO W/O SCOPE: CPT

## 2021-05-01 PROCEDURE — 84484 ASSAY OF TROPONIN QUANT: CPT

## 2021-05-01 PROCEDURE — 36415 COLL VENOUS BLD VENIPUNCTURE: CPT

## 2021-05-01 PROCEDURE — 71046 X-RAY EXAM CHEST 2 VIEWS: CPT

## 2021-05-01 PROCEDURE — 93005 ELECTROCARDIOGRAM TRACING: CPT

## 2021-05-01 PROCEDURE — 85025 COMPLETE CBC W/AUTO DIFF WBC: CPT

## 2021-05-01 PROCEDURE — 80306 DRUG TEST PRSMV INSTRMNT: CPT

## 2021-05-01 PROCEDURE — 80307 DRUG TEST PRSMV CHEM ANLYZR: CPT

## 2021-05-01 PROCEDURE — 80053 COMPREHEN METABOLIC PANEL: CPT

## 2022-01-21 ENCOUNTER — HOSPITAL ENCOUNTER (EMERGENCY)
Dept: HOSPITAL 92 - CSHERS | Age: 57
Discharge: HOME | End: 2022-01-21
Payer: COMMERCIAL

## 2022-01-21 DIAGNOSIS — R55: Primary | ICD-10-CM

## 2022-01-21 DIAGNOSIS — J44.9: ICD-10-CM

## 2022-01-21 DIAGNOSIS — I50.9: ICD-10-CM

## 2022-01-21 DIAGNOSIS — E11.9: ICD-10-CM

## 2022-01-21 DIAGNOSIS — I25.2: ICD-10-CM

## 2022-01-21 DIAGNOSIS — E66.9: ICD-10-CM

## 2022-01-21 DIAGNOSIS — E78.5: ICD-10-CM

## 2022-01-21 DIAGNOSIS — F17.210: ICD-10-CM

## 2022-01-21 DIAGNOSIS — I25.10: ICD-10-CM

## 2022-01-21 LAB
ALBUMIN SERPL BCG-MCNC: 3.9 G/DL (ref 3.5–5)
ALP SERPL-CCNC: 92 U/L (ref 40–110)
ALT SERPL W P-5'-P-CCNC: 23 U/L (ref 8–55)
ANION GAP SERPL CALC-SCNC: 14 MMOL/L (ref 10–20)
AST SERPL-CCNC: 21 U/L (ref 5–34)
BASOPHILS # BLD AUTO: 0 10X3/UL (ref 0–0.2)
BASOPHILS NFR BLD AUTO: 0.4 % (ref 0–2)
BILIRUB SERPL-MCNC: 1.1 MG/DL (ref 0.2–1.2)
BUN SERPL-MCNC: 14 MG/DL (ref 9.8–20.1)
CALCIUM SERPL-MCNC: 8.9 MG/DL (ref 7.8–10.44)
CHLORIDE SERPL-SCNC: 101 MMOL/L (ref 98–107)
CO2 SERPL-SCNC: 29 MMOL/L (ref 22–29)
CREAT CL PREDICTED SERPL C-G-VRATE: 0 ML/MIN (ref 70–130)
EOSINOPHIL # BLD AUTO: 0.2 10X3/UL (ref 0–0.5)
EOSINOPHIL NFR BLD AUTO: 1.9 % (ref 0–6)
GLOBULIN SER CALC-MCNC: 2.7 G/DL (ref 2.4–3.5)
GLUCOSE SERPL-MCNC: 115 MG/DL (ref 70–105)
HGB BLD-MCNC: 16 G/DL (ref 12–15.5)
LYMPHOCYTES NFR BLD AUTO: 29.5 % (ref 18–47)
MCH RBC QN AUTO: 30.6 PG (ref 27–33)
MCV RBC AUTO: 93.7 FL (ref 81.6–98.3)
MONOCYTES # BLD AUTO: 0.7 10X3/UL (ref 0–1.1)
MONOCYTES NFR BLD AUTO: 7.2 % (ref 0–10)
NEUTROPHILS # BLD AUTO: 5.7 10X3/UL (ref 1.5–8.4)
NEUTROPHILS NFR BLD AUTO: 60.5 % (ref 40–75)
PLATELET # BLD AUTO: 141 10X3/UL (ref 150–450)
POTASSIUM SERPL-SCNC: 3.8 MMOL/L (ref 3.5–5.1)
RBC # BLD AUTO: 5.23 10X6/UL (ref 3.9–5.03)
SODIUM SERPL-SCNC: 140 MMOL/L (ref 136–145)
WBC # BLD AUTO: 9.4 10X3/UL (ref 3.5–10.5)

## 2022-01-21 PROCEDURE — 71045 X-RAY EXAM CHEST 1 VIEW: CPT

## 2022-01-21 PROCEDURE — 83880 ASSAY OF NATRIURETIC PEPTIDE: CPT

## 2022-01-21 PROCEDURE — 85652 RBC SED RATE AUTOMATED: CPT

## 2022-01-21 PROCEDURE — 84484 ASSAY OF TROPONIN QUANT: CPT

## 2022-01-21 PROCEDURE — 86140 C-REACTIVE PROTEIN: CPT

## 2022-01-21 PROCEDURE — 93005 ELECTROCARDIOGRAM TRACING: CPT

## 2022-01-21 PROCEDURE — 80053 COMPREHEN METABOLIC PANEL: CPT

## 2022-01-21 PROCEDURE — 85025 COMPLETE CBC W/AUTO DIFF WBC: CPT

## 2022-01-27 ENCOUNTER — HOSPITAL ENCOUNTER (OUTPATIENT)
Dept: HOSPITAL 18 - NAV CT | Age: 57
Discharge: HOME | End: 2022-01-27
Payer: COMMERCIAL

## 2022-01-27 DIAGNOSIS — R55: Primary | ICD-10-CM

## 2022-01-27 PROCEDURE — 70450 CT HEAD/BRAIN W/O DYE: CPT

## 2022-05-06 ENCOUNTER — HOSPITAL ENCOUNTER (EMERGENCY)
Dept: HOSPITAL 92 - CSHERS | Age: 57
LOS: 1 days | Discharge: HOME | End: 2022-05-07
Payer: COMMERCIAL

## 2022-05-06 DIAGNOSIS — I25.2: ICD-10-CM

## 2022-05-06 DIAGNOSIS — I25.10: ICD-10-CM

## 2022-05-06 DIAGNOSIS — R53.1: Primary | ICD-10-CM

## 2022-05-06 DIAGNOSIS — F17.210: ICD-10-CM

## 2022-05-06 DIAGNOSIS — E11.9: ICD-10-CM

## 2022-05-06 LAB
BASOPHILS # BLD AUTO: 0.1 10X3/UL (ref 0–0.2)
BASOPHILS NFR BLD AUTO: 0.7 % (ref 0–2)
EOSINOPHIL # BLD AUTO: 0.2 10X3/UL (ref 0–0.5)
EOSINOPHIL NFR BLD AUTO: 1.7 % (ref 0–6)
HGB BLD-MCNC: 15.8 G/DL (ref 12–15.5)
LYMPHOCYTES NFR BLD AUTO: 23 % (ref 18–47)
MCH RBC QN AUTO: 30.5 PG (ref 27–33)
MCV RBC AUTO: 91.9 FL (ref 81.6–98.3)
MONOCYTES # BLD AUTO: 0.8 10X3/UL (ref 0–1.1)
MONOCYTES NFR BLD AUTO: 8 % (ref 0–10)
NEUTROPHILS # BLD AUTO: 7 10X3/UL (ref 1.5–8.4)
NEUTROPHILS NFR BLD AUTO: 66.2 % (ref 40–75)
PLATELET # BLD AUTO: 161 10X3/UL (ref 150–450)
RBC # BLD AUTO: 5.18 10X6/UL (ref 3.9–5.03)
WBC # BLD AUTO: 10.5 10X3/UL (ref 3.5–10.5)

## 2022-05-06 PROCEDURE — 85025 COMPLETE CBC W/AUTO DIFF WBC: CPT

## 2022-05-06 PROCEDURE — 70450 CT HEAD/BRAIN W/O DYE: CPT

## 2022-05-06 PROCEDURE — 80053 COMPREHEN METABOLIC PANEL: CPT

## 2022-05-06 PROCEDURE — 84484 ASSAY OF TROPONIN QUANT: CPT

## 2022-05-06 PROCEDURE — 81003 URINALYSIS AUTO W/O SCOPE: CPT

## 2022-05-06 PROCEDURE — 93005 ELECTROCARDIOGRAM TRACING: CPT

## 2022-05-07 LAB
ALBUMIN SERPL BCG-MCNC: 3.7 G/DL (ref 3.5–5)
ALP SERPL-CCNC: 91 U/L (ref 40–110)
ALT SERPL W P-5'-P-CCNC: 29 U/L (ref 8–55)
ANION GAP SERPL CALC-SCNC: 14 MMOL/L (ref 10–20)
AST SERPL-CCNC: 22 U/L (ref 5–34)
BILIRUB SERPL-MCNC: 0.5 MG/DL (ref 0.2–1.2)
BUN SERPL-MCNC: 23 MG/DL (ref 9.8–20.1)
CALCIUM SERPL-MCNC: 9 MG/DL (ref 7.8–10.44)
CHLORIDE SERPL-SCNC: 101 MMOL/L (ref 98–107)
CO2 SERPL-SCNC: 27 MMOL/L (ref 22–29)
CREAT CL PREDICTED SERPL C-G-VRATE: 0 ML/MIN (ref 70–130)
GLOBULIN SER CALC-MCNC: 2.5 G/DL (ref 2.4–3.5)
GLUCOSE SERPL-MCNC: 159 MG/DL (ref 70–105)
GLUCOSE UR STRIP-MCNC: >=1000 MG/DL
POTASSIUM SERPL-SCNC: 4.6 MMOL/L (ref 3.5–5.1)
SODIUM SERPL-SCNC: 137 MMOL/L (ref 136–145)
SP GR UR STRIP: 1.01 (ref 1–1.04)

## 2022-06-11 ENCOUNTER — HOSPITAL ENCOUNTER (EMERGENCY)
Dept: HOSPITAL 18 - NAV ERS | Age: 57
Discharge: HOME | End: 2022-06-11
Payer: COMMERCIAL

## 2022-06-11 DIAGNOSIS — Z79.02: ICD-10-CM

## 2022-06-11 DIAGNOSIS — N18.2: ICD-10-CM

## 2022-06-11 DIAGNOSIS — I25.10: ICD-10-CM

## 2022-06-11 DIAGNOSIS — E11.22: ICD-10-CM

## 2022-06-11 DIAGNOSIS — Z79.899: ICD-10-CM

## 2022-06-11 DIAGNOSIS — R40.0: Primary | ICD-10-CM

## 2022-06-11 DIAGNOSIS — J44.9: ICD-10-CM

## 2022-06-11 DIAGNOSIS — I25.2: ICD-10-CM

## 2022-06-11 DIAGNOSIS — E66.9: ICD-10-CM

## 2022-06-11 DIAGNOSIS — I50.9: ICD-10-CM

## 2022-06-11 DIAGNOSIS — F17.210: ICD-10-CM

## 2022-06-11 DIAGNOSIS — E78.5: ICD-10-CM

## 2022-06-11 LAB
ALBUMIN SERPL BCG-MCNC: 3.8 G/DL (ref 3.5–5)
ALP SERPL-CCNC: 99 U/L (ref 40–110)
ALT SERPL W P-5'-P-CCNC: 30 U/L (ref 8–55)
ANION GAP SERPL CALC-SCNC: 16 MMOL/L (ref 10–20)
AST SERPL-CCNC: 26 U/L (ref 5–34)
BASOPHILS # BLD AUTO: 0.1 THOU/UL (ref 0–0.2)
BASOPHILS NFR BLD AUTO: 0.8 % (ref 0–1)
BILIRUB SERPL-MCNC: 1 MG/DL (ref 0.2–1.2)
BUN SERPL-MCNC: 19 MG/DL (ref 9.8–20.1)
CALCIUM SERPL-MCNC: 9.2 MG/DL (ref 7.8–10.44)
CHLORIDE SERPL-SCNC: 98 MMOL/L (ref 98–107)
CO2 SERPL-SCNC: 26 MMOL/L (ref 22–29)
CREAT CL PREDICTED SERPL C-G-VRATE: 0 ML/MIN (ref 70–130)
EOSINOPHIL # BLD AUTO: 0.2 THOU/UL (ref 0–0.7)
EOSINOPHIL NFR BLD AUTO: 2.3 % (ref 0–10)
GLOBULIN SER CALC-MCNC: 2.7 G/DL (ref 2.4–3.5)
GLUCOSE SERPL-MCNC: 91 MG/DL (ref 70–105)
HGB BLD-MCNC: 15.7 G/DL (ref 12–16)
LYMPHOCYTES # BLD AUTO: 2.9 THOU/UL (ref 1.2–3.4)
LYMPHOCYTES NFR BLD AUTO: 27.6 % (ref 21–51)
MCH RBC QN AUTO: 29.3 PG (ref 27–31)
MCV RBC AUTO: 94.2 FL (ref 78–98)
MONOCYTES # BLD AUTO: 0.8 THOU/UL (ref 0.11–0.59)
MONOCYTES NFR BLD AUTO: 7.5 % (ref 0–10)
NEUTROPHILS # BLD AUTO: 6.6 THOU/UL (ref 1.4–6.5)
NEUTROPHILS NFR BLD AUTO: 61.7 % (ref 42–75)
PLATELET # BLD AUTO: 159 THOU/UL (ref 130–400)
POTASSIUM SERPL-SCNC: 4.1 MMOL/L (ref 3.5–5.1)
RBC # BLD AUTO: 5.34 MILL/UL (ref 4.2–5.4)
SODIUM SERPL-SCNC: 136 MMOL/L (ref 136–145)
WBC # BLD AUTO: 10.6 THOU/UL (ref 4.8–10.8)

## 2022-06-11 PROCEDURE — 83605 ASSAY OF LACTIC ACID: CPT

## 2022-06-11 PROCEDURE — 85025 COMPLETE CBC W/AUTO DIFF WBC: CPT

## 2022-06-11 PROCEDURE — 87040 BLOOD CULTURE FOR BACTERIA: CPT

## 2022-06-11 PROCEDURE — 36416 COLLJ CAPILLARY BLOOD SPEC: CPT

## 2022-06-11 PROCEDURE — 96361 HYDRATE IV INFUSION ADD-ON: CPT

## 2022-06-11 PROCEDURE — 80053 COMPREHEN METABOLIC PANEL: CPT

## 2022-06-11 PROCEDURE — 71045 X-RAY EXAM CHEST 1 VIEW: CPT

## 2022-06-11 PROCEDURE — 96374 THER/PROPH/DIAG INJ IV PUSH: CPT

## 2022-06-11 PROCEDURE — 70450 CT HEAD/BRAIN W/O DYE: CPT

## 2022-06-12 ENCOUNTER — HOSPITAL ENCOUNTER (INPATIENT)
Dept: HOSPITAL 92 - CSHERS | Age: 57
LOS: 5 days | Discharge: HOME | DRG: 71 | End: 2022-06-17
Attending: INTERNAL MEDICINE | Admitting: INTERNAL MEDICINE
Payer: COMMERCIAL

## 2022-06-12 VITALS — BODY MASS INDEX: 44.2 KG/M2

## 2022-06-12 DIAGNOSIS — I50.9: ICD-10-CM

## 2022-06-12 DIAGNOSIS — Z90.710: ICD-10-CM

## 2022-06-12 DIAGNOSIS — Z20.822: ICD-10-CM

## 2022-06-12 DIAGNOSIS — J44.1: ICD-10-CM

## 2022-06-12 DIAGNOSIS — G93.41: Primary | ICD-10-CM

## 2022-06-12 DIAGNOSIS — N18.2: ICD-10-CM

## 2022-06-12 DIAGNOSIS — F31.9: ICD-10-CM

## 2022-06-12 DIAGNOSIS — G89.4: ICD-10-CM

## 2022-06-12 DIAGNOSIS — E78.1: ICD-10-CM

## 2022-06-12 DIAGNOSIS — Z88.1: ICD-10-CM

## 2022-06-12 DIAGNOSIS — E66.9: ICD-10-CM

## 2022-06-12 DIAGNOSIS — Z88.6: ICD-10-CM

## 2022-06-12 DIAGNOSIS — I13.0: ICD-10-CM

## 2022-06-12 DIAGNOSIS — Z90.12: ICD-10-CM

## 2022-06-12 DIAGNOSIS — Z90.49: ICD-10-CM

## 2022-06-12 DIAGNOSIS — I25.10: ICD-10-CM

## 2022-06-12 DIAGNOSIS — Z88.8: ICD-10-CM

## 2022-06-12 DIAGNOSIS — R25.1: ICD-10-CM

## 2022-06-12 DIAGNOSIS — E87.5: ICD-10-CM

## 2022-06-12 DIAGNOSIS — Z79.899: ICD-10-CM

## 2022-06-12 DIAGNOSIS — Z79.01: ICD-10-CM

## 2022-06-12 DIAGNOSIS — Z79.84: ICD-10-CM

## 2022-06-12 DIAGNOSIS — E78.5: ICD-10-CM

## 2022-06-12 DIAGNOSIS — N17.9: ICD-10-CM

## 2022-06-12 DIAGNOSIS — Z91.09: ICD-10-CM

## 2022-06-12 DIAGNOSIS — E11.22: ICD-10-CM

## 2022-06-12 DIAGNOSIS — Z88.2: ICD-10-CM

## 2022-06-12 DIAGNOSIS — F17.210: ICD-10-CM

## 2022-06-12 DIAGNOSIS — Z90.722: ICD-10-CM

## 2022-06-12 DIAGNOSIS — M54.9: ICD-10-CM

## 2022-06-12 DIAGNOSIS — Z98.890: ICD-10-CM

## 2022-06-12 DIAGNOSIS — D72.829: ICD-10-CM

## 2022-06-12 DIAGNOSIS — I25.2: ICD-10-CM

## 2022-06-12 DIAGNOSIS — N39.0: ICD-10-CM

## 2022-06-12 DIAGNOSIS — T40.2X5A: ICD-10-CM

## 2022-06-12 DIAGNOSIS — Z88.5: ICD-10-CM

## 2022-06-12 DIAGNOSIS — Z85.828: ICD-10-CM

## 2022-06-12 DIAGNOSIS — Z95.5: ICD-10-CM

## 2022-06-12 DIAGNOSIS — D75.1: ICD-10-CM

## 2022-06-12 LAB
ALBUMIN SERPL BCG-MCNC: 4 G/DL (ref 3.5–5)
ALP SERPL-CCNC: 104 U/L (ref 40–110)
ALT SERPL W P-5'-P-CCNC: 29 U/L (ref 8–55)
ANION GAP SERPL CALC-SCNC: 14 MMOL/L (ref 10–20)
APAP SERPL-MCNC: (no result) MCG/ML (ref 10–30)
AST SERPL-CCNC: 21 U/L (ref 5–34)
BILIRUB SERPL-MCNC: 0.8 MG/DL (ref 0.2–1.2)
BUN SERPL-MCNC: 17 MG/DL (ref 9.8–20.1)
CALCIUM SERPL-MCNC: 9.8 MG/DL (ref 7.8–10.44)
CHLORIDE SERPL-SCNC: 104 MMOL/L (ref 98–107)
CO2 SERPL-SCNC: 28 MMOL/L (ref 22–29)
CREAT CL PREDICTED SERPL C-G-VRATE: 0 ML/MIN (ref 70–130)
GLOBULIN SER CALC-MCNC: 2.8 G/DL (ref 2.4–3.5)
GLUCOSE SERPL-MCNC: 158 MG/DL (ref 70–105)
HGB BLD-MCNC: 17 G/DL (ref 12–15.5)
MCH RBC QN AUTO: 29.6 PG (ref 27–33)
MCV RBC AUTO: 91.5 FL (ref 81.6–98.3)
MDIFF COMPLETE?: YES
PLATELET # BLD AUTO: 152 10X3/UL (ref 150–450)
POTASSIUM SERPL-SCNC: 5.5 MMOL/L (ref 3.5–5.1)
RBC # BLD AUTO: 5.74 10X6/UL (ref 3.9–5.03)
SALICYLATES SERPL-MCNC: (no result) MG/DL (ref 15–30)
SODIUM SERPL-SCNC: 140 MMOL/L (ref 136–145)
WBC # BLD AUTO: 12.8 10X3/UL (ref 3.5–10.5)

## 2022-06-12 PROCEDURE — 95819 EEG AWAKE AND ASLEEP: CPT

## 2022-06-12 PROCEDURE — 83605 ASSAY OF LACTIC ACID: CPT

## 2022-06-12 PROCEDURE — 80048 BASIC METABOLIC PNL TOTAL CA: CPT

## 2022-06-12 PROCEDURE — 87186 SC STD MICRODIL/AGAR DIL: CPT

## 2022-06-12 PROCEDURE — 93005 ELECTROCARDIOGRAM TRACING: CPT

## 2022-06-12 PROCEDURE — 94760 N-INVAS EAR/PLS OXIMETRY 1: CPT

## 2022-06-12 PROCEDURE — 85025 COMPLETE CBC W/AUTO DIFF WBC: CPT

## 2022-06-12 PROCEDURE — 87077 CULTURE AEROBIC IDENTIFY: CPT

## 2022-06-12 PROCEDURE — 71045 X-RAY EXAM CHEST 1 VIEW: CPT

## 2022-06-12 PROCEDURE — 84443 ASSAY THYROID STIM HORMONE: CPT

## 2022-06-12 PROCEDURE — 80306 DRUG TEST PRSMV INSTRMNT: CPT

## 2022-06-12 PROCEDURE — 84300 ASSAY OF URINE SODIUM: CPT

## 2022-06-12 PROCEDURE — U0002 COVID-19 LAB TEST NON-CDC: HCPCS

## 2022-06-12 PROCEDURE — 70450 CT HEAD/BRAIN W/O DYE: CPT

## 2022-06-12 PROCEDURE — 84481 FREE ASSAY (FT-3): CPT

## 2022-06-12 PROCEDURE — 96375 TX/PRO/DX INJ NEW DRUG ADDON: CPT

## 2022-06-12 PROCEDURE — 84439 ASSAY OF FREE THYROXINE: CPT

## 2022-06-12 PROCEDURE — 96365 THER/PROPH/DIAG IV INF INIT: CPT

## 2022-06-12 PROCEDURE — 36416 COLLJ CAPILLARY BLOOD SPEC: CPT

## 2022-06-12 PROCEDURE — 80307 DRUG TEST PRSMV CHEM ANLYZR: CPT

## 2022-06-12 PROCEDURE — 87086 URINE CULTURE/COLONY COUNT: CPT

## 2022-06-12 PROCEDURE — 84133 ASSAY OF URINE POTASSIUM: CPT

## 2022-06-12 PROCEDURE — 51702 INSERT TEMP BLADDER CATH: CPT

## 2022-06-12 PROCEDURE — 81015 MICROSCOPIC EXAM OF URINE: CPT

## 2022-06-12 PROCEDURE — 36415 COLL VENOUS BLD VENIPUNCTURE: CPT

## 2022-06-12 PROCEDURE — 70551 MRI BRAIN STEM W/O DYE: CPT

## 2022-06-12 PROCEDURE — 84484 ASSAY OF TROPONIN QUANT: CPT

## 2022-06-12 PROCEDURE — 95957 EEG DIGITAL ANALYSIS: CPT

## 2022-06-12 PROCEDURE — 83735 ASSAY OF MAGNESIUM: CPT

## 2022-06-12 PROCEDURE — 94640 AIRWAY INHALATION TREATMENT: CPT

## 2022-06-12 PROCEDURE — 80053 COMPREHEN METABOLIC PANEL: CPT

## 2022-06-12 PROCEDURE — 81003 URINALYSIS AUTO W/O SCOPE: CPT

## 2022-06-13 LAB
ALBUMIN SERPL BCG-MCNC: 3.5 G/DL (ref 3.5–5)
ALP SERPL-CCNC: 90 U/L (ref 40–110)
ALT SERPL W P-5'-P-CCNC: 20 U/L (ref 8–55)
ANION GAP SERPL CALC-SCNC: 14 MMOL/L (ref 10–20)
AST SERPL-CCNC: 16 U/L (ref 5–34)
BACTERIA UR QL AUTO: (no result) HPF
BASOPHILS # BLD AUTO: 0 10X3/UL (ref 0–0.2)
BASOPHILS NFR BLD AUTO: 0.2 % (ref 0–2)
BILIRUB SERPL-MCNC: 0.5 MG/DL (ref 0.2–1.2)
BUN SERPL-MCNC: 17 MG/DL (ref 9.8–20.1)
CALCIUM SERPL-MCNC: 8.8 MG/DL (ref 7.8–10.44)
CHLORIDE SERPL-SCNC: 106 MMOL/L (ref 98–107)
CO2 SERPL-SCNC: 27 MMOL/L (ref 22–29)
CREAT CL PREDICTED SERPL C-G-VRATE: 87 ML/MIN (ref 70–130)
DRUG SCREEN CUTOFF: (no result)
EOSINOPHIL # BLD AUTO: 0 10X3/UL (ref 0–0.5)
EOSINOPHIL NFR BLD AUTO: 0.1 % (ref 0–6)
GLOBULIN SER CALC-MCNC: 2.4 G/DL (ref 2.4–3.5)
GLUCOSE SERPL-MCNC: 127 MG/DL (ref 70–105)
GLUCOSE UR STRIP-MCNC: >=1000 MG/DL
HGB BLD-MCNC: 15.5 G/DL (ref 12–15.5)
LEUKOCYTE ESTERASE UR QL STRIP.AUTO: 500
LYMPHOCYTES NFR BLD AUTO: 9.3 % (ref 18–47)
MAGNESIUM SERPL-MCNC: 2.9 MG/DL (ref 1.6–2.6)
MCH RBC QN AUTO: 29.6 PG (ref 27–33)
MCV RBC AUTO: 90.2 FL (ref 81.6–98.3)
MONOCYTES # BLD AUTO: 0.8 10X3/UL (ref 0–1.1)
MONOCYTES NFR BLD AUTO: 5.9 % (ref 0–10)
NEUTROPHILS # BLD AUTO: 10.8 10X3/UL (ref 1.5–8.4)
NEUTROPHILS NFR BLD AUTO: 84.1 % (ref 40–75)
PLATELET # BLD AUTO: 142 10X3/UL (ref 150–450)
POTASSIUM SERPL-SCNC: 5 MMOL/L (ref 3.5–5.1)
POTASSIUM UR-SCNC: 76.3 MMOL/L
PROT UR STRIP.AUTO-MCNC: 15 MG/DL
RBC # BLD AUTO: 5.23 10X6/UL (ref 3.9–5.03)
RBC UR QL AUTO: (no result) HPF (ref 0–3)
SODIUM SERPL-SCNC: 142 MMOL/L (ref 136–145)
SODIUM UR-SCNC: (no result) MMOL/L
SP GR UR STRIP: 1.01 (ref 1–1.04)
WBC # BLD AUTO: 12.9 10X3/UL (ref 3.5–10.5)
WBC UR QL AUTO: (no result) HPF (ref 0–3)

## 2022-06-13 RX ADMIN — HEPARIN SODIUM SCH UNITS: 5000 INJECTION, SOLUTION INTRAVENOUS; SUBCUTANEOUS at 21:02

## 2022-06-13 RX ADMIN — HEPARIN SODIUM SCH UNITS: 5000 INJECTION, SOLUTION INTRAVENOUS; SUBCUTANEOUS at 16:00

## 2022-06-13 RX ADMIN — HEPARIN SODIUM SCH UNITS: 5000 INJECTION, SOLUTION INTRAVENOUS; SUBCUTANEOUS at 09:39

## 2022-06-14 LAB
ANION GAP SERPL CALC-SCNC: 12 MMOL/L (ref 10–20)
BASOPHILS # BLD AUTO: 0 10X3/UL (ref 0–0.2)
BASOPHILS NFR BLD AUTO: 0.3 % (ref 0–2)
BUN SERPL-MCNC: 14 MG/DL (ref 9.8–20.1)
CALCIUM SERPL-MCNC: 8.6 MG/DL (ref 7.8–10.44)
CHLORIDE SERPL-SCNC: 109 MMOL/L (ref 98–107)
CO2 SERPL-SCNC: 25 MMOL/L (ref 22–29)
CREAT CL PREDICTED SERPL C-G-VRATE: 129 ML/MIN (ref 70–130)
EOSINOPHIL # BLD AUTO: 0.1 10X3/UL (ref 0–0.5)
EOSINOPHIL NFR BLD AUTO: 0.9 % (ref 0–6)
GLUCOSE SERPL-MCNC: 87 MG/DL (ref 70–105)
HGB BLD-MCNC: 13.9 G/DL (ref 12–15.5)
LYMPHOCYTES NFR BLD AUTO: 19 % (ref 18–47)
MCH RBC QN AUTO: 29.8 PG (ref 27–33)
MCV RBC AUTO: 91.6 FL (ref 81.6–98.3)
MONOCYTES # BLD AUTO: 0.9 10X3/UL (ref 0–1.1)
MONOCYTES NFR BLD AUTO: 8.8 % (ref 0–10)
NEUTROPHILS # BLD AUTO: 7.2 10X3/UL (ref 1.5–8.4)
NEUTROPHILS NFR BLD AUTO: 70.6 % (ref 40–75)
PLATELET # BLD AUTO: 122 10X3/UL (ref 150–450)
POTASSIUM SERPL-SCNC: 4.1 MMOL/L (ref 3.5–5.1)
RBC # BLD AUTO: 4.67 10X6/UL (ref 3.9–5.03)
SODIUM SERPL-SCNC: 142 MMOL/L (ref 136–145)
T4 FREE SERPL-MCNC: 0.91 NG/DL (ref 0.7–1.48)
WBC # BLD AUTO: 10.2 10X3/UL (ref 3.5–10.5)

## 2022-06-14 RX ADMIN — HEPARIN SODIUM SCH UNITS: 5000 INJECTION, SOLUTION INTRAVENOUS; SUBCUTANEOUS at 16:20

## 2022-06-14 RX ADMIN — HEPARIN SODIUM SCH UNITS: 5000 INJECTION, SOLUTION INTRAVENOUS; SUBCUTANEOUS at 21:09

## 2022-06-14 RX ADMIN — HYDROCODONE BITARTRATE AND ACETAMINOPHEN PRN TAB: 5; 325 TABLET ORAL at 16:21

## 2022-06-14 RX ADMIN — HEPARIN SODIUM SCH UNITS: 5000 INJECTION, SOLUTION INTRAVENOUS; SUBCUTANEOUS at 10:24

## 2022-06-14 RX ADMIN — CEFTRIAXONE SCH MLS: 1 INJECTION, POWDER, FOR SOLUTION INTRAMUSCULAR; INTRAVENOUS at 01:12

## 2022-06-15 RX ADMIN — HEPARIN SODIUM SCH UNITS: 5000 INJECTION, SOLUTION INTRAVENOUS; SUBCUTANEOUS at 21:13

## 2022-06-15 RX ADMIN — HEPARIN SODIUM SCH UNITS: 5000 INJECTION, SOLUTION INTRAVENOUS; SUBCUTANEOUS at 15:19

## 2022-06-15 RX ADMIN — HEPARIN SODIUM SCH UNITS: 5000 INJECTION, SOLUTION INTRAVENOUS; SUBCUTANEOUS at 08:43

## 2022-06-15 RX ADMIN — CEFTRIAXONE SCH MLS: 1 INJECTION, POWDER, FOR SOLUTION INTRAMUSCULAR; INTRAVENOUS at 01:14

## 2022-06-15 RX ADMIN — HYDROCODONE BITARTRATE AND ACETAMINOPHEN PRN TAB: 5; 325 TABLET ORAL at 19:48

## 2022-06-15 RX ADMIN — HYDROCODONE BITARTRATE AND ACETAMINOPHEN PRN TAB: 5; 325 TABLET ORAL at 15:21

## 2022-06-16 RX ADMIN — HEPARIN SODIUM SCH UNITS: 5000 INJECTION, SOLUTION INTRAVENOUS; SUBCUTANEOUS at 09:11

## 2022-06-16 RX ADMIN — CEFTRIAXONE SCH MLS: 1 INJECTION, POWDER, FOR SOLUTION INTRAMUSCULAR; INTRAVENOUS at 02:10

## 2022-06-16 RX ADMIN — HYDROCODONE BITARTRATE AND ACETAMINOPHEN PRN TAB: 5; 325 TABLET ORAL at 21:43

## 2022-06-16 RX ADMIN — HEPARIN SODIUM SCH UNITS: 5000 INJECTION, SOLUTION INTRAVENOUS; SUBCUTANEOUS at 22:15

## 2022-06-16 RX ADMIN — HYDROCODONE BITARTRATE AND ACETAMINOPHEN PRN TAB: 5; 325 TABLET ORAL at 02:08

## 2022-06-16 RX ADMIN — HEPARIN SODIUM SCH UNITS: 5000 INJECTION, SOLUTION INTRAVENOUS; SUBCUTANEOUS at 15:09

## 2022-06-16 RX ADMIN — HYDROCODONE BITARTRATE AND ACETAMINOPHEN PRN TAB: 5; 325 TABLET ORAL at 10:10

## 2022-06-16 RX ADMIN — HYDROCODONE BITARTRATE AND ACETAMINOPHEN PRN TAB: 5; 325 TABLET ORAL at 15:10

## 2022-06-17 VITALS — DIASTOLIC BLOOD PRESSURE: 66 MMHG | TEMPERATURE: 97.3 F | SYSTOLIC BLOOD PRESSURE: 153 MMHG

## 2022-06-17 RX ADMIN — CEFTRIAXONE SCH MLS: 1 INJECTION, POWDER, FOR SOLUTION INTRAMUSCULAR; INTRAVENOUS at 01:01

## 2022-06-17 RX ADMIN — HYDROCODONE BITARTRATE AND ACETAMINOPHEN PRN TAB: 5; 325 TABLET ORAL at 12:21

## 2022-06-17 RX ADMIN — HYDROCODONE BITARTRATE AND ACETAMINOPHEN PRN TAB: 5; 325 TABLET ORAL at 03:19

## 2022-06-17 RX ADMIN — HEPARIN SODIUM SCH: 5000 INJECTION, SOLUTION INTRAVENOUS; SUBCUTANEOUS at 15:30

## 2022-06-17 RX ADMIN — HEPARIN SODIUM SCH UNITS: 5000 INJECTION, SOLUTION INTRAVENOUS; SUBCUTANEOUS at 08:37

## 2022-07-03 ENCOUNTER — HOSPITAL ENCOUNTER (OUTPATIENT)
Dept: HOSPITAL 18 - NAV RAD | Age: 57
Discharge: HOME | End: 2022-07-03
Payer: COMMERCIAL

## 2022-07-03 DIAGNOSIS — M79.89: ICD-10-CM

## 2022-07-03 DIAGNOSIS — S69.92XA: Primary | ICD-10-CM

## 2022-07-03 DIAGNOSIS — S62.512A: ICD-10-CM

## 2022-07-16 ENCOUNTER — HOSPITAL ENCOUNTER (EMERGENCY)
Dept: HOSPITAL 18 - NAV ERS | Age: 57
LOS: 1 days | Discharge: ANOTHER HOSPITAL | End: 2022-07-17
Payer: COMMERCIAL

## 2022-07-16 DIAGNOSIS — Z20.822: ICD-10-CM

## 2022-07-16 DIAGNOSIS — Z79.899: ICD-10-CM

## 2022-07-16 DIAGNOSIS — I50.9: ICD-10-CM

## 2022-07-16 DIAGNOSIS — R53.1: ICD-10-CM

## 2022-07-16 DIAGNOSIS — R41.82: Primary | ICD-10-CM

## 2022-07-16 DIAGNOSIS — I25.2: ICD-10-CM

## 2022-07-16 DIAGNOSIS — J44.9: ICD-10-CM

## 2022-07-16 DIAGNOSIS — E78.5: ICD-10-CM

## 2022-07-16 DIAGNOSIS — G40.909: ICD-10-CM

## 2022-07-16 DIAGNOSIS — F17.210: ICD-10-CM

## 2022-07-16 LAB
ALBUMIN SERPL BCG-MCNC: 4 G/DL (ref 3.5–5)
ALP SERPL-CCNC: 107 U/L (ref 40–110)
ALT SERPL W P-5'-P-CCNC: 16 U/L (ref 8–55)
ANION GAP SERPL CALC-SCNC: 16 MMOL/L (ref 10–20)
APAP SERPL-MCNC: (no result) MCG/ML (ref 10–30)
AST SERPL-CCNC: 15 U/L (ref 5–34)
BASOPHILS # BLD AUTO: 0 THOU/UL (ref 0–0.2)
BASOPHILS NFR BLD AUTO: 0.4 % (ref 0–1)
BILIRUB SERPL-MCNC: 0.6 MG/DL (ref 0.2–1.2)
BUN SERPL-MCNC: 23 MG/DL (ref 9.8–20.1)
CALCIUM SERPL-MCNC: 9.2 MG/DL (ref 7.8–10.44)
CHLORIDE SERPL-SCNC: 104 MMOL/L (ref 98–107)
CO2 SERPL-SCNC: 25 MMOL/L (ref 22–29)
CREAT CL PREDICTED SERPL C-G-VRATE: 0 ML/MIN (ref 70–130)
DRUG SCREEN CUTOFF: (no result)
EOSINOPHIL # BLD AUTO: 0.1 THOU/UL (ref 0–0.7)
EOSINOPHIL NFR BLD AUTO: 1.2 % (ref 0–10)
GLOBULIN SER CALC-MCNC: 2.5 G/DL (ref 2.4–3.5)
GLUCOSE SERPL-MCNC: 142 MG/DL (ref 70–105)
GLUCOSE UR STRIP-MCNC: >=1000 MG/DL
HGB BLD-MCNC: 16.1 G/DL (ref 12–16)
LYMPHOCYTES # BLD AUTO: 1.9 THOU/UL (ref 1.2–3.4)
LYMPHOCYTES NFR BLD AUTO: 17.1 % (ref 21–51)
MCH RBC QN AUTO: 29.8 PG (ref 27–31)
MCV RBC AUTO: 98.2 FL (ref 78–98)
MEDTOX CONTROL LINE VALID?: (no result)
MONOCYTES # BLD AUTO: 0.8 THOU/UL (ref 0.11–0.59)
MONOCYTES NFR BLD AUTO: 7.3 % (ref 0–10)
NEUTROPHILS # BLD AUTO: 8.1 THOU/UL (ref 1.4–6.5)
NEUTROPHILS NFR BLD AUTO: 73.9 % (ref 42–75)
PLATELET # BLD AUTO: 136 THOU/UL (ref 130–400)
POTASSIUM SERPL-SCNC: 4.3 MMOL/L (ref 3.5–5.1)
RBC # BLD AUTO: 5.4 MILL/UL (ref 4.2–5.4)
SALICYLATES SERPL-MCNC: (no result) MG/DL (ref 15–30)
SODIUM SERPL-SCNC: 141 MMOL/L (ref 136–145)
SP GR UR STRIP: 1.01 (ref 1–1.03)
WBC # BLD AUTO: 10.9 THOU/UL (ref 4.8–10.8)

## 2022-07-16 PROCEDURE — 81003 URINALYSIS AUTO W/O SCOPE: CPT

## 2022-07-16 PROCEDURE — 84443 ASSAY THYROID STIM HORMONE: CPT

## 2022-07-16 PROCEDURE — 80307 DRUG TEST PRSMV CHEM ANLYZR: CPT

## 2022-07-16 PROCEDURE — 84484 ASSAY OF TROPONIN QUANT: CPT

## 2022-07-16 PROCEDURE — 93005 ELECTROCARDIOGRAM TRACING: CPT

## 2022-07-16 PROCEDURE — U0002 COVID-19 LAB TEST NON-CDC: HCPCS

## 2022-07-16 PROCEDURE — 80053 COMPREHEN METABOLIC PANEL: CPT

## 2022-07-16 PROCEDURE — 80306 DRUG TEST PRSMV INSTRMNT: CPT

## 2022-07-16 PROCEDURE — 36416 COLLJ CAPILLARY BLOOD SPEC: CPT

## 2022-07-16 PROCEDURE — 85025 COMPLETE CBC W/AUTO DIFF WBC: CPT

## 2022-07-16 PROCEDURE — 70450 CT HEAD/BRAIN W/O DYE: CPT

## 2022-07-17 ENCOUNTER — HOSPITAL ENCOUNTER (INPATIENT)
Dept: HOSPITAL 92 - T4-A | Age: 57
LOS: 2 days | Discharge: HOME | DRG: 552 | End: 2022-07-19
Attending: INTERNAL MEDICINE | Admitting: STUDENT IN AN ORGANIZED HEALTH CARE EDUCATION/TRAINING PROGRAM
Payer: COMMERCIAL

## 2022-07-17 VITALS — BODY MASS INDEX: 42.7 KG/M2

## 2022-07-17 DIAGNOSIS — Z88.2: ICD-10-CM

## 2022-07-17 DIAGNOSIS — G47.33: ICD-10-CM

## 2022-07-17 DIAGNOSIS — Z88.5: ICD-10-CM

## 2022-07-17 DIAGNOSIS — R29.898: ICD-10-CM

## 2022-07-17 DIAGNOSIS — Z88.1: ICD-10-CM

## 2022-07-17 DIAGNOSIS — Z90.710: ICD-10-CM

## 2022-07-17 DIAGNOSIS — Z91.048: ICD-10-CM

## 2022-07-17 DIAGNOSIS — Z20.822: ICD-10-CM

## 2022-07-17 DIAGNOSIS — Z85.3: ICD-10-CM

## 2022-07-17 DIAGNOSIS — Z90.49: ICD-10-CM

## 2022-07-17 DIAGNOSIS — F31.9: ICD-10-CM

## 2022-07-17 DIAGNOSIS — I25.10: ICD-10-CM

## 2022-07-17 DIAGNOSIS — F17.210: ICD-10-CM

## 2022-07-17 DIAGNOSIS — J44.9: ICD-10-CM

## 2022-07-17 DIAGNOSIS — E66.9: ICD-10-CM

## 2022-07-17 DIAGNOSIS — Z79.899: ICD-10-CM

## 2022-07-17 DIAGNOSIS — E11.22: ICD-10-CM

## 2022-07-17 DIAGNOSIS — Z79.51: ICD-10-CM

## 2022-07-17 DIAGNOSIS — Z88.6: ICD-10-CM

## 2022-07-17 DIAGNOSIS — Z79.02: ICD-10-CM

## 2022-07-17 DIAGNOSIS — M50.00: Primary | ICD-10-CM

## 2022-07-17 LAB
ANION GAP SERPL CALC-SCNC: 16 MMOL/L (ref 10–20)
BASOPHILS # BLD AUTO: 0 THOU/UL (ref 0–0.2)
BASOPHILS NFR BLD AUTO: 0.4 % (ref 0–1)
BUN SERPL-MCNC: 18 MG/DL (ref 9.8–20.1)
CALCIUM SERPL-MCNC: 9.4 MG/DL (ref 7.8–10.44)
CHLORIDE SERPL-SCNC: 105 MMOL/L (ref 98–107)
CO2 SERPL-SCNC: 23 MMOL/L (ref 22–29)
CREAT CL PREDICTED SERPL C-G-VRATE: 75 ML/MIN (ref 70–130)
EOSINOPHIL # BLD AUTO: 0.1 THOU/UL (ref 0–0.7)
EOSINOPHIL NFR BLD AUTO: 1.3 % (ref 0–10)
GLUCOSE SERPL-MCNC: 165 MG/DL (ref 70–105)
HGB BLD-MCNC: 17.3 G/DL (ref 12–16)
LYMPHOCYTES # BLD: 2.4 THOU/UL (ref 1.2–3.4)
LYMPHOCYTES NFR BLD AUTO: 25.8 % (ref 21–51)
MCH RBC QN AUTO: 31.2 PG (ref 27–31)
MCV RBC AUTO: 97.4 FL (ref 78–98)
MONOCYTES # BLD AUTO: 0.5 THOU/UL (ref 0.11–0.59)
MONOCYTES NFR BLD AUTO: 5.6 % (ref 0–10)
NEUTROPHILS # BLD AUTO: 6.3 THOU/UL (ref 1.4–6.5)
NEUTROPHILS NFR BLD AUTO: 66.9 % (ref 42–75)
PLATELET # BLD AUTO: 114 THOU/UL (ref 130–400)
POTASSIUM SERPL-SCNC: 4 MMOL/L (ref 3.5–5.1)
RBC # BLD AUTO: 5.56 MILL/UL (ref 4.2–5.4)
SODIUM SERPL-SCNC: 140 MMOL/L (ref 136–145)
WBC # BLD AUTO: 9.3 THOU/UL (ref 4.8–10.8)

## 2022-07-17 PROCEDURE — 72148 MRI LUMBAR SPINE W/O DYE: CPT

## 2022-07-17 PROCEDURE — 36416 COLLJ CAPILLARY BLOOD SPEC: CPT

## 2022-07-17 PROCEDURE — 85025 COMPLETE CBC W/AUTO DIFF WBC: CPT

## 2022-07-17 PROCEDURE — 70551 MRI BRAIN STEM W/O DYE: CPT

## 2022-07-17 PROCEDURE — 72125 CT NECK SPINE W/O DYE: CPT

## 2022-07-17 PROCEDURE — 72141 MRI NECK SPINE W/O DYE: CPT

## 2022-07-17 PROCEDURE — 80048 BASIC METABOLIC PNL TOTAL CA: CPT

## 2022-07-17 PROCEDURE — 36415 COLL VENOUS BLD VENIPUNCTURE: CPT

## 2022-07-17 RX ADMIN — BACITRACIN PRN PK: 500 OINTMENT TOPICAL at 21:18

## 2022-07-18 LAB
ANION GAP SERPL CALC-SCNC: 13 MMOL/L (ref 10–20)
BASOPHILS # BLD AUTO: 0 THOU/UL (ref 0–0.2)
BASOPHILS NFR BLD AUTO: 0.3 % (ref 0–1)
BUN SERPL-MCNC: 18 MG/DL (ref 9.8–20.1)
CALCIUM SERPL-MCNC: 9.5 MG/DL (ref 7.8–10.44)
CHLORIDE SERPL-SCNC: 111 MMOL/L (ref 98–107)
CO2 SERPL-SCNC: 23 MMOL/L (ref 22–29)
CREAT CL PREDICTED SERPL C-G-VRATE: 99 ML/MIN (ref 70–130)
EOSINOPHIL # BLD AUTO: 0.2 THOU/UL (ref 0–0.7)
EOSINOPHIL NFR BLD AUTO: 1.9 % (ref 0–10)
GLUCOSE SERPL-MCNC: 106 MG/DL (ref 70–105)
HGB BLD-MCNC: 16.5 G/DL (ref 12–16)
LYMPHOCYTES # BLD: 2.7 THOU/UL (ref 1.2–3.4)
LYMPHOCYTES NFR BLD AUTO: 32.5 % (ref 21–51)
MCH RBC QN AUTO: 30.6 PG (ref 27–31)
MCV RBC AUTO: 96.4 FL (ref 78–98)
MONOCYTES # BLD AUTO: 0.7 THOU/UL (ref 0.11–0.59)
MONOCYTES NFR BLD AUTO: 8.1 % (ref 0–10)
NEUTROPHILS # BLD AUTO: 4.8 THOU/UL (ref 1.4–6.5)
NEUTROPHILS NFR BLD AUTO: 57.2 % (ref 42–75)
PLATELET # BLD AUTO: 123 THOU/UL (ref 130–400)
POTASSIUM SERPL-SCNC: 4.2 MMOL/L (ref 3.5–5.1)
RBC # BLD AUTO: 5.39 MILL/UL (ref 4.2–5.4)
SODIUM SERPL-SCNC: 143 MMOL/L (ref 136–145)
WBC # BLD AUTO: 8.4 THOU/UL (ref 4.8–10.8)

## 2022-07-18 RX ADMIN — BACITRACIN PRN PK: 500 OINTMENT TOPICAL at 22:38

## 2022-07-18 RX ADMIN — Medication SCH UNITS: at 08:36

## 2022-07-19 VITALS — TEMPERATURE: 97.7 F | SYSTOLIC BLOOD PRESSURE: 118 MMHG | DIASTOLIC BLOOD PRESSURE: 81 MMHG

## 2022-07-19 RX ADMIN — Medication SCH UNITS: at 08:14

## 2022-07-22 ENCOUNTER — HOSPITAL ENCOUNTER (INPATIENT)
Dept: HOSPITAL 92 - CSHERS | Age: 57
LOS: 3 days | Discharge: HOME | DRG: 917 | End: 2022-07-25
Attending: INTERNAL MEDICINE | Admitting: INTERNAL MEDICINE
Payer: COMMERCIAL

## 2022-07-22 VITALS — BODY MASS INDEX: 43.6 KG/M2

## 2022-07-22 DIAGNOSIS — Z20.822: ICD-10-CM

## 2022-07-22 DIAGNOSIS — I95.9: ICD-10-CM

## 2022-07-22 DIAGNOSIS — I50.32: ICD-10-CM

## 2022-07-22 DIAGNOSIS — Z79.51: ICD-10-CM

## 2022-07-22 DIAGNOSIS — N17.9: ICD-10-CM

## 2022-07-22 DIAGNOSIS — E11.40: ICD-10-CM

## 2022-07-22 DIAGNOSIS — T40.2X1A: Primary | ICD-10-CM

## 2022-07-22 DIAGNOSIS — Z85.3: ICD-10-CM

## 2022-07-22 DIAGNOSIS — E78.5: ICD-10-CM

## 2022-07-22 DIAGNOSIS — Z88.1: ICD-10-CM

## 2022-07-22 DIAGNOSIS — Z98.890: ICD-10-CM

## 2022-07-22 DIAGNOSIS — I25.10: ICD-10-CM

## 2022-07-22 DIAGNOSIS — F17.210: ICD-10-CM

## 2022-07-22 DIAGNOSIS — Z79.4: ICD-10-CM

## 2022-07-22 DIAGNOSIS — Z90.49: ICD-10-CM

## 2022-07-22 DIAGNOSIS — K52.1: ICD-10-CM

## 2022-07-22 DIAGNOSIS — E66.01: ICD-10-CM

## 2022-07-22 DIAGNOSIS — Z79.899: ICD-10-CM

## 2022-07-22 DIAGNOSIS — I13.0: ICD-10-CM

## 2022-07-22 DIAGNOSIS — Z88.8: ICD-10-CM

## 2022-07-22 DIAGNOSIS — E11.22: ICD-10-CM

## 2022-07-22 DIAGNOSIS — F31.9: ICD-10-CM

## 2022-07-22 DIAGNOSIS — J44.9: ICD-10-CM

## 2022-07-22 DIAGNOSIS — Z88.2: ICD-10-CM

## 2022-07-22 DIAGNOSIS — G89.29: ICD-10-CM

## 2022-07-22 DIAGNOSIS — N18.2: ICD-10-CM

## 2022-07-22 DIAGNOSIS — Z90.710: ICD-10-CM

## 2022-07-22 DIAGNOSIS — G92.8: ICD-10-CM

## 2022-07-22 DIAGNOSIS — G47.33: ICD-10-CM

## 2022-07-22 DIAGNOSIS — Z88.5: ICD-10-CM
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
## 2022-07-22 LAB
ALBUMIN SERPL BCG-MCNC: 3.6 G/DL (ref 3.5–5)
ALP SERPL-CCNC: 88 U/L (ref 40–110)
ALT SERPL W P-5'-P-CCNC: 25 U/L (ref 8–55)
ANION GAP SERPL CALC-SCNC: 10 MMOL/L (ref 10–20)
APAP SERPL-MCNC: (no result) MCG/ML (ref 10–30)
AST SERPL-CCNC: 19 U/L (ref 5–34)
BASOPHILS # BLD AUTO: 0.1 10X3/UL (ref 0–0.2)
BASOPHILS NFR BLD AUTO: 0.5 % (ref 0–2)
BILIRUB SERPL-MCNC: 1.1 MG/DL (ref 0.2–1.2)
BUN SERPL-MCNC: 25 MG/DL (ref 9.8–20.1)
CALCIUM SERPL-MCNC: 8.7 MG/DL (ref 7.8–10.44)
CHLORIDE SERPL-SCNC: 104 MMOL/L (ref 98–107)
CK SERPL-CCNC: 45 U/L (ref 29–168)
CO2 SERPL-SCNC: 26 MMOL/L (ref 22–29)
CREAT CL PREDICTED SERPL C-G-VRATE: 0 ML/MIN (ref 70–130)
DRUG SCREEN CUTOFF: (no result)
EOSINOPHIL # BLD AUTO: 0.2 10X3/UL (ref 0–0.5)
EOSINOPHIL NFR BLD AUTO: 1.6 % (ref 0–6)
GLOBULIN SER CALC-MCNC: 2.6 G/DL (ref 2.4–3.5)
GLUCOSE SERPL-MCNC: 124 MG/DL (ref 70–105)
GLUCOSE UR STRIP-MCNC: >=1000 MG/DL
HGB BLD-MCNC: 14.6 G/DL (ref 12–15.5)
LYMPHOCYTES NFR BLD AUTO: 22.9 % (ref 18–47)
MAGNESIUM SERPL-MCNC: 2.2 MG/DL (ref 1.6–2.6)
MCH RBC QN AUTO: 30.3 PG (ref 27–33)
MCV RBC AUTO: 91.1 FL (ref 81.6–98.3)
MONOCYTES # BLD AUTO: 0.9 10X3/UL (ref 0–1.1)
MONOCYTES NFR BLD AUTO: 9 % (ref 0–10)
NEUTROPHILS # BLD AUTO: 6.7 10X3/UL (ref 1.5–8.4)
NEUTROPHILS NFR BLD AUTO: 65.3 % (ref 40–75)
PLATELET # BLD AUTO: 135 10X3/UL (ref 150–450)
POTASSIUM SERPL-SCNC: 3.7 MMOL/L (ref 3.5–5.1)
RBC # BLD AUTO: 4.82 10X6/UL (ref 3.9–5.03)
SALICYLATES SERPL-MCNC: (no result) MG/DL (ref 15–30)
SODIUM SERPL-SCNC: 136 MMOL/L (ref 136–145)
SP GR UR STRIP: 1.01 (ref 1–1.04)
WBC # BLD AUTO: 10.3 10X3/UL (ref 3.5–10.5)

## 2022-07-22 PROCEDURE — 94660 CPAP INITIATION&MGMT: CPT

## 2022-07-22 PROCEDURE — U0002 COVID-19 LAB TEST NON-CDC: HCPCS

## 2022-07-22 PROCEDURE — 80306 DRUG TEST PRSMV INSTRMNT: CPT

## 2022-07-22 PROCEDURE — 71045 X-RAY EXAM CHEST 1 VIEW: CPT

## 2022-07-22 PROCEDURE — 84443 ASSAY THYROID STIM HORMONE: CPT

## 2022-07-22 PROCEDURE — 82550 ASSAY OF CK (CPK): CPT

## 2022-07-22 PROCEDURE — 70450 CT HEAD/BRAIN W/O DYE: CPT

## 2022-07-22 PROCEDURE — 82140 ASSAY OF AMMONIA: CPT

## 2022-07-22 PROCEDURE — 93005 ELECTROCARDIOGRAM TRACING: CPT

## 2022-07-22 PROCEDURE — 83630 LACTOFERRIN FECAL (QUAL): CPT

## 2022-07-22 PROCEDURE — 82805 BLOOD GASES W/O2 SATURATION: CPT

## 2022-07-22 PROCEDURE — 80307 DRUG TEST PRSMV CHEM ANLYZR: CPT

## 2022-07-22 PROCEDURE — 94760 N-INVAS EAR/PLS OXIMETRY 1: CPT

## 2022-07-22 PROCEDURE — 96372 THER/PROPH/DIAG INJ SC/IM: CPT

## 2022-07-22 PROCEDURE — 80053 COMPREHEN METABOLIC PANEL: CPT

## 2022-07-22 PROCEDURE — 83735 ASSAY OF MAGNESIUM: CPT

## 2022-07-22 PROCEDURE — G0378 HOSPITAL OBSERVATION PER HR: HCPCS

## 2022-07-22 PROCEDURE — 36416 COLLJ CAPILLARY BLOOD SPEC: CPT

## 2022-07-22 PROCEDURE — 36415 COLL VENOUS BLD VENIPUNCTURE: CPT

## 2022-07-22 PROCEDURE — 84484 ASSAY OF TROPONIN QUANT: CPT

## 2022-07-22 PROCEDURE — 80048 BASIC METABOLIC PNL TOTAL CA: CPT

## 2022-07-22 PROCEDURE — 87505 NFCT AGENT DETECTION GI: CPT

## 2022-07-22 PROCEDURE — 85025 COMPLETE CBC W/AUTO DIFF WBC: CPT

## 2022-07-22 PROCEDURE — 81003 URINALYSIS AUTO W/O SCOPE: CPT

## 2022-07-23 LAB
ANALYZER IN CARDIO: (no result)
ANION GAP SERPL CALC-SCNC: 11 MMOL/L (ref 10–20)
BASE EXCESS STD BLDV CALC-SCNC: -4.5 MEQ/L
BUN SERPL-MCNC: 23 MG/DL (ref 9.8–20.1)
CA-I BLDV-MCNC: 1.07 MMOL/L (ref 1.16–1.32)
CALCIUM SERPL-MCNC: 8.7 MG/DL (ref 7.8–10.44)
CHLORIDE BLDV-SCNC: 107 MMOL/L (ref 98–106)
CHLORIDE SERPL-SCNC: 108 MMOL/L (ref 98–107)
CO2 SERPL-SCNC: 24 MMOL/L (ref 22–29)
CREAT CL PREDICTED SERPL C-G-VRATE: 66 ML/MIN (ref 70–130)
CRITICAL NOTIFIED BY:: (no result)
GLUCOSE SERPL-MCNC: 136 MG/DL (ref 70–105)
HCO3 BLDV-SCNC: 24 MEQ/L (ref 22–28)
HCT VFR BLDV CALC: 46 % (ref 36–47)
HGB BLDV-MCNC: 15.6 G/DL (ref 11.7–16)
POTASSIUM BLDV-SCNC: 4.12 MMOL/L (ref 3.7–5.3)
POTASSIUM SERPL-SCNC: 4.2 MMOL/L (ref 3.5–5.1)
SODIUM BLDV-SCNC: 139.3 MMOL/L (ref 133–146)
SODIUM SERPL-SCNC: 139 MMOL/L (ref 136–145)
SPECIMEN DRAWN FROM PATIENT: (no result)

## 2022-07-23 PROCEDURE — 5A09357 ASSISTANCE WITH RESPIRATORY VENTILATION, LESS THAN 24 CONSECUTIVE HOURS, CONTINUOUS POSITIVE AIRWAY PRESSURE: ICD-10-PCS | Performed by: INTERNAL MEDICINE

## 2022-07-23 RX ADMIN — Medication SCH UNITS: at 09:15

## 2022-07-23 RX ADMIN — MOMETASONE FUROATE AND FORMOTEROL FUMARATE DIHYDRATE SCH PUFF: 200; 5 AEROSOL RESPIRATORY (INHALATION) at 19:00

## 2022-07-23 RX ADMIN — INSULIN HUMAN PRN UNIT: 100 INJECTION, SOLUTION PARENTERAL at 13:32

## 2022-07-23 RX ADMIN — MOMETASONE FUROATE AND FORMOTEROL FUMARATE DIHYDRATE SCH PUFF: 200; 5 AEROSOL RESPIRATORY (INHALATION) at 09:00

## 2022-07-24 LAB
ANION GAP SERPL CALC-SCNC: 13 MMOL/L (ref 10–20)
BUN SERPL-MCNC: 13 MG/DL (ref 9.8–20.1)
CALCIUM SERPL-MCNC: 9.1 MG/DL (ref 7.8–10.44)
CHLORIDE SERPL-SCNC: 110 MMOL/L (ref 98–107)
CO2 SERPL-SCNC: 20 MMOL/L (ref 22–29)
CREAT CL PREDICTED SERPL C-G-VRATE: 99 ML/MIN (ref 70–130)
GLUCOSE SERPL-MCNC: 214 MG/DL (ref 70–105)
POTASSIUM SERPL-SCNC: 3.2 MMOL/L (ref 3.5–5.1)
SODIUM SERPL-SCNC: 140 MMOL/L (ref 136–145)

## 2022-07-24 RX ADMIN — MOMETASONE FUROATE AND FORMOTEROL FUMARATE DIHYDRATE SCH: 200; 5 AEROSOL RESPIRATORY (INHALATION) at 19:35

## 2022-07-24 RX ADMIN — MOMETASONE FUROATE AND FORMOTEROL FUMARATE DIHYDRATE SCH PUFF: 200; 5 AEROSOL RESPIRATORY (INHALATION) at 07:00

## 2022-07-24 RX ADMIN — Medication SCH UNITS: at 09:09

## 2022-07-24 RX ADMIN — INSULIN HUMAN PRN UNIT: 100 INJECTION, SOLUTION PARENTERAL at 17:27

## 2022-07-25 VITALS — SYSTOLIC BLOOD PRESSURE: 152 MMHG | TEMPERATURE: 97 F | DIASTOLIC BLOOD PRESSURE: 72 MMHG

## 2022-07-25 LAB
ANION GAP SERPL CALC-SCNC: 14 MMOL/L (ref 10–20)
BUN SERPL-MCNC: 11 MG/DL (ref 9.8–20.1)
CALCIUM SERPL-MCNC: 8.9 MG/DL (ref 7.8–10.44)
CHLORIDE SERPL-SCNC: 111 MMOL/L (ref 98–107)
CO2 SERPL-SCNC: 22 MMOL/L (ref 22–29)
CREAT CL PREDICTED SERPL C-G-VRATE: 108 ML/MIN (ref 70–130)
GLUCOSE SERPL-MCNC: 124 MG/DL (ref 70–105)
POTASSIUM SERPL-SCNC: 3.5 MMOL/L (ref 3.5–5.1)
SODIUM SERPL-SCNC: 143 MMOL/L (ref 136–145)

## 2022-07-25 RX ADMIN — Medication SCH UNITS: at 08:34

## 2022-07-25 RX ADMIN — MOMETASONE FUROATE AND FORMOTEROL FUMARATE DIHYDRATE SCH PUFF: 200; 5 AEROSOL RESPIRATORY (INHALATION) at 07:17

## 2022-08-01 NOTE — RAD
FEXAM:

Portable chest



PROVIDED CLINICAL HISTORY:

Dyspnea



COMPARISON:

2/18/2019



FINDINGS:

Cardiac and mediastinal silhouette is unchanged in appearance. No focal consolidation, pleural fluid 
or pneumothorax evident.



IMPRESSION:

No evidence for an acute cardiopulmonary process.



Reported By: Delmar Smiley 

Electronically Signed:  4/2/2019 5:44 PM Patient provided hospital bed for comfort while in the emergency department       Shavonne Chi RN  07/31/22 [id number]

## 2022-08-08 ENCOUNTER — HOSPITAL ENCOUNTER (INPATIENT)
Dept: HOSPITAL 92 - ERS | Age: 57
LOS: 4 days | Discharge: HOME | DRG: 71 | End: 2022-08-12
Attending: FAMILY MEDICINE | Admitting: FAMILY MEDICINE
Payer: COMMERCIAL

## 2022-08-08 VITALS — BODY MASS INDEX: 39.5 KG/M2

## 2022-08-08 DIAGNOSIS — Z90.710: ICD-10-CM

## 2022-08-08 DIAGNOSIS — Z20.822: ICD-10-CM

## 2022-08-08 DIAGNOSIS — Z79.4: ICD-10-CM

## 2022-08-08 DIAGNOSIS — Z91.09: ICD-10-CM

## 2022-08-08 DIAGNOSIS — R29.6: ICD-10-CM

## 2022-08-08 DIAGNOSIS — I50.32: ICD-10-CM

## 2022-08-08 DIAGNOSIS — F17.210: ICD-10-CM

## 2022-08-08 DIAGNOSIS — E78.5: ICD-10-CM

## 2022-08-08 DIAGNOSIS — Z90.49: ICD-10-CM

## 2022-08-08 DIAGNOSIS — E87.6: ICD-10-CM

## 2022-08-08 DIAGNOSIS — F31.9: ICD-10-CM

## 2022-08-08 DIAGNOSIS — Z88.8: ICD-10-CM

## 2022-08-08 DIAGNOSIS — N18.2: ICD-10-CM

## 2022-08-08 DIAGNOSIS — Z28.21: ICD-10-CM

## 2022-08-08 DIAGNOSIS — Z88.1: ICD-10-CM

## 2022-08-08 DIAGNOSIS — Z88.2: ICD-10-CM

## 2022-08-08 DIAGNOSIS — Z91.81: ICD-10-CM

## 2022-08-08 DIAGNOSIS — Z71.6: ICD-10-CM

## 2022-08-08 DIAGNOSIS — E66.01: ICD-10-CM

## 2022-08-08 DIAGNOSIS — Z85.3: ICD-10-CM

## 2022-08-08 DIAGNOSIS — Z88.6: ICD-10-CM

## 2022-08-08 DIAGNOSIS — G93.41: Primary | ICD-10-CM

## 2022-08-08 DIAGNOSIS — J44.9: ICD-10-CM

## 2022-08-08 DIAGNOSIS — Z98.890: ICD-10-CM

## 2022-08-08 DIAGNOSIS — I13.0: ICD-10-CM

## 2022-08-08 DIAGNOSIS — Z88.5: ICD-10-CM

## 2022-08-08 DIAGNOSIS — E11.22: ICD-10-CM

## 2022-08-08 DIAGNOSIS — G89.4: ICD-10-CM

## 2022-08-08 DIAGNOSIS — Z79.02: ICD-10-CM

## 2022-08-08 DIAGNOSIS — I25.10: ICD-10-CM

## 2022-08-08 DIAGNOSIS — F11.20: ICD-10-CM

## 2022-08-08 DIAGNOSIS — Z79.899: ICD-10-CM

## 2022-08-08 LAB
ALBUMIN SERPL BCG-MCNC: 4.1 G/DL (ref 3.5–5)
ALP SERPL-CCNC: 120 U/L (ref 40–110)
ALT SERPL W P-5'-P-CCNC: 15 U/L (ref 8–55)
ANION GAP SERPL CALC-SCNC: 15 MMOL/L (ref 10–20)
APAP SERPL-MCNC: (no result) MCG/ML (ref 10–30)
AST SERPL-CCNC: 10 U/L (ref 5–34)
BACTERIA UR QL AUTO: (no result) HPF
BASOPHILS # BLD AUTO: 0.1 THOU/UL (ref 0–0.2)
BASOPHILS NFR BLD AUTO: 0.6 % (ref 0–1)
BILIRUB SERPL-MCNC: 0.8 MG/DL (ref 0.2–1.2)
BUN SERPL-MCNC: 14 MG/DL (ref 9.8–20.1)
CALCIUM SERPL-MCNC: 9 MG/DL (ref 7.8–10.44)
CHLORIDE SERPL-SCNC: 103 MMOL/L (ref 98–107)
CO2 SERPL-SCNC: 26 MMOL/L (ref 22–29)
CREAT CL PREDICTED SERPL C-G-VRATE: 0 ML/MIN (ref 70–130)
DRUG SCREEN CUTOFF: (no result)
EOSINOPHIL # BLD AUTO: 0.1 THOU/UL (ref 0–0.7)
EOSINOPHIL NFR BLD AUTO: 0.8 % (ref 0–10)
GLOBULIN SER CALC-MCNC: 2.5 G/DL (ref 2.4–3.5)
GLUCOSE SERPL-MCNC: 176 MG/DL (ref 70–105)
HGB BLD-MCNC: 16.1 G/DL (ref 12–16)
LYMPHOCYTES # BLD: 1.8 THOU/UL (ref 1.2–3.4)
LYMPHOCYTES NFR BLD AUTO: 15.1 % (ref 21–51)
MCH RBC QN AUTO: 30 PG (ref 27–31)
MCV RBC AUTO: 92.2 FL (ref 78–98)
MONOCYTES # BLD AUTO: 0.8 THOU/UL (ref 0.11–0.59)
MONOCYTES NFR BLD AUTO: 6.4 % (ref 0–10)
NEUTROPHILS # BLD AUTO: 9.3 THOU/UL (ref 1.4–6.5)
NEUTROPHILS NFR BLD AUTO: 77.1 % (ref 42–75)
PLATELET # BLD AUTO: 131 THOU/UL (ref 130–400)
POTASSIUM SERPL-SCNC: 3 MMOL/L (ref 3.5–5.1)
RBC # BLD AUTO: 5.36 MILL/UL (ref 4.2–5.4)
RBC UR QL AUTO: (no result) HPF (ref 0–3)
SALICYLATES SERPL-MCNC: (no result) MG/DL (ref 15–30)
SODIUM SERPL-SCNC: 141 MMOL/L (ref 136–145)
SP GR UR STRIP: 1.01 (ref 1–1.04)
WBC # BLD AUTO: 12.1 THOU/UL (ref 4.8–10.8)
WBC UR QL AUTO: (no result) HPF (ref 0–3)

## 2022-08-08 PROCEDURE — 83735 ASSAY OF MAGNESIUM: CPT

## 2022-08-08 PROCEDURE — 36415 COLL VENOUS BLD VENIPUNCTURE: CPT

## 2022-08-08 PROCEDURE — 85025 COMPLETE CBC W/AUTO DIFF WBC: CPT

## 2022-08-08 PROCEDURE — 87040 BLOOD CULTURE FOR BACTERIA: CPT

## 2022-08-08 PROCEDURE — 80048 BASIC METABOLIC PNL TOTAL CA: CPT

## 2022-08-08 PROCEDURE — U0003 INFECTIOUS AGENT DETECTION BY NUCLEIC ACID (DNA OR RNA); SEVERE ACUTE RESPIRATORY SYNDROME CORONAVIRUS 2 (SARS-COV-2) (CORONAVIRUS DISEASE [COVID-19]), AMPLIFIED PROBE TECHNIQUE, MAKING USE OF HIGH THROUGHPUT TECHNOLOGIES AS DESCRIBED BY CMS-2020-01-R: HCPCS

## 2022-08-08 PROCEDURE — U0005 INFEC AGEN DETEC AMPLI PROBE: HCPCS

## 2022-08-08 PROCEDURE — 80306 DRUG TEST PRSMV INSTRMNT: CPT

## 2022-08-08 PROCEDURE — 71275 CT ANGIOGRAPHY CHEST: CPT

## 2022-08-08 PROCEDURE — 84484 ASSAY OF TROPONIN QUANT: CPT

## 2022-08-08 PROCEDURE — A9579 GAD-BASE MR CONTRAST NOS,1ML: HCPCS

## 2022-08-08 PROCEDURE — 87086 URINE CULTURE/COLONY COUNT: CPT

## 2022-08-08 PROCEDURE — 93005 ELECTROCARDIOGRAM TRACING: CPT

## 2022-08-08 PROCEDURE — 95957 EEG DIGITAL ANALYSIS: CPT

## 2022-08-08 PROCEDURE — 95819 EEG AWAKE AND ASLEEP: CPT

## 2022-08-08 PROCEDURE — 70450 CT HEAD/BRAIN W/O DYE: CPT

## 2022-08-08 PROCEDURE — 36416 COLLJ CAPILLARY BLOOD SPEC: CPT

## 2022-08-08 PROCEDURE — 70553 MRI BRAIN STEM W/O & W/DYE: CPT

## 2022-08-08 PROCEDURE — 80307 DRUG TEST PRSMV CHEM ANLYZR: CPT

## 2022-08-08 PROCEDURE — 80053 COMPREHEN METABOLIC PANEL: CPT

## 2022-08-08 PROCEDURE — 95712 VEEG 2-12 HR INTMT MNTR: CPT

## 2022-08-08 PROCEDURE — 81001 URINALYSIS AUTO W/SCOPE: CPT

## 2022-08-09 LAB
ANION GAP SERPL CALC-SCNC: 14 MMOL/L (ref 10–20)
BASOPHILS # BLD AUTO: 0.1 THOU/UL (ref 0–0.2)
BASOPHILS NFR BLD AUTO: 0.5 % (ref 0–1)
BUN SERPL-MCNC: 13 MG/DL (ref 9.8–20.1)
CALCIUM SERPL-MCNC: 9 MG/DL (ref 7.8–10.44)
CHLORIDE SERPL-SCNC: 106 MMOL/L (ref 98–107)
CO2 SERPL-SCNC: 24 MMOL/L (ref 22–29)
CREAT CL PREDICTED SERPL C-G-VRATE: 87 ML/MIN (ref 70–130)
EOSINOPHIL # BLD AUTO: 0.2 THOU/UL (ref 0–0.7)
EOSINOPHIL NFR BLD AUTO: 1.6 % (ref 0–10)
GLUCOSE SERPL-MCNC: 132 MG/DL (ref 70–105)
HGB BLD-MCNC: 15.9 G/DL (ref 12–16)
LYMPHOCYTES # BLD: 3 THOU/UL (ref 1.2–3.4)
LYMPHOCYTES NFR BLD AUTO: 29.8 % (ref 21–51)
MAGNESIUM SERPL-MCNC: 2.4 MG/DL (ref 1.6–2.6)
MCH RBC QN AUTO: 31.1 PG (ref 27–31)
MCV RBC AUTO: 94 FL (ref 78–98)
MONOCYTES # BLD AUTO: 0.7 THOU/UL (ref 0.11–0.59)
MONOCYTES NFR BLD AUTO: 7.2 % (ref 0–10)
NEUTROPHILS # BLD AUTO: 6.2 THOU/UL (ref 1.4–6.5)
NEUTROPHILS NFR BLD AUTO: 61 % (ref 42–75)
PLATELET # BLD AUTO: 138 THOU/UL (ref 130–400)
POTASSIUM SERPL-SCNC: 3.4 MMOL/L (ref 3.5–5.1)
RBC # BLD AUTO: 5.12 MILL/UL (ref 4.2–5.4)
SODIUM SERPL-SCNC: 141 MMOL/L (ref 136–145)
WBC # BLD AUTO: 10.2 THOU/UL (ref 4.8–10.8)

## 2022-08-09 RX ADMIN — Medication SCH: at 09:00

## 2022-08-09 RX ADMIN — Medication SCH ML: at 21:37

## 2022-08-10 LAB
ANION GAP SERPL CALC-SCNC: 16 MMOL/L (ref 10–20)
BASOPHILS # BLD AUTO: 0.1 THOU/UL (ref 0–0.2)
BASOPHILS NFR BLD AUTO: 0.6 % (ref 0–1)
BUN SERPL-MCNC: 16 MG/DL (ref 9.8–20.1)
CALCIUM SERPL-MCNC: 9.1 MG/DL (ref 7.8–10.44)
CHLORIDE SERPL-SCNC: 106 MMOL/L (ref 98–107)
CO2 SERPL-SCNC: 20 MMOL/L (ref 22–29)
CREAT CL PREDICTED SERPL C-G-VRATE: 89 ML/MIN (ref 70–130)
EOSINOPHIL # BLD AUTO: 0.2 THOU/UL (ref 0–0.7)
EOSINOPHIL NFR BLD AUTO: 2.2 % (ref 0–10)
GLUCOSE SERPL-MCNC: 133 MG/DL (ref 70–105)
HGB BLD-MCNC: 16.2 G/DL (ref 12–16)
LYMPHOCYTES # BLD: 3 THOU/UL (ref 1.2–3.4)
LYMPHOCYTES NFR BLD AUTO: 32.2 % (ref 21–51)
MCH RBC QN AUTO: 31.5 PG (ref 27–31)
MCV RBC AUTO: 97.5 FL (ref 78–98)
MONOCYTES # BLD AUTO: 0.8 THOU/UL (ref 0.11–0.59)
MONOCYTES NFR BLD AUTO: 8.4 % (ref 0–10)
NEUTROPHILS # BLD AUTO: 5.3 THOU/UL (ref 1.4–6.5)
NEUTROPHILS NFR BLD AUTO: 56.7 % (ref 42–75)
PLATELET # BLD AUTO: 127 THOU/UL (ref 130–400)
POTASSIUM SERPL-SCNC: 3.8 MMOL/L (ref 3.5–5.1)
RBC # BLD AUTO: 5.14 MILL/UL (ref 4.2–5.4)
SODIUM SERPL-SCNC: 138 MMOL/L (ref 136–145)
WBC # BLD AUTO: 9.4 THOU/UL (ref 4.8–10.8)

## 2022-08-10 RX ADMIN — Medication SCH ML: at 20:57

## 2022-08-10 RX ADMIN — Medication SCH UNITS: at 10:26

## 2022-08-10 RX ADMIN — Medication SCH ML: at 10:32

## 2022-08-11 RX ADMIN — Medication SCH ML: at 20:53

## 2022-08-11 RX ADMIN — Medication SCH ML: at 09:38

## 2022-08-11 RX ADMIN — Medication SCH UNITS: at 09:37

## 2022-08-12 VITALS — SYSTOLIC BLOOD PRESSURE: 143 MMHG | DIASTOLIC BLOOD PRESSURE: 89 MMHG | TEMPERATURE: 98.3 F

## 2022-08-12 RX ADMIN — Medication SCH ML: at 11:02

## 2022-08-12 RX ADMIN — Medication SCH UNITS: at 09:30

## 2023-01-19 ENCOUNTER — HOSPITAL ENCOUNTER (EMERGENCY)
Dept: HOSPITAL 18 - NAV ERS | Age: 58
LOS: 1 days | Discharge: HOME | End: 2023-01-20
Payer: COMMERCIAL

## 2023-01-19 DIAGNOSIS — E78.5: ICD-10-CM

## 2023-01-19 DIAGNOSIS — J44.9: ICD-10-CM

## 2023-01-19 DIAGNOSIS — F17.210: ICD-10-CM

## 2023-01-19 DIAGNOSIS — I25.10: ICD-10-CM

## 2023-01-19 DIAGNOSIS — Z79.899: ICD-10-CM

## 2023-01-19 DIAGNOSIS — I25.2: ICD-10-CM

## 2023-01-19 DIAGNOSIS — E11.22: ICD-10-CM

## 2023-01-19 DIAGNOSIS — J44.1: Primary | ICD-10-CM

## 2023-01-19 DIAGNOSIS — N18.2: ICD-10-CM

## 2023-01-19 PROCEDURE — 71046 X-RAY EXAM CHEST 2 VIEWS: CPT

## 2023-01-19 PROCEDURE — 94640 AIRWAY INHALATION TREATMENT: CPT

## 2023-01-19 PROCEDURE — 93005 ELECTROCARDIOGRAM TRACING: CPT

## 2023-01-26 ENCOUNTER — HOSPITAL ENCOUNTER (EMERGENCY)
Dept: HOSPITAL 18 - NAV ERS | Age: 58
Discharge: HOME | End: 2023-01-26
Payer: COMMERCIAL

## 2023-01-26 DIAGNOSIS — N18.2: ICD-10-CM

## 2023-01-26 DIAGNOSIS — J44.1: Primary | ICD-10-CM

## 2023-01-26 DIAGNOSIS — E11.22: ICD-10-CM

## 2023-01-26 DIAGNOSIS — Z79.01: ICD-10-CM

## 2023-01-26 DIAGNOSIS — E66.9: ICD-10-CM

## 2023-01-26 DIAGNOSIS — I25.10: ICD-10-CM

## 2023-01-26 DIAGNOSIS — Z79.899: ICD-10-CM

## 2023-01-26 DIAGNOSIS — E78.2: ICD-10-CM

## 2023-01-26 DIAGNOSIS — Z79.82: ICD-10-CM

## 2023-01-26 DIAGNOSIS — F17.210: ICD-10-CM

## 2023-01-26 DIAGNOSIS — Z20.822: ICD-10-CM

## 2023-01-26 LAB
ALBUMIN SERPL BCG-MCNC: 4.1 G/DL (ref 3.5–5)
ALP SERPL-CCNC: 127 U/L (ref 40–110)
ALT SERPL W P-5'-P-CCNC: 26 U/L (ref 8–55)
ANION GAP SERPL CALC-SCNC: 17 MMOL/L (ref 10–20)
AST SERPL-CCNC: 14 U/L (ref 5–34)
BASOPHILS # BLD AUTO: 0.1 THOU/UL (ref 0–0.2)
BASOPHILS NFR BLD AUTO: 0.6 % (ref 0–1)
BILIRUB SERPL-MCNC: 0.7 MG/DL (ref 0.2–1.2)
BUN SERPL-MCNC: 22 MG/DL (ref 9.8–20.1)
CALCIUM SERPL-MCNC: 9.3 MG/DL (ref 7.8–10.44)
CHLORIDE SERPL-SCNC: 102 MMOL/L (ref 98–107)
CO2 SERPL-SCNC: 21 MMOL/L (ref 22–29)
CREAT CL PREDICTED SERPL C-G-VRATE: 0 ML/MIN (ref 70–130)
EOSINOPHIL # BLD AUTO: 0.2 THOU/UL (ref 0–0.7)
EOSINOPHIL NFR BLD AUTO: 1 % (ref 0–10)
GLOBULIN SER CALC-MCNC: 2.8 G/DL (ref 2.4–3.5)
GLUCOSE SERPL-MCNC: 219 MG/DL (ref 70–105)
GLUCOSE UR STRIP-MCNC: >=1000 MG/DL
HGB BLD-MCNC: 18.8 G/DL (ref 12–16)
LYMPHOCYTES # BLD AUTO: 3.1 THOU/UL (ref 1.2–3.4)
LYMPHOCYTES NFR BLD AUTO: 20.7 % (ref 21–51)
MCH RBC QN AUTO: 30.4 PG (ref 27–31)
MCV RBC AUTO: 92.4 FL (ref 78–98)
MONOCYTES # BLD AUTO: 0.7 THOU/UL (ref 0.11–0.59)
MONOCYTES NFR BLD AUTO: 4.4 % (ref 0–10)
NEUTROPHILS # BLD AUTO: 10.9 THOU/UL (ref 1.4–6.5)
NEUTROPHILS NFR BLD AUTO: 73.2 % (ref 42–75)
PLATELET # BLD AUTO: 179 10X3/UL (ref 130–400)
POTASSIUM SERPL-SCNC: 3.5 MMOL/L (ref 3.5–5.1)
RBC # BLD AUTO: 6.16 MILL/UL (ref 4.2–5.4)
SODIUM SERPL-SCNC: 136 MMOL/L (ref 136–145)
SP GR UR STRIP: 1.01 (ref 1–1.03)
WBC # BLD AUTO: 14.9 10X3/UL (ref 4.8–10.8)

## 2023-01-26 PROCEDURE — 85025 COMPLETE CBC W/AUTO DIFF WBC: CPT

## 2023-01-26 PROCEDURE — U0002 COVID-19 LAB TEST NON-CDC: HCPCS

## 2023-01-26 PROCEDURE — 87040 BLOOD CULTURE FOR BACTERIA: CPT

## 2023-01-26 PROCEDURE — 86140 C-REACTIVE PROTEIN: CPT

## 2023-01-26 PROCEDURE — 93005 ELECTROCARDIOGRAM TRACING: CPT

## 2023-01-26 PROCEDURE — 80053 COMPREHEN METABOLIC PANEL: CPT

## 2023-01-26 PROCEDURE — 81003 URINALYSIS AUTO W/O SCOPE: CPT

## 2023-01-26 PROCEDURE — 96365 THER/PROPH/DIAG IV INF INIT: CPT

## 2023-01-26 PROCEDURE — 83605 ASSAY OF LACTIC ACID: CPT

## 2023-01-26 PROCEDURE — 71046 X-RAY EXAM CHEST 2 VIEWS: CPT

## 2023-01-26 PROCEDURE — 96375 TX/PRO/DX INJ NEW DRUG ADDON: CPT

## 2023-01-26 PROCEDURE — 85379 FIBRIN DEGRADATION QUANT: CPT

## 2023-01-26 PROCEDURE — 87086 URINE CULTURE/COLONY COUNT: CPT

## 2023-04-02 ENCOUNTER — HOSPITAL ENCOUNTER (EMERGENCY)
Dept: HOSPITAL 18 - NAV ERS | Age: 58
Discharge: HOME | End: 2023-04-02
Payer: COMMERCIAL

## 2023-04-02 DIAGNOSIS — K02.9: ICD-10-CM

## 2023-04-02 DIAGNOSIS — K04.7: Primary | ICD-10-CM

## 2023-04-02 PROCEDURE — 99283 EMERGENCY DEPT VISIT LOW MDM: CPT

## 2023-04-17 ENCOUNTER — HOSPITAL ENCOUNTER (EMERGENCY)
Dept: HOSPITAL 18 - NAV ERS | Age: 58
Discharge: HOME | End: 2023-04-17
Payer: COMMERCIAL

## 2023-04-17 DIAGNOSIS — J02.9: Primary | ICD-10-CM

## 2023-04-17 PROCEDURE — 87430 STREP A AG IA: CPT

## 2023-04-17 PROCEDURE — 87081 CULTURE SCREEN ONLY: CPT

## 2023-04-17 PROCEDURE — 99283 EMERGENCY DEPT VISIT LOW MDM: CPT

## 2023-04-28 ENCOUNTER — HOSPITAL ENCOUNTER (EMERGENCY)
Dept: HOSPITAL 18 - NAV ERS | Age: 58
LOS: 1 days | Discharge: ANOTHER HOSPITAL | End: 2023-04-29
Payer: COMMERCIAL

## 2023-04-28 DIAGNOSIS — I25.10: ICD-10-CM

## 2023-04-28 DIAGNOSIS — K21.9: ICD-10-CM

## 2023-04-28 DIAGNOSIS — F17.210: ICD-10-CM

## 2023-04-28 DIAGNOSIS — E11.9: ICD-10-CM

## 2023-04-28 DIAGNOSIS — Z79.899: ICD-10-CM

## 2023-04-28 DIAGNOSIS — K86.9: ICD-10-CM

## 2023-04-28 DIAGNOSIS — Z79.84: ICD-10-CM

## 2023-04-28 DIAGNOSIS — E87.1: Primary | ICD-10-CM

## 2023-04-28 DIAGNOSIS — Z79.4: ICD-10-CM

## 2023-04-28 DIAGNOSIS — I10: ICD-10-CM

## 2023-04-28 DIAGNOSIS — R16.0: ICD-10-CM

## 2023-04-28 PROCEDURE — C9113 INJ PANTOPRAZOLE SODIUM, VIA: HCPCS

## 2023-04-28 PROCEDURE — 85025 COMPLETE CBC W/AUTO DIFF WBC: CPT

## 2023-04-28 PROCEDURE — 96375 TX/PRO/DX INJ NEW DRUG ADDON: CPT

## 2023-04-28 PROCEDURE — 93005 ELECTROCARDIOGRAM TRACING: CPT

## 2023-04-28 PROCEDURE — 96374 THER/PROPH/DIAG INJ IV PUSH: CPT

## 2023-04-28 PROCEDURE — 80143 DRUG ASSAY ACETAMINOPHEN: CPT

## 2023-04-28 PROCEDURE — 81003 URINALYSIS AUTO W/O SCOPE: CPT

## 2023-04-28 PROCEDURE — 96361 HYDRATE IV INFUSION ADD-ON: CPT

## 2023-04-28 PROCEDURE — 84484 ASSAY OF TROPONIN QUANT: CPT

## 2023-04-28 PROCEDURE — 96376 TX/PRO/DX INJ SAME DRUG ADON: CPT

## 2023-04-28 PROCEDURE — 74176 CT ABD & PELVIS W/O CONTRAST: CPT

## 2023-04-28 PROCEDURE — 80053 COMPREHEN METABOLIC PANEL: CPT

## 2023-04-28 PROCEDURE — 83690 ASSAY OF LIPASE: CPT

## 2023-04-28 PROCEDURE — 71046 X-RAY EXAM CHEST 2 VIEWS: CPT

## 2023-04-29 LAB
ALBUMIN SERPL BCG-MCNC: 4.1 G/DL (ref 3.5–5)
ALP SERPL-CCNC: 145 U/L (ref 40–110)
ALT SERPL W P-5'-P-CCNC: 35 U/L (ref 8–55)
ANION GAP SERPL CALC-SCNC: 17 MMOL/L (ref 10–20)
APAP SERPL-MCNC: (no result) MCG/ML (ref 10–30)
AST SERPL-CCNC: 46 U/L (ref 5–34)
BASOPHILS # BLD AUTO: 0.1 THOU/UL (ref 0–0.2)
BASOPHILS NFR BLD AUTO: 0.6 % (ref 0–1)
BILIRUB SERPL-MCNC: 0.4 MG/DL (ref 0.2–1.2)
BUN SERPL-MCNC: 30 MG/DL (ref 9.8–20.1)
CALCIUM SERPL-MCNC: 9.3 MG/DL (ref 7.8–10.44)
CHLORIDE SERPL-SCNC: 98 MMOL/L (ref 98–107)
CO2 SERPL-SCNC: 20 MMOL/L (ref 22–29)
CREAT CL PREDICTED SERPL C-G-VRATE: 0 ML/MIN (ref 70–130)
EOSINOPHIL # BLD AUTO: 0 THOU/UL (ref 0–0.7)
EOSINOPHIL NFR BLD AUTO: 0.1 % (ref 0–10)
GLOBULIN SER CALC-MCNC: 3.4 G/DL (ref 2.4–3.5)
GLUCOSE SERPL-MCNC: 164 MG/DL (ref 70–105)
GLUCOSE UR STRIP-MCNC: 500 MG/DL
HGB BLD-MCNC: 15.2 G/DL (ref 12–16)
LIPASE SERPL-CCNC: 84 U/L (ref 8–78)
LYMPHOCYTES # BLD AUTO: 1.3 THOU/UL (ref 1.2–3.4)
LYMPHOCYTES NFR BLD AUTO: 12.4 % (ref 21–51)
MCH RBC QN AUTO: 30.2 PG (ref 27–31)
MCV RBC AUTO: 91.3 FL (ref 78–98)
MONOCYTES # BLD AUTO: 0.6 THOU/UL (ref 0.11–0.59)
MONOCYTES NFR BLD AUTO: 5.7 % (ref 0–10)
NEUTROPHILS # BLD AUTO: 8.4 THOU/UL (ref 1.4–6.5)
NEUTROPHILS NFR BLD AUTO: 81.2 % (ref 42–75)
PLATELET # BLD AUTO: 173 10X3/UL (ref 130–400)
POTASSIUM SERPL-SCNC: 4.9 MMOL/L (ref 3.5–5.1)
RBC # BLD AUTO: 5.02 MILL/UL (ref 4.2–5.4)
SODIUM SERPL-SCNC: 130 MMOL/L (ref 136–145)
SP GR UR STRIP: 1.01 (ref 1–1.03)
WBC # BLD AUTO: 10.4 10X3/UL (ref 4.8–10.8)

## 2023-05-05 ENCOUNTER — HOSPITAL ENCOUNTER (EMERGENCY)
Dept: HOSPITAL 18 - NAV ERS | Age: 58
Discharge: HOME | End: 2023-05-05
Payer: COMMERCIAL

## 2023-05-05 DIAGNOSIS — E78.5: ICD-10-CM

## 2023-05-05 DIAGNOSIS — Z79.899: ICD-10-CM

## 2023-05-05 DIAGNOSIS — E11.40: ICD-10-CM

## 2023-05-05 DIAGNOSIS — F17.210: ICD-10-CM

## 2023-05-05 DIAGNOSIS — I10: ICD-10-CM

## 2023-05-05 DIAGNOSIS — M62.838: Primary | ICD-10-CM

## 2023-05-05 DIAGNOSIS — K21.9: ICD-10-CM

## 2023-05-05 DIAGNOSIS — Z79.4: ICD-10-CM

## 2023-05-05 PROCEDURE — 20552 NJX 1/MLT TRIGGER POINT 1/2: CPT

## 2023-08-09 ENCOUNTER — HOSPITAL ENCOUNTER (EMERGENCY)
Dept: HOSPITAL 18 - NAV ERS | Age: 58
Discharge: HOME | End: 2023-08-09
Payer: COMMERCIAL

## 2023-08-09 DIAGNOSIS — K21.9: ICD-10-CM

## 2023-08-09 DIAGNOSIS — Z79.899: ICD-10-CM

## 2023-08-09 DIAGNOSIS — F17.210: ICD-10-CM

## 2023-08-09 DIAGNOSIS — Z79.4: ICD-10-CM

## 2023-08-09 DIAGNOSIS — E11.9: ICD-10-CM

## 2023-08-09 DIAGNOSIS — C78.00: ICD-10-CM

## 2023-08-09 DIAGNOSIS — Z79.84: ICD-10-CM

## 2023-08-09 DIAGNOSIS — E78.5: ICD-10-CM

## 2023-08-09 DIAGNOSIS — I25.10: ICD-10-CM

## 2023-08-09 DIAGNOSIS — I10: ICD-10-CM

## 2023-08-09 DIAGNOSIS — D64.9: Primary | ICD-10-CM

## 2023-08-09 LAB
ALBUMIN SERPL BCG-MCNC: 3.8 G/DL (ref 3.5–5)
ALP SERPL-CCNC: 204 U/L (ref 40–110)
ALT SERPL W P-5'-P-CCNC: 20 U/L (ref 8–55)
ANION GAP SERPL CALC-SCNC: 16 MMOL/L (ref 10–20)
ANISOCYTOSIS BLD QL SMEAR: (no result) (100X)
APTT PPP: 19.8 SEC (ref 22.9–36.1)
AST SERPL-CCNC: 18 U/L (ref 5–34)
BILIRUB SERPL-MCNC: 0.4 MG/DL (ref 0.2–1.2)
BUN SERPL-MCNC: 22 MG/DL (ref 9.8–20.1)
CALCIUM SERPL-MCNC: 8.7 MG/DL (ref 7.8–10.44)
CHLORIDE SERPL-SCNC: 105 MMOL/L (ref 98–107)
CK SERPL-CCNC: 22 U/L (ref 29–168)
CO2 SERPL-SCNC: 21 MMOL/L (ref 22–29)
CREAT CL PREDICTED SERPL C-G-VRATE: 0 ML/MIN (ref 70–130)
D DIMER PPP FEU-MCNC: 0.76 *MCG/ML (ref 0.27–0.43)
GLOBULIN SER CALC-MCNC: 2.4 G/DL (ref 2.4–3.5)
GLUCOSE SERPL-MCNC: 100 MG/DL (ref 70–105)
HCT VFR BLD CALC: 24.8 % (ref 36–47)
HGB BLD-MCNC: 8.3 G/DL (ref 12–16)
INR PPP: 1
MANUAL DIFF??: YES
MCH RBC QN AUTO: 31 PG (ref 27–31)
MCV RBC AUTO: 93 FL (ref 78–98)
MDIFF COMPLETE?: YES
PLATELET # BLD AUTO: 19 10X3/UL (ref 130–400)
PLATELET BLD QL SMEAR: (no result)
POTASSIUM SERPL-SCNC: 4 MMOL/L (ref 3.5–5.1)
PROTHROMBIN TIME: 13.2 SEC (ref 12–14.7)
RBC # BLD AUTO: 2.66 MILL/UL (ref 4.2–5.4)
REFLEX FOR REVIEW??: YES
SODIUM SERPL-SCNC: 138 MMOL/L (ref 136–145)
TOXIC GRANULES BLD QL SMEAR: SLIGHT
WBC # BLD AUTO: 1.9 10X3/UL (ref 4.8–10.8)

## 2023-08-09 PROCEDURE — 36415 COLL VENOUS BLD VENIPUNCTURE: CPT

## 2023-08-09 PROCEDURE — 84484 ASSAY OF TROPONIN QUANT: CPT

## 2023-08-09 PROCEDURE — 85060 BLOOD SMEAR INTERPRETATION: CPT

## 2023-08-09 PROCEDURE — 80053 COMPREHEN METABOLIC PANEL: CPT

## 2023-08-09 PROCEDURE — 71275 CT ANGIOGRAPHY CHEST: CPT

## 2023-08-09 PROCEDURE — 82550 ASSAY OF CK (CPK): CPT

## 2023-08-09 PROCEDURE — 85730 THROMBOPLASTIN TIME PARTIAL: CPT

## 2023-08-09 PROCEDURE — 85610 PROTHROMBIN TIME: CPT

## 2023-08-09 PROCEDURE — 85025 COMPLETE CBC W/AUTO DIFF WBC: CPT

## 2023-08-09 PROCEDURE — 86850 RBC ANTIBODY SCREEN: CPT

## 2023-08-09 PROCEDURE — 85379 FIBRIN DEGRADATION QUANT: CPT

## 2023-08-09 PROCEDURE — 93005 ELECTROCARDIOGRAM TRACING: CPT

## 2023-08-09 PROCEDURE — 86901 BLOOD TYPING SEROLOGIC RH(D): CPT

## 2023-08-09 PROCEDURE — 86900 BLOOD TYPING SEROLOGIC ABO: CPT

## 2023-08-09 PROCEDURE — 71045 X-RAY EXAM CHEST 1 VIEW: CPT

## 2023-09-02 ENCOUNTER — HOSPITAL ENCOUNTER (EMERGENCY)
Dept: HOSPITAL 18 - NAV ERS | Age: 58
Discharge: HOME | End: 2023-09-02
Payer: COMMERCIAL

## 2023-09-02 DIAGNOSIS — Z79.4: ICD-10-CM

## 2023-09-02 DIAGNOSIS — Z85.118: ICD-10-CM

## 2023-09-02 DIAGNOSIS — R51.9: Primary | ICD-10-CM

## 2023-09-02 DIAGNOSIS — I10: ICD-10-CM

## 2023-09-02 DIAGNOSIS — Z86.73: ICD-10-CM

## 2023-09-02 DIAGNOSIS — F17.210: ICD-10-CM

## 2023-09-02 DIAGNOSIS — E11.9: ICD-10-CM

## 2023-09-02 DIAGNOSIS — K21.9: ICD-10-CM

## 2023-09-02 LAB
ALBUMIN SERPL BCG-MCNC: 3.2 G/DL (ref 3.5–5)
ALP SERPL-CCNC: 152 U/L (ref 40–110)
ALT SERPL W P-5'-P-CCNC: 17 U/L (ref 8–55)
ANION GAP SERPL CALC-SCNC: 10 MMOL/L (ref 10–20)
APTT PPP: 28.6 SEC (ref 22.9–36.1)
AST SERPL-CCNC: 10 U/L (ref 5–34)
BASOPHILS # BLD AUTO: 0 THOU/UL (ref 0–0.2)
BASOPHILS NFR BLD AUTO: 0.5 % (ref 0–1)
BILIRUB SERPL-MCNC: 0.4 MG/DL (ref 0.2–1.2)
BUN SERPL-MCNC: 14 MG/DL (ref 9.8–20.1)
CALCIUM SERPL-MCNC: 8.5 MG/DL (ref 7.8–10.44)
CHLORIDE SERPL-SCNC: 104 MMOL/L (ref 98–107)
CO2 SERPL-SCNC: 25 MMOL/L (ref 22–29)
CREAT CL PREDICTED SERPL C-G-VRATE: 0 ML/MIN (ref 70–130)
EOSINOPHIL # BLD AUTO: 0 THOU/UL (ref 0–0.7)
EOSINOPHIL NFR BLD AUTO: 0.3 % (ref 0–10)
GLOBULIN SER CALC-MCNC: 2.8 G/DL (ref 2.4–3.5)
GLUCOSE SERPL-MCNC: 159 MG/DL (ref 70–105)
HCT VFR BLD CALC: 21.4 % (ref 36–47)
HGB BLD-MCNC: 7.4 G/DL (ref 12–16)
INR PPP: 1.1
LYMPHOCYTES # BLD AUTO: 1.5 THOU/UL (ref 1.2–3.4)
LYMPHOCYTES NFR BLD AUTO: 33.1 % (ref 21–51)
MCH RBC QN AUTO: 31.2 PG (ref 27–31)
MCV RBC AUTO: 90.6 FL (ref 78–98)
MONOCYTES # BLD AUTO: 0.5 THOU/UL (ref 0.11–0.59)
MONOCYTES NFR BLD AUTO: 10.5 % (ref 0–10)
NEUTROPHILS # BLD AUTO: 2.5 THOU/UL (ref 1.4–6.5)
NEUTROPHILS NFR BLD AUTO: 55.6 % (ref 42–75)
PLATELET # BLD AUTO: 35 10X3/UL (ref 130–400)
POTASSIUM SERPL-SCNC: 3.4 MMOL/L (ref 3.5–5.1)
PROTHROMBIN TIME: 14.4 SEC (ref 12–14.7)
RBC # BLD AUTO: 2.36 MILL/UL (ref 4.2–5.4)
SODIUM SERPL-SCNC: 136 MMOL/L (ref 136–145)
WBC # BLD AUTO: 4.4 10X3/UL (ref 4.8–10.8)

## 2023-09-02 PROCEDURE — 85730 THROMBOPLASTIN TIME PARTIAL: CPT

## 2023-09-02 PROCEDURE — 85025 COMPLETE CBC W/AUTO DIFF WBC: CPT

## 2023-09-02 PROCEDURE — 70450 CT HEAD/BRAIN W/O DYE: CPT

## 2023-09-02 PROCEDURE — 70496 CT ANGIOGRAPHY HEAD: CPT

## 2023-09-02 PROCEDURE — 36415 COLL VENOUS BLD VENIPUNCTURE: CPT

## 2023-09-02 PROCEDURE — 96374 THER/PROPH/DIAG INJ IV PUSH: CPT

## 2023-09-02 PROCEDURE — 80053 COMPREHEN METABOLIC PANEL: CPT

## 2023-09-02 PROCEDURE — 85610 PROTHROMBIN TIME: CPT

## 2023-09-18 ENCOUNTER — HOSPITAL ENCOUNTER (EMERGENCY)
Dept: HOSPITAL 92 - CSHERS | Age: 58
Discharge: HOME | End: 2023-09-18
Payer: COMMERCIAL

## 2023-09-18 DIAGNOSIS — K21.9: ICD-10-CM

## 2023-09-18 DIAGNOSIS — Z79.4: ICD-10-CM

## 2023-09-18 DIAGNOSIS — I25.10: ICD-10-CM

## 2023-09-18 DIAGNOSIS — E11.9: ICD-10-CM

## 2023-09-18 DIAGNOSIS — I10: ICD-10-CM

## 2023-09-18 DIAGNOSIS — F17.210: ICD-10-CM

## 2023-09-18 DIAGNOSIS — R56.9: Primary | ICD-10-CM

## 2023-09-18 LAB
ALBUMIN SERPL BCG-MCNC: 3.9 G/DL (ref 3.5–5)
ALP SERPL-CCNC: 165 U/L (ref 40–110)
ALT SERPL W P-5'-P-CCNC: 19 U/L (ref 8–55)
ANION GAP SERPL CALC-SCNC: 15 MMOL/L (ref 10–20)
AST SERPL-CCNC: 16 U/L (ref 5–34)
BACTERIA UR QL AUTO: (no result) HPF
BASOPHILS # BLD AUTO: 0 10X3/UL (ref 0–0.2)
BASOPHILS NFR BLD AUTO: 0.3 % (ref 0–2)
BILIRUB SERPL-MCNC: 0.5 MG/DL (ref 0.2–1.2)
BUN SERPL-MCNC: 26 MG/DL (ref 9.8–20.1)
CALCIUM SERPL-MCNC: 8.3 MG/DL (ref 7.8–10.44)
CAUTI INDICATIONS FOR CULTURE: (no result)
CHLORIDE SERPL-SCNC: 101 MMOL/L (ref 98–107)
CO2 SERPL-SCNC: 26 MMOL/L (ref 22–29)
CREAT CL PREDICTED SERPL C-G-VRATE: 0 ML/MIN (ref 70–130)
EOSINOPHIL # BLD AUTO: 0.2 10X3/UL (ref 0–0.5)
EOSINOPHIL NFR BLD AUTO: 1.9 % (ref 0–6)
GLOBULIN SER CALC-MCNC: 2.8 G/DL (ref 2.4–3.5)
GLUCOSE SERPL-MCNC: 255 MG/DL (ref 70–105)
GLUCOSE UR STRIP-MCNC: >=1000 MG/DL
HCT VFR BLD CALC: 28.1 % (ref 34.9–44.5)
HGB BLD-MCNC: 9.3 G/DL (ref 12–15.5)
LIPASE SERPL-CCNC: 38 U/L (ref 8–78)
LYMPHOCYTES NFR BLD AUTO: 13.1 % (ref 18–47)
MCH RBC QN AUTO: 34.6 PG (ref 27–33)
MCV RBC AUTO: 104.5 FL (ref 81.6–98.3)
MONOCYTES # BLD AUTO: 0.7 10X3/UL (ref 0–1.1)
MONOCYTES NFR BLD AUTO: 6.6 % (ref 0–10)
NEUTROPHILS # BLD AUTO: 7.9 10X3/UL (ref 1.5–8.4)
NEUTROPHILS NFR BLD AUTO: 76.7 % (ref 40–75)
PLATELET # BLD AUTO: 63 10X3/UL (ref 150–450)
POTASSIUM SERPL-SCNC: 4.6 MMOL/L (ref 3.5–5.1)
RBC # BLD AUTO: 2.69 10X6/UL (ref 3.9–5.03)
RBC UR QL AUTO: (no result) HPF (ref 0–3)
SODIUM SERPL-SCNC: 137 MMOL/L (ref 136–145)
SP GR UR STRIP: 1.01 (ref 1–1.03)
TROPONIN I SERPL DL<=0.01 NG/ML-MCNC: (no result) NG/ML (ref ?–0.03)
WBC # BLD AUTO: 10.2 10X3/UL (ref 3.5–10.5)
WBC UR QL AUTO: (no result) HPF (ref 0–3)

## 2023-09-18 PROCEDURE — 85025 COMPLETE CBC W/AUTO DIFF WBC: CPT

## 2023-09-18 PROCEDURE — 71045 X-RAY EXAM CHEST 1 VIEW: CPT

## 2023-09-18 PROCEDURE — 81001 URINALYSIS AUTO W/SCOPE: CPT

## 2023-09-18 PROCEDURE — 83690 ASSAY OF LIPASE: CPT

## 2023-09-18 PROCEDURE — 84484 ASSAY OF TROPONIN QUANT: CPT

## 2023-09-18 PROCEDURE — 83605 ASSAY OF LACTIC ACID: CPT

## 2023-09-18 PROCEDURE — 80053 COMPREHEN METABOLIC PANEL: CPT

## 2023-09-18 PROCEDURE — 93005 ELECTROCARDIOGRAM TRACING: CPT

## 2023-09-18 PROCEDURE — 70450 CT HEAD/BRAIN W/O DYE: CPT

## 2023-11-21 ENCOUNTER — HOSPITAL ENCOUNTER (EMERGENCY)
Dept: HOSPITAL 18 - NAV ERS | Age: 58
Discharge: HOME | End: 2023-11-21
Payer: COMMERCIAL

## 2023-11-21 DIAGNOSIS — Z95.5: ICD-10-CM

## 2023-11-21 DIAGNOSIS — I25.2: ICD-10-CM

## 2023-11-21 DIAGNOSIS — J02.9: Primary | ICD-10-CM

## 2023-11-21 DIAGNOSIS — F17.210: ICD-10-CM

## 2023-11-21 DIAGNOSIS — E11.40: ICD-10-CM

## 2023-11-21 DIAGNOSIS — Z79.899: ICD-10-CM

## 2023-11-21 DIAGNOSIS — I10: ICD-10-CM

## 2023-11-21 DIAGNOSIS — E78.5: ICD-10-CM

## 2023-11-21 DIAGNOSIS — Z79.4: ICD-10-CM

## 2023-11-21 DIAGNOSIS — I25.10: ICD-10-CM

## 2023-11-21 PROCEDURE — 87430 STREP A AG IA: CPT

## 2023-11-21 PROCEDURE — 99283 EMERGENCY DEPT VISIT LOW MDM: CPT

## 2023-11-21 PROCEDURE — 87081 CULTURE SCREEN ONLY: CPT

## 2023-11-28 ENCOUNTER — HOSPITAL ENCOUNTER (EMERGENCY)
Dept: HOSPITAL 18 - NAV ERS | Age: 58
Discharge: HOME | End: 2023-11-28
Payer: COMMERCIAL

## 2023-11-28 DIAGNOSIS — F17.210: ICD-10-CM

## 2023-11-28 DIAGNOSIS — I10: ICD-10-CM

## 2023-11-28 DIAGNOSIS — R51.9: Primary | ICD-10-CM

## 2023-11-28 DIAGNOSIS — Z79.899: ICD-10-CM

## 2023-11-28 DIAGNOSIS — Z79.4: ICD-10-CM

## 2023-11-28 DIAGNOSIS — K21.9: ICD-10-CM

## 2023-11-28 DIAGNOSIS — E11.9: ICD-10-CM

## 2023-11-28 DIAGNOSIS — E78.5: ICD-10-CM

## 2023-11-28 LAB
ALBUMIN SERPL BCG-MCNC: 3.9 G/DL (ref 3.5–5)
ALP SERPL-CCNC: 123 U/L (ref 40–110)
ALT SERPL W P-5'-P-CCNC: 27 U/L (ref 8–55)
ANION GAP SERPL CALC-SCNC: 16 MMOL/L (ref 10–20)
APTT PPP: 25.5 SEC (ref 22.9–36.1)
AST SERPL-CCNC: 19 U/L (ref 5–34)
BASOPHILS # BLD AUTO: 0.1 THOU/UL (ref 0–0.2)
BASOPHILS NFR BLD AUTO: 0.6 % (ref 0–1)
BILIRUB SERPL-MCNC: 0.5 MG/DL (ref 0.2–1.2)
BUN SERPL-MCNC: 17 MG/DL (ref 9.8–20.1)
CALCIUM SERPL-MCNC: 9.3 MG/DL (ref 7.8–10.44)
CHLORIDE SERPL-SCNC: 96 MMOL/L (ref 98–107)
CO2 SERPL-SCNC: 27 MMOL/L (ref 22–29)
CREAT CL PREDICTED SERPL C-G-VRATE: 0 ML/MIN (ref 70–130)
EOSINOPHIL # BLD AUTO: 0 THOU/UL (ref 0–0.7)
EOSINOPHIL NFR BLD AUTO: 0.1 % (ref 0–10)
GLOBULIN SER CALC-MCNC: 3.2 G/DL (ref 2.4–3.5)
GLUCOSE SERPL-MCNC: 204 MG/DL (ref 70–105)
HCT VFR BLD CALC: 47.9 % (ref 36–47)
HGB BLD-MCNC: 16.4 G/DL (ref 12–16)
INR PPP: 0.9
LYMPHOCYTES # BLD AUTO: 1.1 THOU/UL (ref 1.2–3.4)
LYMPHOCYTES NFR BLD AUTO: 12.2 % (ref 21–51)
MCH RBC QN AUTO: 34.5 PG (ref 27–31)
MCV RBC AUTO: 101 FL (ref 78–98)
MONOCYTES # BLD AUTO: 0.6 THOU/UL (ref 0.11–0.59)
MONOCYTES NFR BLD AUTO: 6.8 % (ref 0–10)
NEUTROPHILS # BLD AUTO: 7.4 THOU/UL (ref 1.4–6.5)
NEUTROPHILS NFR BLD AUTO: 80.3 % (ref 42–75)
PLATELET # BLD AUTO: 60 10X3/UL (ref 130–400)
POTASSIUM SERPL-SCNC: 4.4 MMOL/L (ref 3.5–5.1)
PROTHROMBIN TIME: 12.5 SEC (ref 12–14.7)
RBC # BLD AUTO: 4.74 MILL/UL (ref 4.2–5.4)
SODIUM SERPL-SCNC: 135 MMOL/L (ref 136–145)
WBC # BLD AUTO: 9.2 10X3/UL (ref 4.8–10.8)

## 2023-11-28 PROCEDURE — 85730 THROMBOPLASTIN TIME PARTIAL: CPT

## 2023-11-28 PROCEDURE — 70498 CT ANGIOGRAPHY NECK: CPT

## 2023-11-28 PROCEDURE — 85610 PROTHROMBIN TIME: CPT

## 2023-11-28 PROCEDURE — 85025 COMPLETE CBC W/AUTO DIFF WBC: CPT

## 2023-11-28 PROCEDURE — 96374 THER/PROPH/DIAG INJ IV PUSH: CPT

## 2023-11-28 PROCEDURE — 70496 CT ANGIOGRAPHY HEAD: CPT

## 2023-11-28 PROCEDURE — 70450 CT HEAD/BRAIN W/O DYE: CPT

## 2023-11-28 PROCEDURE — 80053 COMPREHEN METABOLIC PANEL: CPT

## 2023-11-28 PROCEDURE — 96375 TX/PRO/DX INJ NEW DRUG ADDON: CPT

## 2023-12-04 ENCOUNTER — HOSPITAL ENCOUNTER (EMERGENCY)
Dept: HOSPITAL 18 - NAV ERS | Age: 58
Discharge: HOME | End: 2023-12-04
Payer: COMMERCIAL

## 2023-12-04 DIAGNOSIS — K12.1: Primary | ICD-10-CM

## 2023-12-04 DIAGNOSIS — I25.10: ICD-10-CM

## 2023-12-04 DIAGNOSIS — K21.9: ICD-10-CM

## 2023-12-04 DIAGNOSIS — I10: ICD-10-CM

## 2023-12-04 DIAGNOSIS — E78.5: ICD-10-CM

## 2023-12-04 DIAGNOSIS — F17.210: ICD-10-CM

## 2023-12-04 DIAGNOSIS — E11.40: ICD-10-CM

## 2023-12-04 DIAGNOSIS — Z79.4: ICD-10-CM

## 2023-12-04 PROCEDURE — 99282 EMERGENCY DEPT VISIT SF MDM: CPT

## 2023-12-13 ENCOUNTER — HOSPITAL ENCOUNTER (EMERGENCY)
Dept: HOSPITAL 18 - NAV ERS | Age: 58
Discharge: HOME | End: 2023-12-13
Payer: COMMERCIAL

## 2023-12-13 DIAGNOSIS — Z79.899: ICD-10-CM

## 2023-12-13 DIAGNOSIS — Z79.84: ICD-10-CM

## 2023-12-13 DIAGNOSIS — I10: ICD-10-CM

## 2023-12-13 DIAGNOSIS — S00.03XA: Primary | ICD-10-CM

## 2023-12-13 DIAGNOSIS — F17.210: ICD-10-CM

## 2023-12-13 DIAGNOSIS — Z79.4: ICD-10-CM

## 2023-12-13 DIAGNOSIS — I25.10: ICD-10-CM
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
## 2023-12-13 DIAGNOSIS — E11.40: ICD-10-CM

## 2023-12-13 DIAGNOSIS — K21.9: ICD-10-CM

## 2023-12-13 DIAGNOSIS — E78.5: ICD-10-CM

## 2023-12-13 PROCEDURE — 70450 CT HEAD/BRAIN W/O DYE: CPT

## 2023-12-13 PROCEDURE — 96372 THER/PROPH/DIAG INJ SC/IM: CPT

## 2023-12-19 ENCOUNTER — HOSPITAL ENCOUNTER (INPATIENT)
Dept: HOSPITAL 92 - ERS | Age: 58
LOS: 4 days | Discharge: HOME | DRG: 189 | End: 2023-12-23
Attending: STUDENT IN AN ORGANIZED HEALTH CARE EDUCATION/TRAINING PROGRAM | Admitting: STUDENT IN AN ORGANIZED HEALTH CARE EDUCATION/TRAINING PROGRAM
Payer: COMMERCIAL

## 2023-12-19 VITALS — BODY MASS INDEX: 38.3 KG/M2

## 2023-12-19 DIAGNOSIS — N17.9: ICD-10-CM

## 2023-12-19 DIAGNOSIS — C79.31: ICD-10-CM

## 2023-12-19 DIAGNOSIS — C79.51: ICD-10-CM

## 2023-12-19 DIAGNOSIS — Z11.52: ICD-10-CM

## 2023-12-19 DIAGNOSIS — C34.90: ICD-10-CM

## 2023-12-19 DIAGNOSIS — K04.7: ICD-10-CM

## 2023-12-19 DIAGNOSIS — Z88.5: ICD-10-CM

## 2023-12-19 DIAGNOSIS — Z79.899: ICD-10-CM

## 2023-12-19 DIAGNOSIS — J96.01: Primary | ICD-10-CM

## 2023-12-19 DIAGNOSIS — Z80.1: ICD-10-CM

## 2023-12-19 DIAGNOSIS — K21.9: ICD-10-CM

## 2023-12-19 DIAGNOSIS — Z88.2: ICD-10-CM

## 2023-12-19 DIAGNOSIS — F17.210: ICD-10-CM

## 2023-12-19 DIAGNOSIS — D69.6: ICD-10-CM

## 2023-12-19 DIAGNOSIS — Z71.6: ICD-10-CM

## 2023-12-19 DIAGNOSIS — E11.40: ICD-10-CM

## 2023-12-19 DIAGNOSIS — I12.9: ICD-10-CM

## 2023-12-19 DIAGNOSIS — Z98.890: ICD-10-CM

## 2023-12-19 DIAGNOSIS — N18.9: ICD-10-CM

## 2023-12-19 DIAGNOSIS — J44.1: ICD-10-CM

## 2023-12-19 DIAGNOSIS — Z88.8: ICD-10-CM

## 2023-12-19 DIAGNOSIS — Z90.710: ICD-10-CM

## 2023-12-19 DIAGNOSIS — E11.22: ICD-10-CM

## 2023-12-19 LAB
ALBUMIN SERPL BCG-MCNC: 3.6 G/DL (ref 3.5–5)
ALP SERPL-CCNC: 111 U/L (ref 40–110)
ALT SERPL W P-5'-P-CCNC: 24 U/L (ref 8–55)
ANION GAP SERPL CALC-SCNC: 14 MMOL/L (ref 10–20)
APTT PPP: 27.1 SEC (ref 22.9–36.1)
AST SERPL-CCNC: 20 U/L (ref 5–34)
BASOPHILS # BLD AUTO: 0 THOU/UL (ref 0–0.2)
BASOPHILS NFR BLD AUTO: 0.3 % (ref 0–1)
BILIRUB SERPL-MCNC: 0.5 MG/DL (ref 0.2–1.2)
BUN SERPL-MCNC: 12 MG/DL (ref 9.8–20.1)
CALCIUM SERPL-MCNC: 8.3 MG/DL (ref 7.8–10.44)
CHLORIDE SERPL-SCNC: 104 MMOL/L (ref 98–107)
CO2 SERPL-SCNC: 25 MMOL/L (ref 22–29)
CREAT CL PREDICTED SERPL C-G-VRATE: 0 ML/MIN (ref 70–130)
EOSINOPHIL # BLD AUTO: 0 THOU/UL (ref 0–0.7)
EOSINOPHIL NFR BLD AUTO: 0.6 % (ref 0–10)
GLOBULIN SER CALC-MCNC: 2.4 G/DL (ref 2.4–3.5)
GLUCOSE SERPL-MCNC: 173 MG/DL (ref 70–105)
HCT VFR BLD CALC: 42.3 % (ref 36–47)
HGB BLD-MCNC: 13.8 G/DL (ref 12–16)
INR PPP: 1
LYMPHOCYTES NFR BLD AUTO: 11.4 % (ref 21–51)
MCH RBC QN AUTO: 33.4 PG (ref 27–31)
MCV RBC AUTO: 102.4 FL (ref 78–98)
MONOCYTES # BLD AUTO: 0.5 THOU/UL (ref 0.11–0.59)
MONOCYTES NFR BLD AUTO: 7.1 % (ref 0–10)
NEUTROPHILS # BLD AUTO: 5.1 THOU/UL (ref 1.4–6.5)
NEUTROPHILS NFR BLD AUTO: 80.1 % (ref 42–75)
PLATELET # BLD AUTO: 54 10X3/UL (ref 130–400)
POTASSIUM SERPL-SCNC: 3.7 MMOL/L (ref 3.5–5.1)
PROTHROMBIN TIME: 13.3 SEC (ref 12–14.7)
RBC # BLD AUTO: 4.13 MILL/UL (ref 4.2–5.4)
SODIUM SERPL-SCNC: 139 MMOL/L (ref 136–145)
TROPONIN I SERPL DL<=0.01 NG/ML-MCNC: 0.01 NG/ML (ref ?–0.03)
WBC # BLD AUTO: 6.3 10X3/UL (ref 4.8–10.8)

## 2023-12-19 PROCEDURE — 94644 CONT INHLJ TX 1ST HOUR: CPT

## 2023-12-19 PROCEDURE — 36416 COLLJ CAPILLARY BLOOD SPEC: CPT

## 2023-12-19 PROCEDURE — 83735 ASSAY OF MAGNESIUM: CPT

## 2023-12-19 PROCEDURE — 83880 ASSAY OF NATRIURETIC PEPTIDE: CPT

## 2023-12-19 PROCEDURE — 93005 ELECTROCARDIOGRAM TRACING: CPT

## 2023-12-19 PROCEDURE — 36415 COLL VENOUS BLD VENIPUNCTURE: CPT

## 2023-12-19 PROCEDURE — 84484 ASSAY OF TROPONIN QUANT: CPT

## 2023-12-19 PROCEDURE — G0378 HOSPITAL OBSERVATION PER HR: HCPCS

## 2023-12-19 PROCEDURE — 83605 ASSAY OF LACTIC ACID: CPT

## 2023-12-19 PROCEDURE — 87040 BLOOD CULTURE FOR BACTERIA: CPT

## 2023-12-19 PROCEDURE — 85610 PROTHROMBIN TIME: CPT

## 2023-12-19 PROCEDURE — 85730 THROMBOPLASTIN TIME PARTIAL: CPT

## 2023-12-19 PROCEDURE — 96375 TX/PRO/DX INJ NEW DRUG ADDON: CPT

## 2023-12-19 PROCEDURE — 96367 TX/PROPH/DG ADDL SEQ IV INF: CPT

## 2023-12-19 PROCEDURE — 94640 AIRWAY INHALATION TREATMENT: CPT

## 2023-12-19 PROCEDURE — 84145 PROCALCITONIN (PCT): CPT

## 2023-12-19 PROCEDURE — 96365 THER/PROPH/DIAG IV INF INIT: CPT

## 2023-12-19 PROCEDURE — 71045 X-RAY EXAM CHEST 1 VIEW: CPT

## 2023-12-19 PROCEDURE — 80053 COMPREHEN METABOLIC PANEL: CPT

## 2023-12-19 PROCEDURE — 94760 N-INVAS EAR/PLS OXIMETRY 1: CPT

## 2023-12-19 PROCEDURE — 85025 COMPLETE CBC W/AUTO DIFF WBC: CPT

## 2023-12-19 RX ADMIN — RANOLAZINE SCH MG: 500 TABLET, EXTENDED RELEASE ORAL at 21:44

## 2023-12-20 LAB
ALBUMIN SERPL BCG-MCNC: 3.9 G/DL (ref 3.5–5)
ALP SERPL-CCNC: 117 U/L (ref 40–110)
ALT SERPL W P-5'-P-CCNC: 25 U/L (ref 8–55)
ANION GAP SERPL CALC-SCNC: 17 MMOL/L (ref 10–20)
ANION GAP SERPL CALC-SCNC: 20 MMOL/L (ref 10–20)
AST SERPL-CCNC: 20 U/L (ref 5–34)
BASOPHILS # BLD AUTO: 0 THOU/UL (ref 0–0.2)
BASOPHILS NFR BLD AUTO: 0.1 % (ref 0–1)
BILIRUB SERPL-MCNC: 0.4 MG/DL (ref 0.2–1.2)
BUN SERPL-MCNC: 18 MG/DL (ref 9.8–20.1)
BUN SERPL-MCNC: 26 MG/DL (ref 9.8–20.1)
CALCIUM SERPL-MCNC: 8.7 MG/DL (ref 7.8–10.44)
CALCIUM SERPL-MCNC: 9 MG/DL (ref 7.8–10.44)
CHLORIDE SERPL-SCNC: 101 MMOL/L (ref 98–107)
CHLORIDE SERPL-SCNC: 99 MMOL/L (ref 98–107)
CO2 SERPL-SCNC: 23 MMOL/L (ref 22–29)
CO2 SERPL-SCNC: 25 MMOL/L (ref 22–29)
CREAT CL PREDICTED SERPL C-G-VRATE: 60 ML/MIN (ref 70–130)
CREAT CL PREDICTED SERPL C-G-VRATE: 68 ML/MIN (ref 70–130)
EOSINOPHIL # BLD AUTO: 0 THOU/UL (ref 0–0.7)
EOSINOPHIL NFR BLD AUTO: 0 % (ref 0–10)
GLOBULIN SER CALC-MCNC: 3.7 G/DL (ref 2.4–3.5)
GLUCOSE SERPL-MCNC: 152 MG/DL (ref 70–105)
GLUCOSE SERPL-MCNC: 159 MG/DL (ref 70–105)
HCT VFR BLD CALC: 47.5 % (ref 36–47)
HGB BLD-MCNC: 14.9 G/DL (ref 12–16)
LYMPHOCYTES NFR BLD AUTO: 4 % (ref 21–51)
MCH RBC QN AUTO: 33 PG (ref 27–31)
MCV RBC AUTO: 105.1 FL (ref 78–98)
MONOCYTES # BLD AUTO: 0.8 THOU/UL (ref 0.11–0.59)
MONOCYTES NFR BLD AUTO: 5 % (ref 0–10)
NEUTROPHILS # BLD AUTO: 14.4 THOU/UL (ref 1.4–6.5)
NEUTROPHILS NFR BLD AUTO: 90.3 % (ref 42–75)
PLATELET # BLD AUTO: 71 10X3/UL (ref 130–400)
POTASSIUM SERPL-SCNC: 3.7 MMOL/L (ref 3.5–5.1)
POTASSIUM SERPL-SCNC: 4.4 MMOL/L (ref 3.5–5.1)
RBC # BLD AUTO: 4.52 MILL/UL (ref 4.2–5.4)
SODIUM SERPL-SCNC: 137 MMOL/L (ref 136–145)
SODIUM SERPL-SCNC: 140 MMOL/L (ref 136–145)
WBC # BLD AUTO: 15.9 10X3/UL (ref 4.8–10.8)

## 2023-12-20 RX ADMIN — Medication SCH UNITS: at 08:44

## 2023-12-20 RX ADMIN — RANOLAZINE SCH MG: 500 TABLET, EXTENDED RELEASE ORAL at 08:48

## 2023-12-20 RX ADMIN — MOMETASONE FUROATE SCH PUFF: 100 AEROSOL RESPIRATORY (INHALATION) at 07:33

## 2023-12-20 RX ADMIN — RANOLAZINE SCH MG: 500 TABLET, EXTENDED RELEASE ORAL at 22:18

## 2023-12-20 RX ADMIN — MOMETASONE FUROATE SCH PUFF: 100 AEROSOL RESPIRATORY (INHALATION) at 19:26

## 2023-12-20 RX ADMIN — INSULIN GLARGINE SCH UNITS: 100 INJECTION, SOLUTION SUBCUTANEOUS at 08:51

## 2023-12-20 RX ADMIN — ISOSORBIDE MONONITRATE SCH MG: 60 TABLET, EXTENDED RELEASE ORAL at 08:48

## 2023-12-21 LAB
ALBUMIN SERPL BCG-MCNC: 3.4 G/DL (ref 3.5–5)
ALP SERPL-CCNC: 90 U/L (ref 40–110)
ALT SERPL W P-5'-P-CCNC: 17 U/L (ref 8–55)
ANION GAP SERPL CALC-SCNC: 14 MMOL/L (ref 10–20)
AST SERPL-CCNC: 14 U/L (ref 5–34)
BASOPHILS # BLD AUTO: 0 THOU/UL (ref 0–0.2)
BASOPHILS NFR BLD AUTO: 0.1 % (ref 0–1)
BILIRUB SERPL-MCNC: 0.5 MG/DL (ref 0.2–1.2)
BUN SERPL-MCNC: 27 MG/DL (ref 9.8–20.1)
CALCIUM SERPL-MCNC: 8.8 MG/DL (ref 7.8–10.44)
CHLORIDE SERPL-SCNC: 99 MMOL/L (ref 98–107)
CO2 SERPL-SCNC: 28 MMOL/L (ref 22–29)
CREAT CL PREDICTED SERPL C-G-VRATE: 64 ML/MIN (ref 70–130)
EOSINOPHIL # BLD AUTO: 0 THOU/UL (ref 0–0.7)
EOSINOPHIL NFR BLD AUTO: 0 % (ref 0–10)
GLOBULIN SER CALC-MCNC: 3 G/DL (ref 2.4–3.5)
GLUCOSE SERPL-MCNC: 105 MG/DL (ref 70–105)
HCT VFR BLD CALC: 39.9 % (ref 36–47)
HGB BLD-MCNC: 12.8 G/DL (ref 12–16)
LYMPHOCYTES NFR BLD AUTO: 15.1 % (ref 21–51)
MCH RBC QN AUTO: 33.5 PG (ref 27–31)
MCV RBC AUTO: 104.5 FL (ref 78–98)
MONOCYTES # BLD AUTO: 0.9 THOU/UL (ref 0.11–0.59)
MONOCYTES NFR BLD AUTO: 7.1 % (ref 0–10)
NEUTROPHILS # BLD AUTO: 9.2 THOU/UL (ref 1.4–6.5)
NEUTROPHILS NFR BLD AUTO: 77.1 % (ref 42–75)
PLATELET # BLD AUTO: 58 10X3/UL (ref 130–400)
POTASSIUM SERPL-SCNC: 3.8 MMOL/L (ref 3.5–5.1)
RBC # BLD AUTO: 3.82 MILL/UL (ref 4.2–5.4)
SODIUM SERPL-SCNC: 137 MMOL/L (ref 136–145)
WBC # BLD AUTO: 11.9 10X3/UL (ref 4.8–10.8)

## 2023-12-21 RX ADMIN — MOMETASONE FUROATE SCH PUFF: 100 AEROSOL RESPIRATORY (INHALATION) at 07:51

## 2023-12-21 RX ADMIN — RANOLAZINE SCH MG: 500 TABLET, EXTENDED RELEASE ORAL at 22:01

## 2023-12-21 RX ADMIN — RANOLAZINE SCH MG: 500 TABLET, EXTENDED RELEASE ORAL at 08:32

## 2023-12-21 RX ADMIN — Medication SCH UNITS: at 08:33

## 2023-12-21 RX ADMIN — MOMETASONE FUROATE SCH PUFF: 100 AEROSOL RESPIRATORY (INHALATION) at 19:19

## 2023-12-21 RX ADMIN — INSULIN GLARGINE SCH UNITS: 100 INJECTION, SOLUTION SUBCUTANEOUS at 08:33

## 2023-12-21 RX ADMIN — ISOSORBIDE MONONITRATE SCH MG: 60 TABLET, EXTENDED RELEASE ORAL at 08:30

## 2023-12-22 LAB
ALBUMIN SERPL BCG-MCNC: 3.7 G/DL (ref 3.5–5)
ALP SERPL-CCNC: 92 U/L (ref 40–110)
ALT SERPL W P-5'-P-CCNC: 27 U/L (ref 8–55)
ANION GAP SERPL CALC-SCNC: 12 MMOL/L (ref 10–20)
AST SERPL-CCNC: 23 U/L (ref 5–34)
BASOPHILS # BLD AUTO: 0 THOU/UL (ref 0–0.2)
BASOPHILS NFR BLD AUTO: 0.4 % (ref 0–1)
BILIRUB SERPL-MCNC: 0.5 MG/DL (ref 0.2–1.2)
BUN SERPL-MCNC: 28 MG/DL (ref 9.8–20.1)
CALCIUM SERPL-MCNC: 8.6 MG/DL (ref 7.8–10.44)
CHLORIDE SERPL-SCNC: 100 MMOL/L (ref 98–107)
CO2 SERPL-SCNC: 30 MMOL/L (ref 22–29)
CREAT CL PREDICTED SERPL C-G-VRATE: 75 ML/MIN (ref 70–130)
EOSINOPHIL # BLD AUTO: 0 THOU/UL (ref 0–0.7)
EOSINOPHIL NFR BLD AUTO: 0.1 % (ref 0–10)
GLOBULIN SER CALC-MCNC: 3.1 G/DL (ref 2.4–3.5)
GLUCOSE SERPL-MCNC: 108 MG/DL (ref 70–105)
HCT VFR BLD CALC: 45.2 % (ref 36–47)
HGB BLD-MCNC: 14.4 G/DL (ref 12–16)
LYMPHOCYTES NFR BLD AUTO: 19.1 % (ref 21–51)
MAGNESIUM SERPL-MCNC: 2.3 MG/DL (ref 1.6–2.6)
MCH RBC QN AUTO: 32.8 PG (ref 27–31)
MCV RBC AUTO: 103 FL (ref 78–98)
MONOCYTES # BLD AUTO: 1 THOU/UL (ref 0.11–0.59)
MONOCYTES NFR BLD AUTO: 8.4 % (ref 0–10)
NEUTROPHILS # BLD AUTO: 8.1 THOU/UL (ref 1.4–6.5)
NEUTROPHILS NFR BLD AUTO: 70.8 % (ref 42–75)
PLATELET # BLD AUTO: 58 10X3/UL (ref 130–400)
POTASSIUM SERPL-SCNC: 3.3 MMOL/L (ref 3.5–5.1)
RBC # BLD AUTO: 4.39 MILL/UL (ref 4.2–5.4)
SODIUM SERPL-SCNC: 139 MMOL/L (ref 136–145)
WBC # BLD AUTO: 11.4 10X3/UL (ref 4.8–10.8)

## 2023-12-22 RX ADMIN — INSULIN LISPRO PRN UNIT: 100 INJECTION, SOLUTION INTRAVENOUS; SUBCUTANEOUS at 18:48

## 2023-12-22 RX ADMIN — RANOLAZINE SCH MG: 500 TABLET, EXTENDED RELEASE ORAL at 21:10

## 2023-12-22 RX ADMIN — MOMETASONE FUROATE SCH PUFF: 100 AEROSOL RESPIRATORY (INHALATION) at 08:01

## 2023-12-22 RX ADMIN — INSULIN GLARGINE SCH UNITS: 100 INJECTION, SOLUTION SUBCUTANEOUS at 09:31

## 2023-12-22 RX ADMIN — RANOLAZINE SCH MG: 500 TABLET, EXTENDED RELEASE ORAL at 09:27

## 2023-12-22 RX ADMIN — MOMETASONE FUROATE SCH PUFF: 100 AEROSOL RESPIRATORY (INHALATION) at 19:29

## 2023-12-22 RX ADMIN — ISOSORBIDE MONONITRATE SCH MG: 60 TABLET, EXTENDED RELEASE ORAL at 09:29

## 2023-12-22 RX ADMIN — Medication SCH UNITS: at 09:31

## 2023-12-23 VITALS — TEMPERATURE: 97.7 F | DIASTOLIC BLOOD PRESSURE: 82 MMHG | SYSTOLIC BLOOD PRESSURE: 129 MMHG

## 2023-12-23 LAB
ALBUMIN SERPL BCG-MCNC: 3.5 G/DL (ref 3.5–5)
ALP SERPL-CCNC: 93 U/L (ref 40–110)
ALT SERPL W P-5'-P-CCNC: 28 U/L (ref 8–55)
ANION GAP SERPL CALC-SCNC: 14 MMOL/L (ref 10–20)
ANION GAP SERPL CALC-SCNC: 17 MMOL/L (ref 10–20)
AST SERPL-CCNC: 18 U/L (ref 5–34)
BASOPHILS # BLD AUTO: 0 THOU/UL (ref 0–0.2)
BASOPHILS NFR BLD AUTO: 0.3 % (ref 0–1)
BILIRUB SERPL-MCNC: 0.4 MG/DL (ref 0.2–1.2)
BUN SERPL-MCNC: 27 MG/DL (ref 9.8–20.1)
BUN SERPL-MCNC: 28 MG/DL (ref 9.8–20.1)
CALCIUM SERPL-MCNC: 8.5 MG/DL (ref 7.8–10.44)
CALCIUM SERPL-MCNC: 8.6 MG/DL (ref 7.8–10.44)
CHLORIDE SERPL-SCNC: 95 MMOL/L (ref 98–107)
CHLORIDE SERPL-SCNC: 99 MMOL/L (ref 98–107)
CO2 SERPL-SCNC: 28 MMOL/L (ref 22–29)
CO2 SERPL-SCNC: 31 MMOL/L (ref 22–29)
CREAT CL PREDICTED SERPL C-G-VRATE: 65 ML/MIN (ref 70–130)
CREAT CL PREDICTED SERPL C-G-VRATE: 72 ML/MIN (ref 70–130)
EOSINOPHIL # BLD AUTO: 0 THOU/UL (ref 0–0.7)
EOSINOPHIL NFR BLD AUTO: 0.3 % (ref 0–10)
GLOBULIN SER CALC-MCNC: 3.1 G/DL (ref 2.4–3.5)
GLUCOSE SERPL-MCNC: 168 MG/DL (ref 70–105)
GLUCOSE SERPL-MCNC: 286 MG/DL (ref 70–105)
HCT VFR BLD CALC: 44.1 % (ref 36–47)
HGB BLD-MCNC: 13.9 G/DL (ref 12–16)
LYMPHOCYTES NFR BLD AUTO: 23.4 % (ref 21–51)
MCH RBC QN AUTO: 33 PG (ref 27–31)
MCV RBC AUTO: 104.8 FL (ref 78–98)
MONOCYTES # BLD AUTO: 0.7 THOU/UL (ref 0.11–0.59)
MONOCYTES NFR BLD AUTO: 6.6 % (ref 0–10)
NEUTROPHILS # BLD AUTO: 7.4 THOU/UL (ref 1.4–6.5)
NEUTROPHILS NFR BLD AUTO: 68.1 % (ref 42–75)
PLATELET # BLD AUTO: 60 10X3/UL (ref 130–400)
POTASSIUM SERPL-SCNC: 3.3 MMOL/L (ref 3.5–5.1)
POTASSIUM SERPL-SCNC: 3.9 MMOL/L (ref 3.5–5.1)
RBC # BLD AUTO: 4.21 MILL/UL (ref 4.2–5.4)
SODIUM SERPL-SCNC: 136 MMOL/L (ref 136–145)
SODIUM SERPL-SCNC: 141 MMOL/L (ref 136–145)
WBC # BLD AUTO: 10.8 10X3/UL (ref 4.8–10.8)

## 2023-12-23 RX ADMIN — INSULIN LISPRO PRN UNIT: 100 INJECTION, SOLUTION INTRAVENOUS; SUBCUTANEOUS at 06:12

## 2023-12-23 RX ADMIN — Medication SCH UNITS: at 08:10

## 2023-12-23 RX ADMIN — RANOLAZINE SCH MG: 500 TABLET, EXTENDED RELEASE ORAL at 08:10

## 2023-12-23 RX ADMIN — MOMETASONE FUROATE SCH PUFF: 100 AEROSOL RESPIRATORY (INHALATION) at 07:15

## 2023-12-23 RX ADMIN — ISOSORBIDE MONONITRATE SCH MG: 60 TABLET, EXTENDED RELEASE ORAL at 08:12

## 2023-12-23 RX ADMIN — INSULIN GLARGINE SCH UNITS: 100 INJECTION, SOLUTION SUBCUTANEOUS at 08:16

## 2024-02-19 ENCOUNTER — HOSPITAL ENCOUNTER (INPATIENT)
Dept: HOSPITAL 92 - CSHERS | Age: 59
LOS: 4 days | Discharge: HOME | DRG: 189 | End: 2024-02-23
Attending: INTERNAL MEDICINE | Admitting: FAMILY MEDICINE
Payer: COMMERCIAL

## 2024-02-19 VITALS — BODY MASS INDEX: 42.2 KG/M2

## 2024-02-19 DIAGNOSIS — F17.210: ICD-10-CM

## 2024-02-19 DIAGNOSIS — Z79.4: ICD-10-CM

## 2024-02-19 DIAGNOSIS — Z90.710: ICD-10-CM

## 2024-02-19 DIAGNOSIS — Z88.5: ICD-10-CM

## 2024-02-19 DIAGNOSIS — Z91.048: ICD-10-CM

## 2024-02-19 DIAGNOSIS — Z88.1: ICD-10-CM

## 2024-02-19 DIAGNOSIS — C34.90: ICD-10-CM

## 2024-02-19 DIAGNOSIS — C79.51: ICD-10-CM

## 2024-02-19 DIAGNOSIS — Z90.49: ICD-10-CM

## 2024-02-19 DIAGNOSIS — I25.2: ICD-10-CM

## 2024-02-19 DIAGNOSIS — I11.0: ICD-10-CM

## 2024-02-19 DIAGNOSIS — Z88.2: ICD-10-CM

## 2024-02-19 DIAGNOSIS — Z11.52: ICD-10-CM

## 2024-02-19 DIAGNOSIS — C79.31: ICD-10-CM

## 2024-02-19 DIAGNOSIS — G93.41: ICD-10-CM

## 2024-02-19 DIAGNOSIS — Z79.899: ICD-10-CM

## 2024-02-19 DIAGNOSIS — J96.01: Primary | ICD-10-CM

## 2024-02-19 DIAGNOSIS — E11.40: ICD-10-CM

## 2024-02-19 DIAGNOSIS — J44.1: ICD-10-CM

## 2024-02-19 DIAGNOSIS — K21.9: ICD-10-CM

## 2024-02-19 DIAGNOSIS — F32.A: ICD-10-CM

## 2024-02-19 DIAGNOSIS — I50.9: ICD-10-CM

## 2024-02-19 DIAGNOSIS — I25.10: ICD-10-CM

## 2024-02-19 LAB
ALBUMIN SERPL BCG-MCNC: 3.9 G/DL (ref 3.5–5)
ALP SERPL-CCNC: 102 U/L (ref 40–110)
ALT SERPL W P-5'-P-CCNC: 14 U/L (ref 8–55)
ANALYZER IN CARDIO: (no result)
ANION GAP SERPL CALC-SCNC: 15 MMOL/L (ref 10–20)
ANISOCYTOSIS BLD QL SMEAR: (no result) (100X)
AST SERPL-CCNC: 14 U/L (ref 5–34)
BASE EXCESS STD BLDA CALC-SCNC: 3.4 MEQ/L
BASOPHILS # BLD AUTO: 0 10X3/UL (ref 0–0.2)
BASOPHILS NFR BLD AUTO: 0.1 % (ref 0–2)
BILIRUB SERPL-MCNC: 0.9 MG/DL (ref 0.2–1.2)
BUN SERPL-MCNC: 17 MG/DL (ref 9.8–20.1)
CA-I BLDA-SCNC: 1.12 MMOL/L (ref 1.12–1.3)
CALCIUM SERPL-MCNC: 8.6 MG/DL (ref 7.8–10.44)
CHLORIDE SERPL-SCNC: 101 MMOL/L (ref 98–107)
CO2 SERPL-SCNC: 26 MMOL/L (ref 22–29)
CREAT CL PREDICTED SERPL C-G-VRATE: 0 ML/MIN (ref 70–130)
DRAW TIME:: (no result)
EOSINOPHIL # BLD AUTO: 0 10X3/UL (ref 0–0.5)
EOSINOPHIL NFR BLD AUTO: 0.1 % (ref 0–6)
GLOBULIN SER CALC-MCNC: 2.4 G/DL (ref 2.4–3.5)
GLUCOSE SERPL-MCNC: 162 MG/DL (ref 70–105)
HCO3 BLDA-SCNC: 29.2 MEQ/L (ref 22–28)
HCT VFR BLD CALC: 45.6 % (ref 34.9–44.5)
HCT VFR BLDA CALC: 45 % (ref 36–47)
HGB BLD-MCNC: 14.8 G/DL (ref 12–15.5)
HGB BLDA-MCNC: 15.4 G/DL (ref 12–16)
LYMPHOCYTES NFR BLD AUTO: 6.3 % (ref 18–47)
MCH RBC QN AUTO: 31.8 PG (ref 27–33)
MCV RBC AUTO: 97.9 FL (ref 81.6–98.3)
MONOCYTES # BLD AUTO: 0.8 10X3/UL (ref 0–1.1)
MONOCYTES NFR BLD AUTO: 5.5 % (ref 0–10)
NEUTROPHILS # BLD AUTO: 13 10X3/UL (ref 1.5–8.4)
NEUTROPHILS NFR BLD AUTO: 87.7 % (ref 40–75)
O2 A-A PPRESDIFF RESPIRATORY: 135.13 MMHG (ref 0–20)
OVALOCYTES BLD QL SMEAR: (no result) (100X)
PCO2 BLDA: 48.2 MMHG (ref 35–45)
PH BLDA: 7.4 [PH] (ref 7.35–7.45)
PLATELET # BLD AUTO: 64 10X3/UL (ref 150–450)
PLATELET BLD QL SMEAR: (no result)
PO2 BLDA: 61.3 MMHG (ref 80–?)
POIKILOCYTOSIS BLD QL SMEAR: (no result) (100X)
POLYCHROMASIA BLD QL SMEAR: (no result) (100X)
POTASSIUM BLD-SCNC: 3.47 MMOL/L (ref 3.7–5.3)
POTASSIUM SERPL-SCNC: 3.7 MMOL/L (ref 3.5–5.1)
RBC # BLD AUTO: 4.66 10X6/UL (ref 3.9–5.03)
SODIUM SERPL-SCNC: 138 MMOL/L (ref 136–145)
SPECIMEN DRAWN FROM PATIENT: (no result)
TROPONIN I SERPL DL<=0.01 NG/ML-MCNC: 0.02 NG/ML (ref ?–0.03)
WBC # BLD AUTO: 14.8 10X3/UL (ref 3.5–10.5)

## 2024-02-19 PROCEDURE — 84484 ASSAY OF TROPONIN QUANT: CPT

## 2024-02-19 PROCEDURE — 4A033R1 MEASUREMENT OF ARTERIAL SATURATION, PERIPHERAL, PERCUTANEOUS APPROACH: ICD-10-PCS | Performed by: INTERNAL MEDICINE

## 2024-02-19 PROCEDURE — 94640 AIRWAY INHALATION TREATMENT: CPT

## 2024-02-19 PROCEDURE — 96374 THER/PROPH/DIAG INJ IV PUSH: CPT

## 2024-02-19 PROCEDURE — 83880 ASSAY OF NATRIURETIC PEPTIDE: CPT

## 2024-02-19 PROCEDURE — 36416 COLLJ CAPILLARY BLOOD SPEC: CPT

## 2024-02-19 PROCEDURE — 36415 COLL VENOUS BLD VENIPUNCTURE: CPT

## 2024-02-19 PROCEDURE — 82805 BLOOD GASES W/O2 SATURATION: CPT

## 2024-02-19 PROCEDURE — 80306 DRUG TEST PRSMV INSTRMNT: CPT

## 2024-02-19 PROCEDURE — 71275 CT ANGIOGRAPHY CHEST: CPT

## 2024-02-19 PROCEDURE — 80053 COMPREHEN METABOLIC PANEL: CPT

## 2024-02-19 PROCEDURE — 80048 BASIC METABOLIC PNL TOTAL CA: CPT

## 2024-02-19 PROCEDURE — 94760 N-INVAS EAR/PLS OXIMETRY 1: CPT

## 2024-02-19 PROCEDURE — 83735 ASSAY OF MAGNESIUM: CPT

## 2024-02-19 PROCEDURE — 94762 N-INVAS EAR/PLS OXIMTRY CONT: CPT

## 2024-02-19 PROCEDURE — 36600 WITHDRAWAL OF ARTERIAL BLOOD: CPT

## 2024-02-19 PROCEDURE — 85025 COMPLETE CBC W/AUTO DIFF WBC: CPT

## 2024-02-20 LAB
ANION GAP SERPL CALC-SCNC: 14 MMOL/L (ref 10–20)
BASOPHILS # BLD AUTO: 0 10X3/UL (ref 0–0.2)
BASOPHILS NFR BLD AUTO: 0.2 % (ref 0–2)
BUN SERPL-MCNC: 20 MG/DL (ref 9.8–20.1)
CALCIUM SERPL-MCNC: 9 MG/DL (ref 7.8–10.44)
CHLORIDE SERPL-SCNC: 101 MMOL/L (ref 98–107)
CO2 SERPL-SCNC: 26 MMOL/L (ref 22–29)
CREAT CL PREDICTED SERPL C-G-VRATE: 87 ML/MIN (ref 70–130)
DRUG SCREEN CUTOFF: (no result)
EOSINOPHIL # BLD AUTO: 0.3 10X3/UL (ref 0–0.5)
EOSINOPHIL NFR BLD AUTO: 2.2 % (ref 0–6)
GLUCOSE SERPL-MCNC: 320 MG/DL (ref 70–105)
HCT VFR BLD CALC: 42 % (ref 34.9–44.5)
HGB BLD-MCNC: 14.1 G/DL (ref 12–15.5)
LYMPHOCYTES NFR BLD AUTO: 2.9 % (ref 18–47)
MAGNESIUM SERPL-MCNC: 2 MG/DL (ref 1.6–2.6)
MCH RBC QN AUTO: 32.3 PG (ref 27–33)
MCV RBC AUTO: 96.3 FL (ref 81.6–98.3)
MONOCYTES # BLD AUTO: 0.1 10X3/UL (ref 0–1.1)
MONOCYTES NFR BLD AUTO: 1.1 % (ref 0–10)
NEUTROPHILS # BLD AUTO: 11.7 10X3/UL (ref 1.5–8.4)
NEUTROPHILS NFR BLD AUTO: 93.2 % (ref 40–75)
PLATELET # BLD AUTO: 61 10X3/UL (ref 150–450)
POTASSIUM SERPL-SCNC: 4 MMOL/L (ref 3.5–5.1)
RBC # BLD AUTO: 4.36 10X6/UL (ref 3.9–5.03)
SODIUM SERPL-SCNC: 137 MMOL/L (ref 136–145)
WBC # BLD AUTO: 12.6 10X3/UL (ref 3.5–10.5)

## 2024-02-20 RX ADMIN — INSULIN LISPRO PRN UNIT: 100 INJECTION, SOLUTION INTRAVENOUS; SUBCUTANEOUS at 08:07

## 2024-02-20 RX ADMIN — LEVETIRACETAM SCH MG: 100 SOLUTION ORAL at 08:07

## 2024-02-20 RX ADMIN — POTASSIUM CHLORIDE AND SODIUM CHLORIDE SCH MLS: 900; 150 INJECTION, SOLUTION INTRAVENOUS at 01:53

## 2024-02-20 RX ADMIN — CEFTRIAXONE SCH MLS: 1 INJECTION, POWDER, FOR SOLUTION INTRAMUSCULAR; INTRAVENOUS at 11:57

## 2024-02-20 RX ADMIN — INSULIN GLARGINE SCH UNIT: 100 INJECTION, SOLUTION SUBCUTANEOUS at 08:06

## 2024-02-20 RX ADMIN — ENOXAPARIN SODIUM SCH: 100 INJECTION SUBCUTANEOUS at 19:45

## 2024-02-20 RX ADMIN — ISOSORBIDE MONONITRATE SCH MG: 60 TABLET, EXTENDED RELEASE ORAL at 08:07

## 2024-02-21 LAB
ANALYZER IN CARDIO: (no result)
BASE EXCESS STD BLDV CALC-SCNC: 5 MEQ/L
CA-I BLDV-MCNC: 1.16 MMOL/L (ref 1.16–1.32)
CHLORIDE BLDV-SCNC: 108 MMOL/L (ref 98–106)
DRAW TIME:: (no result)
HCO3 BLDV-SCNC: 31.1 MEQ/L (ref 22–28)
HCT VFR BLDV CALC: 39 % (ref 36–47)
HGB BLDV-MCNC: 13.1 G/DL (ref 11.7–16)
POTASSIUM BLDV-SCNC: 4.21 MMOL/L (ref 3.7–5.3)
SODIUM BLDV-SCNC: 142 MMOL/L (ref 133–146)
SPECIMEN DRAWN FROM PATIENT: (no result)

## 2024-02-22 LAB
ANION GAP SERPL CALC-SCNC: 14 MMOL/L (ref 10–20)
BASOPHILS # BLD AUTO: 0 10X3/UL (ref 0–0.2)
BASOPHILS NFR BLD AUTO: 0.1 % (ref 0–2)
BUN SERPL-MCNC: 25 MG/DL (ref 9.8–20.1)
CALCIUM SERPL-MCNC: 9.2 MG/DL (ref 7.8–10.44)
CHLORIDE SERPL-SCNC: 106 MMOL/L (ref 98–107)
CO2 SERPL-SCNC: 26 MMOL/L (ref 22–29)
CREAT CL PREDICTED SERPL C-G-VRATE: 117 ML/MIN (ref 70–130)
EOSINOPHIL # BLD AUTO: 0 10X3/UL (ref 0–0.5)
EOSINOPHIL NFR BLD AUTO: 0 % (ref 0–6)
GLUCOSE SERPL-MCNC: 212 MG/DL (ref 70–105)
HCT VFR BLD CALC: 40.4 % (ref 34.9–44.5)
HGB BLD-MCNC: 13.2 G/DL (ref 12–15.5)
LYMPHOCYTES NFR BLD AUTO: 9.4 % (ref 18–47)
MCH RBC QN AUTO: 31.5 PG (ref 27–33)
MCV RBC AUTO: 96.4 FL (ref 81.6–98.3)
MONOCYTES # BLD AUTO: 0.4 10X3/UL (ref 0–1.1)
MONOCYTES NFR BLD AUTO: 4 % (ref 0–10)
NEUTROPHILS # BLD AUTO: 7.6 10X3/UL (ref 1.5–8.4)
NEUTROPHILS NFR BLD AUTO: 85.9 % (ref 40–75)
PLATELET # BLD AUTO: 68 10X3/UL (ref 150–450)
POTASSIUM SERPL-SCNC: 4.2 MMOL/L (ref 3.5–5.1)
RBC # BLD AUTO: 4.19 10X6/UL (ref 3.9–5.03)
SODIUM SERPL-SCNC: 142 MMOL/L (ref 136–145)
WBC # BLD AUTO: 8.8 10X3/UL (ref 3.5–10.5)

## 2024-02-22 RX ADMIN — FUROSEMIDE SCH MG: 10 INJECTION, SOLUTION INTRAVENOUS at 12:30

## 2024-02-22 RX ADMIN — DOCUSATE SODIUM 50 MG AND SENNOSIDES 8.6 MG PRN TAB: 8.6; 5 TABLET, FILM COATED ORAL at 21:13

## 2024-02-23 VITALS — TEMPERATURE: 96.7 F | SYSTOLIC BLOOD PRESSURE: 167 MMHG | DIASTOLIC BLOOD PRESSURE: 80 MMHG

## 2024-02-23 LAB
ANION GAP SERPL CALC-SCNC: 17 MMOL/L (ref 10–20)
BASOPHILS # BLD AUTO: 0 10X3/UL (ref 0–0.2)
BASOPHILS NFR BLD AUTO: 0.1 % (ref 0–2)
BUN SERPL-MCNC: 25 MG/DL (ref 9.8–20.1)
CALCIUM SERPL-MCNC: 9 MG/DL (ref 7.8–10.44)
CHLORIDE SERPL-SCNC: 103 MMOL/L (ref 98–107)
CO2 SERPL-SCNC: 23 MMOL/L (ref 22–29)
CREAT CL PREDICTED SERPL C-G-VRATE: 101 ML/MIN (ref 70–130)
EOSINOPHIL # BLD AUTO: 0 10X3/UL (ref 0–0.5)
EOSINOPHIL NFR BLD AUTO: 0 % (ref 0–6)
GLUCOSE SERPL-MCNC: 271 MG/DL (ref 70–105)
HCT VFR BLD CALC: 41.4 % (ref 34.9–44.5)
HGB BLD-MCNC: 13.5 G/DL (ref 12–15.5)
LYMPHOCYTES NFR BLD AUTO: 11.8 % (ref 18–47)
MCH RBC QN AUTO: 31.1 PG (ref 27–33)
MCV RBC AUTO: 95.4 FL (ref 81.6–98.3)
MONOCYTES # BLD AUTO: 0.6 10X3/UL (ref 0–1.1)
MONOCYTES NFR BLD AUTO: 8.3 % (ref 0–10)
NEUTROPHILS # BLD AUTO: 5.6 10X3/UL (ref 1.5–8.4)
NEUTROPHILS NFR BLD AUTO: 79 % (ref 40–75)
PLATELET # BLD AUTO: 72 10X3/UL (ref 150–450)
PLATELET BLD QL SMEAR: (no result)
POTASSIUM SERPL-SCNC: 3.9 MMOL/L (ref 3.5–5.1)
RBC # BLD AUTO: 4.34 10X6/UL (ref 3.9–5.03)
SODIUM SERPL-SCNC: 139 MMOL/L (ref 136–145)
WBC # BLD AUTO: 7.1 10X3/UL (ref 3.5–10.5)

## 2024-02-23 RX ADMIN — FUROSEMIDE SCH MG: 10 INJECTION, SOLUTION INTRAVENOUS at 09:29

## 2024-02-23 RX ADMIN — ONDANSETRON PRN MG: 2 INJECTION INTRAMUSCULAR; INTRAVENOUS at 13:18

## 2024-02-23 RX ADMIN — LEVETIRACETAM SCH MG: 100 SOLUTION ORAL at 09:22

## 2024-02-23 RX ADMIN — ISOSORBIDE MONONITRATE SCH MG: 60 TABLET, EXTENDED RELEASE ORAL at 09:21

## 2024-02-25 ENCOUNTER — HOSPITAL ENCOUNTER (INPATIENT)
Dept: HOSPITAL 92 - CSHERS | Age: 59
LOS: 3 days | Discharge: HOME | DRG: 193 | End: 2024-02-28
Attending: INTERNAL MEDICINE | Admitting: INTERNAL MEDICINE
Payer: COMMERCIAL

## 2024-02-25 VITALS — BODY MASS INDEX: 35.3 KG/M2

## 2024-02-25 DIAGNOSIS — Z90.49: ICD-10-CM

## 2024-02-25 DIAGNOSIS — D69.6: ICD-10-CM

## 2024-02-25 DIAGNOSIS — I50.32: ICD-10-CM

## 2024-02-25 DIAGNOSIS — G47.33: ICD-10-CM

## 2024-02-25 DIAGNOSIS — I25.10: ICD-10-CM

## 2024-02-25 DIAGNOSIS — G93.1: ICD-10-CM

## 2024-02-25 DIAGNOSIS — T38.0X5A: ICD-10-CM

## 2024-02-25 DIAGNOSIS — F31.9: ICD-10-CM

## 2024-02-25 DIAGNOSIS — Z79.899: ICD-10-CM

## 2024-02-25 DIAGNOSIS — E11.9: ICD-10-CM

## 2024-02-25 DIAGNOSIS — C79.31: ICD-10-CM

## 2024-02-25 DIAGNOSIS — J10.00: Primary | ICD-10-CM

## 2024-02-25 DIAGNOSIS — Z66: ICD-10-CM

## 2024-02-25 DIAGNOSIS — J44.1: ICD-10-CM

## 2024-02-25 DIAGNOSIS — C34.90: ICD-10-CM

## 2024-02-25 DIAGNOSIS — I11.0: ICD-10-CM

## 2024-02-25 DIAGNOSIS — K59.03: ICD-10-CM

## 2024-02-25 DIAGNOSIS — J96.01: ICD-10-CM

## 2024-02-25 DIAGNOSIS — Z79.4: ICD-10-CM

## 2024-02-25 DIAGNOSIS — T40.605A: ICD-10-CM

## 2024-02-25 DIAGNOSIS — Z90.710: ICD-10-CM

## 2024-02-25 DIAGNOSIS — F17.200: ICD-10-CM

## 2024-02-25 DIAGNOSIS — C79.51: ICD-10-CM

## 2024-02-25 DIAGNOSIS — E78.5: ICD-10-CM

## 2024-02-25 LAB
ALBUMIN SERPL BCG-MCNC: 3.8 G/DL (ref 3.5–5)
ALP SERPL-CCNC: 73 U/L (ref 40–110)
ALT SERPL W P-5'-P-CCNC: 19 U/L (ref 8–55)
ANALYZER IN CARDIO: (no result)
ANION GAP SERPL CALC-SCNC: 17 MMOL/L (ref 10–20)
AST SERPL-CCNC: 17 U/L (ref 5–34)
BASE EXCESS STD BLDA CALC-SCNC: 0.5 MEQ/L
BASOPHILS # BLD AUTO: 0 10X3/UL (ref 0–0.2)
BASOPHILS NFR BLD AUTO: 0.3 % (ref 0–2)
BILIRUB SERPL-MCNC: 0.7 MG/DL (ref 0.2–1.2)
BUN SERPL-MCNC: 21 MG/DL (ref 9.8–20.1)
CA-I BLDA-SCNC: 1.08 MMOL/L (ref 1.12–1.3)
CALCIUM SERPL-MCNC: 8.5 MG/DL (ref 7.8–10.44)
CHLORIDE SERPL-SCNC: 101 MMOL/L (ref 98–107)
CO2 SERPL-SCNC: 25 MMOL/L (ref 22–29)
CREAT CL PREDICTED SERPL C-G-VRATE: 0 ML/MIN (ref 70–130)
DRAW TIME:: (no result)
EOSINOPHIL # BLD AUTO: 0 10X3/UL (ref 0–0.5)
EOSINOPHIL NFR BLD AUTO: 0.1 % (ref 0–6)
FLUAV RNA UPPER RESP QL NAA+PROBE: DETECTED
GLOBULIN SER CALC-MCNC: 2.9 G/DL (ref 2.4–3.5)
GLUCOSE SERPL-MCNC: 166 MG/DL (ref 70–105)
HCO3 BLDA-SCNC: 24.6 MEQ/L (ref 22–28)
HCT VFR BLD CALC: 47.5 % (ref 34.9–44.5)
HCT VFR BLDA CALC: 46 % (ref 36–47)
HGB BLD-MCNC: 15.6 G/DL (ref 12–15.5)
HGB BLDA-MCNC: 15.7 G/DL (ref 12–16)
LYMPHOCYTES NFR BLD AUTO: 6.5 % (ref 18–47)
MCH RBC QN AUTO: 31.2 PG (ref 27–33)
MCV RBC AUTO: 95 FL (ref 81.6–98.3)
MONOCYTES # BLD AUTO: 0.7 10X3/UL (ref 0–1.1)
MONOCYTES NFR BLD AUTO: 7.6 % (ref 0–10)
NEUTROPHILS # BLD AUTO: 7.5 10X3/UL (ref 1.5–8.4)
NEUTROPHILS NFR BLD AUTO: 84 % (ref 40–75)
O2 A-A PPRESDIFF RESPIRATORY: 124.2 MMHG (ref 0–20)
PCO2 BLDA: 38.2 MMHG (ref 35–45)
PH BLDA: 7.43 [PH] (ref 7.35–7.45)
PLATELET # BLD AUTO: 57 10X3/UL (ref 150–450)
PLATELET BLD QL SMEAR: (no result)
PO2 BLDA: 77.6 MMHG (ref 80–?)
POTASSIUM BLD-SCNC: 3.53 MMOL/L (ref 3.7–5.3)
POTASSIUM SERPL-SCNC: 3.7 MMOL/L (ref 3.5–5.1)
RAPIDCOMM COLLECT BY: (no result)
RBC # BLD AUTO: 5 10X6/UL (ref 3.9–5.03)
SODIUM SERPL-SCNC: 139 MMOL/L (ref 136–145)
SPECIMEN DRAWN FROM PATIENT: (no result)
WBC # BLD AUTO: 8.9 10X3/UL (ref 3.5–10.5)

## 2024-02-25 PROCEDURE — 96374 THER/PROPH/DIAG INJ IV PUSH: CPT

## 2024-02-25 PROCEDURE — 93005 ELECTROCARDIOGRAM TRACING: CPT

## 2024-02-25 PROCEDURE — 83605 ASSAY OF LACTIC ACID: CPT

## 2024-02-25 PROCEDURE — 94640 AIRWAY INHALATION TREATMENT: CPT

## 2024-02-25 PROCEDURE — 80048 BASIC METABOLIC PNL TOTAL CA: CPT

## 2024-02-25 PROCEDURE — 80306 DRUG TEST PRSMV INSTRMNT: CPT

## 2024-02-25 PROCEDURE — 87040 BLOOD CULTURE FOR BACTERIA: CPT

## 2024-02-25 PROCEDURE — 84145 PROCALCITONIN (PCT): CPT

## 2024-02-25 PROCEDURE — 82805 BLOOD GASES W/O2 SATURATION: CPT

## 2024-02-25 PROCEDURE — 36600 WITHDRAWAL OF ARTERIAL BLOOD: CPT

## 2024-02-25 PROCEDURE — 71045 X-RAY EXAM CHEST 1 VIEW: CPT

## 2024-02-25 PROCEDURE — 80053 COMPREHEN METABOLIC PANEL: CPT

## 2024-02-25 PROCEDURE — 83880 ASSAY OF NATRIURETIC PEPTIDE: CPT

## 2024-02-25 PROCEDURE — 71260 CT THORAX DX C+: CPT

## 2024-02-25 PROCEDURE — 36416 COLLJ CAPILLARY BLOOD SPEC: CPT

## 2024-02-25 PROCEDURE — 94760 N-INVAS EAR/PLS OXIMETRY 1: CPT

## 2024-02-25 PROCEDURE — 93010 ELECTROCARDIOGRAM REPORT: CPT

## 2024-02-25 PROCEDURE — 94644 CONT INHLJ TX 1ST HOUR: CPT

## 2024-02-25 PROCEDURE — 87899 AGENT NOS ASSAY W/OPTIC: CPT

## 2024-02-25 PROCEDURE — 85025 COMPLETE CBC W/AUTO DIFF WBC: CPT

## 2024-02-25 PROCEDURE — 94660 CPAP INITIATION&MGMT: CPT

## 2024-02-25 PROCEDURE — 94762 N-INVAS EAR/PLS OXIMTRY CONT: CPT

## 2024-02-25 PROCEDURE — 36415 COLL VENOUS BLD VENIPUNCTURE: CPT

## 2024-02-25 PROCEDURE — 87449 NOS EACH ORGANISM AG IA: CPT

## 2024-02-26 LAB
ANION GAP SERPL CALC-SCNC: 17 MMOL/L (ref 10–20)
BUN SERPL-MCNC: 26 MG/DL (ref 9.8–20.1)
CALCIUM SERPL-MCNC: 8.3 MG/DL (ref 7.8–10.44)
CHLORIDE SERPL-SCNC: 101 MMOL/L (ref 98–107)
CO2 SERPL-SCNC: 22 MMOL/L (ref 22–29)
CREAT CL PREDICTED SERPL C-G-VRATE: 79 ML/MIN (ref 70–130)
DRUG SCREEN CUTOFF: (no result)
GLUCOSE SERPL-MCNC: 244 MG/DL (ref 70–105)
POTASSIUM SERPL-SCNC: 3.6 MMOL/L (ref 3.5–5.1)
S PNEUM AG UR QL: NEGATIVE
SODIUM SERPL-SCNC: 136 MMOL/L (ref 136–145)

## 2024-02-26 PROCEDURE — 5A09357 ASSISTANCE WITH RESPIRATORY VENTILATION, LESS THAN 24 CONSECUTIVE HOURS, CONTINUOUS POSITIVE AIRWAY PRESSURE: ICD-10-PCS | Performed by: INTERNAL MEDICINE

## 2024-02-26 PROCEDURE — 5A0945A ASSISTANCE WITH RESPIRATORY VENTILATION, 24-96 CONSECUTIVE HOURS, HIGH NASAL FLOW/VELOCITY: ICD-10-PCS | Performed by: INTERNAL MEDICINE

## 2024-02-26 RX ADMIN — Medication SCH UNITS: at 09:02

## 2024-02-26 RX ADMIN — ISOSORBIDE MONONITRATE SCH MG: 60 TABLET, EXTENDED RELEASE ORAL at 10:34

## 2024-02-26 RX ADMIN — INSULIN LISPRO PRN UNIT: 100 INJECTION, SOLUTION INTRAVENOUS; SUBCUTANEOUS at 01:38

## 2024-02-26 RX ADMIN — BUTALBITAL, ASPIRIN, AND CAFFEINE PRN TAB: 50; 325; 40 CAPSULE ORAL at 13:01

## 2024-02-26 RX ADMIN — INSULIN GLARGINE SCH UNIT: 100 INJECTION, SOLUTION SUBCUTANEOUS at 09:04

## 2024-02-27 VITALS — TEMPERATURE: 98.4 F

## 2024-02-27 VITALS — DIASTOLIC BLOOD PRESSURE: 61 MMHG | SYSTOLIC BLOOD PRESSURE: 103 MMHG

## 2024-02-27 LAB
ANION GAP SERPL CALC-SCNC: 16 MMOL/L (ref 10–20)
BASOPHILS # BLD AUTO: 0 10X3/UL (ref 0–0.2)
BASOPHILS NFR BLD AUTO: 0.1 % (ref 0–2)
BUN SERPL-MCNC: 32 MG/DL (ref 9.8–20.1)
CALCIUM SERPL-MCNC: 8.7 MG/DL (ref 7.8–10.44)
CHLORIDE SERPL-SCNC: 102 MMOL/L (ref 98–107)
CO2 SERPL-SCNC: 24 MMOL/L (ref 22–29)
CREAT CL PREDICTED SERPL C-G-VRATE: 62 ML/MIN (ref 70–130)
EOSINOPHIL # BLD AUTO: 0 10X3/UL (ref 0–0.5)
EOSINOPHIL NFR BLD AUTO: 0 % (ref 0–6)
GLUCOSE SERPL-MCNC: 197 MG/DL (ref 70–105)
HCT VFR BLD CALC: 41 % (ref 34.9–44.5)
HGB BLD-MCNC: 13.4 G/DL (ref 12–15.5)
LYMPHOCYTES NFR BLD AUTO: 6.6 % (ref 18–47)
MCH RBC QN AUTO: 31.1 PG (ref 27–33)
MCV RBC AUTO: 95.1 FL (ref 81.6–98.3)
MONOCYTES # BLD AUTO: 0.4 10X3/UL (ref 0–1.1)
MONOCYTES NFR BLD AUTO: 4.7 % (ref 0–10)
NEUTROPHILS # BLD AUTO: 7.6 10X3/UL (ref 1.5–8.4)
NEUTROPHILS NFR BLD AUTO: 87 % (ref 40–75)
PLATELET # BLD AUTO: 58 10X3/UL (ref 150–450)
POTASSIUM SERPL-SCNC: 3.7 MMOL/L (ref 3.5–5.1)
RBC # BLD AUTO: 4.31 10X6/UL (ref 3.9–5.03)
SODIUM SERPL-SCNC: 138 MMOL/L (ref 136–145)
WBC # BLD AUTO: 8.8 10X3/UL (ref 3.5–10.5)

## 2024-02-27 RX ADMIN — DOCUSATE SODIUM 50 MG AND SENNOSIDES 8.6 MG PRN TAB: 8.6; 5 TABLET, FILM COATED ORAL at 05:51

## 2024-02-27 RX ADMIN — ALUMINUM ZIRCONIUM TRICHLOROHYDREX GLY SCH EACH: 0.2 STICK TOPICAL at 21:04

## 2024-02-27 RX ADMIN — LACTULOSE SCH GM: 20 SOLUTION ORAL at 11:56

## 2024-02-27 RX ADMIN — DOCUSATE SODIUM 50 MG AND SENNOSIDES 8.6 MG SCH TAB: 8.6; 5 TABLET, FILM COATED ORAL at 21:34

## 2024-02-28 LAB
ANION GAP SERPL CALC-SCNC: 15 MMOL/L (ref 10–20)
BUN SERPL-MCNC: 28 MG/DL (ref 9.8–20.1)
CALCIUM SERPL-MCNC: 8.6 MG/DL (ref 7.8–10.44)
CHLORIDE SERPL-SCNC: 101 MMOL/L (ref 98–107)
CO2 SERPL-SCNC: 26 MMOL/L (ref 22–29)
CREAT CL PREDICTED SERPL C-G-VRATE: 82 ML/MIN (ref 70–130)
GLUCOSE SERPL-MCNC: 184 MG/DL (ref 70–105)
POTASSIUM SERPL-SCNC: 3.5 MMOL/L (ref 3.5–5.1)
SODIUM SERPL-SCNC: 138 MMOL/L (ref 136–145)

## 2024-02-28 RX ADMIN — LACTULOSE SCH GM: 20 SOLUTION ORAL at 08:13

## 2024-03-26 ENCOUNTER — HOSPITAL ENCOUNTER (INPATIENT)
Dept: HOSPITAL 92 - CSHERS | Age: 59
LOS: 2 days | Discharge: HOME | DRG: 193 | End: 2024-03-28
Attending: STUDENT IN AN ORGANIZED HEALTH CARE EDUCATION/TRAINING PROGRAM | Admitting: FAMILY MEDICINE
Payer: COMMERCIAL

## 2024-03-26 VITALS — BODY MASS INDEX: 34.2 KG/M2

## 2024-03-26 DIAGNOSIS — Z88.1: ICD-10-CM

## 2024-03-26 DIAGNOSIS — I11.0: ICD-10-CM

## 2024-03-26 DIAGNOSIS — E11.40: ICD-10-CM

## 2024-03-26 DIAGNOSIS — J18.9: Primary | ICD-10-CM

## 2024-03-26 DIAGNOSIS — C34.90: ICD-10-CM

## 2024-03-26 DIAGNOSIS — G40.909: ICD-10-CM

## 2024-03-26 DIAGNOSIS — Z79.899: ICD-10-CM

## 2024-03-26 DIAGNOSIS — J96.21: ICD-10-CM

## 2024-03-26 DIAGNOSIS — Z98.890: ICD-10-CM

## 2024-03-26 DIAGNOSIS — I50.33: ICD-10-CM

## 2024-03-26 DIAGNOSIS — F17.210: ICD-10-CM

## 2024-03-26 DIAGNOSIS — E78.5: ICD-10-CM

## 2024-03-26 DIAGNOSIS — Z88.8: ICD-10-CM

## 2024-03-26 DIAGNOSIS — Z88.5: ICD-10-CM

## 2024-03-26 DIAGNOSIS — D69.6: ICD-10-CM

## 2024-03-26 DIAGNOSIS — E11.9: ICD-10-CM

## 2024-03-26 DIAGNOSIS — Z90.49: ICD-10-CM

## 2024-03-26 DIAGNOSIS — F39: ICD-10-CM

## 2024-03-26 DIAGNOSIS — C79.31: ICD-10-CM

## 2024-03-26 DIAGNOSIS — Z66: ICD-10-CM

## 2024-03-26 DIAGNOSIS — C79.51: ICD-10-CM

## 2024-03-26 DIAGNOSIS — F31.9: ICD-10-CM

## 2024-03-26 DIAGNOSIS — I25.10: ICD-10-CM

## 2024-03-26 DIAGNOSIS — R51.9: ICD-10-CM

## 2024-03-26 DIAGNOSIS — G47.33: ICD-10-CM

## 2024-03-26 DIAGNOSIS — C78.7: ICD-10-CM

## 2024-03-26 DIAGNOSIS — Z79.4: ICD-10-CM

## 2024-03-26 DIAGNOSIS — G89.29: ICD-10-CM

## 2024-03-26 DIAGNOSIS — Z90.710: ICD-10-CM

## 2024-03-26 DIAGNOSIS — J44.1: ICD-10-CM

## 2024-03-26 LAB
ALBUMIN SERPL BCG-MCNC: 3.1 G/DL (ref 3.4–4.8)
ALP SERPL-CCNC: 95 U/L (ref 40–110)
ALT SERPL W P-5'-P-CCNC: 15 U/L (ref 8–55)
ANION GAP SERPL CALC-SCNC: 14 MMOL/L (ref 10–20)
ANISOCYTOSIS BLD QL SMEAR: (no result) (100X)
AST SERPL-CCNC: 15 U/L (ref 5–34)
BASOPHILS # BLD AUTO: 0.03 10X3/UL (ref 0–0.2)
BASOPHILS NFR BLD AUTO: 0.4 % (ref 0–2)
BILIRUB SERPL-MCNC: 0.8 MG/DL (ref 0.2–1.2)
BUN SERPL-MCNC: 15 MG/DL (ref 9.8–20.1)
CALCIUM SERPL-MCNC: 9 MG/DL (ref 7.8–10.44)
CAUTI INDICATIONS FOR CULTURE: (no result)
CHLORIDE SERPL-SCNC: 98 MMOL/L (ref 98–107)
CO2 SERPL-SCNC: 30 MMOL/L (ref 23–31)
CREAT CL PREDICTED SERPL C-G-VRATE: 0 ML/MIN (ref 70–130)
EOSINOPHIL # BLD AUTO: 0.06 10X3/UL (ref 0–0.5)
EOSINOPHIL NFR BLD AUTO: 0.7 % (ref 0–6)
GIANT PLATELETS BLD QL SMEAR: SLIGHT HPF (ref 0–5)
GLOBULIN SER CALC-MCNC: 3.5 G/DL (ref 2.4–3.5)
GLUCOSE SERPL-MCNC: 207 MG/DL (ref 80–115)
GLUCOSE UR STRIP-MCNC: >=1000 MG/DL
HCT VFR BLD CALC: 43.9 % (ref 34.9–44.5)
HGB BLD-MCNC: 14.6 G/DL (ref 12–15.5)
LIPASE SERPL-CCNC: 26 U/L (ref 8–78)
LYMPHOCYTES NFR BLD AUTO: 17.3 % (ref 18–47)
MAGNESIUM SERPL-MCNC: 2 MG/DL (ref 1.6–2.6)
MCH RBC QN AUTO: 33.2 PG (ref 27–33)
MCV RBC AUTO: 99.8 FL (ref 81.6–98.3)
MICROCYTES BLD QL SMEAR: (no result) (100X)
MONOCYTES # BLD AUTO: 0.66 10X3/UL (ref 0–1.1)
MONOCYTES NFR BLD AUTO: 8.2 % (ref 0–10)
NEUTROPHILS # BLD AUTO: 5.86 10X3/UL (ref 1.5–8.4)
NEUTROPHILS NFR BLD AUTO: 72.9 % (ref 40–75)
PLATELET # BLD AUTO: 44 10X3/UL (ref 150–450)
PLATELET BLD QL SMEAR: (no result)
POIKILOCYTOSIS BLD QL SMEAR: (no result) (100X)
POLYCHROMASIA BLD QL SMEAR: (no result) (100X)
POTASSIUM SERPL-SCNC: 2.7 MMOL/L (ref 3.5–5.1)
PROT UR STRIP.AUTO-MCNC: 15 MG/DL
RBC # BLD AUTO: 4.4 10X6/UL (ref 3.9–5.03)
SODIUM SERPL-SCNC: 139 MMOL/L (ref 136–145)
SP GR UR STRIP: 1.01 (ref 1–1.03)
TROPONIN I SERPL DL<=0.01 NG/ML-MCNC: 0.01 NG/ML (ref ?–0.03)
TROPONIN I SERPL DL<=0.01 NG/ML-MCNC: 0.01 NG/ML (ref ?–0.03)
WBC # BLD AUTO: 8 10X3/UL (ref 3.5–10.5)

## 2024-03-26 PROCEDURE — 96367 TX/PROPH/DG ADDL SEQ IV INF: CPT

## 2024-03-26 PROCEDURE — 36415 COLL VENOUS BLD VENIPUNCTURE: CPT

## 2024-03-26 PROCEDURE — 83690 ASSAY OF LIPASE: CPT

## 2024-03-26 PROCEDURE — 83880 ASSAY OF NATRIURETIC PEPTIDE: CPT

## 2024-03-26 PROCEDURE — 96366 THER/PROPH/DIAG IV INF ADDON: CPT

## 2024-03-26 PROCEDURE — A9579 GAD-BASE MR CONTRAST NOS,1ML: HCPCS

## 2024-03-26 PROCEDURE — 81001 URINALYSIS AUTO W/SCOPE: CPT

## 2024-03-26 PROCEDURE — 94762 N-INVAS EAR/PLS OXIMTRY CONT: CPT

## 2024-03-26 PROCEDURE — 84484 ASSAY OF TROPONIN QUANT: CPT

## 2024-03-26 PROCEDURE — 93005 ELECTROCARDIOGRAM TRACING: CPT

## 2024-03-26 PROCEDURE — 96375 TX/PRO/DX INJ NEW DRUG ADDON: CPT

## 2024-03-26 PROCEDURE — 70553 MRI BRAIN STEM W/O & W/DYE: CPT

## 2024-03-26 PROCEDURE — 87086 URINE CULTURE/COLONY COUNT: CPT

## 2024-03-26 PROCEDURE — 96365 THER/PROPH/DIAG IV INF INIT: CPT

## 2024-03-26 PROCEDURE — 80048 BASIC METABOLIC PNL TOTAL CA: CPT

## 2024-03-26 PROCEDURE — 87040 BLOOD CULTURE FOR BACTERIA: CPT

## 2024-03-26 PROCEDURE — 94664 DEMO&/EVAL PT USE INHALER: CPT

## 2024-03-26 PROCEDURE — 36416 COLLJ CAPILLARY BLOOD SPEC: CPT

## 2024-03-26 PROCEDURE — 94640 AIRWAY INHALATION TREATMENT: CPT

## 2024-03-26 PROCEDURE — 94760 N-INVAS EAR/PLS OXIMETRY 1: CPT

## 2024-03-26 PROCEDURE — 85025 COMPLETE CBC W/AUTO DIFF WBC: CPT

## 2024-03-26 PROCEDURE — 83735 ASSAY OF MAGNESIUM: CPT

## 2024-03-26 PROCEDURE — 71045 X-RAY EXAM CHEST 1 VIEW: CPT

## 2024-03-26 PROCEDURE — 83605 ASSAY OF LACTIC ACID: CPT

## 2024-03-26 PROCEDURE — 80053 COMPREHEN METABOLIC PANEL: CPT

## 2024-03-26 RX ADMIN — MAGNESIUM SULFATE HEPTAHYDRATE SCH MLS: 40 INJECTION, SOLUTION INTRAVENOUS at 22:09

## 2024-03-26 RX ADMIN — MOMETASONE FUROATE SCH PUFF: 100 AEROSOL RESPIRATORY (INHALATION) at 21:44

## 2024-03-26 RX ADMIN — VANCOMYCIN SCH: 1.25 INJECTION, SOLUTION INTRAVENOUS at 18:15

## 2024-03-27 LAB
ALBUMIN SERPL BCG-MCNC: 3 G/DL (ref 3.4–4.8)
ALP SERPL-CCNC: 95 U/L (ref 40–110)
ALT SERPL W P-5'-P-CCNC: 14 U/L (ref 8–55)
ANION GAP SERPL CALC-SCNC: 17 MMOL/L (ref 10–20)
AST SERPL-CCNC: 14 U/L (ref 5–34)
BASOPHILS # BLD AUTO: 0.03 10X3/UL (ref 0–0.2)
BASOPHILS NFR BLD AUTO: 0.4 % (ref 0–2)
BILIRUB SERPL-MCNC: 0.6 MG/DL (ref 0.2–1.2)
BUN SERPL-MCNC: 18 MG/DL (ref 9.8–20.1)
CALCIUM SERPL-MCNC: 9.2 MG/DL (ref 7.8–10.44)
CHLORIDE SERPL-SCNC: 100 MMOL/L (ref 98–107)
CO2 SERPL-SCNC: 27 MMOL/L (ref 23–31)
CREAT CL PREDICTED SERPL C-G-VRATE: 78 ML/MIN (ref 70–130)
EOSINOPHIL # BLD AUTO: 0.01 10X3/UL (ref 0–0.5)
EOSINOPHIL NFR BLD AUTO: 0.1 % (ref 0–6)
GLOBULIN SER CALC-MCNC: 3.5 G/DL (ref 2.4–3.5)
GLUCOSE SERPL-MCNC: 243 MG/DL (ref 80–115)
HCT VFR BLD CALC: 41.9 % (ref 34.9–44.5)
HGB BLD-MCNC: 13.8 G/DL (ref 12–15.5)
LYMPHOCYTES NFR BLD AUTO: 9.1 % (ref 18–47)
MAGNESIUM SERPL-MCNC: 2.8 MG/DL (ref 1.6–2.6)
MCH RBC QN AUTO: 33.1 PG (ref 27–33)
MCV RBC AUTO: 100.5 FL (ref 81.6–98.3)
MONOCYTES # BLD AUTO: 0.2 10X3/UL (ref 0–1.1)
MONOCYTES NFR BLD AUTO: 2.8 % (ref 0–10)
NEUTROPHILS # BLD AUTO: 6.18 10X3/UL (ref 1.5–8.4)
NEUTROPHILS NFR BLD AUTO: 87 % (ref 40–75)
PLATELET # BLD AUTO: 41 10X3/UL (ref 150–450)
POTASSIUM SERPL-SCNC: 4.1 MMOL/L (ref 3.5–5.1)
RBC # BLD AUTO: 4.17 10X6/UL (ref 3.9–5.03)
SODIUM SERPL-SCNC: 140 MMOL/L (ref 136–145)
WBC # BLD AUTO: 7.1 10X3/UL (ref 3.5–10.5)

## 2024-03-27 RX ADMIN — MOMETASONE FUROATE SCH PUFF: 100 AEROSOL RESPIRATORY (INHALATION) at 07:37

## 2024-03-27 RX ADMIN — ISOSORBIDE MONONITRATE SCH MG: 60 TABLET, EXTENDED RELEASE ORAL at 09:04

## 2024-03-27 RX ADMIN — INSULIN LISPRO PRN UNIT: 100 INJECTION, SOLUTION INTRAVENOUS; SUBCUTANEOUS at 07:14

## 2024-03-27 RX ADMIN — AZITHROMYCIN SCH MLS: 500 INJECTION, POWDER, LYOPHILIZED, FOR SOLUTION INTRAVENOUS at 21:15

## 2024-03-28 VITALS — TEMPERATURE: 97.6 F | DIASTOLIC BLOOD PRESSURE: 58 MMHG | SYSTOLIC BLOOD PRESSURE: 132 MMHG

## 2024-03-28 LAB
ANION GAP SERPL CALC-SCNC: 13 MMOL/L (ref 10–20)
BASOPHILS # BLD AUTO: 0.03 10X3/UL (ref 0–0.2)
BASOPHILS NFR BLD AUTO: 0.4 % (ref 0–2)
BUN SERPL-MCNC: 18 MG/DL (ref 9.8–20.1)
CALCIUM SERPL-MCNC: 9.2 MG/DL (ref 7.8–10.44)
CHLORIDE SERPL-SCNC: 102 MMOL/L (ref 98–107)
CO2 SERPL-SCNC: 28 MMOL/L (ref 23–31)
CREAT CL PREDICTED SERPL C-G-VRATE: 95 ML/MIN (ref 70–130)
EOSINOPHIL # BLD AUTO: 0.07 10X3/UL (ref 0–0.5)
EOSINOPHIL NFR BLD AUTO: 0.8 % (ref 0–6)
GLUCOSE SERPL-MCNC: 232 MG/DL (ref 80–115)
HCT VFR BLD CALC: 38.9 % (ref 34.9–44.5)
HGB BLD-MCNC: 12.8 G/DL (ref 12–15.5)
LYMPHOCYTES NFR BLD AUTO: 28.6 % (ref 18–47)
MCH RBC QN AUTO: 32.9 PG (ref 27–33)
MCV RBC AUTO: 100 FL (ref 81.6–98.3)
MONOCYTES # BLD AUTO: 0.8 10X3/UL (ref 0–1.1)
MONOCYTES NFR BLD AUTO: 9.6 % (ref 0–10)
NEUTROPHILS # BLD AUTO: 4.98 10X3/UL (ref 1.5–8.4)
NEUTROPHILS NFR BLD AUTO: 60 % (ref 40–75)
PLATELET # BLD AUTO: 43 10X3/UL (ref 150–450)
POTASSIUM SERPL-SCNC: 3.6 MMOL/L (ref 3.5–5.1)
RBC # BLD AUTO: 3.89 10X6/UL (ref 3.9–5.03)
SODIUM SERPL-SCNC: 139 MMOL/L (ref 136–145)
WBC # BLD AUTO: 8.3 10X3/UL (ref 3.5–10.5)

## 2024-03-28 RX ADMIN — Medication SCH UNITS: at 07:59
